# Patient Record
Sex: FEMALE | Race: BLACK OR AFRICAN AMERICAN | Employment: OTHER | URBAN - METROPOLITAN AREA
[De-identification: names, ages, dates, MRNs, and addresses within clinical notes are randomized per-mention and may not be internally consistent; named-entity substitution may affect disease eponyms.]

---

## 2017-01-13 ENCOUNTER — HOSPITAL ENCOUNTER (OUTPATIENT)
Dept: MAMMOGRAPHY | Age: 72
Discharge: HOME | End: 2017-01-13
Attending: NURSE PRACTITIONER
Payer: MEDICARE

## 2017-01-13 DIAGNOSIS — Z13.39 SCREENING FOR ALCOHOLISM: ICD-10-CM

## 2017-01-13 DIAGNOSIS — I10 ESSENTIAL HYPERTENSION WITH GOAL BLOOD PRESSURE LESS THAN 130/80: ICD-10-CM

## 2017-01-13 DIAGNOSIS — Z12.39 SCREENING FOR MALIGNANT NEOPLASM OF BREAST: ICD-10-CM

## 2017-01-13 DIAGNOSIS — M89.9 DISORDER OF BONE: ICD-10-CM

## 2017-01-13 DIAGNOSIS — R10.9 LEFT SIDED ABDOMINAL PAIN: ICD-10-CM

## 2017-01-13 DIAGNOSIS — Z71.3 DIETARY COUNSELING AND SURVEILLANCE: ICD-10-CM

## 2017-01-13 DIAGNOSIS — L73.2 HIDRADENITIS SUPPURATIVA: ICD-10-CM

## 2017-01-13 DIAGNOSIS — Z71.89 ADVANCE CARE PLANNING: ICD-10-CM

## 2017-01-13 DIAGNOSIS — E66.9 OBESITY, CLASS I, BMI 30.0-34.9 (SEE ACTUAL BMI): ICD-10-CM

## 2017-01-13 DIAGNOSIS — Z12.31 VISIT FOR SCREENING MAMMOGRAM: ICD-10-CM

## 2017-01-13 DIAGNOSIS — E55.9 VITAMIN D DEFICIENCY: ICD-10-CM

## 2017-01-13 DIAGNOSIS — Z71.82 EXERCISE COUNSELING: ICD-10-CM

## 2017-01-13 DIAGNOSIS — E78.5 HYPERLIPIDEMIA, UNSPECIFIED HYPERLIPIDEMIA TYPE: ICD-10-CM

## 2017-01-13 DIAGNOSIS — Z13.820 SCREENING FOR OSTEOPOROSIS: ICD-10-CM

## 2017-01-13 DIAGNOSIS — N20.0 RENAL CALCULI: ICD-10-CM

## 2017-01-13 DIAGNOSIS — Z00.00 ROUTINE GENERAL MEDICAL EXAMINATION AT A HEALTH CARE FACILITY: ICD-10-CM

## 2017-01-13 PROCEDURE — 77080 DXA BONE DENSITY AXIAL: CPT

## 2017-01-13 PROCEDURE — 77067 SCR MAMMO BI INCL CAD: CPT

## 2017-01-23 ENCOUNTER — OFFICE VISIT (OUTPATIENT)
Dept: FAMILY MEDICINE CLINIC | Age: 72
End: 2017-01-23

## 2017-01-23 VITALS
HEIGHT: 60 IN | SYSTOLIC BLOOD PRESSURE: 112 MMHG | TEMPERATURE: 98.9 F | DIASTOLIC BLOOD PRESSURE: 73 MMHG | OXYGEN SATURATION: 97 % | RESPIRATION RATE: 20 BRPM | WEIGHT: 170.8 LBS | BODY MASS INDEX: 33.53 KG/M2 | HEART RATE: 102 BPM

## 2017-01-23 DIAGNOSIS — E55.9 VITAMIN D DEFICIENCY: ICD-10-CM

## 2017-01-23 DIAGNOSIS — Z71.82 EXERCISE COUNSELING: ICD-10-CM

## 2017-01-23 DIAGNOSIS — E66.9 OBESITY, CLASS I, BMI 30.0-34.9 (SEE ACTUAL BMI): ICD-10-CM

## 2017-01-23 DIAGNOSIS — I10 ESSENTIAL HYPERTENSION WITH GOAL BLOOD PRESSURE LESS THAN 130/80: Primary | ICD-10-CM

## 2017-01-23 DIAGNOSIS — L73.2 HIDRADENITIS SUPPURATIVA: ICD-10-CM

## 2017-01-23 DIAGNOSIS — Z83.3 FAMILY HISTORY OF DIABETES MELLITUS: ICD-10-CM

## 2017-01-23 DIAGNOSIS — Z71.3 DIETARY COUNSELING AND SURVEILLANCE: ICD-10-CM

## 2017-01-23 DIAGNOSIS — E78.5 HYPERLIPIDEMIA, UNSPECIFIED HYPERLIPIDEMIA TYPE: ICD-10-CM

## 2017-01-23 NOTE — PROGRESS NOTES
Progress Note    Patient: Angela Larson MRN: [id number]  SSN: [id number]    YOB: 1945  Age: 70 y.o. Sex: female        Chief Complaint   Patient presents with    Hypertension    Diabetes    Labs         Subjective:   Cardiovascular Review:  The patient has diabetes, hypertension and hyperlipidemia. Diet and Lifestyle: generally follows a low fat low cholesterol diet, exercises regularly, nonsmoker, caffeine intake mild, alcohol intake none  Home BP Monitoring: is not measured at home. Pertinent ROS: taking medications as instructed, no medication side effects noted, no TIA's, no chest pain on exertion, no dyspnea on exertion, no swelling of ankles, no orthostatic dizziness or lightheadedness, no orthopnea or paroxysmal nocturnal dyspnea, no palpitations. Patient states that she is taking her medications as prescribed. No issues or concerns. Denies any problems with side-effects.      Diabetes Mellitus:  She has diabetes mellitus, and diabetes, hypertension and hyperlipidemia. Diabetic ROS - medication compliance: compliant all of the time, diabetic diet compliance: compliant all of the time, home glucose monitoring: is performed regularly, minimum reading is 120, maximum reading is 150, and average reading is 140. .   Lab review: orders written for new lab studies as appropriate; see orders, Patient states that she is taking her medications as prescribed. No problems with low blood sugar. Encounter Diagnoses   Name Primary?     Essential hypertension with goal blood pressure less than 130/80 Yes    Hyperlipidemia, unspecified hyperlipidemia type     Vitamin D deficiency     Hidradenitis suppurativa     Exercise counseling     Dietary counseling and surveillance     Obesity, Class I, BMI 30.0-34.9 (see actual BMI)      Current and past medical information:    Current Medications after this visit[de-identified]   Current Outpatient Prescriptions   Medication Sig    HYDROcodone-acetaminophen ([place]) 7.5-325 mg per tablet     tamsulosin (FLOMAX) 0.4 mg capsule     furosemide (LASIX) 40 mg tablet Take 1 Tab by mouth daily for 90 days. Indications: EDEMA    metFORMIN (GLUCOPHAGE) 500 mg tablet Take 1 Tab by mouth two (2) times daily (with meals).  apixaban (ELIQUIS) 5 mg tablet Take 1 Tab by mouth every twelve (12) hours.  oxybutynin (DITROPAN) 5 mg tablet Take 1 Tab by mouth three (3) times daily.  loratadine (CLARITIN) 10 mg tablet Take 1 Tab by mouth daily for 360 days.  ezetimibe (ZETIA) 10 mg tablet Take 1 Tab by mouth daily.  atorvastatin (LIPITOR) 80 mg tablet Take 1 Tab by mouth daily. No current facility-administered medications for this visit. Patient Active Problem List    Diagnosis Date Noted    Advanced care planning/counseling discussion 06/13/2016    H/O Pulmonary embolism (1/2015) 01/05/2015    Edema 08/27/2014    Hydradenitis 02/01/2012    HTN (hypertension) 01/21/2011    AR (allergic rhinitis) 05/21/2010    Eczema 05/21/2010    Depression 05/21/2010    Female stress incontinence 05/21/2010    Hyperlipidemia 05/21/2010       Past Medical History   Diagnosis Date    Arthritis     Diabetes ([facility] [address]      states she is \"pre-diabetic\", diet controlled    frequency     H/O blood clots      right side of body    Heart attack ([facility] [address] 1/2014    Hydradenitis 2/1/2012    Hypercholesterolemia     Hypertension     Ill-defined condition      edema of legs       Allergies   Allergen Reactions    Pcn [Penicillins] Rash       History reviewed. No pertinent past surgical history.     Social History     Social History    Marital status: LEGALLY      Spouse name: N/A    Number of children: N/A    Years of education: N/A     Social History Main Topics    Smoking status: Never Smoker    Smokeless tobacco: Never Used    Alcohol use No    Drug use: No    Sexual activity: No     Other Topics Concern    None     Social History Narrative       Review of Systems Constitutional: Negative. Negative for chills, diaphoresis, fever, malaise/fatigue and weight loss. HENT: Negative. Negative for congestion and sore throat. Eyes: Negative. Negative for blurred vision, double vision, photophobia and pain. Respiratory: Negative. Negative for cough, hemoptysis, sputum production, shortness of breath and wheezing. Cardiovascular: Negative. Negative for chest pain, palpitations, orthopnea, claudication, leg swelling and PND. Gastrointestinal: Negative. Negative for abdominal pain, blood in stool, constipation, diarrhea, heartburn, melena, nausea and vomiting. Genitourinary: Negative. Negative for dysuria, flank pain, frequency, hematuria and urgency. Musculoskeletal: Negative. Negative for back pain, falls, joint pain, myalgias and neck pain. Skin: Negative for itching and rash. Abscesses under both axillary areas   Neurological: Negative for dizziness, tingling, tremors, sensory change, speech change, focal weakness, seizures, loss of consciousness, weakness and headaches. Endo/Heme/Allergies: Negative. Negative for environmental allergies and polydipsia. Does not bruise/bleed easily. Psychiatric/Behavioral: Negative. Negative for depression, hallucinations, memory loss, substance abuse and suicidal ideas. The patient is not nervous/anxious and does not have insomnia. Objective:     Vitals:    01/23/17 1339   BP: 112/73   Pulse: (!) 102   Resp: 20   Temp: 98.9 °F (37.2 °C)   TempSrc: Oral   SpO2: 97%   Weight: 170 lb 12.8 oz (77.5 kg)   Height: 5' (1.524 m)      Body mass index is 33.36 kg/(m^2). Physical Exam   Constitutional: She is oriented to person, place, and time and well-developed, well-nourished, and in no distress. No distress. HENT:   Head: Normocephalic and atraumatic.    Right Ear: External ear normal.   Left Ear: External ear normal.   Nose: Nose normal.   Mouth/Throat: Oropharynx is clear and moist. No oropharyngeal exudate. Eyes: Conjunctivae and EOM are normal. Pupils are equal, round, and reactive to light. Right eye exhibits no discharge. Left eye exhibits no discharge. No scleral icterus. Neck: Normal range of motion. Neck supple. No JVD present. No tracheal deviation present. No thyromegaly present. Cardiovascular: Normal rate, regular rhythm, normal heart sounds and intact distal pulses. Exam reveals no gallop and no friction rub. No murmur heard. Pulmonary/Chest: Effort normal and breath sounds normal. No stridor. No respiratory distress. She has no wheezes. She has no rales. She exhibits no tenderness. Abdominal: Soft. Normal appearance, normal aorta and bowel sounds are normal. She exhibits no shifting dullness, no distension, no pulsatile liver, no fluid wave, no abdominal bruit, no ascites, no pulsatile midline mass and no mass. There is no hepatosplenomegaly. There is no tenderness. There is no rebound, no guarding and no CVA tenderness. Musculoskeletal: Normal range of motion. She exhibits edema. She exhibits no tenderness or deformity. 2-3 + edema is noted in the bilateral lower extremities. Lymphadenopathy:     She has no cervical adenopathy. Neurological: She is alert and oriented to person, place, and time. She displays normal reflexes. No cranial nerve deficit. She exhibits normal muscle tone. Gait normal. Coordination normal. GCS score is 15. Skin: Skin is warm and dry. No rash noted. She is not diaphoretic. There is erythema. No pallor. Patient has multiple abscesses under the bilateral axillary area. Some has some open areas with yellow/white drainage noted. Painful on palpation. Psychiatric: Mood, memory, affect and judgment normal.   Nursing note and vitals reviewed.         Health Maintenance Due   Topic Date Due    BREAST CANCER SCRN MAMMOGRAM  03/05/1995    FOBT Q 1 YEAR AGE 50-75  03/05/1995    ZOSTER VACCINE AGE 60>  03/05/2005    GLAUCOMA SCREENING Q2Y  03/05/2010  Pneumococcal 65+ Low/Medium Risk (1 of 2 - PCV13) 03/05/2010       Assessment and orders:       ICD-10-CM ICD-9-CM    1. Essential hypertension with goal blood pressure less than 130/80 I10 401.9 Self management goals of living with hypertension include:   A. Taking medicine as prescribed,  B. Monitoring blood pressure response to therapy  C. Adopting lifestyle recommendations--increasing exercise, decreasing salt intake, and increasing fruits and vegetable consumption. Our goal is to normalize the blood pressure to decrease the risks of strokes and heart attacks. The patient is in agreement with the plan. Information printed and given to the patient for review. 2. Hyperlipidemia, unspecified hyperlipidemia type E78.5 272.4 The patient is aware of our goal to reduce or eliminate the long term problems (such as strokes and heart attacks) related to poorly controlled hyperlipidemia. Discussion about strict diet modification along with as much exercise as possible. Information printed and given to the patient for review. 3. Vitamin D deficiency E55.9 268.9 Stable at this visit. Patient to continue with her current treatment. 4. Hidradenitis suppurativa L73.2 705.83 Patient to keep her appointment with derm in February. Patient continues to have pain in the bilateral under arms. 5. Exercise counseling Z71.89 V65.41 Physical activity information given to patient and printed for review. 6. Dietary counseling and surveillance Z71.3 V65.3 Dietary information discussed with patient and printed for review. 7. Obesity, Class I, BMI 30.0-34.9 (see actual BMI) E66.9 278.00 I have reviewed/discussed the above normal BMI with the patient. I have recommended the following interventions: dietary management education, guidance, and counseling, dietary needs education, encourage exercise, feeding regime, lifestyle education regarding diet, monitor weight, nutrition therapy and nutrition/feeding management . The plan is as follows: I have counseled this patient on diet and exercise regimens. .             Plan of care:  Discussed diagnoses in detail with patient. Medication risks/benefits/side effects discussed with patient. All of the patient's questions were addressed. The patient understands and agrees with our plan of care. The patient knows to call back if they are unsure of or forget any changes we discussed today or if the symptoms change. The patient received an After-Visit Summary which contains VS, orders, medication list and allergy list. This can be used as a \"mini-medical record\" should they have to seek medical care while out of town.     Patient Care Team:  Boogie Leahy NP as PCP - General (Nurse Practitioner)  Deana Fuentes MD (General Surgery)  Evangelina Young MD (Cardiology)  Kimo Morales DPM (Podiatry)  Deana Fuentes MD (General Surgery)  Myrna Carias RN as Nurse Navigator  Marissa Munoz NP (Nurse Practitioner)    Follow-up Disposition: Not on File    Future Appointments  Date Time Provider [facility]   3/20/2017 2:20 PM Evangelina Young MD [address] [place] [address]       Signed By: Boogie Leahy NP     January 23, 2017

## 2017-01-23 NOTE — PATIENT INSTRUCTIONS
Body Mass Index: Care Instructions  Your Care Instructions    Body mass index (BMI) can help you see if your weight is raising your risk for health problems. It uses a formula to compare how much you weigh with how tall you are. A BMI between 18.5 and 24.9 is considered healthy. A BMI between 25 and 29.9 is considered overweight. A BMI of 30 or higher is considered obese. If your BMI is in the normal range, it means that you have a lower risk for weight-related health problems. If your BMI is in the overweight or obese range, you may be at increased risk for weight-related health problems, such as high blood pressure, heart disease, stroke, arthritis or joint pain, and diabetes. BMI is just one measure of your risk for weight-related health problems. You may be at higher risk for health problems if you are not active, you eat an unhealthy diet, or you drink too much alcohol or use tobacco products. Follow-up care is a key part of your treatment and safety. Be sure to make and go to all appointments, and call your doctor if you are having problems. It's also a good idea to know your test results and keep a list of the medicines you take. How can you care for yourself at home? · Practice healthy eating habits. This includes eating plenty of fruits, vegetables, whole grains, lean protein, and low-fat dairy. · Get at least 30 minutes of exercise 5 days a week or more. Brisk walking is a good choice. You also may want to do other activities, such as running, swimming, cycling, or playing tennis or team sports. · Do not smoke. Smoking can increase your risk for health problems. If you need help quitting, talk to your doctor about stop-smoking programs and medicines. These can increase your chances of quitting for good. · Limit alcohol to 2 drinks a day for men and 1 drink a day for women. Too much alcohol can cause health problems.   If you have a BMI higher than 25  · Your doctor may do other tests to check your risk for weight-related health problems. This may include measuring the distance around your waist. A waist measurement of more than 40 inches in men or 35 inches in women can increase the risk of weight-related health problems. · Talk with your doctor about steps you can take to stay healthy or improve your health. You may need to make lifestyle changes to lose weight and stay healthy, such as changing your diet and getting regular exercise. Where can you learn more? Go to http://milagros-vitaliy.info/. Enter S176 in the search box to learn more about \"Body Mass Index: Care Instructions. \"  Current as of: February 16, 2016  Content Version: 11.1  © 7302-8469 [employer]. Care instructions adapted under license by [employer] (which disclaims liability or warranty for this information). If you have questions about a medical condition or this instruction, always ask your healthcare professional. [address] any warranty or liability for your use of this information. Diabetic Renal Diet: Care Instructions  Your Care Instructions  You may already be spreading carbohydrate throughout your daily meals. When you also have kidney disease, you need to avoid foods that make your kidneys worse. Keep your blood sugar and blood pressure as near normal as you can to reduce your chance of kidney failure. Your doctor and dietitian will help you make an eating plan. It will be based on your body weight, size, and medical condition. You may need to limit salt, fluids, and protein. You also may need to limit minerals such as potassium and phosphorus. It takes planning, but there are plenty of tasty, healthy foods you can eat. Always talk with your doctor or dietitian before you make changes in your diet. Follow-up care is a key part of your treatment and safety. Be sure to make and go to all appointments, and call your doctor if you are having problems. It's also a good idea to know your test results and keep a list of the medicines you take. How can you care for yourself at home? · Work with your doctor or dietitian to create a food plan that guides your daily food choices. · Eat regular meals. Do not skip meals or go for many hours without eating. To help control your blood sugar, try to eat several small meals during the day, rather than three large ones. · You can use margarine, mayonnaise, and oil to add calories to your diet for energy. The healthiest oils are olive, canola, and safflower oils. · Talk to your dietitian about eating sweets, including honey and sugar. · Be safe with medicines. Take your medicines exactly as prescribed. Call your doctor if you think you are having a problem with your medicine. You will get more details on the specific medicines your doctor prescribes. · Do not take any other medicine without talking to your doctor first. This includes over-the-counter medicines, vitamins, and herbal products. · Limit alcohol to no more than 1 drink per day. Count it as part of your fluid allowance. To get the right amount of protein  · Ask your doctor or dietitian how much protein you can have each day. You need some protein to stay healthy. · Include all sources of protein in your daily protein count. Besides meat, poultry, and fish, protein is found in milk and milk products, breads, cereals, and most vegetables. To limit salt  · Do not add salt to your food. Avoid foods that list salt, sodium, or MSG as an ingredient. And look for \"reduced salt\" or \"low sodium\" on labels. · Do not use a salt substitute or lite salt unless your doctor says it is okay. (These products are high in potassium.)  · Avoid or use very small amounts of condiments and marinades. These include soy sauce, fish sauce, and barbecue sauce. They are high in sodium. · Avoid salted pretzels, chips, and other salted snacks.   · Check food labels to become more aware of the sodium content of foods. Foods that are high in sodium include soups; many canned foods; cured, smoked, or dried meats; and many packaged foods. To control carbohydrate  · Spread carbohydrate throughout the day. This helps to prevent high blood sugar after meals. Ask your dietitian how much carbohydrate you can have. Carbohydrate foods include:  ¨ Whole-grain and refined breads and cereals, and some vegetables such as peas and beans. ¨ Fruits, milk, and milk products (except cheese). ¨ Candy, table sugar, and regular carbonated drinks. To limit fluids  · Know what your fluid allowance is. Fill a pitcher with that amount of water every day. If you drink another fluid (such as coffee) that day, pour an equal amount out of the pitcher. · Foods that are liquid at room temperature count as fluids. These include ice cream and gelatin desserts such as Jell-O. To limit potassium  · Fruits that are low in potassium include blueberries and raspberries. · Vegetables that are low in potassium include cucumber and radishes. To limit phosphorus  · Follow your doctor's or dietitian's plan for your limit on milk and milk products in your diet. · Avoid nuts, peanut butter, seeds, lentils, beans, organ meats, and sardines. · Avoid cola drinks. · Avoid bran breads or bran cereals. They are high in phosphorus. Where can you learn more? Go to http://milagros-vitaliy.info/. Enter G799 in the search box to learn more about \"Diabetic Renal Diet: Care Instructions. \"  Current as of: May 23, 2016  Content Version: 11.1  © 3635-3861 [employer]. Care instructions adapted under license by [employer] (which disclaims liability or warranty for this information). If you have questions about a medical condition or this instruction, always ask your healthcare professional. [place] [address] any warranty or liability for your use of this information.        Eating Healthy Foods: Care Instructions  Your Care Instructions  Eating healthy foods can help lower your risk for disease. Healthy food gives you energy and keeps your heart strong, your brain active, your muscles working, and your bones strong. A healthy diet includes a variety of foods from the basic food groups: grains, vegetables, fruits, milk and milk products, and meat and beans. Some people may eat more of their favorite foods from only one food group and, as a result, miss getting the nutrients they need. So, it is important to pay attention not only to what you eat but also to what you are missing from your diet. You can eat a healthy, balanced diet by making a few small changes. Follow-up care is a key part of your treatment and safety. Be sure to make and go to all appointments, and call your doctor if you are having problems. Its also a good idea to know your test results and keep a list of the medicines you take. How can you care for yourself at home? Look at what you eat  · Keep a food diary for a week or two and record everything you eat or drink. Track the number of servings you eat from each food group. · For a balanced diet every day, eat a variety of:  ¨ 6 or more ounce-equivalents of grains, such as cereals, breads, crackers, rice, or pasta, every day. An ounce-equivalent is 1 slice of bread, 1 cup of ready-to-eat cereal, or ½ cup of cooked rice, cooked pasta, or cooked cereal.  ¨ 2½ cups of vegetables, especially:  § Dark-green vegetables such as broccoli and spinach. § Orange vegetables such as carrots and sweet potatoes. § Dry beans (such as barcenas and kidney beans) and peas (such as lentils). ¨ 2 cups of fresh, frozen, or canned fruit. A small apple or 1 banana or orange equals 1 cup. ¨ 3 cups of nonfat or low-fat milk, yogurt, or other milk products. ¨ 5½ ounces of meat and beans, such as chicken, fish, lean meat, beans, nuts, and seeds.  One egg, 1 tablespoon of peanut butter, ½ ounce nuts or seeds, or ¼ cup of cooked beans equals 1 ounce of meat. · Learn how to read food labels for serving sizes and ingredients. Fast-food and convenience-food meals often contain few or no fruits or vegetables. Make sure you eat some fruits and vegetables to make the meal more nutritious. · Look at your food diary. For each food group, add up what you have eaten and then divide the total by the number of days. This will give you an idea of how much you are eating from each food group. See if you can find some ways to change your diet to make it more healthy. Start small  · Do not try to make dramatic changes to your diet all at once. You might feel that you are missing out on your favorite foods and then be more likely to fail. · Start slowly, and gradually change your habits. Try some of the following:  ¨ Use whole wheat bread instead of white bread. ¨ Use nonfat or low-fat milk instead of whole milk. ¨ Eat brown rice instead of white rice, and eat whole wheat pasta instead of white-flour pasta. ¨ Try low-fat cheeses and low-fat yogurt. ¨ Add more fruits and vegetables to meals and have them for snacks. ¨ Add lettuce, tomato, cucumber, and onion to sandwiches. ¨ Add fruit to yogurt and cereal.  Enjoy food  · You can still eat your favorite foods. You just may need to eat less of them. If your favorite foods are high in fat, salt, and sugar, limit how often you eat them, but do not cut them out entirely. · Eat a wide variety of foods. Make healthy choices when eating out  · The type of restaurant you choose can help you make healthy choices. Even fast-food chains are now offering more low-fat or healthier choices on the menu. · Choose smaller portions, or take half of your meal home. · When eating out, try:  ¨ A veggie pizza with a whole wheat crust or grilled chicken (instead of sausage or pepperoni). ¨ Pasta with roasted vegetables, grilled chicken, or marinara sauce instead of cream sauce.   ¨ A vegetable wrap or grilled chicken wrap. ¨ Broiled or poached food instead of fried or breaded items. Make healthy choices easy  · Buy packaged, prewashed, ready-to-eat fresh vegetables and fruits, such as baby carrots, salad mixes, and chopped or shredded broccoli and cauliflower. · Buy packaged, presliced fruits, such as melon or pineapple. · Choose 100% fruit or vegetable juice instead of soda. Limit juice intake to 4 to 6 oz (½ to ¾ cup) a day. · Blend low-fat yogurt, fruit juice, and canned or frozen fruit to make a smoothie for breakfast or a snack. Where can you learn more? Go to http://milagros-vitaliy.info/. Enter T756 in the search box to learn more about \"Eating Healthy Foods: Care Instructions. \"  Current as of: November 20, 2015  Content Version: 11.1  © 5980-1867 [employer]. Care instructions adapted under license by [employer] (which disclaims liability or warranty for this information). If you have questions about a medical condition or this instruction, always ask your healthcare professional. [place] [address] any warranty or liability for your use of this information. Learning About Healthy Weight  What is a healthy weight? A healthy weight is the weight at which you feel good about yourself and have energy for work and play. It's also one that lowers your risk for health problems. What can you do to stay at a healthy weight? It can be hard to stay at a healthy weight, especially when fast food, vending-machine snacks, and processed foods are so easy to find. And with your busy lifestyle, activity may be low on your list of things to do. But staying at a healthy weight may be easier than you think. Here are some dos and don'ts for staying at a healthy weight:  Do eat healthy foods  The kinds of foods you eat have a big impact on both your weight and your health.  Reaching and staying at a healthy weight is not about going on a diet. It's about making healthier food choices every day and changing your diet for good. Healthy eating means eating a variety of foods so that you get all the nutrients you need. Your body needs protein, carbohydrate, and fats for energy. They keep your heart beating, your brain active, and your muscles working. On most days, try to eat from each food group. This means eating a variety of:  · Whole grains, such as whole wheat breads and pastas. · Fruits and vegetables. · Dairy products, such as low-fat milk, yogurt, and cheese. · Lean proteins, such as all types of fish, chicken without the skin, and beans. Don't have too much or too little of one thing. All foods, if eaten in moderation, can be part of healthy eating. Even sweets can be okay. If your favorite foods are high in fat, salt, sugar, or calories, limit how often you eat them. Eat smaller servings, or look for healthy substitutes. Do watch what you eat  Many people eat more than their bodies need. Part of staying at a healthy weight means learning how much food you really need from day to day and not eating more than that. Even with healthy foods, eating too much can make you gain weight. Having a well-balanced diet means that you eat enough, but not too much, and that your food gives you the nutrients you need to stay healthy. So listen to your body. Eat when you're hungry. Stop when you feel satisfied. It's a good idea to have healthy snacks ready for when you get hungry. Keep healthy snacks with you at work, in your car, and at home. If you have a healthy snack easily available, you'll be less likely to pick a candy bar or bag of chips from a vending machine instead. Some healthy snacks you might want to keep on hand are fruit, low-fat yogurt, string cheese, low-fat microwave popcorn, raisins and other dried fruit, nuts, whole wheat crackers, pretzels, carrots, celery sticks, and broccoli.   Do some physical activity  A big part of reaching and staying at a healthy weight is being active. When you're active, you burn calories. This makes it easier to reach and stay at a healthy weight. When you're active on a regular basis, your body burns more calories, even when you're at rest. Being active helps you lose fat and build lean muscle. Try to be active for at least 1 hour every day. This may sound like a lot, but it's okay to be active in smaller blocks of time that add up to 1 hour a day. Any activity that makes your heart beat faster and keeps it there for a while counts. A brisk walk, run, or swim will get your heart beating faster. So will climbing stairs, shooting baskets, or cycling. Even some household chores like vacuuming and mowing the lawn will get your heart rate up. Pick activities that you enjoy--ones that make your heart beat faster, your muscles stronger, and your muscles and joints more flexible. If you find more than one thing you like doing, do them all. You don't have to do the same thing every day. Don't diet  Diets don't work. Diets are temporary. Because you give up so much when you diet, you may be hungry and think about food all the time. And after you stop dieting, you also may overeat to make up for what you missed. Most people who diet end up gaining back the pounds they lost--and more. Remember that healthy bodies come in lots of shapes and sizes. Everyone can get healthier by eating better and being more active. Where can you learn more? Go to http://milagros-vitaliy.info/. Enter 809 0177 in the search box to learn more about \"Learning About Healthy Weight. \"  Current as of: February 16, 2016  Content Version: 11.1  © 0398-2515 [employer]. Care instructions adapted under license by [employer] (which disclaims liability or warranty for this information).  If you have questions about a medical condition or this instruction, always ask your healthcare professional. [employer] disclaims any warranty or liability for your use of this information.

## 2017-01-23 NOTE — MR AVS SNAPSHOT
Visit Information Date & Time Provider Department Dept. Phone Encounter #  
 1/23/2017  1:25 PM Hyman ArmondLAINA [facility] [phone] [id number] Your Appointments 3/20/2017  2:20 PM  
ESTABLISHED PATIENT with Hemant Gomez MD  
[facility] ([facility] SCHEDULING) Appt Note: 1 yr fu appt  sll; ANNUAL  
 [address] [facility] 600 1007 [address] Upcoming Health Maintenance Date Due  
 BREAST CANCER SCRN MAMMOGRAM 3/5/1995 FOBT Q 1 YEAR AGE 50-75 3/5/1995 ZOSTER VACCINE AGE 60> 3/5/2005 GLAUCOMA SCREENING Q2Y 3/5/2010 Pneumococcal 65+ Low/Medium Risk (1 of 2 - PCV13) 3/5/2010 MEDICARE YEARLY EXAM 11/18/2017 DTaP/Tdap/Td series (2 - Td) 9/13/2022 Allergies as of 1/23/2017  Review Complete On: 1/23/2017 By: Veronica Forde Severity Noted Reaction Type Reactions Pcn [Penicillins]  01/03/2011    Rash Current Immunizations  Reviewed on 9/13/2012 Name Date TDAP Vaccine 9/13/2012 Not reviewed this visit You Were Diagnosed With   
  
 Codes Comments Essential hypertension with goal blood pressure less than 130/80    -  Primary ICD-10-CM: I10 
ICD-9-CM: 401.9 Hyperlipidemia, unspecified hyperlipidemia type     ICD-10-CM: E78.5 ICD-9-CM: 272.4 Vitamin D deficiency     ICD-10-CM: E55.9 ICD-9-CM: 268.9 Hidradenitis suppurativa     ICD-10-CM: L73.2 ICD-9-CM: 705.83 Exercise counseling     ICD-10-CM: Z71.89 ICD-9-CM: V65.41 Dietary counseling and surveillance     ICD-10-CM: Z71.3 ICD-9-CM: V65.3 Obesity, Class I, BMI 30.0-34.9 (see actual BMI)     ICD-10-CM: E66.9 ICD-9-CM: 278.00 Family history of diabetes mellitus     ICD-10-CM: Z83.3 ICD-9-CM: V18.0 Vitals BP Pulse Temp Resp Height(growth percentile) Weight(growth percentile) 112/73 (!) 102 98.9 °F (37.2 °C) (Oral) 20 5' (1.524 m) 170 lb 12.8 oz (77.5 kg) SpO2 BMI OB Status Smoking Status 97% 33.36 kg/m2 Postmenopausal Never Smoker Vitals History BMI and BSA Data Body Mass Index Body Surface Area  
 33.36 kg/m 2 1.81 m 2 Preferred Pharmacy Pharmacy Name Phone [facility] [address] [place] [address] [address] [phone] Your Updated Medication List  
  
   
This list is accurate as of: 1/23/17  2:05 PM.  Always use your most recent med list.  
  
  
  
  
 apixaban 5 mg tablet Commonly known as:  Thao Falling Take 1 Tab by mouth every twelve (12) hours. atorvastatin 80 mg tablet Commonly known as:  LIPITOR Take 1 Tab by mouth daily. ezetimibe 10 mg tablet Commonly known as:  Paco Lamprey Take 1 Tab by mouth daily. furosemide 40 mg tablet Commonly known as:  LASIX Take 1 Tab by mouth daily for 90 days. Indications: EDEMA HYDROcodone-acetaminophen 7.5-325 mg per tablet Commonly known as:  NORCO  
  
 loratadine 10 mg tablet Commonly known as:  Shahnaz Shaylee Take 1 Tab by mouth daily for 360 days. metFORMIN 500 mg tablet Commonly known as:  GLUCOPHAGE Take 1 Tab by mouth two (2) times daily (with meals). oxybutynin 5 mg tablet Commonly known as:  XFPOCJCN Take 1 Tab by mouth three (3) times daily. We Performed the Following CBC WITH AUTOMATED DIFF [01524 CPT(R)] HEMOGLOBIN A1C WITH EAG [95243 CPT(R)] LIPID PANEL [25455 CPT(R)] MAGNESIUM [id number] CPT(R)] METABOLIC PANEL, COMPREHENSIVE [75348 CPT(R)] MICROALBUMIN, UR, RAND W/ MICROALBUMIN/CREA RATIO [id number] CPT(R)] PHOSPHORUS [48581 CPT(R)] PTH INTACT [79782 CPT(R)] T4, FREE [id number] CPT(R)] TSH 3RD GENERATION [31190 CPT(R)] URINALYSIS W/MICROSCOPIC [72042 CPT(R)] VITAMIN D, 25 HYDROXY [id number] CPT(R)] Patient Instructions Body Mass Index: Care Instructions Your Care Instructions Body mass index (BMI) can help you see if your weight is raising your risk for health problems. It uses a formula to compare how much you weigh with how tall you are. A BMI between 18.5 and 24.9 is considered healthy. A BMI between 25 and 29.9 is considered overweight. A BMI of 30 or higher is considered obese. If your BMI is in the normal range, it means that you have a lower risk for weight-related health problems. If your BMI is in the overweight or obese range, you may be at increased risk for weight-related health problems, such as high blood pressure, heart disease, stroke, arthritis or joint pain, and diabetes. BMI is just one measure of your risk for weight-related health problems. You may be at higher risk for health problems if you are not active, you eat an unhealthy diet, or you drink too much alcohol or use tobacco products. Follow-up care is a key part of your treatment and safety. Be sure to make and go to all appointments, and call your doctor if you are having problems. It's also a good idea to know your test results and keep a list of the medicines you take. How can you care for yourself at home? · Practice healthy eating habits. This includes eating plenty of fruits, vegetables, whole grains, lean protein, and low-fat dairy. · Get at least 30 minutes of exercise 5 days a week or more. Brisk walking is a good choice. You also may want to do other activities, such as running, swimming, cycling, or playing tennis or team sports. · Do not smoke. Smoking can increase your risk for health problems. If you need help quitting, talk to your doctor about stop-smoking programs and medicines. These can increase your chances of quitting for good. · Limit alcohol to 2 drinks a day for men and 1 drink a day for women. Too much alcohol can cause health problems. If you have a BMI higher than 25 · Your doctor may do other tests to check your risk for weight-related health problems. This may include measuring the distance around your waist. A waist measurement of more than 40 inches in men or 35 inches in women can increase the risk of weight-related health problems. · Talk with your doctor about steps you can take to stay healthy or improve your health. You may need to make lifestyle changes to lose weight and stay healthy, such as changing your diet and getting regular exercise. Where can you learn more? Go to http://milagros-vitaliy.info/. Enter S176 in the search box to learn more about \"Body Mass Index: Care Instructions. \" Current as of: February 16, 2016 Content Version: 11.1 © 3804-5201 [employer]. Care instructions adapted under license by [employer] (which disclaims liability or warranty for this information). If you have questions about a medical condition or this instruction, always ask your healthcare professional. [address] any warranty or liability for your use of this information. Diabetic Renal Diet: Care Instructions Your Care Instructions You may already be spreading carbohydrate throughout your daily meals. When you also have kidney disease, you need to avoid foods that make your kidneys worse. Keep your blood sugar and blood pressure as near normal as you can to reduce your chance of kidney failure. Your doctor and dietitian will help you make an eating plan. It will be based on your body weight, size, and medical condition. You may need to limit salt, fluids, and protein. You also may need to limit minerals such as potassium and phosphorus. It takes planning, but there are plenty of tasty, healthy foods you can eat. Always talk with your doctor or dietitian before you make changes in your diet. Follow-up care is a key part of your treatment and safety.  Be sure to make and go to all appointments, and call your doctor if you are having problems. It's also a good idea to know your test results and keep a list of the medicines you take. How can you care for yourself at home? · Work with your doctor or dietitian to create a food plan that guides your daily food choices. · Eat regular meals. Do not skip meals or go for many hours without eating. To help control your blood sugar, try to eat several small meals during the day, rather than three large ones. · You can use margarine, mayonnaise, and oil to add calories to your diet for energy. The healthiest oils are olive, canola, and safflower oils. · Talk to your dietitian about eating sweets, including honey and sugar. · Be safe with medicines. Take your medicines exactly as prescribed. Call your doctor if you think you are having a problem with your medicine. You will get more details on the specific medicines your doctor prescribes. · Do not take any other medicine without talking to your doctor first. This includes over-the-counter medicines, vitamins, and herbal products. · Limit alcohol to no more than 1 drink per day. Count it as part of your fluid allowance. To get the right amount of protein · Ask your doctor or dietitian how much protein you can have each day. You need some protein to stay healthy. · Include all sources of protein in your daily protein count. Besides meat, poultry, and fish, protein is found in milk and milk products, breads, cereals, and most vegetables. To limit salt · Do not add salt to your food. Avoid foods that list salt, sodium, or MSG as an ingredient. And look for \"reduced salt\" or \"low sodium\" on labels. · Do not use a salt substitute or lite salt unless your doctor says it is okay. (These products are high in potassium.) · Avoid or use very small amounts of condiments and marinades. These include soy sauce, fish sauce, and barbecue sauce. They are high in sodium. · Avoid salted pretzels, chips, and other salted snacks. · Check food labels to become more aware of the sodium content of foods. Foods that are high in sodium include soups; many canned foods; cured, smoked, or dried meats; and many packaged foods. To control carbohydrate · Spread carbohydrate throughout the day. This helps to prevent high blood sugar after meals. Ask your dietitian how much carbohydrate you can have. Carbohydrate foods include: ¨ Whole-grain and refined breads and cereals, and some vegetables such as peas and beans. ¨ Fruits, milk, and milk products (except cheese). ¨ Candy, table sugar, and regular carbonated drinks. To limit fluids · Know what your fluid allowance is. Fill a pitcher with that amount of water every day. If you drink another fluid (such as coffee) that day, pour an equal amount out of the pitcher. · Foods that are liquid at room temperature count as fluids. These include ice cream and gelatin desserts such as Jell-O. To limit potassium · Fruits that are low in potassium include blueberries and raspberries. · Vegetables that are low in potassium include cucumber and radishes. To limit phosphorus · Follow your doctor's or dietitian's plan for your limit on milk and milk products in your diet. · Avoid nuts, peanut butter, seeds, lentils, beans, organ meats, and sardines. · Avoid cola drinks. · Avoid bran breads or bran cereals. They are high in phosphorus. Where can you learn more? Go to http://milagros-vitaliy.info/. Enter N544 in the search box to learn more about \"Diabetic Renal Diet: Care Instructions. \" Current as of: May 23, 2016 Content Version: 11.1 © 3445-5983 [employer] Care instructions adapted under license by surespot (which disclaims liability or warranty for this information).  If you have questions about a medical condition or this instruction, always ask your healthcare professional. Gwendolyn Anglin[employer]orporated disclaims any warranty or liability for your use of this information. Eating Healthy Foods: Care Instructions Your Care Instructions Eating healthy foods can help lower your risk for disease. Healthy food gives you energy and keeps your heart strong, your brain active, your muscles working, and your bones strong. A healthy diet includes a variety of foods from the basic food groups: grains, vegetables, fruits, milk and milk products, and meat and beans. Some people may eat more of their favorite foods from only one food group and, as a result, miss getting the nutrients they need. So, it is important to pay attention not only to what you eat but also to what you are missing from your diet. You can eat a healthy, balanced diet by making a few small changes. Follow-up care is a key part of your treatment and safety. Be sure to make and go to all appointments, and call your doctor if you are having problems. Its also a good idea to know your test results and keep a list of the medicines you take. How can you care for yourself at home? Look at what you eat · Keep a food diary for a week or two and record everything you eat or drink. Track the number of servings you eat from each food group. · For a balanced diet every day, eat a variety of: ¨ 6 or more ounce-equivalents of grains, such as cereals, breads, crackers, rice, or pasta, every day. An ounce-equivalent is 1 slice of bread, 1 cup of ready-to-eat cereal, or ½ cup of cooked rice, cooked pasta, or cooked cereal. 
¨ 2½ cups of vegetables, especially: § Dark-green vegetables such as broccoli and spinach. § Orange vegetables such as carrots and sweet potatoes. § Dry beans (such as barcenas and kidney beans) and peas (such as lentils). ¨ 2 cups of fresh, frozen, or canned fruit. A small apple or 1 banana or orange equals 1 cup. ¨ 3 cups of nonfat or low-fat milk, yogurt, or other milk products. ¨ 5½ ounces of meat and beans, such as chicken, fish, lean meat, beans, nuts, and seeds. One egg, 1 tablespoon of peanut butter, ½ ounce nuts or seeds, or ¼ cup of cooked beans equals 1 ounce of meat. · Learn how to read food labels for serving sizes and ingredients. Fast-food and convenience-food meals often contain few or no fruits or vegetables. Make sure you eat some fruits and vegetables to make the meal more nutritious. · Look at your food diary. For each food group, add up what you have eaten and then divide the total by the number of days. This will give you an idea of how much you are eating from each food group. See if you can find some ways to change your diet to make it more healthy. Start small · Do not try to make dramatic changes to your diet all at once. You might feel that you are missing out on your favorite foods and then be more likely to fail. · Start slowly, and gradually change your habits. Try some of the following: ¨ Use whole wheat bread instead of white bread. ¨ Use nonfat or low-fat milk instead of whole milk. ¨ Eat brown rice instead of white rice, and eat whole wheat pasta instead of white-flour pasta. ¨ Try low-fat cheeses and low-fat yogurt. ¨ Add more fruits and vegetables to meals and have them for snacks. ¨ Add lettuce, tomato, cucumber, and onion to sandwiches. ¨ Add fruit to yogurt and cereal. 
Enjoy food · You can still eat your favorite foods. You just may need to eat less of them. If your favorite foods are high in fat, salt, and sugar, limit how often you eat them, but do not cut them out entirely. · Eat a wide variety of foods. Make healthy choices when eating out · The type of restaurant you choose can help you make healthy choices. Even fast-food chains are now offering more low-fat or healthier choices on the menu. · Choose smaller portions, or take half of your meal home. · When eating out, try: ¨ A veggie pizza with a whole wheat crust or grilled chicken (instead of sausage or pepperoni). ¨ Pasta with roasted vegetables, grilled chicken, or marinara sauce instead of cream sauce. ¨ A vegetable wrap or grilled chicken wrap. ¨ Broiled or poached food instead of fried or breaded items. Make healthy choices easy · Buy packaged, prewashed, ready-to-eat fresh vegetables and fruits, such as baby carrots, salad mixes, and chopped or shredded broccoli and cauliflower. · Buy packaged, presliced fruits, such as melon or pineapple. · Choose 100% fruit or vegetable juice instead of soda. Limit juice intake to 4 to 6 oz (½ to ¾ cup) a day. · Blend low-fat yogurt, fruit juice, and canned or frozen fruit to make a smoothie for breakfast or a snack. Where can you learn more? Go to http://milagros-vitaliy.info/. Enter T756 in the search box to learn more about \"Eating Healthy Foods: Care Instructions. \" Current as of: November 20, 2015 Content Version: 11.1 © 5663-8495 Mediv[employer]. Care instructions adapted under license by Planet Daily (which disclaims liability or warranty for this information). If you have questions about a medical condition or this instruction, always ask your healthcare professional. [place] [address] any warranty or liability for your use of this information. Learning About Healthy Weight What is a healthy weight? A healthy weight is the weight at which you feel good about yourself and have energy for work and play. It's also one that lowers your risk for health problems. What can you do to stay at a healthy weight? It can be hard to stay at a healthy weight, especially when fast food, vending-machine snacks, and processed foods are so easy to find. And with your busy lifestyle, activity may be low on your list of things to do. But staying at a healthy weight may be easier than you think. Here are some dos and don'ts for staying at a healthy weight: 
Do eat healthy foods The kinds of foods you eat have a big impact on both your weight and your health. Reaching and staying at a healthy weight is not about going on a diet. It's about making healthier food choices every day and changing your diet for good. Healthy eating means eating a variety of foods so that you get all the nutrients you need. Your body needs protein, carbohydrate, and fats for energy. They keep your heart beating, your brain active, and your muscles working. On most days, try to eat from each food group. This means eating a variety of: · Whole grains, such as whole wheat breads and pastas. · Fruits and vegetables. · Dairy products, such as low-fat milk, yogurt, and cheese. · Lean proteins, such as all types of fish, chicken without the skin, and beans. Don't have too much or too little of one thing. All foods, if eaten in moderation, can be part of healthy eating. Even sweets can be okay. If your favorite foods are high in fat, salt, sugar, or calories, limit how often you eat them. Eat smaller servings, or look for healthy substitutes. Do watch what you eat Many people eat more than their bodies need. Part of staying at a healthy weight means learning how much food you really need from day to day and not eating more than that. Even with healthy foods, eating too much can make you gain weight. Having a well-balanced diet means that you eat enough, but not too much, and that your food gives you the nutrients you need to stay healthy. So listen to your body. Eat when you're hungry. Stop when you feel satisfied. It's a good idea to have healthy snacks ready for when you get hungry. Keep healthy snacks with you at work, in your car, and at home. If you have a healthy snack easily available, you'll be less likely to pick a candy bar or bag of chips from a vending machine instead. Some healthy snacks you might want to keep on hand are fruit, low-fat yogurt, string cheese, low-fat microwave popcorn, raisins and other dried fruit, nuts, whole wheat crackers, pretzels, carrots, celery sticks, and broccoli. Do some physical activity A big part of reaching and staying at a healthy weight is being active. When you're active, you burn calories. This makes it easier to reach and stay at a healthy weight. When you're active on a regular basis, your body burns more calories, even when you're at rest. Being active helps you lose fat and build lean muscle. Try to be active for at least 1 hour every day. This may sound like a lot, but it's okay to be active in smaller blocks of time that add up to 1 hour a day. Any activity that makes your heart beat faster and keeps it there for a while counts. A brisk walk, run, or swim will get your heart beating faster. So will climbing stairs, shooting baskets, or cycling. Even some household chores like vacuuming and mowing the lawn will get your heart rate up. Pick activities that you enjoyones that make your heart beat faster, your muscles stronger, and your muscles and joints more flexible. If you find more than one thing you like doing, do them all. You don't have to do the same thing every day. Don't diet Diets don't work. Diets are temporary. Because you give up so much when you diet, you may be hungry and think about food all the time. And after you stop dieting, you also may overeat to make up for what you missed. Most people who diet end up gaining back the pounds they lostand more. Remember that healthy bodies come in lots of shapes and sizes. Everyone can get healthier by eating better and being more active. Where can you learn more? Go to http://milagros-vitaliy.info/. Enter 929 3653 in the search box to learn more about \"Learning About Healthy Weight. \" Current as of: February 16, 2016 Content Version: 11.1 © 6088-3230 Healthwise, Incorporated. Care instructions adapted under license by Limtel (which disclaims liability or warranty for this information). If you have questions about a medical condition or this instruction, always ask your healthcare professional. [address] any warranty or liability for your use of this information. Introducing [facility]! Gwendolyn Johnson introduces AimWith patient portal. Now you can access parts of your medical record, email your doctor's office, and request medication refills online. 1. In your internet browser, go to [email or web address] "[email or web address]."/[email or web address]. 2. Click on the First Time User? Click Here link in the Sign In box. You will see the New Member Sign Up page. 3. Enter your AimWith Access Code exactly as it appears below. You will not need to use this code after youve completed the sign-up process. If you do not sign up before the expiration date, you must request a new code. · AimWith Access Code: [email or web address] Expires: 3/18/2017  5:21 PM 
 
4. Enter the last four digits of your Social Security Number (xxxx) and Date of Birth (mm/dd/yyyy) as indicated and click Submit. You will be taken to the next sign-up page. 5. Create a AimWith ID. This will be your AimWith login ID and cannot be changed, so think of one that is secure and easy to remember. 6. Create a AimWith password. You can change your password at any time. 7. Enter your Password Reset Question and Answer. This can be used at a later time if you forget your password. 8. Enter your e-mail address. You will receive e-mail notification when new information is available in [address] 9. Click Sign Up. You can now view and download portions of your medical record. 10. Click the Download Summary menu link to download a portable copy of your medical information.  
 
If you have questions, please visit the Frequently Asked Questions section of the PATHSENSORS. Remember, [employer] Solutions[employer] is NOT to be used for urgent needs. For medical emergencies, dial 911. Now available from your iPhone and Android! Please provide this summary of care documentation to your next provider. Your primary care clinician is listed as [address] [place] [address]. If you have any questions after today's visit, please call [phone].

## 2017-01-23 NOTE — PROGRESS NOTES
Health Maintenance Due   Topic Date Due    BREAST CANCER SCRN MAMMOGRAM  03/05/1995    FOBT Q 1 YEAR AGE 50-75  03/05/1995    ZOSTER VACCINE AGE 60>  03/05/2005    GLAUCOMA SCREENING Q2Y  03/05/2010    Pneumococcal 65+ Low/Medium Risk (1 of 2 - PCV13) 03/05/2010       Diabetic Bundle:  LDL-122  A1C-6.8  BP-  Smoking?no  Anticoagulation medication?  yes  Eye exam dilated?good  Foot exam?good

## 2017-01-24 ENCOUNTER — TELEPHONE (OUTPATIENT)
Dept: FAMILY MEDICINE CLINIC | Age: 72
End: 2017-01-24

## 2017-01-24 LAB
25(OH)D3+25(OH)D2 SERPL-MCNC: 12.3 NG/ML (ref 30–100)
ALBUMIN SERPL-MCNC: 3.3 G/DL (ref 3.5–4.8)
ALBUMIN/CREAT UR: 10.2 MG/G CREAT (ref 0–30)
ALBUMIN/GLOB SERPL: 0.8 {RATIO} (ref 1.1–2.5)
ALP SERPL-CCNC: 136 IU/L (ref 39–117)
ALT SERPL-CCNC: 15 IU/L (ref 0–32)
APPEARANCE UR: ABNORMAL
AST SERPL-CCNC: 20 IU/L (ref 0–40)
BACTERIA #/AREA URNS HPF: ABNORMAL /[HPF]
BASOPHILS # BLD AUTO: 0 X10E3/UL (ref 0–0.2)
BASOPHILS NFR BLD AUTO: 0 %
BILIRUB SERPL-MCNC: 0.3 MG/DL (ref 0–1.2)
BILIRUB UR QL STRIP: NEGATIVE
BUN SERPL-MCNC: 11 MG/DL (ref 8–27)
BUN/CREAT SERPL: 14 (ref 11–26)
CALCIUM SERPL-MCNC: 9.1 MG/DL (ref 8.7–10.3)
CASTS URNS QL MICRO: ABNORMAL /LPF
CHLORIDE SERPL-SCNC: 98 MMOL/L (ref 96–106)
CHOLEST SERPL-MCNC: 192 MG/DL (ref 100–199)
CO2 SERPL-SCNC: 25 MMOL/L (ref 18–29)
COLOR UR: YELLOW
CREAT SERPL-MCNC: 0.8 MG/DL (ref 0.57–1)
CREAT UR-MCNC: 159.6 MG/DL
CRYSTALS URNS MICRO: ABNORMAL
EOSINOPHIL # BLD AUTO: 0.4 X10E3/UL (ref 0–0.4)
EOSINOPHIL NFR BLD AUTO: 3 %
EPI CELLS #/AREA URNS HPF: ABNORMAL /HPF
ERYTHROCYTE [DISTWIDTH] IN BLOOD BY AUTOMATED COUNT: 15.1 % (ref 12.3–15.4)
EST. AVERAGE GLUCOSE BLD GHB EST-MCNC: 148 MG/DL
GLOBULIN SER CALC-MCNC: 4.4 G/DL (ref 1.5–4.5)
GLUCOSE SERPL-MCNC: 116 MG/DL (ref 65–99)
GLUCOSE UR QL: NEGATIVE
HBA1C MFR BLD: 6.8 % (ref 4.8–5.6)
HCT VFR BLD AUTO: 33.5 % (ref 34–46.6)
HDLC SERPL-MCNC: 48 MG/DL
HGB BLD-MCNC: 11.1 G/DL (ref 11.1–15.9)
HGB UR QL STRIP: ABNORMAL
IMM GRANULOCYTES # BLD: 0 X10E3/UL (ref 0–0.1)
IMM GRANULOCYTES NFR BLD: 0 %
KETONES UR QL STRIP: NEGATIVE
LDLC SERPL CALC-MCNC: 121 MG/DL (ref 0–99)
LEUKOCYTE ESTERASE UR QL STRIP: ABNORMAL
LYMPHOCYTES # BLD AUTO: 2.2 X10E3/UL (ref 0.7–3.1)
LYMPHOCYTES NFR BLD AUTO: 19 %
MAGNESIUM SERPL-MCNC: 1.8 MG/DL (ref 1.6–2.3)
MCH RBC QN AUTO: 28.1 PG (ref 26.6–33)
MCHC RBC AUTO-ENTMCNC: 33.1 G/DL (ref 31.5–35.7)
MCV RBC AUTO: 85 FL (ref 79–97)
MICRO URNS: ABNORMAL
MICROALBUMIN UR-MCNC: 16.2 UG/ML
MONOCYTES # BLD AUTO: 0.6 X10E3/UL (ref 0.1–0.9)
MONOCYTES NFR BLD AUTO: 5 %
MUCOUS THREADS URNS QL MICRO: PRESENT
NEUTROPHILS # BLD AUTO: 8.2 X10E3/UL (ref 1.4–7)
NEUTROPHILS NFR BLD AUTO: 73 %
NITRITE UR QL STRIP: NEGATIVE
PH UR STRIP: 6 [PH] (ref 5–7.5)
PHOSPHATE SERPL-MCNC: 3 MG/DL (ref 2.5–4.5)
PLATELET # BLD AUTO: 402 X10E3/UL (ref 150–379)
POTASSIUM SERPL-SCNC: 4 MMOL/L (ref 3.5–5.2)
PROT SERPL-MCNC: 7.7 G/DL (ref 6–8.5)
PROT UR QL STRIP: ABNORMAL
PTH-INTACT SERPL-MCNC: 15 PG/ML (ref 15–65)
RBC # BLD AUTO: 3.95 X10E6/UL (ref 3.77–5.28)
RBC #/AREA URNS HPF: ABNORMAL /HPF
SODIUM SERPL-SCNC: 139 MMOL/L (ref 134–144)
SP GR UR: 1.02 (ref 1–1.03)
T4 FREE SERPL-MCNC: 0.97 NG/DL (ref 0.82–1.77)
TRIGL SERPL-MCNC: 113 MG/DL (ref 0–149)
TSH SERPL DL<=0.005 MIU/L-ACNC: 2.84 UIU/ML (ref 0.45–4.5)
UNIDENT CRYS URNS QL MICRO: PRESENT
UROBILINOGEN UR STRIP-MCNC: 0.2 MG/DL (ref 0.2–1)
VLDLC SERPL CALC-MCNC: 23 MG/DL (ref 5–40)
WBC # BLD AUTO: 11.4 X10E3/UL (ref 3.4–10.8)
WBC #/AREA URNS HPF: >30 /HPF

## 2017-01-24 RX ORDER — CIPROFLOXACIN 500 MG/1
500 TABLET ORAL 2 TIMES DAILY
Qty: 20 TAB | Refills: 0 | Status: SHIPPED | OUTPATIENT
Start: 2017-01-24 | End: 2017-02-03

## 2017-01-25 NOTE — PROGRESS NOTES
Unable to reach patient by phone. Letter mailed to patient to schedule appointment to follow up as soon as possible.

## 2017-02-07 ENCOUNTER — OFFICE VISIT (OUTPATIENT)
Dept: FAMILY MEDICINE CLINIC | Age: 72
End: 2017-02-07

## 2017-02-07 VITALS
HEIGHT: 60 IN | TEMPERATURE: 99.5 F | HEART RATE: 91 BPM | RESPIRATION RATE: 12 BRPM | SYSTOLIC BLOOD PRESSURE: 129 MMHG | DIASTOLIC BLOOD PRESSURE: 72 MMHG | WEIGHT: 160 LBS | BODY MASS INDEX: 31.41 KG/M2

## 2017-02-07 DIAGNOSIS — H92.01 OTALGIA, RIGHT: Primary | ICD-10-CM

## 2017-02-07 DIAGNOSIS — H61.21 IMPACTED CERUMEN OF RIGHT EAR: ICD-10-CM

## 2017-02-07 NOTE — PROGRESS NOTES
Reviewed record in preparation for visit and have necessary documentation      Body mass index is 31.25 kg/(m^2).     Health Maintenance Due   Topic Date Due    FOBT Q 1 YEAR AGE 50-75  03/05/1995    ZOSTER VACCINE AGE 60>  03/05/2005    GLAUCOMA SCREENING Q2Y  03/05/2010    Pneumococcal 65+ Low/Medium Risk (1 of 2 - PCV13) 03/05/2010

## 2017-02-07 NOTE — MR AVS SNAPSHOT
Visit Information Date & Time Provider Department Dept. Phone Encounter #  
 2/7/2017  1:20 PM Shayne Conde  [facility] [phone] [id number] Your Appointments 2/20/2017  1:05 PM  
ROUTINE CARE with Lorrie Anaya  [facility] ([address]) Appt Note: 1 mo f/u-Per [place] [address] [facility] [address] [facility]   
[phone]  
  
   
 [facility] [id number]  
  
    
 3/20/2017  2:20 PM  
ESTABLISHED PATIENT with Milla Nobles MD  
[facility] ([facility] SCHEDULING) Appt Note: 1 yr fu appt  sll; ANNUAL  
 [address] [facility] [address] [facility]  
54 [facility] [address] [address] Upcoming Health Maintenance Date Due FOBT Q 1 YEAR AGE 50-75 3/5/1995 ZOSTER VACCINE AGE 60> 3/5/2005 GLAUCOMA SCREENING Q2Y 3/5/2010 Pneumococcal 65+ Low/Medium Risk (1 of 2 - PCV13) 3/5/2010 MEDICARE YEARLY EXAM 11/18/2017 BREAST CANCER SCRN MAMMOGRAM 1/13/2019 DTaP/Tdap/Td series (2 - Td) 9/13/2022 Allergies as of 2/7/2017  Review Complete On: 2/7/2017 By: Shayne Conde MD  
  
 Severity Noted Reaction Type Reactions Pcn [Penicillins]  01/03/2011    Rash Current Immunizations  Reviewed on 9/13/2012 Name Date TDAP Vaccine 9/13/2012 Not reviewed this visit You Were Diagnosed With   
  
 Codes Comments Otalgia, right    -  Primary ICD-10-CM: H92.01 
ICD-9-CM: 388.70 Impacted cerumen of right ear     ICD-10-CM: H61.21 ICD-9-CM: 380.4 Vitals BP Pulse Temp Resp Height(growth percentile) Weight(growth percentile) 129/72 91 99.5 °F (37.5 °C) (Oral) 12 5' (1.524 m) 160 lb (72.6 kg) BMI OB Status Smoking Status 31.25 kg/m2 Postmenopausal Never Smoker BMI and BSA Data Body Mass Index Body Surface Area 31.25 kg/m 2 1.75 m 2 Preferred Pharmacy Pharmacy Name Phone [facility] [address] [facility] [address] [phone] Your Updated Medication List  
  
   
This list is accurate as of: 2/7/17  1:56 PM.  Always use your most recent med list.  
  
  
  
  
 apixaban 5 mg tablet Commonly known as:  King Maze Take 1 Tab by mouth every twelve (12) hours. atorvastatin 80 mg tablet Commonly known as:  LIPITOR Take 1 Tab by mouth daily. ezetimibe 10 mg tablet Commonly known as:  Scott Och Take 1 Tab by mouth daily. furosemide 40 mg tablet Commonly known as:  LASIX Take 1 Tab by mouth daily for 90 days. Indications: EDEMA HYDROcodone-acetaminophen 7.5-325 mg per tablet Commonly known as:  NORCO  
  
 loratadine 10 mg tablet Commonly known as:  Herrera Seal Take 1 Tab by mouth daily for 360 days. metFORMIN 500 mg tablet Commonly known as:  GLUCOPHAGE Take 1 Tab by mouth two (2) times daily (with meals). oxybutynin 5 mg tablet Commonly known as:  VENMMWQI Take 1 Tab by mouth three (3) times daily. Patient Instructions Earwax Blockage: Care Instructions Your Care Instructions Earwax is a natural substance that protects the ear canal. Normally, earwax drains from the ears and does not cause problems. Sometimes earwax builds up and hardens. Earwax blockage (also called cerumen impaction) can cause some loss of hearing and pain. When wax is tightly packed, you will need to have your doctor remove it. Follow-up care is a key part of your treatment and safety. Be sure to make and go to all appointments, and call your doctor if you are having problems. Its also a good idea to know your test results and keep a list of the medicines you take. How can you care for yourself at home?  
· Do not try to remove earwax with cotton swabs, fingers, or other objects. This can make the blockage worse and damage the eardrum. · If your doctor recommends that you try to remove earwax at home: ¨ Soften and loosen the earwax with warm mineral oil. You also can try hydrogen peroxide mixed with an equal amount of room temperature water. Place 2 drops of the fluid, warmed to body temperature, in the ear two times a day for up to 5 days. ¨ Once the wax is loose and soft, all that is usually needed to remove it from the ear canal is a gentle, warm shower. Direct the water into the ear, then tip your head to let the earwax drain out. Dry your ear thoroughly with a hair dryer set on low. Hold the dryer several inches from your ear. ¨ If the warm mineral oil and shower do not work, use an over-the-counter wax softener followed by gentle flushing with an ear syringe each night for a week or two. Make sure the flushing solution is body temperature. Cool or hot fluids in the ear can cause dizziness. When should you call for help? Call your doctor now or seek immediate medical care if: · Pus or blood drains from your ear. · Your ears are ringing or feel full. · You have a loss of hearing. Watch closely for changes in your health, and be sure to contact your doctor if: 
· You have pain or reduced hearing after 1 week of home treatment. · You have any new symptoms, such as nausea or balance problems. Where can you learn more? Go to http://milagros-vitaliy.info/. Enter J503 in the search box to learn more about \"Earwax Blockage: Care Instructions. \" Current as of: May 27, 2016 Content Version: 11.1 © 3406-8684 [employer]. Care instructions adapted under license by [employer] (which disclaims liability or warranty for this information).  If you have questions about a medical condition or this instruction, always ask your healthcare professional. [address] any warranty or liability for your use of this information. Introducing [facility]! Lora Ibarra introduces yourdelivery patient portal. Now you can access parts of your medical record, email your doctor's office, and request medication refills online. 1. In your internet browser, go to [email or web address] 2. Click on the First Time User? Click Here link in the Sign In box. You will see the New Member Sign Up page. 3. Enter your yourdelivery Access Code exactly as it appears below. You will not need to use this code after youve completed the sign-up process. If you do not sign up before the expiration date, you must request a new code. · yourdelivery Access Code: [email or web address] Expires: 3/18/2017  5:21 PM 
 
4. Enter the last four digits of your Social Security Number (xxxx) and Date of Birth (mm/dd/yyyy) as indicated and click Submit. You will be taken to the next sign-up page. 5. Create a yourdelivery ID. This will be your yourdelivery login ID and cannot be changed, so think of one that is secure and easy to remember. 6. Create a yourdelivery password. You can change your password at any time. 7. Enter your Password Reset Question and Answer. This can be used at a later time if you forget your password. 8. Enter your e-mail address. You will receive e-mail notification when new information is available in [address] 9. Click Sign Up. You can now view and download portions of your medical record. 10. Click the Download Summary menu link to download a portable copy of your medical information. If you have questions, please visit the Frequently Asked Questions section of the yourdelivery website. Remember, yourdelivery is NOT to be used for urgent needs. For medical emergencies, dial 911. Now available from your iPhone and Android! Please provide this summary of care documentation to your next provider. Your primary care clinician is listed as [address].  If you have any questions after today's visit, please call [phone].

## 2017-02-07 NOTE — PATIENT INSTRUCTIONS
Earwax Blockage: Care Instructions  Your Care Instructions    Earwax is a natural substance that protects the ear canal. Normally, earwax drains from the ears and does not cause problems. Sometimes earwax builds up and hardens. Earwax blockage (also called cerumen impaction) can cause some loss of hearing and pain. When wax is tightly packed, you will need to have your doctor remove it. Follow-up care is a key part of your treatment and safety. Be sure to make and go to all appointments, and call your doctor if you are having problems. Its also a good idea to know your test results and keep a list of the medicines you take. How can you care for yourself at home? · Do not try to remove earwax with cotton swabs, fingers, or other objects. This can make the blockage worse and damage the eardrum. · If your doctor recommends that you try to remove earwax at home:  ¨ Soften and loosen the earwax with warm mineral oil. You also can try hydrogen peroxide mixed with an equal amount of room temperature water. Place 2 drops of the fluid, warmed to body temperature, in the ear two times a day for up to 5 days. ¨ Once the wax is loose and soft, all that is usually needed to remove it from the ear canal is a gentle, warm shower. Direct the water into the ear, then tip your head to let the earwax drain out. Dry your ear thoroughly with a hair dryer set on low. Hold the dryer several inches from your ear. ¨ If the warm mineral oil and shower do not work, use an over-the-counter wax softener followed by gentle flushing with an ear syringe each night for a week or two. Make sure the flushing solution is body temperature. Cool or hot fluids in the ear can cause dizziness. When should you call for help? Call your doctor now or seek immediate medical care if:  · Pus or blood drains from your ear. · Your ears are ringing or feel full. · You have a loss of hearing.   Watch closely for changes in your health, and be sure to contact your doctor if:  · You have pain or reduced hearing after 1 week of home treatment. · You have any new symptoms, such as nausea or balance problems. Where can you learn more? Go to http://milagros-vitaliy.info/. Enter J842 in the search box to learn more about \"Earwax Blockage: Care Instructions. \"  Current as of: May 27, 2016  Content Version: 11.1  © 8394-6348 [employer].. Care instructions adapted under license by [employer] (which disclaims liability or warranty for this information). If you have questions about a medical condition or this instruction, always ask your healthcare professional. [address] any warranty or liability for your use of this information.

## 2017-02-13 NOTE — PROGRESS NOTES
Patient: Marty Fontana MRN: [id number]  SSN: [id number]    YOB: 1945  Age: 70 y.o. Sex: female      Chief Complaint   Patient presents with    Ear Pain     Marty Fontana is a 70 y.o. female presents with complaints of right ear pain, hearing loss for several days. There has been no nausea and no vomiting . she has not had  congestion, headache and fever. Symptoms are mild. Patient is drinking plenty of fluids. Patient feeling good sans other complaints or concerns at this time. Medications:     Current Outpatient Prescriptions   Medication Sig    HYDROcodone-acetaminophen (NORCO) 7.5-325 mg per tablet     furosemide (LASIX) 40 mg tablet Take 1 Tab by mouth daily for 90 days. Indications: EDEMA    metFORMIN (GLUCOPHAGE) 500 mg tablet Take 1 Tab by mouth two (2) times daily (with meals).  apixaban (ELIQUIS) 5 mg tablet Take 1 Tab by mouth every twelve (12) hours.  oxybutynin (DITROPAN) 5 mg tablet Take 1 Tab by mouth three (3) times daily.  loratadine (CLARITIN) 10 mg tablet Take 1 Tab by mouth daily for 360 days.  ezetimibe (ZETIA) 10 mg tablet Take 1 Tab by mouth daily.  atorvastatin (LIPITOR) 80 mg tablet Take 1 Tab by mouth daily. No current facility-administered medications for this visit.         Problem List:     Patient Active Problem List    Diagnosis Date Noted    Advanced care planning/counseling discussion 06/13/2016    H/O Pulmonary embolism (1/2015) 01/05/2015    Edema 08/27/2014    Hydradenitis 02/01/2012    HTN (hypertension) 01/21/2011    AR (allergic rhinitis) 05/21/2010    Eczema 05/21/2010    Depression 05/21/2010    Female stress incontinence 05/21/2010    Hyperlipidemia 05/21/2010       Medical History:     Past Medical History   Diagnosis Date    Arthritis     Diabetes ([facility] [address]      states she is \"pre-diabetic\", diet controlled    frequency     H/O blood clots      right side of body    Heart attack ([facility] [address] 1/2014    Hydradenitis 2/1/2012    Hypercholesterolemia     Hypertension     Ill-defined condition      edema of legs       Allergies: Allergies   Allergen Reactions    Pcn [Penicillins] Rash       Surgical History:   History reviewed. No pertinent past surgical history.     Social History:     Social History     Social History    Marital status: LEGALLY      Spouse name: N/A    Number of children: N/A    Years of education: N/A     Social History Main Topics    Smoking status: Never Smoker    Smokeless tobacco: Never Used    Alcohol use No    Drug use: No    Sexual activity: No     Other Topics Concern    None     Social History Narrative       Review of Symptoms:  Constitutional: Negative for fever or chills  Skin: Negative for rash or lesion  Head: Negative for facial swelling or tenderness  Eyes: Negative for redness or discharge  Ears: Positive for otalgia and decreased hearing  Nose: Negative for nasal congestion, sinus pressure  Neck: Negative for sore throat, denies lymphadenopathy   Cardiovascular: Negative for chest pain or palpitations  Respiratory: Negative for cough, wheezing or SOB  Gastrointestinal: Negative for nausea or abdominal pain  Neurologic: Negative for headache or dizziness      Visit Vitals    /72    Pulse 91    Temp 99.5 °F (37.5 °C) (Oral)    Resp 12    Ht 5' (1.524 m)    Wt 160 lb (72.6 kg)    BMI 31.25 kg/m2       Physical Examination:  General: Well developed, well nourished, in no acute distress  Skin: Warm and dry sans rash or lesion  Head: Normocephalic, atraumatic  Eyes: Sclera clear, EOMI, PERRL  Ears: right cerumen impaction, tympanic membranes normal in appearance  Nose: mucosal edema with rhinorrhea  Oropharynx: posterior erythema, no exudate   Neck: Normal range of motion, no lymphadenopathy  Cardiovascular: normal S1, S2, regular rate and rhythm  Respiratory: Clear to auscultation bilaterally with symmetrical, unlabored effort  Extremities: Full range of motion  Neurologic: Active, alert and oriented      Vivian Abdalla was seen today for ear pain. Diagnoses and all orders for this visit:    Otalgia, right    Impacted cerumen of right ear  -     REMOVAL IMPACTED CERUMEN IRRIGATION/LVG UNILAT        I have discussed the diagnosis with the patient and the intended plan as seen in the above orders. The patient expresses understanding and agreement with our plan of care. All of the patient's questions were answered to apparent satisfaction. The patient has received an after-visit summary. The patient knows to call our office if there are any questions or concerns regarding diagnosis and treatment plans. I have discussed medication side effects and warnings with the patient as well. Follow-up Disposition:  Return if symptoms worsen or fail to improve.

## 2017-02-16 DIAGNOSIS — M85.80 OSTEOPENIA: Primary | ICD-10-CM

## 2017-02-16 RX ORDER — ALENDRONATE SODIUM 35 MG/1
35 TABLET ORAL
Qty: 4 TAB | Refills: 2 | Status: SHIPPED | OUTPATIENT
Start: 2017-02-16 | End: 2017-03-20 | Stop reason: ALTCHOICE

## 2017-02-20 ENCOUNTER — OFFICE VISIT (OUTPATIENT)
Dept: FAMILY MEDICINE CLINIC | Age: 72
End: 2017-02-20

## 2017-02-20 VITALS
HEART RATE: 83 BPM | RESPIRATION RATE: 14 BRPM | OXYGEN SATURATION: 99 % | SYSTOLIC BLOOD PRESSURE: 102 MMHG | TEMPERATURE: 97.8 F | BODY MASS INDEX: 32.39 KG/M2 | DIASTOLIC BLOOD PRESSURE: 67 MMHG | HEIGHT: 60 IN | WEIGHT: 165 LBS

## 2017-02-20 DIAGNOSIS — I10 ESSENTIAL HYPERTENSION WITH GOAL BLOOD PRESSURE LESS THAN 130/80: Primary | ICD-10-CM

## 2017-02-20 DIAGNOSIS — L73.2 HIDRADENITIS SUPPURATIVA: ICD-10-CM

## 2017-02-20 DIAGNOSIS — Z12.11 SCREENING FOR COLON CANCER: ICD-10-CM

## 2017-02-20 DIAGNOSIS — E78.5 HYPERLIPIDEMIA, UNSPECIFIED HYPERLIPIDEMIA TYPE: ICD-10-CM

## 2017-02-20 DIAGNOSIS — E66.9 OBESITY, CLASS I, BMI 30.0-34.9 (SEE ACTUAL BMI): ICD-10-CM

## 2017-02-20 DIAGNOSIS — Z23 NEED FOR STREPTOCOCCUS PNEUMONIAE VACCINATION: ICD-10-CM

## 2017-02-20 DIAGNOSIS — E66.9 OBESITY (BMI 30-39.9): ICD-10-CM

## 2017-02-20 DIAGNOSIS — Z71.3 DIETARY COUNSELING AND SURVEILLANCE: ICD-10-CM

## 2017-02-20 DIAGNOSIS — Z71.82 EXERCISE COUNSELING: ICD-10-CM

## 2017-02-20 DIAGNOSIS — E55.9 VITAMIN D DEFICIENCY: ICD-10-CM

## 2017-02-20 DIAGNOSIS — Z23 NEED FOR SHINGLES VACCINE: ICD-10-CM

## 2017-02-20 RX ORDER — ACETAMINOPHEN 500 MG
2000 TABLET ORAL 2 TIMES DAILY
Qty: 180 CAP | Refills: 0 | Status: SHIPPED | OUTPATIENT
Start: 2017-02-20 | End: 2017-03-20 | Stop reason: ALTCHOICE

## 2017-02-20 RX ORDER — DOXYCYCLINE 100 MG/1
CAPSULE ORAL
COMMUNITY
Start: 2017-02-04 | End: 2017-11-29

## 2017-02-20 RX ORDER — CLINDAMYCIN PHOSPHATE 10 UG/ML
LOTION TOPICAL
COMMUNITY
Start: 2017-02-02 | End: 2017-10-05 | Stop reason: SDUPTHER

## 2017-02-20 NOTE — PROGRESS NOTES
Reviewed record in preparation for visit and have necessary documentation      Body mass index is 32.22 kg/(m^2).     Health Maintenance Due   Topic Date Due    FOBT Q 1 YEAR AGE 50-75  03/05/1995    ZOSTER VACCINE AGE 60>  03/05/2005    GLAUCOMA SCREENING Q2Y  03/05/2010    Pneumococcal 65+ Low/Medium Risk (1 of 2 - PCV13) 03/05/2010

## 2017-02-20 NOTE — MR AVS SNAPSHOT
Visit Information Date & Time Provider Department Dept. Phone Encounter #  
 2/20/2017  1:05 PM Lorrie Storey January,  [facility] [phone] [id number] Follow-up Instructions Return in about 3 months (around 5/20/2017), or if symptoms worsen or fail to improve. Your Appointments 3/20/2017  2:20 PM  
ESTABLISHED PATIENT with Xuan Wynn MD  
[facility] Appt Note: 1 yr fu appt  sll; ANNUAL  
 [address] [place] [address] [facility]  
[address] [facility] [place] [address] [facility] Upcoming Health Maintenance Date Due FOBT Q 1 YEAR AGE 50-75 3/5/1995 ZOSTER VACCINE AGE 60> 3/5/2005 GLAUCOMA SCREENING Q2Y 3/5/2010 Pneumococcal 65+ Low/Medium Risk (1 of 2 - PCV13) 3/5/2010 MEDICARE YEARLY EXAM 11/18/2017 BREAST CANCER SCRN MAMMOGRAM 1/13/2019 DTaP/Tdap/Td series (2 - Td) 9/13/2022 Allergies as of 2/20/2017  Review Complete On: 2/20/2017 By: Sandra Kelly NP Severity Noted Reaction Type Reactions Pcn [Penicillins]  01/03/2011    Rash Current Immunizations  Reviewed on 9/13/2012 Name Date TDAP Vaccine 9/13/2012 Not reviewed this visit You Were Diagnosed With   
  
 Codes Comments Essential hypertension with goal blood pressure less than 130/80    -  Primary ICD-10-CM: I10 
ICD-9-CM: 401.9 Hyperlipidemia, unspecified hyperlipidemia type     ICD-10-CM: E78.5 ICD-9-CM: 272.4 Vitamin D deficiency     ICD-10-CM: E55.9 ICD-9-CM: 268.9 Hidradenitis suppurativa     ICD-10-CM: L73.2 ICD-9-CM: 705.83 Screening for colon cancer     ICD-10-CM: Z12.11 ICD-9-CM: V76.51 Exercise counseling     ICD-10-CM: Z71.89 ICD-9-CM: V65.41 Dietary counseling and surveillance     ICD-10-CM: Z71.3 ICD-9-CM: V65.3 Obesity, Class I, BMI 30.0-34.9 (see actual BMI)     ICD-10-CM: E66.9 ICD-9-CM: 278.00 Need for Streptococcus pneumoniae vaccination     ICD-10-CM: N49 ICD-9-CM: V03.82 Need for shingles vaccine     ICD-10-CM: H76 ICD-9-CM: V04.89 Obesity (BMI 30-39. 9)     ICD-10-CM: E66.9 ICD-9-CM: 278.00 Vitals BP Pulse Temp Resp Height(growth percentile) Weight(growth percentile) 102/67 83 97.8 °F (36.6 °C) (Oral) 14 5' (1.524 m) 165 lb (74.8 kg) SpO2 BMI OB Status Smoking Status 99% 32.22 kg/m2 Postmenopausal Never Smoker BMI and BSA Data Body Mass Index Body Surface Area  
 32.22 kg/m 2 1.78 m 2 Preferred Pharmacy Pharmacy Name [facility] [address] [place] [address] [phone] Your Updated Medication List  
  
   
This list is accurate as of: 2/20/17  1:54 PM.  Always use your most recent med list.  
  
  
  
  
 alendronate 35 mg tablet Commonly known as:  FOSAMAX Take 1 Tab by mouth every seven (7) days for 90 days. Indications: POST-MENOPAUSAL OSTEOPOROSIS PREVENTION  
  
 apixaban 5 mg tablet Commonly known as:  [place] Take 1 Tab by mouth every twelve (12) hours. atorvastatin 80 mg tablet Commonly known as:  LIPITOR Take 1 Tab by mouth daily. Cholecalciferol (Vitamin D3) 2,000 unit Cap capsule Commonly known as:  VITAMIN D3 Take 2,000 Units by mouth two (2) times a day for 90 days. Indications: VITAMIN D DEFICIENCY  
  
 clindamycin 1 % lotion Commonly known as:  CLEOCIN T  
  
 doxycycline 100 mg capsule Commonly known as:  VIBRAMYCIN  
  
 ezetimibe 10 mg tablet Commonly known as:  Jarome Dys Take 1 Tab by mouth daily. HYDROcodone-acetaminophen 7.5-325 mg per tablet Commonly known as:  NORCO  
  
 loratadine 10 mg tablet Commonly known as:  Sylvie Dustman Take 1 Tab by mouth daily for 360 days. metFORMIN 500 mg tablet Commonly known as:  GLUCOPHAGE  
 Take 1 Tab by mouth two (2) times daily (with meals). oxybutynin 5 mg tablet Commonly known as:  VSPWEPNR Take 1 Tab by mouth three (3) times daily. pneumococcal 13 carolyn conj dip 0.5 mL Syrg injection Commonly known as:  PREVNAR-13  
0.5 mL by IntraMUSCular route once for 1 dose. varicella zoster vacine live 19,400 unit/0.65 mL Susr injection Commonly known as:  varicella-zoster vacine live 1 Vial by SubCUTAneous route once for 1 dose. Prescriptions Sent to Pharmacy Refills  
 pneumococcal 13 carolyn conj dip (PREVNAR-13) 0.5 mL syrg injection 0 Si.5 mL by IntraMUSCular route once for 1 dose. Class: Normal  
 Pharmacy: [place] [id number] Ph #: [phone] Route: IntraMUSCular  
 varicella zoster vacine live (VARICELLA-ZOSTER VACINE LIVE) 19,400 unit/0.65 mL susr injection 0 Si Vial by SubCUTAneous route once for 1 dose. Class: Normal  
 Pharmacy: [place] [address] Ph #: [phone] Route: SubCUTAneous Cholecalciferol, Vitamin D3, (VITAMIN D3) 2,000 unit cap capsule 0 Sig: Take 2,000 Units by mouth two (2) times a day for 90 days. Indications: VITAMIN D DEFICIENCY Class: Normal  
 Pharmacy: [place] [id number] Ph #: [phone] Route: Oral  
  
We Performed the Following OCCULT BLOOD, IMMUNOASSAY (FIT) [id number] CPT(R)] Follow-up Instructions Return in about 3 months (around 2017), or if symptoms worsen or fail to improve. Patient Instructions Body Mass Index: Care Instructions Your Care Instructions Body mass index (BMI) can help you see if your weight is raising your risk for health problems. It uses a formula to compare how much you weigh with how tall you are. A BMI between 18.5 and 24.9 is considered healthy.  A BMI between 25 and 29.9 is considered overweight. A BMI of 30 or higher is considered obese. If your BMI is in the normal range, it means that you have a lower risk for weight-related health problems. If your BMI is in the overweight or obese range, you may be at increased risk for weight-related health problems, such as high blood pressure, heart disease, stroke, arthritis or joint pain, and diabetes. BMI is just one measure of your risk for weight-related health problems. You may be at higher risk for health problems if you are not active, you eat an unhealthy diet, or you drink too much alcohol or use tobacco products. Follow-up care is a key part of your treatment and safety. Be sure to make and go to all appointments, and call your doctor if you are having problems. It's also a good idea to know your test results and keep a list of the medicines you take. How can you care for yourself at home? · Practice healthy eating habits. This includes eating plenty of fruits, vegetables, whole grains, lean protein, and low-fat dairy. · Get at least 30 minutes of exercise 5 days a week or more. Brisk walking is a good choice. You also may want to do other activities, such as running, swimming, cycling, or playing tennis or team sports. · Do not smoke. Smoking can increase your risk for health problems. If you need help quitting, talk to your doctor about stop-smoking programs and medicines. These can increase your chances of quitting for good. · Limit alcohol to 2 drinks a day for men and 1 drink a day for women. Too much alcohol can cause health problems. If you have a BMI higher than 25 · Your doctor may do other tests to check your risk for weight-related health problems. This may include measuring the distance around your waist. A waist measurement of more than 40 inches in men or 35 inches in women can increase the risk of weight-related health problems. · Talk with your doctor about steps you can take to stay healthy or improve your health. You may need to make lifestyle changes to lose weight and stay healthy, such as changing your diet and getting regular exercise. Where can you learn more? Go to http://milagros-vitaliy.info/. Enter S176 in the search box to learn more about \"Body Mass Index: Care Instructions. \" Current as of: February 16, 2016 Content Version: 11.1 © 9018-4040 GOOD. Care instructions adapted under license by [employer] (which disclaims liability or warranty for this information). If you have questions about a medical condition or this instruction, always ask your healthcare professional. [address] any warranty or liability for your use of this information. Folliculitis: Care Instructions Your Care Instructions Folliculitis (say \"yni-GSA-bvk-LY-tus\") is an infection of the pouches (follicles) in the skin where hair grows. It can occur on any part of the body, but it is most common on the scalp, face, armpits, and groin. Bacteria, such as those found in a hot tub, can cause folliculitis. Folliculitis begins as a red, tender area near a strand of hair. The skin can itch or burn and may drain pus or blood. Sometimes folliculitis can lead to more serious skin infections. Your doctor usually can treat mild folliculitis with an antibiotic cream or ointment. If you have folliculitis on your scalp, you may use a shampoo that kills bacteria. Antibiotics you take as pills can treat infections deeper in the skin. For stubborn cases of folliculitis, laser treatment may be an option. Laser treatment uses strong beams of light to destroy the hair follicle. But hair will no longer grow in the treated area. Follow-up care is a key part of your treatment and safety.  Be sure to make and go to all appointments, and call your doctor if you are having problems. It's also a good idea to know your test results and keep a list of the medicines you take. How can you care for yourself at home? · Take your medicine exactly as prescribed. If your doctor prescribed antibiotics, take them as directed. Do not stop taking them just because you feel better. You need to take the full course of antibiotics. · Use a soap that kills bacteria to wash the infected area. If your scalp or beard is infected, use a shampoo with selenium or propylene glycol. Be careful. Do not scrub too long or too hard. · Mix 1 1/3 cup warm water and 1 tablespoon vinegar. Soak a cloth in the mixture, and place it over the infected skin until it cools off (usually 5 to 10 minutes). You can do this 3 to 6 times a day. · Do not share your razor, towel, or washcloth. That can spread folliculitis. · Use a new blade in your razor each time you shave to keep from re-infecting your skin. · If you tend to get folliculitis, avoid using hot tubs. They can contain bacteria that cause folliculitis. When should you call for help? Call your doctor now or seek immediate medical care if: 
· You have a fever not caused by the flu or some other known illness. · You have signs of infection such as: 
¨ Pain, warmth, or swelling in the skin. ¨ Red streaks near a hair follicle. ¨ Pus coming from a hair follicle. ¨ A fever. Watch closely for changes in your health, and be sure to contact your doctor if: 
· Your home treatment does not help. Where can you learn more? Go to http://milagros-vitaliy.info/. Enter M257 in the search box to learn more about \"Folliculitis: Care Instructions. \" Current as of: February 5, 2016 Content Version: 11.1 © 7621-6833 Talenta. Care instructions adapted under license by AllTrails (which disclaims liability or warranty for this information).  If you have questions about a medical condition or this instruction, always ask your healthcare professional. [place] [address] any warranty or liability for your use of this information. Eating Healthy Foods: Care Instructions Your Care Instructions Eating healthy foods can help lower your risk for disease. Healthy food gives you energy and keeps your heart strong, your brain active, your muscles working, and your bones strong. A healthy diet includes a variety of foods from the basic food groups: grains, vegetables, fruits, milk and milk products, and meat and beans. Some people may eat more of their favorite foods from only one food group and, as a result, miss getting the nutrients they need. So, it is important to pay attention not only to what you eat but also to what you are missing from your diet. You can eat a healthy, balanced diet by making a few small changes. Follow-up care is a key part of your treatment and safety. Be sure to make and go to all appointments, and call your doctor if you are having problems. Its also a good idea to know your test results and keep a list of the medicines you take. How can you care for yourself at home? Look at what you eat · Keep a food diary for a week or two and record everything you eat or drink. Track the number of servings you eat from each food group. · For a balanced diet every day, eat a variety of: ¨ 6 or more ounce-equivalents of grains, such as cereals, breads, crackers, rice, or pasta, every day. An ounce-equivalent is 1 slice of bread, 1 cup of ready-to-eat cereal, or ½ cup of cooked rice, cooked pasta, or cooked cereal. 
¨ 2½ cups of vegetables, especially: § Dark-green vegetables such as broccoli and spinach. § Orange vegetables such as carrots and sweet potatoes. § Dry beans (such as barcenas and kidney beans) and peas (such as lentils). ¨ 2 cups of fresh, frozen, or canned fruit. A small apple or 1 banana or orange equals 1 cup. ¨ 3 cups of nonfat or low-fat milk, yogurt, or other milk products. ¨ 5½ ounces of meat and beans, such as chicken, fish, lean meat, beans, nuts, and seeds. One egg, 1 tablespoon of peanut butter, ½ ounce nuts or seeds, or ¼ cup of cooked beans equals 1 ounce of meat. · Learn how to read food labels for serving sizes and ingredients. Fast-food and convenience-food meals often contain few or no fruits or vegetables. Make sure you eat some fruits and vegetables to make the meal more nutritious. · Look at your food diary. For each food group, add up what you have eaten and then divide the total by the number of days. This will give you an idea of how much you are eating from each food group. See if you can find some ways to change your diet to make it more healthy. Start small · Do not try to make dramatic changes to your diet all at once. You might feel that you are missing out on your favorite foods and then be more likely to fail. · Start slowly, and gradually change your habits. Try some of the following: ¨ Use whole wheat bread instead of white bread. ¨ Use nonfat or low-fat milk instead of whole milk. ¨ Eat brown rice instead of white rice, and eat whole wheat pasta instead of white-flour pasta. ¨ Try low-fat cheeses and low-fat yogurt. ¨ Add more fruits and vegetables to meals and have them for snacks. ¨ Add lettuce, tomato, cucumber, and onion to sandwiches. ¨ Add fruit to yogurt and cereal. 
Enjoy food · You can still eat your favorite foods. You just may need to eat less of them. If your favorite foods are high in fat, salt, and sugar, limit how often you eat them, but do not cut them out entirely. · Eat a wide variety of foods. Make healthy choices when eating out · The type of restaurant you choose can help you make healthy choices. Even fast-food chains are now offering more low-fat or healthier choices on the menu. · Choose smaller portions, or take half of your meal home. · When eating out, try: ¨ A veggie pizza with a whole wheat crust or grilled chicken (instead of sausage or pepperoni). ¨ Pasta with roasted vegetables, grilled chicken, or marinara sauce instead of cream sauce. ¨ A vegetable wrap or grilled chicken wrap. ¨ Broiled or poached food instead of fried or breaded items. Make healthy choices easy · Buy packaged, prewashed, ready-to-eat fresh vegetables and fruits, such as baby carrots, salad mixes, and chopped or shredded broccoli and cauliflower. · Buy packaged, presliced fruits, such as melon or pineapple. · Choose 100% fruit or vegetable juice instead of soda. Limit juice intake to 4 to 6 oz (½ to ¾ cup) a day. · Blend low-fat yogurt, fruit juice, and canned or frozen fruit to make a smoothie for breakfast or a snack. Where can you learn more? Go to http://milagros-vitaliy.info/. Enter T756 in the search box to learn more about \"Eating Healthy Foods: Care Instructions. \" Current as of: November 20, 2015 Content Version: 11.1 © 1066-5307 C2cube. Care instructions adapted under license by iCouch (which disclaims liability or warranty for this information). If you have questions about a medical condition or this instruction, always ask your healthcare professional. [place] [address] any warranty or liability for your use of this information. Learning About Healthy Weight What is a healthy weight? A healthy weight is the weight at which you feel good about yourself and have energy for work and play. It's also one that lowers your risk for health problems. What can you do to stay at a healthy weight? It can be hard to stay at a healthy weight, especially when fast food, vending-machine snacks, and processed foods are so easy to find. And with your busy lifestyle, activity may be low on your list of things to do. But staying at a healthy weight may be easier than you think. Here are some dos and don'ts for staying at a healthy weight: 
Do eat healthy foods The kinds of foods you eat have a big impact on both your weight and your health. Reaching and staying at a healthy weight is not about going on a diet. It's about making healthier food choices every day and changing your diet for good. Healthy eating means eating a variety of foods so that you get all the nutrients you need. Your body needs protein, carbohydrate, and fats for energy. They keep your heart beating, your brain active, and your muscles working. On most days, try to eat from each food group. This means eating a variety of: · Whole grains, such as whole wheat breads and pastas. · Fruits and vegetables. · Dairy products, such as low-fat milk, yogurt, and cheese. · Lean proteins, such as all types of fish, chicken without the skin, and beans. Don't have too much or too little of one thing. All foods, if eaten in moderation, can be part of healthy eating. Even sweets can be okay. If your favorite foods are high in fat, salt, sugar, or calories, limit how often you eat them. Eat smaller servings, or look for healthy substitutes. Do watch what you eat Many people eat more than their bodies need. Part of staying at a healthy weight means learning how much food you really need from day to day and not eating more than that. Even with healthy foods, eating too much can make you gain weight. Having a well-balanced diet means that you eat enough, but not too much, and that your food gives you the nutrients you need to stay healthy. So listen to your body. Eat when you're hungry. Stop when you feel satisfied. It's a good idea to have healthy snacks ready for when you get hungry. Keep healthy snacks with you at work, in your car, and at home. If you have a healthy snack easily available, you'll be less likely to pick a candy bar or bag of chips from a vending machine instead. Some healthy snacks you might want to keep on hand are fruit, low-fat yogurt, string cheese, low-fat microwave popcorn, raisins and other dried fruit, nuts, whole wheat crackers, pretzels, carrots, celery sticks, and broccoli. Do some physical activity A big part of reaching and staying at a healthy weight is being active. When you're active, you burn calories. This makes it easier to reach and stay at a healthy weight. When you're active on a regular basis, your body burns more calories, even when you're at rest. Being active helps you lose fat and build lean muscle. Try to be active for at least 1 hour every day. This may sound like a lot, but it's okay to be active in smaller blocks of time that add up to 1 hour a day. Any activity that makes your heart beat faster and keeps it there for a while counts. A brisk walk, run, or swim will get your heart beating faster. So will climbing stairs, shooting baskets, or cycling. Even some household chores like vacuuming and mowing the lawn will get your heart rate up. Pick activities that you enjoyones that make your heart beat faster, your muscles stronger, and your muscles and joints more flexible. If you find more than one thing you like doing, do them all. You don't have to do the same thing every day. Don't diet Diets don't work. Diets are temporary. Because you give up so much when you diet, you may be hungry and think about food all the time. And after you stop dieting, you also may overeat to make up for what you missed. Most people who diet end up gaining back the pounds they lostand more. Remember that healthy bodies come in lots of shapes and sizes. Everyone can get healthier by eating better and being more active. Where can you learn more? Go to http://milagros-vitaliy.info/. Enter 202 9866 in the search box to learn more about \"Learning About Healthy Weight. \" Current as of: February 16, 2016 Content Version: 11.1 © 2998-5533 Healthwise, Raffstar. Care instructions adapted under license by [employer] (which disclaims liability or warranty for this information). If you have questions about a medical condition or this instruction, always ask your healthcare professional. [address] any warranty or liability for your use of this information. A Healthy Lifestyle: Care Instructions Your Care Instructions A healthy lifestyle can help you feel good, stay at a healthy weight, and have plenty of energy for both work and play. A healthy lifestyle is something you can share with your whole family. A healthy lifestyle also can lower your risk for serious health problems, such as high blood pressure, heart disease, and diabetes. You can follow a few steps listed below to improve your health and the health of your family. Follow-up care is a key part of your treatment and safety. Be sure to make and go to all appointments, and call your doctor if you are having problems. Its also a good idea to know your test results and keep a list of the medicines you take. How can you care for yourself at home? · Do not eat too much sugar, fat, or fast foods. You can still have dessert and treats now and then. The goal is moderation. · Start small to improve your eating habits. Pay attention to portion sizes, drink less juice and soda pop, and eat more fruits and vegetables. ¨ Eat a healthy amount of food. A 3-ounce serving of meat, for example, is about the size of a deck of cards. Fill the rest of your plate with vegetables and whole grains. ¨ Limit the amount of soda and sports drinks you have every day. Drink more water when you are thirsty. ¨ Eat at least 5 servings of fruits and vegetables every day. It may seem like a lot, but it is not hard to reach this goal. A serving or helping is 1 piece of fruit, 1 cup of vegetables, or 2 cups of leafy, raw vegetables. Have an apple or some carrot sticks as an afternoon snack instead of a candy bar. Try to have fruits and/or vegetables at every meal. 
· Make exercise part of your daily routine. You may want to start with simple activities, such as walking, bicycling, or slow swimming. Try to be active 30 to 60 minutes every day. You do not need to do all 30 to 60 minutes all at once. For example, you can exercise 3 times a day for 10 or 20 minutes. Moderate exercise is safe for most people, but it is always a good idea to talk to your doctor before starting an exercise program. 
· Keep moving. Peggy Baires the lawn, work in the garden, or [employer]. Take the stairs instead of the elevator at work. · If you smoke, quit. People who smoke have an increased risk for heart attack, stroke, cancer, and other lung illnesses. Quitting is hard, but there are ways to boost your chance of quitting tobacco for good. ¨ Use nicotine gum, patches, or lozenges. ¨ Ask your doctor about stop-smoking programs and medicines. ¨ Keep trying. In addition to reducing your risk of diseases in the future, you will notice some benefits soon after you stop using tobacco. If you have shortness of breath or asthma symptoms, they will likely get better within a few weeks after you quit. · Limit how much alcohol you drink. Moderate amounts of alcohol (up to 2 drinks a day for men, 1 drink a day for women) are okay. But drinking too much can lead to liver problems, high blood pressure, and other health problems. Family health If you have a family, there are many things you can do together to improve your health. · Eat meals together as a family as often as possible. · Eat healthy foods. This includes fruits, vegetables, lean meats and dairy, and whole grains. · Include your family in your fitness plan.  Most people think of activities such as jogging or tennis as the way to fitness, but there are many ways you and your family can be more active. Anything that makes you breathe hard and gets your heart pumping is exercise. Here are some tips: 
¨ Walk to do errands or to take your child to school or the bus. ¨ Go for a family bike ride after dinner instead of watching TV. Where can you learn more? Go to http://milagros-vitaliy.info/. Enter E055 in the search box to learn more about \"A Healthy Lifestyle: Care Instructions. \" Current as of: July 26, 2016 Content Version: 11.1 © 4582-2639 [employer]. Care instructions adapted under license by [employer] (which disclaims liability or warranty for this information). If you have questions about a medical condition or this instruction, always ask your healthcare professional. [address] any warranty or liability for your use of this information. Introducing [facility]! [employer] introduces [facility] patient portal. Now you can access parts of your medical record, email your doctor's office, and request medication refills online. 1. In your internet browser, go to [email or web address] 2. Click on the First Time User? Click Here link in the Sign In box. You will see the New Member Sign Up page. 3. Enter your [facility] Access Code exactly as it appears below. You will not need to use this code after youve completed the sign-up process. If you do not sign up before the expiration date, you must request a new code. · [facility] Access Code: [email or web address] Expires: 3/18/2017  5:21 PM 
 
4. Enter the last four digits of your Social Security Number (xxxx) and Date of Birth (mm/dd/yyyy) as indicated and click Submit. You will be taken to the next sign-up page. 5. Create a [facility] ID. This will be your [facility] login ID and cannot be changed, so think of one that is secure and easy to remember. 6. Create a takokatt password. You can change your password at any time. 7. Enter your Password Reset Question and Answer. This can be used at a later time if you forget your password. 8. Enter your e-mail address. You will receive e-mail notification when new information is available in [address] 9. Click Sign Up. You can now view and download portions of your medical record. 10. Click the Download Summary menu link to download a portable copy of your medical information. If you have questions, please visit the Frequently Asked Questions section of the "[employer]" website. Remember, "[employer]" is NOT to be used for urgent needs. For medical emergencies, dial 911. Now available from your iPhone and Android! Please provide this summary of care documentation to your next provider. Your primary care clinician is listed as [address] [place] [address]. If you have any questions after today's visit, please call [phone].

## 2017-02-20 NOTE — PROGRESS NOTES
Progress Note    Patient: Valentina Iraheta MRN: [id number]  SSN: [id number]    YOB: 1945  Age: 70 y.o. Sex: female        No chief complaint on file. Subjective:    Cardiovascular Review:  The patient has diabetes, hypertension and hyperlipidemia. Diet and Lifestyle: generally follows a low fat low cholesterol diet, exercises regularly, nonsmoker, caffeine intake mild, alcohol intake none  Home BP Monitoring: is not measured at home. Pertinent ROS: taking medications as instructed, no medication side effects noted, no TIA's, no chest pain on exertion, no dyspnea on exertion, no swelling of ankles, no orthostatic dizziness or lightheadedness, no orthopnea or paroxysmal nocturnal dyspnea, no palpitations. Patient states that she is taking her medications as prescribed. No issues or concerns. Denies any problems with side-effects.       Diabetes Mellitus:  She has diabetes mellitus, and diabetes, hypertension and hyperlipidemia. Diabetic ROS - medication compliance: compliant all of the time, diabetic diet compliance: compliant all of the time, home glucose monitoring: is performed regularly, minimum reading is 120, maximum reading is 150, and average reading is 140. .   Lab review: orders written for new lab studies as appropriate; see orders, Patient states that she is taking her medications as prescribed. No problems with low blood sugar.      Encounter Diagnoses   Name Primary?     Essential hypertension with goal blood pressure less than 130/80 Yes    Hyperlipidemia, unspecified hyperlipidemia type     Vitamin D deficiency     Hidradenitis suppurativa     Exercise counseling     Dietary counseling and surveillance     Obesity, Class I, BMI 30.0-34.9 (see actual BMI)      Current and past medical information:    Current Medications after this visit[de-identified]   Current Outpatient Prescriptions   Medication Sig    alendronate (FOSAMAX) 35 mg tablet Take 1 Tab by mouth every seven (7) days for 90 days. Indications: POST-MENOPAUSAL OSTEOPOROSIS PREVENTION    HYDROcodone-acetaminophen (NORCO) 7.5-325 mg per tablet     metFORMIN (GLUCOPHAGE) 500 mg tablet Take 1 Tab by mouth two (2) times daily (with meals).  apixaban (ELIQUIS) 5 mg tablet Take 1 Tab by mouth every twelve (12) hours.  oxybutynin (DITROPAN) 5 mg tablet Take 1 Tab by mouth three (3) times daily.  loratadine (CLARITIN) 10 mg tablet Take 1 Tab by mouth daily for 360 days.  ezetimibe (ZETIA) 10 mg tablet Take 1 Tab by mouth daily.  atorvastatin (LIPITOR) 80 mg tablet Take 1 Tab by mouth daily. No current facility-administered medications for this visit. Patient Active Problem List    Diagnosis Date Noted    Advanced care planning/counseling discussion 06/13/2016    H/O Pulmonary embolism (1/2015) 01/05/2015    Edema 08/27/2014    Hydradenitis 02/01/2012    HTN (hypertension) 01/21/2011    AR (allergic rhinitis) 05/21/2010    Eczema 05/21/2010    Depression 05/21/2010    Female stress incontinence 05/21/2010    Hyperlipidemia 05/21/2010       Past Medical History   Diagnosis Date    Arthritis     Diabetes ([facility] [address]      states she is \"pre-diabetic\", diet controlled    frequency     H/O blood clots      right side of body    Heart attack ([facility] [address] 1/2014    Hydradenitis 2/1/2012    Hypercholesterolemia     Hypertension     Ill-defined condition      edema of legs       Allergies   Allergen Reactions    Pcn [Penicillins] Rash       History reviewed. No pertinent past surgical history. Social History     Social History    Marital status: LEGALLY      Spouse name: N/A    Number of children: N/A    Years of education: N/A     Social History Main Topics    Smoking status: Never Smoker    Smokeless tobacco: Never Used    Alcohol use No    Drug use: No    Sexual activity: No     Other Topics Concern    None     Social History Narrative       Review of Systems   Constitutional: Negative.   Negative for chills, diaphoresis, fever, malaise/fatigue and weight loss. HENT: Negative. Negative for congestion and sore throat. Eyes: Negative. Negative for blurred vision, double vision, photophobia and pain. Respiratory: Negative. Negative for cough, hemoptysis, sputum production, shortness of breath and wheezing. Cardiovascular: Negative. Negative for chest pain, palpitations, orthopnea, claudication, leg swelling and PND. Gastrointestinal: Negative. Negative for abdominal pain, blood in stool, constipation, diarrhea, heartburn, melena, nausea and vomiting. Genitourinary: Negative. Negative for dysuria, flank pain, frequency, hematuria and urgency. Musculoskeletal: Negative. Negative for back pain, falls, joint pain, myalgias and neck pain. Skin: Negative for itching and rash. Abscesses under both axillary areas   Neurological: Negative for dizziness, tingling, tremors, sensory change, speech change, focal weakness, seizures, loss of consciousness, weakness and headaches. Endo/Heme/Allergies: Negative. Negative for environmental allergies and polydipsia. Does not bruise/bleed easily. Psychiatric/Behavioral: Negative. Negative for depression, hallucinations, memory loss, substance abuse and suicidal ideas. The patient is not nervous/anxious and does not have insomnia. Objective:     Vitals:    02/20/17 1327   BP: 102/67   Pulse: 83   Resp: 14   Temp: 97.8 °F (36.6 °C)   TempSrc: Oral   SpO2: 99%   Weight: 165 lb (74.8 kg)   Height: 5' (1.524 m)      Body mass index is 32.22 kg/(m^2). Physical Exam   Constitutional: She is oriented to person, place, and time and well-developed, well-nourished, and in no distress. No distress. HENT:   Head: Normocephalic and atraumatic. Right Ear: External ear normal.   Left Ear: External ear normal.   Nose: Nose normal.   Mouth/Throat: Oropharynx is clear and moist. No oropharyngeal exudate.    Eyes: Conjunctivae and EOM are normal. Pupils are equal, round, and reactive to light. Right eye exhibits no discharge. Left eye exhibits no discharge. No scleral icterus. Neck: Normal range of motion. Neck supple. No JVD present. No tracheal deviation present. No thyromegaly present. Cardiovascular: Normal rate, regular rhythm, normal heart sounds and intact distal pulses. Exam reveals no gallop and no friction rub. No murmur heard. Pulmonary/Chest: Effort normal and breath sounds normal. No stridor. No respiratory distress. She has no wheezes. She has no rales. She exhibits no tenderness. Abdominal: Soft. Normal appearance, normal aorta and bowel sounds are normal. She exhibits no shifting dullness, no distension, no pulsatile liver, no fluid wave, no abdominal bruit, no ascites, no pulsatile midline mass and no mass. There is no hepatosplenomegaly. There is no tenderness. There is no rebound, no guarding and no CVA tenderness. Musculoskeletal: Normal range of motion. She exhibits edema. She exhibits no tenderness or deformity. 2-3 + edema is noted in the bilateral lower extremities. Lymphadenopathy:     She has no cervical adenopathy. Neurological: She is alert and oriented to person, place, and time. She displays normal reflexes. No cranial nerve deficit. She exhibits normal muscle tone. Gait normal. Coordination normal. GCS score is 15. Skin: Skin is warm and dry. No rash noted. She is not diaphoretic. There is erythema. No pallor. Patient has multiple abscesses under the bilateral axillary area. Some has some open areas with yellow/white drainage noted. Painful on palpation. Psychiatric: Mood, memory, affect and judgment normal.   Nursing note and vitals reviewed.     Lab Results   Component Value Date/Time    WBC 11.4 01/23/2017 02:18 PM    HGB 11.1 01/23/2017 02:18 PM    PLATELET 897 27/49/2416 02:18 PM      Lab Results   Component Value Date/Time    Sodium 139 01/23/2017 02:18 PM    Potassium 4.0 01/23/2017 02:18 PM Chloride 98 01/23/2017 02:18 PM    CO2 25 01/23/2017 02:18 PM    Anion gap 5 01/08/2015 02:17 AM    Glucose 116 01/23/2017 02:18 PM    BUN 11 01/23/2017 02:18 PM    Creatinine 0.80 01/23/2017 02:18 PM    BUN/Creatinine ratio 14 01/23/2017 02:18 PM    GFR est AA 86 01/23/2017 02:18 PM    GFR est non-AA 74 01/23/2017 02:18 PM    Calcium 9.1 01/23/2017 02:18 PM    Bilirubin, total 0.3 01/23/2017 02:18 PM    ALT (SGPT) 15 01/23/2017 02:18 PM    AST (SGOT) 20 01/23/2017 02:18 PM    Alk. phosphatase 136 01/23/2017 02:18 PM    Protein, total 7.7 01/23/2017 02:18 PM    Albumin 3.3 01/23/2017 02:18 PM    Globulin 4.3 06/30/2014 04:09 PM    A-G Ratio 0.8 01/23/2017 02:18 PM      Last Lipid:    Lab Results   Component Value Date/Time    Cholesterol, total 192 01/23/2017 02:18 PM    HDL Cholesterol 48 01/23/2017 02:18 PM    LDL, calculated 121 01/23/2017 02:18 PM    Triglyceride 113 01/23/2017 02:18 PM    CHOL/HDL Ratio 4.7 09/30/2009 03:47 PM     Health Maintenance Due   Topic Date Due    FOBT Q 1 YEAR AGE 50-75  03/05/1995    ZOSTER VACCINE AGE 60>  03/05/2005    GLAUCOMA SCREENING Q2Y  03/05/2010    Pneumococcal 65+ Low/Medium Risk (1 of 2 - PCV13) 03/05/2010       Assessment and orders:       ICD-10-CM ICD-9-CM    1. Essential hypertension with goal blood pressure less than 130/80 I10 401.9 Self management goals of living with hypertension include:   A. Taking medicine as prescribed,  B. Monitoring blood pressure response to therapy  C. Adopting lifestyle recommendations--increasing exercise, decreasing salt intake, and increasing fruits and vegetable consumption. Our goal is to normalize the blood pressure to decrease the risks of strokes and heart attacks. The patient is in agreement with the plan. Information printed and given to the patient for review.     2. Hyperlipidemia, unspecified hyperlipidemia type E78.5 272.4 The patient is aware of our goal to reduce or eliminate the long term problems (such as strokes and heart attacks) related to poorly controlled hyperlipidemia. Discussion about strict diet modification along with as much exercise as possible. Information printed and given to the patient for review. 3. Vitamin D deficiency E55.9 268.9 Stable at this visit. Patient to continue with her current treatment. 4. Hidradenitis suppurativa L73.2 705.83 Patient to continue with her appointment with derm for further evaluation. 5. Exercise counseling Z71.89 V65.41 Physical activity information given to patient and printed for review. 6. Dietary counseling and surveillance Z71.3 V65.3 Dietary information discussed with patient and printed for review. 7. Obesity, Class I, BMI 30.0-34.9 (see actual BMI) E66.9 278.00 Obesity information discussed with patient with emphasis on the effects of obesity on total health         Plan of care:  Discussed diagnoses in detail with patient. Medication risks/benefits/side effects discussed with patient. All of the patient's questions were addressed. The patient understands and agrees with our plan of care. The patient knows to call back if they are unsure of or forget any changes we discussed today or if the symptoms change. The patient received an After-Visit Summary which contains VS, orders, medication list and allergy list. This can be used as a \"mini-medical record\" should they have to seek medical care while out of town.     Patient Care Team:  Gabino Dorantes NP as PCP - General (Nurse Practitioner)  Shashank Almonte MD (General Surgery)  Edis Santamaria MD (Cardiology)  Nazario Boswell DPM (Podiatry)  Shashank Almonte MD (General Surgery)  Rc Rivera RN as Nurse Navigator  Papi Carolina NP (Nurse Practitioner)    Follow-up Disposition: Not on File    Future Appointments  Date Time Provider Buddy Elvi   3/20/2017 2:20 PM Edis Santamaria MD [address] [place] [address]       Signed By: Gabino Dorantes NP     February 20, 2017 Discussed the patient's BMI with her. The BMI follow up plan is as follows:     I have reviewed/discussed the above normal BMI with the patient. I have recommended the following interventions: dietary management education, guidance, and counseling . The plan is as follows: I have counseled this patient on diet and exercise regimens.

## 2017-02-20 NOTE — LETTER
2/20/2017 1:38 PM 
 
Ms. Iraida Andres [address] [facility] Dear Iraida Andres: 
 
Please find your most recent results below. Resulted Orders CBC WITH AUTOMATED DIFF Result Value Ref Range WBC 11.4 (H) 3.4 - 10.8 x10E3/uL  
 RBC 3.95 3.77 - 5.28 x10E6/uL HGB 11.1 11.1 - 15.9 g/dL HCT 33.5 (L) 34.0 - 46.6 % MCV 85 79 - 97 fL  
 MCH 28.1 26.6 - 33.0 pg  
 MCHC 33.1 31.5 - 35.7 g/dL  
 RDW 15.1 12.3 - 15.4 % PLATELET 670 (H) 965 - 379 x10E3/uL NEUTROPHILS 73 % Lymphocytes 19 % MONOCYTES 5 % EOSINOPHILS 3 % BASOPHILS 0 %  
 ABS. NEUTROPHILS 8.2 (H) 1.4 - 7.0 x10E3/uL Abs Lymphocytes 2.2 0.7 - 3.1 x10E3/uL  
 ABS. MONOCYTES 0.6 0.1 - 0.9 x10E3/uL  
 ABS. EOSINOPHILS 0.4 0.0 - 0.4 x10E3/uL  
 ABS. BASOPHILS 0.0 0.0 - 0.2 x10E3/uL IMMATURE GRANULOCYTES 0 %  
 ABS. IMM. GRANS. 0.0 0.0 - 0.1 x10E3/uL Narrative Performed at:  [address]  [id number] : Mony Ugarte MD, Phone:  [phone] HEMOGLOBIN A1C WITH EAG Result Value Ref Range Hemoglobin A1c 6.8 (H) 4.8 - 5.6 % Comment:  
            Pre-diabetes: 5.7 - 6.4 Diabetes: >6.4 Glycemic control for adults with diabetes: <7.0 Estimated average glucose 148 mg/dL Narrative Performed at:  [address]  [id number] : Mony Ugarte MD, Phone:  [phone] METABOLIC PANEL, COMPREHENSIVE Result Value Ref Range Glucose 116 (H) 65 - 99 mg/dL BUN 11 8 - 27 mg/dL Creatinine 0.80 0.57 - 1.00 mg/dL GFR est non-AA 74 >59 mL/min/1.73 GFR est AA 86 >59 mL/min/1.73  
 BUN/Creatinine ratio 14 11 - 26 Sodium 139 134 - 144 mmol/L Potassium 4.0 3.5 - 5.2 mmol/L Chloride 98 96 - 106 mmol/L  
 CO2 25 18 - 29 mmol/L Calcium 9.1 8.7 - 10.3 mg/dL Protein, total 7.7 6.0 - 8.5 g/dL Albumin 3.3 (L) 3.5 - 4.8 g/dL GLOBULIN, TOTAL 4.4 1.5 - 4.5 g/dL A-G Ratio 0.8 (L) 1.1 - 2.5 Bilirubin, total 0.3 0.0 - 1.2 mg/dL Alk. phosphatase 136 (H) 39 - 117 IU/L  
 AST (SGOT) 20 0 - 40 IU/L  
 ALT (SGPT) 15 0 - 32 IU/L Narrative Performed at:  [address]  [id number] : Shira Santamaria MD, Phone:  [phone] VITAMIN D, 25 HYDROXY Result Value Ref Range VITAMIN D, 25-HYDROXY 12.3 (L) 30.0 - 100.0 ng/mL Comment:  
   Vitamin D deficiency has been defined by the [address] [place] [address] practice guideline as a 
level of serum 25-OH vitamin D less than 20 ng/mL (1,2). The Endocrine Society went on to further define vitamin D 
insufficiency as a level between 21 and 29 ng/mL (2). 1. IOM ([place] of [employer]). 2010. Dietary reference 
   intakes for calcium and D. [address] [place] [address]: The 
   [employer]. 2. Sara MF, Benigno NC, Bren RAMOS, et al. 
   Evaluation, treatment, and prevention of vitamin D 
   deficiency: an Endocrine Society clinical practice 
   guideline. JCEM. 2011 Jul; 96(7):1911-30. Narrative Performed at:  [address]  [id number] : Shira Santamaria MD, Phone:  [phone] URINALYSIS W/MICROSCOPIC Result Value Ref Range Specific Gravity 1.025 1.005 - 1.030  
 pH (UA) 6.0 5.0 - 7.5 Color Yellow Yellow Appearance Cloudy (A) Clear Leukocyte Esterase 3+ (A) Negative Protein Trace Negative/Trace Glucose Negative Negative Ketone Negative Negative Blood 1+ (A) Negative Bilirubin Negative Negative Urobilinogen 0.2 0.2 - 1.0 mg/dL Nitrites Negative Negative Microscopic Examination See additional order Comment:  
   Microscopic was indicated and was performed. Narrative Performed at:  [address]  [id number] : Bipin Robbins MD, Phone:  [phone] TSH 3RD GENERATION Result Value Ref Range TSH 2.840 0.450 - 4.500 uIU/mL Narrative Performed at:  [address]  [id number] : Bipin Robbins MD, Phone:  [phone] T4, FREE Result Value Ref Range T4, Free 0.97 0.82 - 1.77 ng/dL Narrative Performed at:  [address]  [id number] : Bipin Robbins MD, Phone:  [phone] MICROALBUMIN, UR, RAND W/ MICROALBUMIN/CREA RATIO Result Value Ref Range Creatinine, urine 159.6 Not Estab. mg/dL Microalbumin, urine 16.2 Not Estab. ug/mL Microalb/Creat ratio (ug/mg creat.) 10.2 0.0 - 30.0 mg/g creat Narrative Performed at:  [address]  [id number] : Bipin Robbins MD, Phone:  [phone] LIPID PANEL Result Value Ref Range Cholesterol, total 192 100 - 199 mg/dL Triglyceride 113 0 - 149 mg/dL HDL Cholesterol 48 >39 mg/dL VLDL, calculated 23 5 - 40 mg/dL LDL, calculated 121 (H) 0 - 99 mg/dL Narrative Performed at:  [address]  [id number] : Bipin Robbins MD, Phone:  [phone] PHOSPHORUS Result Value Ref Range Phosphorus 3.0 2.5 - 4.5 mg/dL Narrative Performed at:  [address]  [id number] : Bipin Robbins MD, Phone:  [phone] MAGNESIUM Result Value Ref Range Magnesium 1.8 1.6 - 2.3 mg/dL Narrative Performed at:  [address]  [id number] : Bipin Robbins MD, Phone:  [phone] PTH INTACT Result Value Ref Range PTH, Intact 15 15 - 65 pg/mL Narrative Performed at:  [address]  [id number] : Wesley Patterson MD, Phone:  [phone] MICROSCOPIC EXAMINATION Result Value Ref Range WBC >30 (A) 0 - 5 /hpf  
 RBC 11-30 (A) 0 - 2 /hpf Epithelial cells 0-10 0 - 10 /hpf Casts None seen None seen /lpf Crystals Present (A) N/A Crystal type Calcium Oxalate N/A Mucus Present Not Estab. Bacteria Few None seen/Few Narrative Performed at:  [address]  [id number] : Wesley Patterson MD, Phone:  [phone] RECOMMENDATIONS: 
As discussed during office visit. Please call me if you have any questions: [phone] Sincerely, Lorrie Kapadia NP

## 2017-02-20 NOTE — PATIENT INSTRUCTIONS
Body Mass Index: Care Instructions  Your Care Instructions    Body mass index (BMI) can help you see if your weight is raising your risk for health problems. It uses a formula to compare how much you weigh with how tall you are. A BMI between 18.5 and 24.9 is considered healthy. A BMI between 25 and 29.9 is considered overweight. A BMI of 30 or higher is considered obese. If your BMI is in the normal range, it means that you have a lower risk for weight-related health problems. If your BMI is in the overweight or obese range, you may be at increased risk for weight-related health problems, such as high blood pressure, heart disease, stroke, arthritis or joint pain, and diabetes. BMI is just one measure of your risk for weight-related health problems. You may be at higher risk for health problems if you are not active, you eat an unhealthy diet, or you drink too much alcohol or use tobacco products. Follow-up care is a key part of your treatment and safety. Be sure to make and go to all appointments, and call your doctor if you are having problems. It's also a good idea to know your test results and keep a list of the medicines you take. How can you care for yourself at home? · Practice healthy eating habits. This includes eating plenty of fruits, vegetables, whole grains, lean protein, and low-fat dairy. · Get at least 30 minutes of exercise 5 days a week or more. Brisk walking is a good choice. You also may want to do other activities, such as running, swimming, cycling, or playing tennis or team sports. · Do not smoke. Smoking can increase your risk for health problems. If you need help quitting, talk to your doctor about stop-smoking programs and medicines. These can increase your chances of quitting for good. · Limit alcohol to 2 drinks a day for men and 1 drink a day for women. Too much alcohol can cause health problems.   If you have a BMI higher than 25  · Your doctor may do other tests to check your risk for weight-related health problems. This may include measuring the distance around your waist. A waist measurement of more than 40 inches in men or 35 inches in women can increase the risk of weight-related health problems. · Talk with your doctor about steps you can take to stay healthy or improve your health. You may need to make lifestyle changes to lose weight and stay healthy, such as changing your diet and getting regular exercise. Where can you learn more? Go to http://milagros-vitaliy.info/. Enter S176 in the search box to learn more about \"Body Mass Index: Care Instructions. \"  Current as of: February 16, 2016  Content Version: 11.1  © 4916-3283 [email or web address]. Care instructions adapted under license by [employer] (which disclaims liability or warranty for this information). If you have questions about a medical condition or this instruction, always ask your healthcare professional. [place] [address] any warranty or liability for your use of this information. Folliculitis: Care Instructions  Your Care Instructions    Folliculitis (say \"psa-NQT-kgr-LY-tus\") is an infection of the pouches (follicles) in the skin where hair grows. It can occur on any part of the body, but it is most common on the scalp, face, armpits, and groin. Bacteria, such as those found in a hot tub, can cause folliculitis. Folliculitis begins as a red, tender area near a strand of hair. The skin can itch or burn and may drain pus or blood. Sometimes folliculitis can lead to more serious skin infections. Your doctor usually can treat mild folliculitis with an antibiotic cream or ointment. If you have folliculitis on your scalp, you may use a shampoo that kills bacteria. Antibiotics you take as pills can treat infections deeper in the skin. For stubborn cases of folliculitis, laser treatment may be an option.  Laser treatment uses strong beams of light to destroy the hair follicle. But hair will no longer grow in the treated area. Follow-up care is a key part of your treatment and safety. Be sure to make and go to all appointments, and call your doctor if you are having problems. It's also a good idea to know your test results and keep a list of the medicines you take. How can you care for yourself at home? · Take your medicine exactly as prescribed. If your doctor prescribed antibiotics, take them as directed. Do not stop taking them just because you feel better. You need to take the full course of antibiotics. · Use a soap that kills bacteria to wash the infected area. If your scalp or beard is infected, use a shampoo with selenium or propylene glycol. Be careful. Do not scrub too long or too hard. · Mix 1 1/3 cup warm water and 1 tablespoon vinegar. Soak a cloth in the mixture, and place it over the infected skin until it cools off (usually 5 to 10 minutes). You can do this 3 to 6 times a day. · Do not share your razor, towel, or washcloth. That can spread folliculitis. · Use a new blade in your razor each time you shave to keep from re-infecting your skin. · If you tend to get folliculitis, avoid using hot tubs. They can contain bacteria that cause folliculitis. When should you call for help? Call your doctor now or seek immediate medical care if:  · You have a fever not caused by the flu or some other known illness. · You have signs of infection such as:  ¨ Pain, warmth, or swelling in the skin. ¨ Red streaks near a hair follicle. ¨ Pus coming from a hair follicle. ¨ A fever. Watch closely for changes in your health, and be sure to contact your doctor if:  · Your home treatment does not help. Where can you learn more? Go to http://milagros-vitaliy.info/. Enter M257 in the search box to learn more about \"Folliculitis: Care Instructions. \"  Current as of: February 5, 2016  Content Version: 11.1  © 6537-4739 [employer], [facility].  Care instructions adapted under license by [employer] (which disclaims liability or warranty for this information). If you have questions about a medical condition or this instruction, always ask your healthcare professional. [address] any warranty or liability for your use of this information. Eating Healthy Foods: Care Instructions  Your Care Instructions  Eating healthy foods can help lower your risk for disease. Healthy food gives you energy and keeps your heart strong, your brain active, your muscles working, and your bones strong. A healthy diet includes a variety of foods from the basic food groups: grains, vegetables, fruits, milk and milk products, and meat and beans. Some people may eat more of their favorite foods from only one food group and, as a result, miss getting the nutrients they need. So, it is important to pay attention not only to what you eat but also to what you are missing from your diet. You can eat a healthy, balanced diet by making a few small changes. Follow-up care is a key part of your treatment and safety. Be sure to make and go to all appointments, and call your doctor if you are having problems. Its also a good idea to know your test results and keep a list of the medicines you take. How can you care for yourself at home? Look at what you eat  · Keep a food diary for a week or two and record everything you eat or drink. Track the number of servings you eat from each food group. · For a balanced diet every day, eat a variety of:  ¨ 6 or more ounce-equivalents of grains, such as cereals, breads, crackers, rice, or pasta, every day. An ounce-equivalent is 1 slice of bread, 1 cup of ready-to-eat cereal, or ½ cup of cooked rice, cooked pasta, or cooked cereal.  ¨ 2½ cups of vegetables, especially:  § Dark-green vegetables such as broccoli and spinach. § Orange vegetables such as carrots and sweet potatoes.   § Dry beans (such as barcenas and kidney beans) and peas (such as lentils). ¨ 2 cups of fresh, frozen, or canned fruit. A small apple or 1 banana or orange equals 1 cup. ¨ 3 cups of nonfat or low-fat milk, yogurt, or other milk products. ¨ 5½ ounces of meat and beans, such as chicken, fish, lean meat, beans, nuts, and seeds. One egg, 1 tablespoon of peanut butter, ½ ounce nuts or seeds, or ¼ cup of cooked beans equals 1 ounce of meat. · Learn how to read food labels for serving sizes and ingredients. Fast-food and convenience-food meals often contain few or no fruits or vegetables. Make sure you eat some fruits and vegetables to make the meal more nutritious. · Look at your food diary. For each food group, add up what you have eaten and then divide the total by the number of days. This will give you an idea of how much you are eating from each food group. See if you can find some ways to change your diet to make it more healthy. Start small  · Do not try to make dramatic changes to your diet all at once. You might feel that you are missing out on your favorite foods and then be more likely to fail. · Start slowly, and gradually change your habits. Try some of the following:  ¨ Use whole wheat bread instead of white bread. ¨ Use nonfat or low-fat milk instead of whole milk. ¨ Eat brown rice instead of white rice, and eat whole wheat pasta instead of white-flour pasta. ¨ Try low-fat cheeses and low-fat yogurt. ¨ Add more fruits and vegetables to meals and have them for snacks. ¨ Add lettuce, tomato, cucumber, and onion to sandwiches. ¨ Add fruit to yogurt and cereal.  Enjoy food  · You can still eat your favorite foods. You just may need to eat less of them. If your favorite foods are high in fat, salt, and sugar, limit how often you eat them, but do not cut them out entirely. · Eat a wide variety of foods. Make healthy choices when eating out  · The type of restaurant you choose can help you make healthy choices.  Even fast-food chains are now offering more low-fat or healthier choices on the menu. · Choose smaller portions, or take half of your meal home. · When eating out, try:  ¨ A veggie pizza with a whole wheat crust or grilled chicken (instead of sausage or pepperoni). ¨ Pasta with roasted vegetables, grilled chicken, or marinara sauce instead of cream sauce. ¨ A vegetable wrap or grilled chicken wrap. ¨ Broiled or poached food instead of fried or breaded items. Make healthy choices easy  · Buy packaged, prewashed, ready-to-eat fresh vegetables and fruits, such as baby carrots, salad mixes, and chopped or shredded broccoli and cauliflower. · Buy packaged, presliced fruits, such as melon or pineapple. · Choose 100% fruit or vegetable juice instead of soda. Limit juice intake to 4 to 6 oz (½ to ¾ cup) a day. · Blend low-fat yogurt, fruit juice, and canned or frozen fruit to make a smoothie for breakfast or a snack. Where can you learn more? Go to http://milagrosNalari Healthvitaliy.info/. Enter T756 in the search box to learn more about \"Eating Healthy Foods: Care Instructions. \"  Current as of: November 20, 2015  Content Version: 11.1  © 3170-2908 [employer]. Care instructions adapted under license by [employer] (which disclaims liability or warranty for this information). If you have questions about a medical condition or this instruction, always ask your healthcare professional. [place] [address] any warranty or liability for your use of this information. Learning About Healthy Weight  What is a healthy weight? A healthy weight is the weight at which you feel good about yourself and have energy for work and play. It's also one that lowers your risk for health problems. What can you do to stay at a healthy weight? It can be hard to stay at a healthy weight, especially when fast food, vending-machine snacks, and processed foods are so easy to find.  And with your busy lifestyle, activity may be low on your list of things to do. But staying at a healthy weight may be easier than you think. Here are some dos and don'ts for staying at a healthy weight:  Do eat healthy foods  The kinds of foods you eat have a big impact on both your weight and your health. Reaching and staying at a healthy weight is not about going on a diet. It's about making healthier food choices every day and changing your diet for good. Healthy eating means eating a variety of foods so that you get all the nutrients you need. Your body needs protein, carbohydrate, and fats for energy. They keep your heart beating, your brain active, and your muscles working. On most days, try to eat from each food group. This means eating a variety of:  · Whole grains, such as whole wheat breads and pastas. · Fruits and vegetables. · Dairy products, such as low-fat milk, yogurt, and cheese. · Lean proteins, such as all types of fish, chicken without the skin, and beans. Don't have too much or too little of one thing. All foods, if eaten in moderation, can be part of healthy eating. Even sweets can be okay. If your favorite foods are high in fat, salt, sugar, or calories, limit how often you eat them. Eat smaller servings, or look for healthy substitutes. Do watch what you eat  Many people eat more than their bodies need. Part of staying at a healthy weight means learning how much food you really need from day to day and not eating more than that. Even with healthy foods, eating too much can make you gain weight. Having a well-balanced diet means that you eat enough, but not too much, and that your food gives you the nutrients you need to stay healthy. So listen to your body. Eat when you're hungry. Stop when you feel satisfied. It's a good idea to have healthy snacks ready for when you get hungry. Keep healthy snacks with you at work, in your car, and at home.  If you have a healthy snack easily available, you'll be less likely to pick a candy bar or bag of chips from a vending machine instead. Some healthy snacks you might want to keep on hand are fruit, low-fat yogurt, string cheese, low-fat microwave popcorn, raisins and other dried fruit, nuts, whole wheat crackers, pretzels, carrots, celery sticks, and broccoli. Do some physical activity  A big part of reaching and staying at a healthy weight is being active. When you're active, you burn calories. This makes it easier to reach and stay at a healthy weight. When you're active on a regular basis, your body burns more calories, even when you're at rest. Being active helps you lose fat and build lean muscle. Try to be active for at least 1 hour every day. This may sound like a lot, but it's okay to be active in smaller blocks of time that add up to 1 hour a day. Any activity that makes your heart beat faster and keeps it there for a while counts. A brisk walk, run, or swim will get your heart beating faster. So will climbing stairs, shooting baskets, or cycling. Even some household chores like vacuuming and mowing the lawn will get your heart rate up. Pick activities that you enjoy--ones that make your heart beat faster, your muscles stronger, and your muscles and joints more flexible. If you find more than one thing you like doing, do them all. You don't have to do the same thing every day. Don't diet  Diets don't work. Diets are temporary. Because you give up so much when you diet, you may be hungry and think about food all the time. And after you stop dieting, you also may overeat to make up for what you missed. Most people who diet end up gaining back the pounds they lost--and more. Remember that healthy bodies come in lots of shapes and sizes. Everyone can get healthier by eating better and being more active. Where can you learn more? Go to http://milagros-vitaliy.info/.   Enter 652 6212 in the search box to learn more about \"Learning About Healthy Weight. \"  Current as of: February 16, 2016  Content Version: 11.1  © 6570-2636 [employer]. Care instructions adapted under license by [employer] (which disclaims liability or warranty for this information). If you have questions about a medical condition or this instruction, always ask your healthcare professional. [address] any warranty or liability for your use of this information. A Healthy Lifestyle: Care Instructions  Your Care Instructions  A healthy lifestyle can help you feel good, stay at a healthy weight, and have plenty of energy for both work and play. A healthy lifestyle is something you can share with your whole family. A healthy lifestyle also can lower your risk for serious health problems, such as high blood pressure, heart disease, and diabetes. You can follow a few steps listed below to improve your health and the health of your family. Follow-up care is a key part of your treatment and safety. Be sure to make and go to all appointments, and call your doctor if you are having problems. Its also a good idea to know your test results and keep a list of the medicines you take. How can you care for yourself at home? · Do not eat too much sugar, fat, or fast foods. You can still have dessert and treats now and then. The goal is moderation. · Start small to improve your eating habits. Pay attention to portion sizes, drink less juice and soda pop, and eat more fruits and vegetables. ¨ Eat a healthy amount of food. A 3-ounce serving of meat, for example, is about the size of a deck of cards. Fill the rest of your plate with vegetables and whole grains. ¨ Limit the amount of soda and sports drinks you have every day. Drink more water when you are thirsty. ¨ Eat at least 5 servings of fruits and vegetables every day.  It may seem like a lot, but it is not hard to reach this goal. A serving or helping is 1 piece of fruit, 1 cup of vegetables, or 2 cups of leafy, raw vegetables. Have an apple or some carrot sticks as an afternoon snack instead of a candy bar. Try to have fruits and/or vegetables at every meal.  · Make exercise part of your daily routine. You may want to start with simple activities, such as walking, bicycling, or slow swimming. Try to be active 30 to 60 minutes every day. You do not need to do all 30 to 60 minutes all at once. For example, you can exercise 3 times a day for 10 or 20 minutes. Moderate exercise is safe for most people, but it is always a good idea to talk to your doctor before starting an exercise program.  · Keep moving. [place] the lawn, work in the garden, or [employer]. Take the stairs instead of the elevator at work. · If you smoke, quit. People who smoke have an increased risk for heart attack, stroke, cancer, and other lung illnesses. Quitting is hard, but there are ways to boost your chance of quitting tobacco for good. ¨ Use nicotine gum, patches, or lozenges. ¨ Ask your doctor about stop-smoking programs and medicines. ¨ Keep trying. In addition to reducing your risk of diseases in the future, you will notice some benefits soon after you stop using tobacco. If you have shortness of breath or asthma symptoms, they will likely get better within a few weeks after you quit. · Limit how much alcohol you drink. Moderate amounts of alcohol (up to 2 drinks a day for men, 1 drink a day for women) are okay. But drinking too much can lead to liver problems, high blood pressure, and other health problems. Family health  If you have a family, there are many things you can do together to improve your health. · Eat meals together as a family as often as possible. · Eat healthy foods. This includes fruits, vegetables, lean meats and dairy, and whole grains. · Include your family in your fitness plan.  Most people think of activities such as jogging or tennis as the way to fitness, but there are many ways you and your family can be more active. Anything that makes you breathe hard and gets your heart pumping is exercise. Here are some tips:  ¨ Walk to do errands or to take your child to school or the bus. ¨ Go for a family bike ride after dinner instead of watching TV. Where can you learn more? Go to http://milagros-vitaliy.info/. Enter E058 in the search box to learn more about \"A Healthy Lifestyle: Care Instructions. \"  Current as of: July 26, 2016  Content Version: 11.1  © 0808-1831 [employer]. Care instructions adapted under license by [employer] (which disclaims liability or warranty for this information). If you have questions about a medical condition or this instruction, always ask your healthcare professional. [address] any warranty or liability for your use of this information.

## 2017-03-20 ENCOUNTER — OFFICE VISIT (OUTPATIENT)
Dept: CARDIOLOGY CLINIC | Age: 72
End: 2017-03-20

## 2017-03-20 VITALS
DIASTOLIC BLOOD PRESSURE: 60 MMHG | SYSTOLIC BLOOD PRESSURE: 120 MMHG | BODY MASS INDEX: 33.57 KG/M2 | HEART RATE: 78 BPM | WEIGHT: 171 LBS | OXYGEN SATURATION: 99 % | HEIGHT: 60 IN | RESPIRATION RATE: 22 BRPM

## 2017-03-20 DIAGNOSIS — I10 ESSENTIAL HYPERTENSION: Primary | ICD-10-CM

## 2017-03-20 DIAGNOSIS — E78.5 DYSLIPIDEMIA: ICD-10-CM

## 2017-03-20 DIAGNOSIS — I26.99 PULMONARY EMBOLISM, OTHER: ICD-10-CM

## 2017-03-20 RX ORDER — FUROSEMIDE 40 MG/1
TABLET ORAL DAILY
COMMUNITY
End: 2017-09-22 | Stop reason: SDUPTHER

## 2017-03-20 RX ORDER — GLUCOSAMINE SULFATE 1500 MG
2000 POWDER IN PACKET (EA) ORAL DAILY
COMMUNITY
End: 2018-03-08 | Stop reason: DRUGHIGH

## 2017-03-20 NOTE — PROGRESS NOTES
Subjective:     Problem List  Date Reviewed: 3/20/2017          Codes Class Noted    Advanced care planning/counseling discussion ICD-10-CM: Z71.89  ICD-9-CM: V65.49  6/13/2016    Overview Signed 6/13/2016  3:44 PM by Francis Kc NP     Discussion with patient. Information and paperwork given to patient for review. H/O Pulmonary embolism (1/2015) ICD-10-CM: I26.99  ICD-9-CM: 415.19  1/5/2015        Edema ICD-10-CM: R60.9  ICD-9-CM: 782.3  8/27/2014        Hydradenitis ICD-10-CM: L73.2  ICD-9-CM: 705.83  2/1/2012        HTN (hypertension) ICD-10-CM: I10  ICD-9-CM: 401.9  1/21/2011    Overview Addendum 3/8/2016  4:29 PM by MD NICOLE Garcia. Echo (8/27/14):  EF 65%, DD.  B.  Echo (1/4/15): Normal LVF. Mild TR. C.  Echo (3/8/16):  EF 55%, DD. Mild AVSC. AR (allergic rhinitis) ICD-10-CM: J30.9  ICD-9-CM: 477.9  5/21/2010        Eczema ICD-10-CM: L30.9  ICD-9-CM: 692.9  5/21/2010        Depression ICD-10-CM: F32.9  ICD-9-CM: 407  5/21/2010        Female stress incontinence ICD-10-CM: N39.3  ICD-9-CM: 625.6  5/21/2010        Dyslipidemia ICD-10-CM: E78.5  ICD-9-CM: 272.4  5/21/2010    Overview Signed 3/20/2017  4:00 PM by MD NICOLE Garcia. FLP (1/23/17): Tot 192, , HDL 48,  (Zetia 10, Lipitor 80). Ms. Rubina Brown is a 67 y.o. woman with the above past medical history, who presents for follow up of hypertension. She is doing well from a cardiac standpoint. She has chronic lymphedema of the lower extremities. Otherwise she denies any chest pain, chest discomfort, shortness of breath, orthopnea, paroxysmal nocturnal dyspnea, palpitations or syncope. History   Smoking Status    Never Smoker   Smokeless Tobacco    Never Used       Current Outpatient Prescriptions   Medication Sig Dispense Refill    furosemide (LASIX) 40 mg tablet Take  by mouth daily.       cholecalciferol (VITAMIN D3) 1,000 unit cap Take 2,000 Units by mouth daily.      clindamycin (CLEOCIN T) 1 % lotion       doxycycline (VIBRAMYCIN) 100 mg capsule       HYDROcodone-acetaminophen (NORCO) 7.5-325 mg per tablet       metFORMIN (GLUCOPHAGE) 500 mg tablet Take 1 Tab by mouth two (2) times daily (with meals). 60 Tab 3    apixaban (ELIQUIS) 5 mg tablet Take 1 Tab by mouth every twelve (12) hours. 60 Tab 3    oxybutynin (DITROPAN) 5 mg tablet Take 1 Tab by mouth three (3) times daily. 270 Tab 1    loratadine (CLARITIN) 10 mg tablet Take 1 Tab by mouth daily for 360 days. 30 Tab 11    ezetimibe (ZETIA) 10 mg tablet Take 1 Tab by mouth daily. 90 Tab 1    atorvastatin (LIPITOR) 80 mg tablet Take 1 Tab by mouth daily. 30 Tab 3       Objective:     Visit Vitals    /60 (BP 1 Location: Left arm, BP Patient Position: Sitting)    Pulse 78    Resp 22    Ht 5' (1.524 m)    Wt 171 lb (77.6 kg)    SpO2 99%    BMI 33.4 kg/m2       HEENT Exam:     Normocephalic, atraumatic. EOMI. Oropharynx negative. Neck supple. No lymphadenopathy. Lung Exam:     The patient is not dyspneic. There is no cough. The lungs are clear to percussion. Breath sounds are heard equally in all lung fields. There are no wheezes, rales, rhonchi, or rubs heard on auscultation. Heart Exam:     The rhythm is regular. The PMI is in the 5th intercostal space of the MCL. Apical impulse is normal. S1 is regular. S2 is physiologic. There is no S3, S4 gallop, murmur, click, or rub. Abdomen Exam:     Bowel sounds are normoactive. Abdomen benign. Extremities Exam:     The extremities are atraumatic appearing. There is no clubbing, cyanosis, edema, ulcers, varicose veins, rash, swelling, erythemia noted in the extremities. The neurovascular status is grossly intact with normal distal sensation and pulses. Vascular Exam:     The radial, brachial, dorsalis pedis, posterior tibial, are equal and strong bilaterally The carotids are equal bilaterally without bruits. EKG: NSR @ 69, no isch. Assessment/Plan:     Ms. Glenna Herrera appears stable from a cardiac standpoint. We are going to stay the course with her current medication regimen, and she is going to follow up with me as needed. We had a lengthy discussion with regard to diet and exercise including sodium restricted diet, and trying to limit her intake of sugars. Plan:  1. Continue outpatient medication regimen. 2. Follow up with me as needed. 3. Diet and exercise. 4. Call my office, call her primary care physician, or return to the hospital should any concerning symptomatology arise. Ms. Glenna Herrera indicated that she understood this plan and wished to proceed ahead.              Patient Care Team:  Librado Duverney, NP as PCP - General (Nurse Practitioner)  Alvina Dimas MD (General Surgery)  Yahir Beasley MD (Cardiology)  Kendal Jovel DPM (Podiatry)  Alvina Dimas MD (General Surgery)  Santos Fitch RN as Nurse Navigator  Toby Avila NP (Nurse Practitioner)

## 2017-09-07 RX ORDER — FUROSEMIDE 40 MG/1
40 TABLET ORAL DAILY
Qty: 30 TAB | Status: CANCELLED | OUTPATIENT
Start: 2017-09-07

## 2017-09-08 RX ORDER — FUROSEMIDE 40 MG/1
40 TABLET ORAL DAILY
Qty: 30 TAB | Refills: 0 | OUTPATIENT
Start: 2017-09-08

## 2017-09-11 NOTE — TELEPHONE ENCOUNTER
Attempted to call patient at all numbers listed however, all numbers are no longer in service. Patient will need an appointment prior to refills.

## 2017-09-22 ENCOUNTER — OFFICE VISIT (OUTPATIENT)
Dept: FAMILY MEDICINE CLINIC | Age: 72
End: 2017-09-22

## 2017-09-22 VITALS
HEIGHT: 60 IN | TEMPERATURE: 98.7 F | HEART RATE: 88 BPM | OXYGEN SATURATION: 98 % | WEIGHT: 160 LBS | RESPIRATION RATE: 20 BRPM | SYSTOLIC BLOOD PRESSURE: 108 MMHG | DIASTOLIC BLOOD PRESSURE: 62 MMHG | BODY MASS INDEX: 31.41 KG/M2

## 2017-09-22 DIAGNOSIS — R73.9 HYPERGLYCEMIA: ICD-10-CM

## 2017-09-22 DIAGNOSIS — R05.9 COUGH: ICD-10-CM

## 2017-09-22 DIAGNOSIS — R60.1 GENERALIZED EDEMA: Primary | ICD-10-CM

## 2017-09-22 DIAGNOSIS — I27.82 OTHER CHRONIC PULMONARY EMBOLISM WITHOUT ACUTE COR PULMONALE (HCC): ICD-10-CM

## 2017-09-22 DIAGNOSIS — I10 ESSENTIAL HYPERTENSION: ICD-10-CM

## 2017-09-22 RX ORDER — FUROSEMIDE 40 MG/1
40 TABLET ORAL DAILY
Qty: 90 TAB | Refills: 0 | Status: SHIPPED | OUTPATIENT
Start: 2017-09-22 | End: 2017-10-05 | Stop reason: SDUPTHER

## 2017-09-22 RX ORDER — BENZONATATE 100 MG/1
100 CAPSULE ORAL
Qty: 30 CAP | Refills: 0 | Status: SHIPPED | OUTPATIENT
Start: 2017-09-22 | End: 2017-09-29

## 2017-09-22 RX ORDER — METFORMIN HYDROCHLORIDE 500 MG/1
500 TABLET ORAL 2 TIMES DAILY WITH MEALS
Qty: 60 TAB | Refills: 3 | Status: SHIPPED | OUTPATIENT
Start: 2017-09-22 | End: 2018-03-02 | Stop reason: SDUPTHER

## 2017-09-22 NOTE — PROGRESS NOTES
Progress Note    Patient: Nuria Huetra MRN: [id number]  SSN: [id number]    YOB: 1945  Age: 67 y.o. Sex: female        Chief Complaint   Patient presents with    Cough    Sore Throat         Subjective:     Encounter Diagnoses   Name Primary?  Generalized edema Yes    Other chronic pulmonary embolism without acute cor pulmonale (HCC)     Essential hypertension     Hyperglycemia     Cough        Cough  Patient accompanied by her daughter complaining of cough x 1 week. Associated with chest pain during cough. Patient concerned that she has PNA as well as fluid in her chest. Patient has been out of lasix x 1 week. Hypertension: Controlled   BP Readings from Last 3 Encounters:   09/22/17 108/62   03/20/17 120/60   02/20/17 102/67     The patient reports:  taking medications as instructed, no medication side effects noted, no TIA's, no chest pain on exertion, no dyspnea on exertion, noting swelling of ankles. Lab Results   Component Value Date/Time    Sodium 141 09/22/2017 04:16 PM    Potassium 3.9 09/22/2017 04:16 PM    Chloride 102 09/22/2017 04:16 PM    CO2 26 09/22/2017 04:16 PM    Anion gap 5 01/08/2015 02:17 AM    Glucose 101 09/22/2017 04:16 PM    BUN 9 09/22/2017 04:16 PM    Creatinine 0.79 09/22/2017 04:16 PM    BUN/Creatinine ratio 11 09/22/2017 04:16 PM    GFR est AA 86 09/22/2017 04:16 PM    GFR est non-AA 75 09/22/2017 04:16 PM    Calcium 9.0 09/22/2017 04:16 PM    Bilirubin, total 0.3 01/23/2017 02:18 PM    AST (SGOT) 20 01/23/2017 02:18 PM    Alk. phosphatase 136 01/23/2017 02:18 PM    Protein, total 7.7 01/23/2017 02:18 PM    Albumin 3.3 01/23/2017 02:18 PM    Globulin 4.3 06/30/2014 04:09 PM    A-G Ratio 0.8 01/23/2017 02:18 PM    ALT (SGPT) 15 01/23/2017 02:18 PM     Our goal is to normalize the blood pressure to decrease the risks of strokes and heart attacks. The patient is in agreement with the plan.       Health Maintenance  Health Maintenance Due   Topic Date Due    FOBT Q 1 YEAR AGE 50-75  03/05/1995    ZOSTER VACCINE AGE 60>  01/05/2005    GLAUCOMA SCREENING Q2Y  03/05/2010    Pneumococcal 65+ Low/Medium Risk (1 of 2 - PCV13) 03/05/2010    INFLUENZA AGE 9 TO ADULT  08/01/2017       Current and past medical information:  Patient Active Problem List    Diagnosis Date Noted    Advanced care planning/counseling discussion 06/13/2016    H/O Pulmonary embolism (1/2015) 01/05/2015    Edema 08/27/2014    Hydradenitis 02/01/2012    HTN (hypertension) 01/21/2011    AR (allergic rhinitis) 05/21/2010    Eczema 05/21/2010    Depression 05/21/2010    Female stress incontinence 05/21/2010    Dyslipidemia 05/21/2010       Past Medical History:   Diagnosis Date    Arthritis     Diabetes ([facility] [address]     states she is \"pre-diabetic\", diet controlled    frequency     H/O blood clots     right side of body    Heart attack ([facility] [address] 1/2014    Hydradenitis 2/1/2012    Hypercholesterolemia     Hypertension     Ill-defined condition     edema of legs       Allergies   Allergen Reactions    Pcn [Penicillins] Rash       History reviewed. No pertinent surgical history. Social History     Social History    Marital status: LEGALLY      Spouse name: N/A    Number of children: N/A    Years of education: N/A     Social History Main Topics    Smoking status: Never Smoker    Smokeless tobacco: Never Used    Alcohol use No    Drug use: No    Sexual activity: No     Other Topics Concern    None     Social History Narrative       {Review of Systems   Constitutional: Negative for chills and fever. HENT: Positive for congestion and sore throat. Respiratory: Positive for cough. Negative for hemoptysis, sputum production, shortness of breath and wheezing. Cardiovascular: Positive for leg swelling. Negative for chest pain and palpitations. Skin: Negative for itching. Neurological: Negative for dizziness, focal weakness, loss of consciousness and headaches. Objective:     Vitals:    09/22/17 1526   BP: 108/62   Pulse: 88   Resp: 20   Temp: 98.7 °F (37.1 °C)   TempSrc: Oral   SpO2: 98%   Weight: 160 lb (72.6 kg)   Height: 5' (1.524 m)      Body mass index is 31.25 kg/(m^2). Physical Exam   Constitutional: She appears well-developed. Pt is disheveled and wearing dirty clothes. HENT:   Head: Normocephalic and atraumatic. Right Ear: External ear normal.   Left Ear: External ear normal.   Mouth/Throat: Oropharynx is clear and moist. No oropharyngeal exudate. Eyes: EOM are normal. Pupils are equal, round, and reactive to light. Neck: Normal range of motion. Neck supple. Cardiovascular: Normal rate and regular rhythm. Exam reveals no friction rub. No murmur heard. Pulmonary/Chest: Effort normal and breath sounds normal. No respiratory distress. She has no wheezes. She has no rales. Musculoskeletal: Normal range of motion. She exhibits edema (3+ ankles and trace edema to mid clves bilaterally. ). Assessment and orders:     Encounter Diagnoses     ICD-10-CM ICD-9-CM   1. Generalized edema R60.1 782.3   2. Other chronic pulmonary embolism without acute cor pulmonale (HCC) I27.82 416.2   3. Essential hypertension I10 401.9   4. Hyperglycemia R73.9 790.29   5. Cough R05 786.2       1. Generalized edema  Resume lasix and see patient back in office for close f/u. Discussed with patient and duaghter regarding monitoring weight to to call if weight increases more then 3 lbs in a day. 2. Other chronic pulmonary embolism without acute cor pulmonale (HCC)  - apixaban (ELIQUIS) 5 mg tablet; Take 1 Tab by mouth every twelve (12) hours. Dispense: 60 Tab; Refill: 3    3. Essential hypertension  - furosemide (LASIX) 40 mg tablet; Take 1 Tab by mouth daily. Dispense: 90 Tab; Refill: 0  - METABOLIC PANEL, BASIC  - LIPID PANEL    4. Hyperglycemia  - metFORMIN (GLUCOPHAGE) 500 mg tablet; Take 1 Tab by mouth two (2) times daily (with meals).   Dispense: 60 Tab; Refill: 3  - HEMOGLOBIN A1C WITH EAG    5. Cough  Likely viral illness. - benzonatate (TESSALON) 100 mg capsule; Take 1 Cap by mouth three (3) times daily as needed for Cough for up to 7 days. Dispense: 30 Cap; Refill: 0    Current medication list after this visit:  Current Outpatient Prescriptions   Medication Sig    furosemide (LASIX) 40 mg tablet Take 1 Tab by mouth daily.  apixaban (ELIQUIS) 5 mg tablet Take 1 Tab by mouth every twelve (12) hours.  metFORMIN (GLUCOPHAGE) 500 mg tablet Take 1 Tab by mouth two (2) times daily (with meals).  benzonatate (TESSALON) 100 mg capsule Take 1 Cap by mouth three (3) times daily as needed for Cough for up to 7 days.  cholecalciferol (VITAMIN D3) 1,000 unit cap Take 2,000 Units by mouth daily.  clindamycin (CLEOCIN T) 1 % lotion     doxycycline (VIBRAMYCIN) 100 mg capsule     HYDROcodone-acetaminophen (NORCO) 7.5-325 mg per tablet     oxybutynin (DITROPAN) 5 mg tablet Take 1 Tab by mouth three (3) times daily.  ezetimibe (ZETIA) 10 mg tablet Take 1 Tab by mouth daily.  atorvastatin (LIPITOR) 80 mg tablet Take 1 Tab by mouth daily. No current facility-administered medications for this visit. Medications Discontinued During This Encounter   Medication Reason    furosemide (LASIX) 40 mg tablet Reorder    apixaban (ELIQUIS) 5 mg tablet Reorder    metFORMIN (GLUCOPHAGE) 500 mg tablet Reorder       Plan of care:  Discussed diagnoses in detail with patient. Medication risks/benefits/side effects discussed with patient. All of the patient's questions were addressed. The patient understands and agrees with our plan of care. The patient knows to call back if they are unsure of or forget any changes we discussed today or if the symptoms change.      The patient received an After-Visit Summary which contains VS, orders, medication list and allergy list. This can be used as a \"mini-medical record\" should they have to seek medical care while out of town. Follow-up Disposition:  Return in about 1 week (around 9/29/2017) for fluid.     Future Appointments  Date Time Provider Buddy Elvi   9/29/2017 2:10 PM Daysi Hu MD [facility] DANNY SCHED   11/15/2017 11:30 AM Clarissa Pierre [facility] DANNY SCHED       Signed By: Daysi Hu MD     September 25, 2017

## 2017-09-22 NOTE — PROGRESS NOTES
Reviewed record in preparation for visit and have necessary documentation  Pt did not bring medication to office visit for review    Goals that were addressed and/or need to be completed during or after this appointment include   Health Maintenance Due   Topic Date Due    FOBT Q 1 YEAR AGE 50-75  03/05/1995    ZOSTER VACCINE AGE 60>  01/05/2005    GLAUCOMA SCREENING Q2Y  03/05/2010    Pneumococcal 65+ Low/Medium Risk (1 of 2 - PCV13) 03/05/2010    INFLUENZA AGE 9 TO ADULT  08/01/2017

## 2017-09-22 NOTE — MR AVS SNAPSHOT
Visit Information Date & Time Provider Department Dept. Phone Encounter #  
 9/22/2017  3:00 PM Buddy Brian MD  Amanda Anaya [id number] Follow-up Instructions Return in about 1 week (around 9/29/2017) for fluid. Your Appointments 11/15/2017 11:30 AM  
Medicare Physical with Jael Chambers[address] [place] [address] ([address]) Appt Note:  & Chronic Condition Education-letter mailed [address] [address]  
[place] [address] [address] Upcoming Health Maintenance Date Due FOBT Q 1 YEAR AGE 50-75 3/5/1995 ZOSTER VACCINE AGE 60> 1/5/2005 GLAUCOMA SCREENING Q2Y 3/5/2010 Pneumococcal 65+ Low/Medium Risk (1 of 2 - PCV13) 3/5/2010 INFLUENZA AGE 9 TO ADULT 8/1/2017 MEDICARE YEARLY EXAM 11/18/2017 BREAST CANCER SCRN MAMMOGRAM 1/13/2019 DTaP/Tdap/Td series (2 - Td) 9/13/2022 Allergies as of 9/22/2017  Review Complete On: 9/22/2017 By: Buddy Brian MD  
  
 Severity Noted Reaction Type Reactions Pcn [Penicillins]  01/03/2011    Rash Current Immunizations  Reviewed on 9/13/2012 Name Date TDAP Vaccine 9/13/2012 Not reviewed this visit You Were Diagnosed With   
  
 Codes Comments Generalized edema    -  Primary ICD-10-CM: R60.1 ICD-9-CM: 782.3 Other chronic pulmonary embolism without acute cor pulmonale (HCC)     ICD-10-CM: I27.82 
ICD-9-CM: 416.2 Essential hypertension     ICD-10-CM: I10 
ICD-9-CM: 401.9 Hyperglycemia     ICD-10-CM: R73.9 ICD-9-CM: 790.29 Vitals BP Pulse Temp Resp Height(growth percentile) Weight(growth percentile) 108/62 (BP 1 Location: Left arm, BP Patient Position: Sitting) 88 98.7 °F (37.1 °C) (Oral) 20 5' (1.524 m) 160 lb (72.6 kg) SpO2 BMI OB Status Smoking Status 98% 31.25 kg/m2 Postmenopausal Never Smoker Vitals History BMI and BSA Data Body Mass Index Body Surface Area  
 31.25 kg/m 2 1.75 m 2 Preferred Pharmacy Pharmacy Name Phone [facility] [address] [place] [address] [phone] Your Updated Medication List  
  
   
This list is accurate as of: 9/22/17  3:55 PM.  Always use your most recent med list.  
  
  
  
  
 apixaban 5 mg tablet Commonly known as:  Donnis Staples Take 1 Tab by mouth every twelve (12) hours. atorvastatin 80 mg tablet Commonly known as:  LIPITOR Take 1 Tab by mouth daily. clindamycin 1 % lotion Commonly known as:  CLEOCIN T  
  
 doxycycline 100 mg capsule Commonly known as:  VIBRAMYCIN  
  
 ezetimibe 10 mg tablet Commonly known as:  Denette Chaudhary Take 1 Tab by mouth daily. furosemide 40 mg tablet Commonly known as:  LASIX Take 1 Tab by mouth daily. HYDROcodone-acetaminophen 7.5-325 mg per tablet Commonly known as:  NORCO  
  
 metFORMIN 500 mg tablet Commonly known as:  GLUCOPHAGE Take 1 Tab by mouth two (2) times daily (with meals). oxybutynin 5 mg tablet Commonly known as:  BUEMNADI Take 1 Tab by mouth three (3) times daily. VITAMIN D3 1,000 unit Cap Generic drug:  cholecalciferol Take 2,000 Units by mouth daily. Prescriptions Sent to Pharmacy Refills  
 furosemide (LASIX) 40 mg tablet 0 Sig: Take 1 Tab by mouth daily. Class: Normal  
 Pharmacy: [place] [address] Ph #: [phone] Route: Oral  
 apixaban (ELIQUIS) 5 mg tablet 3 Sig: Take 1 Tab by mouth every twelve (12) hours. Class: Normal  
 Pharmacy: [place] [address] Ph #: [phone] Route: Oral  
 metFORMIN (GLUCOPHAGE) 500 mg tablet 3 Sig: Take 1 Tab by mouth two (2) times daily (with meals).   
 Class: Normal  
 Pharmacy: [address], [address] [place] [address] [facility] #: [phone] Route: Oral  
  
We Performed the Following HEMOGLOBIN A1C WITH EAG [91572 CPT(R)] LIPID PANEL [54806 CPT(R)] METABOLIC PANEL, BASIC [39904 CPT(R)] Follow-up Instructions Return in about 1 week (around 9/29/2017) for fluid. Patient Instructions Low Sodium Diet (2,000 Milligram): Care Instructions Your Care Instructions Too much sodium causes your body to hold on to extra water. This can raise your blood pressure and force your heart and kidneys to work harder. In very serious cases, this could cause you to be put in the hospital. It might even be life-threatening. By limiting sodium, you will feel better and lower your risk of serious problems. The most common source of sodium is salt. People get most of the salt in their diet from canned, prepared, and packaged foods. Fast food and restaurant meals also are very high in sodium. Your doctor will probably limit your sodium to less than 2,000 milligrams (mg) a day. This limit counts all the sodium in prepared and packaged foods and any salt you add to your food. Follow-up care is a key part of your treatment and safety. Be sure to make and go to all appointments, and call your doctor if you are having problems. It's also a good idea to know your test results and keep a list of the medicines you take. How can you care for yourself at home? Read food labels · Read labels on cans and food packages. The labels tell you how much sodium is in each serving. Make sure that you look at the serving size. If you eat more than the serving size, you have eaten more sodium. · Food labels also tell you the Percent Daily Value for sodium. Choose products with low Percent Daily Values for sodium. · Be aware that sodium can come in forms other than salt, including monosodium glutamate (MSG), sodium citrate, and sodium bicarbonate (baking soda). MSG is often added to Asian food.  When you eat out, you can sometimes ask for food without MSG or added salt. Buy low-sodium foods · Buy foods that are labeled \"unsalted\" (no salt added), \"sodium-free\" (less than 5 mg of sodium per serving), or \"low-sodium\" (less than 140 mg of sodium per serving). Foods labeled \"reduced-sodium\" and \"light sodium\" may still have too much sodium. Be sure to read the label to see how much sodium you are getting. · Buy fresh vegetables, or frozen vegetables without added sauces. Buy low-sodium versions of canned vegetables, soups, and other canned goods. Prepare low-sodium meals · Cut back on the amount of salt you use in cooking. This will help you adjust to the taste. Do not add salt after cooking. One teaspoon of salt has about 2,300 mg of sodium. · Take the salt shaker off the table. · Flavor your food with garlic, lemon juice, onion, vinegar, herbs, and spices. Do not use soy sauce, lite soy sauce, steak sauce, onion salt, garlic salt, celery salt, mustard, or ketchup on your food. · Use low-sodium salad dressings, sauces, and ketchup. Or make your own salad dressings and sauces without adding salt. · Use less salt (or none) when recipes call for it. You can often use half the salt a recipe calls for without losing flavor. Other foods such as rice, pasta, and grains do not need added salt. · Rinse canned vegetables, and cook them in fresh water. This removes somebut not allof the salt. · Avoid water that is naturally high in sodium or that has been treated with water softeners, which add sodium. Call your local water company to find out the sodium content of your water supply. If you buy bottled water, read the label and choose a sodium-free brand. Avoid high-sodium foods · Avoid eating: ¨ Smoked, cured, salted, and canned meat, fish, and poultry. ¨ Ham, sinha, hot dogs, and luncheon meats. ¨ Regular, hard, and processed cheese and regular peanut butter.  
¨ Crackers with salted tops, and other salted snack foods such as pretzels, chips, and salted popcorn. ¨ Frozen prepared meals, unless labeled low-sodium. ¨ Canned and dried soups, broths, and bouillon, unless labeled sodium-free or low-sodium. ¨ Canned vegetables, unless labeled sodium-free or low-sodium. ¨ Western Deepthi fries, pizza, tacos, and other fast foods. ¨ Pickles, olives, ketchup, and other condiments, especially soy sauce, unless labeled sodium-free or low-sodium. Where can you learn more? Go to http://milagrosRenovis Surgical Technologiesvitaliy.info/. Enter V286 in the search box to learn more about \"Low Sodium Diet (2,000 Milligram): Care Instructions. \" Current as of: July 26, 2016 Content Version: 11.3 © 8871-5977 [employer]. Care instructions adapted under license by [employer] (which disclaims liability or warranty for this information). If you have questions about a medical condition or this instruction, always ask your healthcare professional. [place] [address] any warranty or liability for your use of this information. Introducing [address]! Marlene Cuevas introduces [employer] patient portal. Now you can access parts of your medical record, email your doctor's office, and request medication refills online. 1. In your internet browser, go to [email or web address] 2. Click on the First Time User? Click Here link in the Sign In box. You will see the New Member Sign Up page. 3. Enter your [employer] Access Code exactly as it appears below. You will not need to use this code after youve completed the sign-up process. If you do not sign up before the expiration date, you must request a new code. · [employer] Access Code: [email or web address] Expires: 12/21/2017  3:21 PM 
 
4. Enter the last four digits of your Social Security Number (xxxx) and Date of Birth (mm/dd/yyyy) as indicated and click Submit. You will be taken to the next sign-up page. 5. Create a [employer] ID. This will be your [employer] login ID and cannot be changed, so think of one that is secure and easy to remember. 6. Create a [employer] password. You can change your password at any time. 7. Enter your Password Reset Question and Answer. This can be used at a later time if you forget your password. 8. Enter your e-mail address. You will receive e-mail notification when new information is available in [address] 9. Click Sign Up. You can now view and download portions of your medical record. 10. Click the Download Summary menu link to download a portable copy of your medical information. If you have questions, please visit the Frequently Asked Questions section of the [employer] website. Remember, [employer] is NOT to be used for urgent needs. For medical emergencies, dial 911. Now available from your iPhone and Android! Please provide this summary of care documentation to your next provider. Your primary care clinician is listed as [address] [place] [address]. If you have any questions after today's visit, please call [phone].

## 2017-09-22 NOTE — PATIENT INSTRUCTIONS

## 2017-09-23 LAB
BUN SERPL-MCNC: 9 MG/DL (ref 8–27)
BUN/CREAT SERPL: 11 (ref 12–28)
CALCIUM SERPL-MCNC: 9 MG/DL (ref 8.7–10.3)
CHLORIDE SERPL-SCNC: 102 MMOL/L (ref 96–106)
CHOLEST SERPL-MCNC: 171 MG/DL (ref 100–199)
CO2 SERPL-SCNC: 26 MMOL/L (ref 18–29)
CREAT SERPL-MCNC: 0.79 MG/DL (ref 0.57–1)
EST. AVERAGE GLUCOSE BLD GHB EST-MCNC: 137 MG/DL
GLUCOSE SERPL-MCNC: 101 MG/DL (ref 65–99)
HBA1C MFR BLD: 6.4 % (ref 4.8–5.6)
HDLC SERPL-MCNC: 39 MG/DL
LDLC SERPL CALC-MCNC: 110 MG/DL (ref 0–99)
POTASSIUM SERPL-SCNC: 3.9 MMOL/L (ref 3.5–5.2)
SODIUM SERPL-SCNC: 141 MMOL/L (ref 134–144)
TRIGL SERPL-MCNC: 111 MG/DL (ref 0–149)
VLDLC SERPL CALC-MCNC: 22 MG/DL (ref 5–40)

## 2017-10-05 ENCOUNTER — OFFICE VISIT (OUTPATIENT)
Dept: FAMILY MEDICINE CLINIC | Age: 72
End: 2017-10-05

## 2017-10-05 VITALS
HEART RATE: 87 BPM | BODY MASS INDEX: 31.29 KG/M2 | SYSTOLIC BLOOD PRESSURE: 120 MMHG | TEMPERATURE: 98.5 F | OXYGEN SATURATION: 97 % | WEIGHT: 159.4 LBS | DIASTOLIC BLOOD PRESSURE: 74 MMHG | HEIGHT: 60 IN | RESPIRATION RATE: 20 BRPM

## 2017-10-05 DIAGNOSIS — Z00.00 MEDICARE ANNUAL WELLNESS VISIT, SUBSEQUENT: ICD-10-CM

## 2017-10-05 DIAGNOSIS — L73.2 HYDRADENITIS: ICD-10-CM

## 2017-10-05 DIAGNOSIS — J30.89 NON-SEASONAL ALLERGIC RHINITIS, UNSPECIFIED CHRONICITY, UNSPECIFIED TRIGGER: Primary | ICD-10-CM

## 2017-10-05 DIAGNOSIS — Z13.31 DEPRESSION SCREENING: ICD-10-CM

## 2017-10-05 DIAGNOSIS — R60.1 GENERALIZED EDEMA: ICD-10-CM

## 2017-10-05 DIAGNOSIS — I10 ESSENTIAL HYPERTENSION: ICD-10-CM

## 2017-10-05 DIAGNOSIS — Z13.39 ALCOHOL SCREENING: ICD-10-CM

## 2017-10-05 RX ORDER — FLUTICASONE PROPIONATE 50 MCG
2 SPRAY, SUSPENSION (ML) NASAL DAILY
Qty: 1 BOTTLE | Refills: 2 | Status: SHIPPED | OUTPATIENT
Start: 2017-10-05 | End: 2018-06-29 | Stop reason: SDUPTHER

## 2017-10-05 RX ORDER — LORATADINE 10 MG/1
10 TABLET ORAL DAILY
Qty: 30 TAB | Refills: 2 | Status: SHIPPED | OUTPATIENT
Start: 2017-10-05 | End: 2018-06-29 | Stop reason: SDUPTHER

## 2017-10-05 RX ORDER — CLINDAMYCIN PHOSPHATE 10 UG/ML
LOTION TOPICAL
Qty: 1 BOTTLE | Refills: 2 | Status: SHIPPED | OUTPATIENT
Start: 2017-10-05 | End: 2017-11-29 | Stop reason: SDUPTHER

## 2017-10-05 RX ORDER — FUROSEMIDE 40 MG/1
TABLET ORAL
Qty: 90 TAB | Refills: 0
Start: 2017-10-05 | End: 2017-11-29 | Stop reason: SDUPTHER

## 2017-10-05 NOTE — PATIENT INSTRUCTIONS
Medicare Part B Preventive Services Guidelines/Limitations Date last completed and Frequency Due Date   Bone Mass Measurement  (age 72 & older, biennial) Requires diagnosis related to osteoporosis or estrogen deficiency. Biennial benefit unless patient has history of long-term glucocorticoid tx or baseline is needed because initial test was by other method Completed     1/13/17    Recommended every 2 years As recommended by your PCP or Specialist     Cardiovascular Screening Blood Tests (every 5 years)  Total cholesterol, HDL, Triglycerides Order as a panel if possible Completed     9/22/17    As recommended by your PCP As recommended by your PCP or Specialist   Colorectal Cancer Screening  -Fecal occult blood test (annual)  -Flexible sigmoidoscopy (5y)  -Screening colonoscopy (10y)  -Barium Enema Age 49-80; After age [de-identified] if history of abnormal results      Recommended every 5 to 10 years  As recommended by your PCP or Specialist     Counseling to Prevent Tobacco Use (up to 8 sessions per year)  - Counseling greater than 3 and up to 10 minutes  - Counseling greater than 10 minutes Patients must be asymptomatic of tobacco-related conditions to receive as preventive service N/A N/A   Diabetes Screening Tests (at least every 3 years, Medicare covers annually or at 6-month intervals for prediabetic patients)    Fasting blood sugar (FBS) or glucose tolerance test (GTT) Patient must be diagnosed with one of the following:  -Hypertension, Dyslipidemia, obesity, previous impaired FBS or GTT  Or any two of the following: overweight, FH of diabetes, age ? 72, history of gestational diabetes, birth of baby weighing more than 9 pounds Completed       9/23/17    Recommended every 3 years for non-diabetics    Recommended every 3-6 months for Pre-Diabetics and Diabetics As recommended by your PCP or Specialist     Glaucoma Screening (no USPSTF recommendation) Diabetes mellitus, family history, , age 48 or over,  American, age 72 or over     Recommended annually As recommended by your PCP or Specialist    Referral to Dr. Jasmyne Jara [place], [place]   Seasonal Influenza Vaccination (annually)    Recommended Annually Due Fall 2017   TDAP Vaccination  Completed     9/13/12    Recommended every 10 years As recommended by your PCP or Specialist   Zoster (Shingles) Vaccination Covered by Medicare Part D through the pharmacy- PCP provides prescription     Recommended once over age 48     Pneumococcal Vaccination (once after 72)  Pneumo 21-   Recommended once over the age of 72    Prevnar 15-  Recommended once over the age of 72          Screening Mammography (biennial age 54-69) Annually (age 36 or over) Completed     1/13/17 As recommended by your PCP or Specialist     Screening Pap Tests and Pelvic Examination (up to age 79 and after 79 if unknown history or abnormal study last 8 years) Every 25 months except high risk As recommended by your PCP or Specialist   As recommended by your PCP or Specialist     Ultrasound Screening for Abdominal Aortic Aneurysm (AAA) (once) Patient must be referred through IPPE and not have had a screening for abdominal aortic aneurysm before under Medicare. Limited to patients who meet one of the following criteria:  - Men who are 73-68 years old and have smoked more than 100 cigarettes in their lifetime.  -Anyone with a FH of AAA  -Anyone recommended for screening by USPSTF Not indicated unless recommended by PCP   Not indicated unless recommended by PCP     Family Practice Management 2011    Medicare Wellness Visit, Female    The best way to live healthy is to have a healthy lifestyle by eating a well-balanced diet, exercising regularly, limiting alcohol and stopping smoking. Regular physical exams and screening tests are another way to keep healthy. Preventive exams provided by your health care provider can find health problems before they become diseases or illnesses.  Preventive services including immunizations, screening tests, monitoring and exams can help you take care of your own health. All people over age 72 should have a pneumovax  and and a prevnar shot to prevent pneumonia. These are once in a lifetime unless you and your provider decide differently. All people over 65 should have a yearly flu shot and a tetanus vaccine every 10 years. A bone mass density to screen for osteoporosis or thinning of the bones should be done every 2 years after 65. Screening for diabetes mellitus with a blood sugar test should be done every year. Glaucoma is a disease of the eye due to increased ocular pressure that can lead to blindness and it should be done every year by an eye professional.    Cardiovascular screening tests that check for elevated lipids (fatty part of blood) which can lead to heart disease and strokes should be done every 5 years. Colorectal screening that evaluates for blood or polyps in your colon should be done yearly as a stool test or every five years as a flexible sigmoidoscope or every 10 years as a colonoscopy up to age 76. Breast cancer screening with a mammogram is recommended biennially  for women age 54-69. Screening for cervical cancer with a pap smear and pelvic exam is recommended for women after age 72 years every 2 years up to age 79 or when the provider and patient decide to stop. If there is a history of cervical abnormalities or other increased risk for cancer then the test is recommended yearly. Hepatitis C screening is also recommended for anyone born between 80 through [address]. A shingles vaccine is also recommended once in a lifetime after age 61. Your Medicare Wellness Exam is recommended annually.     Here is a list of your current Health Maintenance items with a due date:  Health Maintenance Due   Topic Date Due    Stool testing for trace blood  03/05/1995    Glaucoma Screening   03/05/2010

## 2017-10-05 NOTE — PROGRESS NOTES
Chief Complaint   Patient presents with    Cough     Chest Congestion    Sore Throat    Skin Problem     Under Arms     Body mass index is 31.13 kg/(m^2).     Reviewed record in preparation for visit and have necessary documentation  Pt did not bring medication to office visit for review  Information was given to pt on Advanced Directives, Living Will  Information was given on Shingles Vaccine  Opportunity was given for questions  Goals that were addressed and/or need to be completed after this appointment include:     Health Maintenance Due   Topic Date Due    FOBT Q 1 YEAR AGE 50-75  03/05/1995    ZOSTER VACCINE AGE 60>  01/05/2005    GLAUCOMA SCREENING Q2Y  03/05/2010    Pneumococcal 65+ Low/Medium Risk (1 of 2 - PCV13) 03/05/2010    INFLUENZA AGE 9 TO ADULT  08/01/2017

## 2017-10-05 NOTE — MR AVS SNAPSHOT
Visit Information Date & Time Provider Department Dept. Phone Encounter #  
 10/5/2017  1:00 PM René Larsen MD [address] [place] [address] [id number] Follow-up Instructions Return in about 1 week (around 10/12/2017) for Weight check. Your Appointments 10/12/2017  1:00 PM  
ROUTINE CARE with René Larsen MD  
[facility] ([facility]) Appt Note: Weight check [address] [address]  
[place] [address] [address] Upcoming Health Maintenance Date Due FOBT Q 1 YEAR AGE 50-75 3/5/1995 ZOSTER VACCINE AGE 60> 1/5/2005 GLAUCOMA SCREENING Q2Y 3/5/2010 Pneumococcal 65+ Low/Medium Risk (1 of 2 - PCV13) 3/5/2010 INFLUENZA AGE 9 TO ADULT 8/1/2017 MEDICARE YEARLY EXAM 11/18/2017 BREAST CANCER SCRN MAMMOGRAM 1/13/2019 DTaP/Tdap/Td series (2 - Td) 9/13/2022 Allergies as of 10/5/2017  Review Complete On: 10/5/2017 By: René Larsen MD  
  
 Severity Noted Reaction Type Reactions Ciprofloxacin  10/05/2017    Other (comments) Vomiting Doxycycline  10/05/2017    Other (comments) Vomiting Pcn [Penicillins]  01/03/2011    Rash Current Immunizations  Reviewed on 9/13/2012 Name Date TDAP Vaccine 9/13/2012 Not reviewed this visit You Were Diagnosed With   
  
 Codes Comments Non-seasonal allergic rhinitis, unspecified chronicity, unspecified trigger    -  Primary ICD-10-CM: J30.89 ICD-9-CM: 477.8 Generalized edema     ICD-10-CM: R60.1 ICD-9-CM: 782.3 Essential hypertension     ICD-10-CM: I10 
ICD-9-CM: 401.9 Hydradenitis     ICD-10-CM: L73.2 ICD-9-CM: 705.83 Vitals BP Pulse Temp Resp Height(growth percentile) Weight(growth percentile)  120/74 (BP 1 Location: Right arm, BP Patient Position: Sitting) 87 98.5 °F (36.9 °C) (Oral) 20 5' (1.524 m) 159 lb 6.4 oz (72.3 kg) SpO2 BMI OB Status Smoking Status 97% 31.13 kg/m2 Postmenopausal Never Smoker Vitals History BMI and BSA Data Body Mass Index Body Surface Area  
 31.13 kg/m 2 1.75 m 2 Preferred Pharmacy Pharmacy Name Phone [address] [phone] Your Updated Medication List  
  
   
This list is accurate as of: 10/5/17  1:41 PM.  Always use your most recent med list.  
  
  
  
  
 apixaban 5 mg tablet Commonly known as:  Mariela Shield Take 1 Tab by mouth every twelve (12) hours. atorvastatin 80 mg tablet Commonly known as:  LIPITOR Take 1 Tab by mouth daily. clindamycin 1 % lotion Commonly known as:  CLEOCIN T Apply to effected area twixe a day  
  
 doxycycline 100 mg capsule Commonly known as:  VIBRAMYCIN  
  
 ezetimibe 10 mg tablet Commonly known as:  Suellyn Leon Take 1 Tab by mouth daily. fluticasone 50 mcg/actuation nasal spray Commonly known as:  Caffie Euler 2 Sprays by Both Nostrils route daily. furosemide 40 mg tablet Commonly known as:  LASIX Take 1 tab every morning and another tab 6 hours later. HYDROcodone-acetaminophen 7.5-325 mg per tablet Commonly known as:  NORCO  
  
 loratadine 10 mg tablet Commonly known as:  Rene Shipley Take 1 Tab by mouth daily. metFORMIN 500 mg tablet Commonly known as:  GLUCOPHAGE Take 1 Tab by mouth two (2) times daily (with meals). oxybutynin 5 mg tablet Commonly known as:  HEXMBOHN Take 1 Tab by mouth three (3) times daily. VITAMIN D3 1,000 unit Cap Generic drug:  cholecalciferol Take 2,000 Units by mouth daily. Prescriptions Sent to Pharmacy Refills  
 clindamycin (CLEOCIN T) 1 % lotion 2 Sig: Apply to effected area twixe a day  Class: Normal  
 Pharmacy: [id number], [address] [place] [address] [place] Ph #: [phone]  
 loratadine (CLARITIN) 10 mg tablet 2 Sig: Take 1 Tab by mouth daily. Class: Normal  
 Pharmacy: [place] [id number] Ph #: [phone] Route: Oral  
 fluticasone (FLONASE) 50 mcg/actuation nasal spray 2 Si Sprays by Both Nostrils route daily. Class: Normal  
 Pharmacy: [place] [address] Ph #: [phone] Route: Both Nostrils Follow-up Instructions Return in about 1 week (around 10/12/2017) for Weight check. Patient Instructions Medicare Part B Preventive Services Guidelines/Limitations Date last completed and Frequency Due Date Bone Mass Measurement 
(age 72 & older, biennial) Requires diagnosis related to osteoporosis or estrogen deficiency. Biennial benefit unless patient has history of long-term glucocorticoid tx or baseline is needed because initial test was by other method Completed 17 Recommended every 2 years As recommended by your PCP or Specialist 
  
Cardiovascular Screening Blood Tests (every 5 years) Total cholesterol, HDL, Triglycerides Order as a panel if possible Completed 17 As recommended by your PCP As recommended by your PCP or Specialist  
Colorectal Cancer Screening 
-Fecal occult blood test (annual) -Flexible sigmoidoscopy (5y) 
-Screening colonoscopy (10y) -Barium Enema Age 49-80; After age [de-identified] if history of abnormal results Recommended every 5 to 10 years  As recommended by your PCP or Specialist 
  
Counseling to Prevent Tobacco Use (up to 8 sessions per year) - Counseling greater than 3 and up to 10 minutes - Counseling greater than 10 minutes Patients must be asymptomatic of tobacco-related conditions to receive as preventive service N/A N/A Diabetes Screening Tests (at least every 3 years, Medicare covers annually or at 6-month intervals for prediabetic patients) Fasting blood sugar (FBS) or glucose tolerance test (GTT) Patient must be diagnosed with one of the following: 
-Hypertension, Dyslipidemia, obesity, previous impaired FBS or GTT 
Or any two of the following: overweight, FH of diabetes, age ? 72, history of gestational diabetes, birth of baby weighing more than 9 pounds Completed 9/23/17 Recommended every 3 years for non-diabetics Recommended every 3-6 months for Pre-Diabetics and Diabetics As recommended by your PCP or Specialist 
  
Glaucoma Screening (no USPSTF recommendation) Diabetes mellitus, family history, , age 48 or over,  American, age 72 or over Recommended annually As recommended by your PCP or Specialist 
 
Referral to Dr. Wisdom [place], [place] Seasonal Influenza Vaccination (annually) Recommended Annually Due Fall 2017 TDAP Vaccination  Completed 9/13/12 Recommended every 10 years As recommended by your PCP or Specialist  
Zoster (Shingles) Vaccination Covered by Medicare Part D through the pharmacy- PCP provides prescription Recommended once over age 48 Pneumococcal Vaccination (once after 65)  Pneumo 23- Recommended once over the age of 72 Prevnar 13-  Recommended once over the age of 72 Screening Mammography (biennial age 54-69) Annually (age 36 or over) Completed 1/13/17 As recommended by your PCP or Specialist 
  
Screening Pap Tests and Pelvic Examination (up to age 79 and after 79 if unknown history or abnormal study last 10 years) Every 24 months except high risk As recommended by your PCP or Specialist 
 As recommended by your PCP or Specialist 
  
Ultrasound Screening for Abdominal Aortic Aneurysm (AAA) (once) Patient must be referred through IPPE and not have had a screening for abdominal aortic aneurysm before under Medicare.   Limited to patients who meet one of the following criteria: 
- Men who are 73-68 years old and have smoked more than 100 cigarettes in their lifetime. 
-Anyone with a FH of AAA 
-Anyone recommended for screening by USPSTF Not indicated unless recommended by PCP Not indicated unless recommended by PCP Family Practice Management 2011 Introducing [facility] & [facility] SERVICES! [employer] introduces [employer] patient portal. Now you can access parts of your medical record, email your doctor's office, and request medication refills online. 1. In your internet browser, go to [email or web address] 2. Click on the First Time User? Click Here link in the Sign In box. You will see the New Member Sign Up page. 3. Enter your [employer] Access Code exactly as it appears below. You will not need to use this code after youve completed the sign-up process. If you do not sign up before the expiration date, you must request a new code. · [employer] Access Code: [email or web address] Expires: 12/21/2017  3:21 PM 
 
4. Enter the last four digits of your Social Security Number (xxxx) and Date of Birth (mm/dd/yyyy) as indicated and click Submit. You will be taken to the next sign-up page. 5. Create a [employer] ID. This will be your [employer] login ID and cannot be changed, so think of one that is secure and easy to remember. 6. Create a [employer] password. You can change your password at any time. 7. Enter your Password Reset Question and Answer. This can be used at a later time if you forget your password. 8. Enter your e-mail address. You will receive e-mail notification when new information is available in [address] 9. Click Sign Up. You can now view and download portions of your medical record. 10. Click the Download Summary menu link to download a portable copy of your medical information. If you have questions, please visit the Frequently Asked Questions section of the [employer] website. Remember, [employer] is NOT to be used for urgent needs. For medical emergencies, dial 911. Now available from your iPhone and Android! Please provide this summary of care documentation to your next provider. Your primary care clinician is listed as [address] [place] [address]. If you have any questions after today's visit, please call [phone].

## 2017-10-05 NOTE — PROGRESS NOTES
Navi Wilkinson  67 y.o. female  1945  [address] [place]   [id number]     [facility] Practice: Progress Note       Encounter Date: 10/5/2017    Chief Complaint   Patient presents with    Cough     Chest Congestion    Sore Throat    Skin Problem     Under Arms     History of Present Illness   Navi Wilkinson is a 67 y.o. female who presents to clinic today for      Cough  Patient is complaining of cough x several days. Associated with itching eyes. Cough is non-productive. Denies fever, chills, SOB. Denies sick contacts. Daughter reports that she used to take allergy medication but it has been several months since she took it. Edema  Patient is still LE edema. No improvement since last visit. Patient reports that she had been non-compliant with therapy including medication and elevating extremities and diet. Skin  Patient has a hx of hydradenitis and uses clindamycin ointment for therapy      Health Maintenance  Discussed with MLP during medicare wellness. Health Maintenance Due   Topic Date Due    FOBT Q 1 YEAR AGE 50-75  03/05/1995    ZOSTER VACCINE AGE 60>  01/05/2005    GLAUCOMA SCREENING Q2Y  03/05/2010    Pneumococcal 65+ Low/Medium Risk (1 of 2 - PCV13) 03/05/2010    INFLUENZA AGE 9 TO ADULT  08/01/2017     Review of Systems   Review of Systems   Constitutional: Negative for chills and fever. HENT: Positive for congestion and sore throat. Negative for ear discharge, ear pain, nosebleeds and sinus pain. Eyes: Positive for redness. Negative for pain and discharge. Respiratory: Negative for stridor. Cardiovascular: Positive for leg swelling. Negative for chest pain and palpitations. Gastrointestinal: Negative for abdominal pain, blood in stool, constipation, diarrhea, nausea and vomiting. Musculoskeletal: Negative for myalgias and neck pain. Skin: Negative for rash. Neurological: Negative for dizziness and headaches.        Vitals/Objective:     Vitals: 10/05/17 1300   BP: 120/74   Pulse: 87   Resp: 20   Temp: 98.5 °F (36.9 °C)   TempSrc: Oral   SpO2: 97%   Weight: 159 lb 6.4 oz (72.3 kg)   Height: 5' (1.524 m)     Body mass index is 31.13 kg/(m^2). Physical Exam   Constitutional: She is oriented to person, place, and time. She appears well-developed and well-nourished. HENT:   Head: Normocephalic and atraumatic. Right Ear: External ear normal.   Left Ear: External ear normal.   Mouth/Throat: No oropharyngeal exudate. Eyes: Conjunctivae and EOM are normal. Pupils are equal, round, and reactive to light. Right eye exhibits no discharge. Left eye exhibits no discharge. Neck: Normal range of motion. Neck supple. Cardiovascular: Normal rate, regular rhythm and normal heart sounds. Pulmonary/Chest: Effort normal and breath sounds normal. No respiratory distress. She has no wheezes. Musculoskeletal: Normal range of motion. Edema: 3 pitting edema bilaterla feet and LE. Neurological: She is alert and oriented to person, place, and time. No results found for this or any previous visit (from the past 24 hour(s)). Assessment and Plan:     Encounter Diagnoses     ICD-10-CM ICD-9-CM   1. Non-seasonal allergic rhinitis, unspecified chronicity, unspecified trigger J30.89 477.8   2. Generalized edema R60.1 782.3   3. Essential hypertension I10 401.9   4. Hydradenitis L73.2 705.83       1. Non-seasonal allergic rhinitis, unspecified chronicity, unspecified trigger  Start allergy treated. - loratadine (CLARITIN) 10 mg tablet; Take 1 Tab by mouth daily. Dispense: 30 Tab; Refill: 2  - fluticasone (FLONASE) 50 mcg/actuation nasal spray; 2 Sprays by Both Nostrils route daily. Dispense: 1 Bottle; Refill: 2    2. Generalized edema  3. Essential hypertension  Increase Lasix to BID for the next week. Will see back for close f/u  - furosemide (LASIX) 40 mg tablet; Take 1 tab every morning and another tab 6 hours later. Dispense: 90 Tab; Refill: 0    4. Hydradenitis  - clindamycin (CLEOCIN T) 1 % lotion; Apply to effected area twixe a day  Dispense: 1 Bottle; Refill: 2    I have discussed the diagnosis with the patient and the intended plan as seen in the above orders. she has expressed understanding. The patient has received an after-visit summary and questions were answered concerning future plans. I have discussed medication side effects and warnings with the patient as well. Electronically Signed: Joselito Capps MD     History/Allergies   Patients past medical, surgical and family histories were reviewed and updated. Past Medical History:   Diagnosis Date    Arthritis     Diabetes ([facility] [address]     states she is \"pre-diabetic\", diet controlled    frequency     H/O blood clots     right side of body    Heart attack 1/2014    Hydradenitis 2/1/2012    Hypercholesterolemia     Hypertension     Ill-defined condition     edema of legs    History reviewed. No pertinent surgical history. Family History   Problem Relation Age of Onset    Heart Disease Mother     Cancer Mother     Asthma Father     Alcohol abuse Brother      Social History     Social History    Marital status: LEGALLY      Spouse name: N/A    Number of children: N/A    Years of education: N/A     Occupational History    Not on file. Social History Main Topics    Smoking status: Never Smoker    Smokeless tobacco: Never Used    Alcohol use No    Drug use: No    Sexual activity: No     Other Topics Concern    Not on file     Social History Narrative         Allergies   Allergen Reactions    Ciprofloxacin Other (comments)     Vomiting    Doxycycline Other (comments)     Vomiting    Pcn [Penicillins] Rash       Disposition     Follow-up Disposition:  Return in about 1 week (around 10/12/2017) for Weight check.     Future Appointments  Date Time Provider Buddy Boles   10/12/2017 1:00 PM Joselito Capps MD [facility] SCHED            Current Medications after this visit     Current Outpatient Prescriptions   Medication Sig    clindamycin (CLEOCIN T) 1 % lotion Apply to effected area twixe a day    loratadine (CLARITIN) 10 mg tablet Take 1 Tab by mouth daily.  fluticasone (FLONASE) 50 mcg/actuation nasal spray 2 Sprays by Both Nostrils route daily.  furosemide (LASIX) 40 mg tablet Take 1 tab every morning and another tab 6 hours later.  apixaban (ELIQUIS) 5 mg tablet Take 1 Tab by mouth every twelve (12) hours.  metFORMIN (GLUCOPHAGE) 500 mg tablet Take 1 Tab by mouth two (2) times daily (with meals).  cholecalciferol (VITAMIN D3) 1,000 unit cap Take 2,000 Units by mouth daily.  HYDROcodone-acetaminophen (NORCO) 7.5-325 mg per tablet     oxybutynin (DITROPAN) 5 mg tablet Take 1 Tab by mouth three (3) times daily.  ezetimibe (ZETIA) 10 mg tablet Take 1 Tab by mouth daily.  atorvastatin (LIPITOR) 80 mg tablet Take 1 Tab by mouth daily.  doxycycline (VIBRAMYCIN) 100 mg capsule      No current facility-administered medications for this visit.       Medications Discontinued During This Encounter   Medication Reason    clindamycin (CLEOCIN T) 1 % lotion Reorder    furosemide (LASIX) 40 mg tablet Reorder

## 2017-10-05 NOTE — ACP (ADVANCE CARE PLANNING)
NN discussed with patient about an Advanced Medical Directive. Provided patient blank Advanced Medical Directive. RN reviewed Advanced Medical Directive paperwork with patient with a brief description of how to complete the form. Requested that if document completed, to please provide a copy of completed Advanced Medical Directive to PCP office. Patient verbalized understanding.

## 2017-10-05 NOTE — PROGRESS NOTES
This is a Subsequent Medicare Annual Wellness Exam (AWV) (Performed 12 months after IPPE or effective date of Medicare Part B enrollment, Once in a lifetime)    I have reviewed the patient's medical history in detail and updated the computerized patient record. History     Past Medical History:   Diagnosis Date    Arthritis     Diabetes ([address]     states she is \"pre-diabetic\", diet controlled    frequency     H/O blood clots     right side of body    Heart attack 1/2014    Hydradenitis 2/1/2012    Hypercholesterolemia     Hypertension     Ill-defined condition     edema of legs      History reviewed. No pertinent surgical history. Current Outpatient Prescriptions   Medication Sig Dispense Refill    clindamycin (CLEOCIN T) 1 % lotion Apply to effected area twixe a day 1 Bottle 2    loratadine (CLARITIN) 10 mg tablet Take 1 Tab by mouth daily. 30 Tab 2    fluticasone (FLONASE) 50 mcg/actuation nasal spray 2 Sprays by Both Nostrils route daily. 1 Bottle 2    furosemide (LASIX) 40 mg tablet Take 1 tab every morning and another tab 6 hours later. 90 Tab 0    apixaban (ELIQUIS) 5 mg tablet Take 1 Tab by mouth every twelve (12) hours. 60 Tab 3    metFORMIN (GLUCOPHAGE) 500 mg tablet Take 1 Tab by mouth two (2) times daily (with meals). 60 Tab 3    cholecalciferol (VITAMIN D3) 1,000 unit cap Take 2,000 Units by mouth daily.  HYDROcodone-acetaminophen (NORCO) 7.5-325 mg per tablet       oxybutynin (DITROPAN) 5 mg tablet Take 1 Tab by mouth three (3) times daily. 270 Tab 1    ezetimibe (ZETIA) 10 mg tablet Take 1 Tab by mouth daily. 90 Tab 1    atorvastatin (LIPITOR) 80 mg tablet Take 1 Tab by mouth daily. 30 Tab 3    doxycycline (VIBRAMYCIN) 100 mg capsule          Medication reconciliation completed by MA/ LPN and reviewed by PCP.       Allergies   Allergen Reactions    Ciprofloxacin Other (comments)     Vomiting    Doxycycline Other (comments)     Vomiting    Pcn [Penicillins] Rash Family History   Problem Relation Age of Onset    Heart Disease Mother     Cancer Mother     Asthma Father     Alcohol abuse Brother      Social History   Substance Use Topics    Smoking status: Never Smoker    Smokeless tobacco: Never Used    Alcohol use No     Patient states she does not consume or abuse tobacco products. She states she does consume a glass of wine with dinner \"every now and then\". RN reviewed medication profile and discussed medication and alcohol mixtures. Patient verbalized understanding. Patient Active Problem List   Diagnosis Code    AR (allergic rhinitis) J30.9    Eczema L30.9    Depression F32.9    Female stress incontinence N39.3    Dyslipidemia E78.5    HTN (hypertension) I10    Hydradenitis L73.2    Edema R60.9    H/O Pulmonary embolism (1/2015) I26.99    Advanced care planning/counseling discussion Z71.89       Depression Risk Factor Screening:     PHQ over the last two weeks 2/20/2017   Little interest or pleasure in doing things Not at all   Feeling down, depressed or hopeless Not at all   Total Score PHQ 2 0       Patient denies thoughts of harm to self or others. Patient states there is a strong support system within friends & family. Alcohol Risk Factor Screening: You do not drink alcohol or very rarely. Functional Ability and Level of Safety:   Hearing Loss  Hearing is good. Activities of Daily Living  The home contains: no safety equipment. Patient does total self care    Patient states she does not drive a motor vehicle. She resides in her home with her children. She uses the bus for transportation locally and her daughter drives. Fall RiskFall Risk Assessment, last 12 mths 10/5/2017   Able to walk? Yes   Fall in past 12 months? No     Patient denies falls within the past year & verbalizes awareness of fall prevention strategies.        Abuse Screen  Patient is not abused    Cognitive Screening   Evaluation of Cognitive Function:  Has your family/caregiver stated any concerns about your memory: no  Normal    Patient Care Team   Patient Care Team:  Gregorio Loya, LAINA as PCP - General (Nurse Practitioner)  Wagner Kaur MD (General Surgery)  Will Alvarez MD (Cardiology)  Sujata Hoover DPM (Podiatry)  aWgner Kaur MD (General Surgery)  David Lott, RN as Nurse Navigator  Hailee Hernandes NP (Nurse Practitioner)    Assessment/Plan   Education and counseling provided:  Are appropriate based on today's review and evaluation  Screening for glaucoma    Diagnoses and all orders for this visit:    1. Non-seasonal allergic rhinitis, unspecified chronicity, unspecified trigger  -     loratadine (CLARITIN) 10 mg tablet; Take 1 Tab by mouth daily. -     fluticasone (FLONASE) 50 mcg/actuation nasal spray; 2 Sprays by Both Nostrils route daily. 2. Generalized edema  -     furosemide (LASIX) 40 mg tablet; Take 1 tab every morning and another tab 6 hours later. 3. Essential hypertension  -     furosemide (LASIX) 40 mg tablet; Take 1 tab every morning and another tab 6 hours later. 4. Hydradenitis  -     clindamycin (CLEOCIN T) 1 % lotion; Apply to effected area twixe a day    5. Medicare annual wellness visit, subsequent    6. Alcohol screening    7. Depression screening        Health Maintenance Due   Topic Date Due    FOBT Q 1 YEAR AGE 50-75  03/05/1995    GLAUCOMA SCREENING Q2Y  03/05/2010     NN discussed with patient about an Advanced Medical Directive. Provided patient blank Advanced Medical Directive. RN reviewed Advanced Medical Directive paperwork with patient with a brief description of how to complete the form. Requested that if document completed, to please provide a copy of completed Advanced Medical Directive to PCP office. Patient verbalized understanding. Patient is up to date on the following immunizations: Seasonal Influenza, Zoster and Pneumococcal (patient declined at this time).   Tdap (completed 9/13/12). Patient states that she follows the PCP's recommendations for the following screenings: Mammography (completed 1/13/17) & DEXA scan (completed 1/13/17). Due to the patient's age, screening colonoscopies are recommended by PCP or a specialist.    Referral for Glaucoma screening-patient request a doctor in [place], [place]. Patient verbalized understanding of all information discussed. Patient was given the opportunity to ask questions.

## 2017-10-18 ENCOUNTER — OFFICE VISIT (OUTPATIENT)
Dept: FAMILY MEDICINE CLINIC | Age: 72
End: 2017-10-18

## 2017-10-18 VITALS
TEMPERATURE: 98.7 F | OXYGEN SATURATION: 98 % | HEIGHT: 60 IN | RESPIRATION RATE: 20 BRPM | WEIGHT: 156 LBS | BODY MASS INDEX: 30.63 KG/M2 | HEART RATE: 95 BPM | SYSTOLIC BLOOD PRESSURE: 98 MMHG | DIASTOLIC BLOOD PRESSURE: 57 MMHG

## 2017-10-18 DIAGNOSIS — E11.40 NEUROPATHY DUE TO TYPE 2 DIABETES MELLITUS (HCC): ICD-10-CM

## 2017-10-18 DIAGNOSIS — E78.5 HYPERLIPIDEMIA, UNSPECIFIED HYPERLIPIDEMIA TYPE: ICD-10-CM

## 2017-10-18 DIAGNOSIS — R60.0 LOWER EXTREMITY EDEMA: Primary | ICD-10-CM

## 2017-10-18 DIAGNOSIS — Z12.11 SCREENING FOR MALIGNANT NEOPLASM OF COLON: ICD-10-CM

## 2017-10-18 RX ORDER — ATORVASTATIN CALCIUM 80 MG/1
80 TABLET, FILM COATED ORAL DAILY
Qty: 30 TAB | Refills: 3 | Status: SHIPPED | OUTPATIENT
Start: 2017-10-18 | End: 2018-03-02 | Stop reason: SDUPTHER

## 2017-10-18 RX ORDER — GABAPENTIN 100 MG/1
100 CAPSULE ORAL
Qty: 30 CAP | Refills: 0 | Status: SHIPPED | OUTPATIENT
Start: 2017-10-18 | End: 2018-03-02 | Stop reason: SDUPTHER

## 2017-10-18 NOTE — PATIENT INSTRUCTIONS
An appointment has been made for you to see Dr. Naz Hanson, optometrist, on October 19, 2017 at 2:15. The office is located at [address] [facility], [address] and the number to the office is [phone]. Fecal Immunochemical Test (FIT): About This Test  What is it? A fecal immunochemical test, or FIT, checks for hidden blood in the stool. Your doctor gives you a kit that contains everything you need. At home, you follow simple steps to collect a small amount of stool. You return the kit to the doctor or to a lab. Why is this test done? This test is done to check for colorectal cancer and other types of gastrointestinal problems, such as hemorrhoids, anal fissures, and colon polyps, that can cause blood in the stools. How can you prepare for the test?  · Don't do the test during your menstrual period or if you are having bleeding from hemorrhoids. What happens during the test?  There are different types of home tests available. It is important to follow the instructions provided with any test.  Here are some general instructions:  · Check the expiration date on the package. Don't use a test kit after its expiration date. · Follow the instructions exactly. Do all the steps, in order, without skipping any of them. · After you finish your test, follow the instructions that you were given for returning the test.  There is a FIT test that shows the results right away. If your test shows that blood was found in your stool sample, call your doctor as soon as possible. Follow-up care is a key part of your treatment and safety. Be sure to make and go to all appointments, and call your doctor if you are having problems. It's also a good idea to keep a list of the medicines you take. Ask your doctor when you can expect to have your test results. Where can you learn more? Go to http://milagros-vitaliy.info/.   Enter B025 in the search box to learn more about \"Fecal Immunochemical Test (FIT): About This Test.\"  Current as of: July 28, 2016  Content Version: 11.3  © 3077-0072 [employer]. Care instructions adapted under license by [employer] (which disclaims liability or warranty for this information). If you have questions about a medical condition or this instruction, always ask your healthcare professional. [address] any warranty or liability for your use of this information. Gabapentin (By mouth)   Gabapentin (zonia-a-PEN-tin)  Treats seizures and pain caused by shingles. Brand Name(s): ACTIVE-PAC with Gabapentin, Convenience Gaudencio, Cyclo/Taty 10/300 Pack, FusePaq Fanatrex, Taty-V, Gralise, [address] Pack, Neurontin, SmartRx Taty Kit   There may be other brand names for this medicine. When This Medicine Should Not Be Used: This medicine is not right for everyone. Do not use it if you had an allergic reaction to gabapentin. How to Use This Medicine:   Capsule, Liquid, Tablet  · Take your medicine as directed. Your dose may need to be changed several times to find what works best for you. If you have epilepsy, do not allow more than 12 hours to pass between doses. · Capsule: Swallow the capsule whole with plenty of water. Do not open, crush, or chew it. · Gralise® tablet: Swallow the tablet whole . Do not crush, break, or chew it. · Neurontin® tablet: If you break a tablet into 2 pieces, use the second half as your next dose. If you don't use it within 28 days, throw it away. · Measure the oral liquid medicine with a marked measuring spoon, oral syringe, or medicine cup. · This medicine should come with a Medication Guide. Ask your pharmacist for a copy if you do not have one. · Missed dose: Take a dose as soon as you remember. If it is almost time for your next dose, wait until then and take a regular dose. Do not take extra medicine to make up for a missed dose.   · Store the medicine in a closed container at room temperature, away from heat, moisture, and direct light. Store the Neurontin® oral liquid in the refrigerator. Do not freeze. Drugs and Foods to Avoid:   Ask your doctor or pharmacist before using any other medicine, including over-the-counter medicines, vitamins, and herbal products. · Some medicines can affect how gabapentin works. Tell your doctor if you also use any of the following:   ¨ Hydrocodone  ¨ Morphine  · If you take an antacid, wait at least 2 hours before you take gabapentin. · Tell your doctor if you use anything else that makes you sleepy. Some examples are allergy medicine, narcotic pain medicine, and alcohol. Warnings While Using This Medicine:   · Tell your doctor if you are pregnant or breastfeeding, or if you have kidney problems or are receiving dialysis. Tell your doctor if you have a history of depression or mental health problems. · This medicine may increase depression or thoughts of suicide. Tell your doctor right away if you start to feel more depressed or think about hurting yourself. · This medicine may cause a serious allergic reaction called multiorgan hypersensitivity, which can damage organs and be life-threatening. · Do not stop using this medicine suddenly. Your doctor will need to slowly decrease your dose before you stop it completely. If you take this medicine to prevent seizures, your seizures may return or occur more often if you stop this medicine suddenly. · This medicine may make you dizzy or drowsy. Do not drive or do anything else that could be dangerous until you know how this medicine affects you. · Tell any doctor or dentist who treats you that you are using this medicine. This medicine may affect certain medical test results. · Your doctor will check your progress and the effects of this medicine at regular visits. Keep all appointments. · Keep all medicine out of the reach of children. Never share your medicine with anyone.   Possible Side Effects While Using This Medicine:   Call your doctor right away if you notice any of these side effects:  · Allergic reaction: Itching or hives, swelling in your face or hands, swelling or tingling in your mouth or throat, chest tightness, trouble breathing  · Behavior problems, aggression, restlessness, trouble concentrating, moodiness (especially in children)  · Blistering, peeling, red skin rash  · Change in how much or how often you urinate, bloody or cloudy urine,  · Chest pain, fast heartbeat, trouble breathing  · Dark urine or pale stools, nausea, vomiting, loss of appetite, stomach pain, yellow skin or eyes  · Fever, rash, swollen or tender glands in the neck, armpit, or groin  · Problems with coordination, shakiness, unsteadiness  · Rapid weight gain, swelling in your hands, ankles, or feet  · Unusual moods or behaviors, thoughts of hurting yourself, feeling depressed  If you notice these less serious side effects, talk with your doctor:   · Dizziness, drowsiness, sleepiness, tiredness  If you notice other side effects that you think are caused by this medicine, tell your doctor. Call your doctor for medical advice about side effects. You may report side effects to FDA at 8-787-FDA-8282  © 2017 [facility] Information is for End User's use only and may not be sold, redistributed or otherwise used for commercial purposes. The above information is an  only. It is not intended as medical advice for individual conditions or treatments. Talk to your doctor, nurse or pharmacist before following any medical regimen to see if it is safe and effective for you.

## 2017-10-18 NOTE — Clinical Note
You were the PCP but I had not seen any visits with you recently so I reassigned to myself. Let me know if you object.

## 2017-10-18 NOTE — PROGRESS NOTES
1. Have you been to the ER, urgent care clinic since your last visit? Hospitalized since your last visit? No    2. Have you seen or consulted any other health care providers outside of the [address] [place] [address] since your last visit? Include any pap smears or colon screening.  No  Reviewed record in preparation for visit and have necessary documentation  Pt did not bring medication to office visit for review    Goals that were addressed and/or need to be completed during or after this appointment include   Health Maintenance Due   Topic Date Due    FOBT Q 1 YEAR AGE 50-75  03/05/1995    GLAUCOMA SCREENING Q2Y  03/05/2010

## 2017-10-18 NOTE — MR AVS SNAPSHOT
Visit Information Date & Time Provider Department Dept. Phone Encounter #  
 10/18/2017  1:00 PM Luis Amaya MD [address] [place], [place] [address] [id number] Follow-up Instructions Return in about 3 months (around 1/18/2018) for Routine. Claudine Bill Upcoming Health Maintenance Date Due FOBT Q 1 YEAR AGE 50-75 3/5/1995 GLAUCOMA SCREENING Q2Y 3/5/2010 Pneumococcal 65+ Low/Medium Risk (2 of 2 - PPSV23) 10/5/2018 MEDICARE YEARLY EXAM 10/6/2018 BREAST CANCER SCRN MAMMOGRAM 1/13/2019 DTaP/Tdap/Td series (2 - Td) 9/13/2022 Allergies as of 10/18/2017  Review Complete On: 10/18/2017 By: Luis Amaya MD  
  
 Severity Noted Reaction Type Reactions Ciprofloxacin  10/05/2017    Other (comments) Vomiting Doxycycline  10/05/2017    Other (comments) Vomiting Pcn [Penicillins]  01/03/2011    Rash Current Immunizations  Reviewed on 9/13/2012 Name Date TDAP Vaccine 9/13/2012 Not reviewed this visit You Were Diagnosed With   
  
 Codes Comments Lower extremity edema    -  Primary ICD-10-CM: R60.0 ICD-9-CM: 782.3 Neuropathy due to type 2 diabetes mellitus (HCC)     ICD-10-CM: E11.40 ICD-9-CM: 250.60, 357.2 Hyperlipidemia, unspecified hyperlipidemia type     ICD-10-CM: E78.5 ICD-9-CM: 272.4 Screening for malignant neoplasm of colon     ICD-10-CM: Z12.11 ICD-9-CM: V76.51 Vitals BP Pulse Temp Resp Height(growth percentile) Weight(growth percentile) 98/57 (BP 1 Location: Right arm, BP Patient Position: Sitting) 95 98.7 °F (37.1 °C) (Oral) 20 5' (1.524 m) 156 lb (70.8 kg) SpO2 BMI OB Status Smoking Status 98% 30.47 kg/m2 Postmenopausal Never Smoker Vitals History BMI and BSA Data Body Mass Index Body Surface Area  
 30.47 kg/m 2 1.73 m 2 Preferred Pharmacy Pharmacy Name Phone [facility] [address] [place] [address] [phone] Your Updated Medication List  
  
   
This list is accurate as of: 10/18/17  1:25 PM.  Always use your most recent med list.  
  
  
  
  
 apixaban 5 mg tablet Commonly known as:  Irma Spatz Take 1 Tab by mouth every twelve (12) hours. atorvastatin 80 mg tablet Commonly known as:  LIPITOR Take 1 Tab by mouth daily. clindamycin 1 % lotion Commonly known as:  CLEOCIN T Apply to effected area twixe a day  
  
 doxycycline 100 mg capsule Commonly known as:  VIBRAMYCIN  
  
 fluticasone 50 mcg/actuation nasal spray Commonly known as:  Danette Jumper 2 Sprays by Both Nostrils route daily. furosemide 40 mg tablet Commonly known as:  LASIX Take 1 tab every morning and another tab 6 hours later. gabapentin 100 mg capsule Commonly known as:  NEURONTIN Take 1 Cap by mouth three (3) times daily as needed. loratadine 10 mg tablet Commonly known as:  Zee Gracia Take 1 Tab by mouth daily. metFORMIN 500 mg tablet Commonly known as:  GLUCOPHAGE Take 1 Tab by mouth two (2) times daily (with meals). oxybutynin 5 mg tablet Commonly known as:  DLFJAAVS Take 1 Tab by mouth three (3) times daily. VITAMIN D3 1,000 unit Cap Generic drug:  cholecalciferol Take 2,000 Units by mouth daily. Prescriptions Sent to Pharmacy Refills  
 atorvastatin (LIPITOR) 80 mg tablet 3 Sig: Take 1 Tab by mouth daily. Class: Normal  
 Pharmacy: [id number] [place] [address] Ph #: [phone] Route: Oral  
 gabapentin (NEURONTIN) 100 mg capsule 0 Sig: Take 1 Cap by mouth three (3) times daily as needed. Class: Normal  
 Pharmacy: [id number] [place] [address] Ph #: [phone] Route: Oral  
  
We Performed the Following OCCULT BLOOD, IMMUNOASSAY (FIT) [id number] CPT(R)] Follow-up Instructions Return in about 3 months (around 1/18/2018) for Routine. Nestor Cochran Patient Instructions An appointment has been made for you to see Dr. Diane Otero, optometrist, on October 19, 2017 at 2:15. The office is located at [address] [place] [address], [address] and the number to the office is [phone]. Fecal Immunochemical Test (FIT): About This Test 
What is it? A fecal immunochemical test, or FIT, checks for hidden blood in the stool. Your doctor gives you a kit that contains everything you need. At home, you follow simple steps to collect a small amount of stool. You return the kit to the doctor or to a lab. Why is this test done? This test is done to check for colorectal cancer and other types of gastrointestinal problems, such as hemorrhoids, anal fissures, and colon polyps, that can cause blood in the stools. How can you prepare for the test? 
· Don't do the test during your menstrual period or if you are having bleeding from hemorrhoids. What happens during the test? 
There are different types of home tests available. It is important to follow the instructions provided with any test. 
Here are some general instructions: 
· Check the expiration date on the package. Don't use a test kit after its expiration date. · Follow the instructions exactly. Do all the steps, in order, without skipping any of them. · After you finish your test, follow the instructions that you were given for returning the test. 
There is a FIT test that shows the results right away. If your test shows that blood was found in your stool sample, call your doctor as soon as possible. Follow-up care is a key part of your treatment and safety. Be sure to make and go to all appointments, and call your doctor if you are having problems. It's also a good idea to keep a list of the medicines you take. Ask your doctor when you can expect to have your test results. Where can you learn more? Go to http://milagros-vitaliy.info/. Enter A092 in the search box to learn more about \"Fecal Immunochemical Test (FIT): About This Test.\" Current as of: July 28, 2016 Content Version: 11.3 © 1050-9807 [employer]. Care instructions adapted under license by [employer] (which disclaims liability or warranty for this information). If you have questions about a medical condition or this instruction, always ask your healthcare professional. [place] [address] any warranty or liability for your use of this information. Gabapentin (By mouth) Gabapentin (zonia-a-PEN-tin) Treats seizures and pain caused by shingles. Brand Name(s): ACTIVE-PAC with Gabapentin, Convenience Gaudencio, Cyclo/Taty 10/300 Pack, [employer]V, Taty-V, Gralise, Gralise Starter Pack, Neurontin, [employer] Kit There may be other brand names for this medicine. When This Medicine Should Not Be Used: This medicine is not right for everyone. Do not use it if you had an allergic reaction to gabapentin. How to Use This Medicine:  
Capsule, Liquid, Tablet · Take your medicine as directed. Your dose may need to be changed several times to find what works best for you. If you have epilepsy, do not allow more than 12 hours to pass between doses. · Capsule: Swallow the capsule whole with plenty of water. Do not open, crush, or chew it. · Gralise® tablet: Swallow the tablet whole . Do not crush, break, or chew it. · Neurontin® tablet: If you break a tablet into 2 pieces, use the second half as your next dose. If you don't use it within 28 days, throw it away. · Measure the oral liquid medicine with a marked measuring spoon, oral syringe, or medicine cup. · This medicine should come with a Medication Guide. Ask your pharmacist for a copy if you do not have one. · Missed dose: Take a dose as soon as you remember. If it is almost time for your next dose, wait until then and take a regular dose.  Do not take extra medicine to make up for a missed dose. · Store the medicine in a closed container at room temperature, away from heat, moisture, and direct light. Store the Neurontin® oral liquid in the refrigerator. Do not freeze. Drugs and Foods to Avoid: Ask your doctor or pharmacist before using any other medicine, including over-the-counter medicines, vitamins, and herbal products. · Some medicines can affect how gabapentin works. Tell your doctor if you also use any of the following: ¨ Hydrocodone ¨ Morphine · If you take an antacid, wait at least 2 hours before you take gabapentin. · Tell your doctor if you use anything else that makes you sleepy. Some examples are allergy medicine, narcotic pain medicine, and alcohol. Warnings While Using This Medicine: · Tell your doctor if you are pregnant or breastfeeding, or if you have kidney problems or are receiving dialysis. Tell your doctor if you have a history of depression or mental health problems. · This medicine may increase depression or thoughts of suicide. Tell your doctor right away if you start to feel more depressed or think about hurting yourself. · This medicine may cause a serious allergic reaction called multiorgan hypersensitivity, which can damage organs and be life-threatening. · Do not stop using this medicine suddenly. Your doctor will need to slowly decrease your dose before you stop it completely. If you take this medicine to prevent seizures, your seizures may return or occur more often if you stop this medicine suddenly. · This medicine may make you dizzy or drowsy. Do not drive or do anything else that could be dangerous until you know how this medicine affects you. · Tell any doctor or dentist who treats you that you are using this medicine. This medicine may affect certain medical test results. · Your doctor will check your progress and the effects of this medicine at regular visits. Keep all appointments. · Keep all medicine out of the reach of children. Never share your medicine with anyone. Possible Side Effects While Using This Medicine:  
Call your doctor right away if you notice any of these side effects: · Allergic reaction: Itching or hives, swelling in your face or hands, swelling or tingling in your mouth or throat, chest tightness, trouble breathing · Behavior problems, aggression, restlessness, trouble concentrating, moodiness (especially in children) · Blistering, peeling, red skin rash · Change in how much or how often you urinate, bloody or cloudy urine, 
· Chest pain, fast heartbeat, trouble breathing · Dark urine or pale stools, nausea, vomiting, loss of appetite, stomach pain, yellow skin or eyes · Fever, rash, swollen or tender glands in the neck, armpit, or groin · Problems with coordination, shakiness, unsteadiness · Rapid weight gain, swelling in your hands, ankles, or feet · Unusual moods or behaviors, thoughts of hurting yourself, feeling depressed If you notice these less serious side effects, talk with your doctor: · Dizziness, drowsiness, sleepiness, tiredness If you notice other side effects that you think are caused by this medicine, tell your doctor. Call your doctor for medical advice about side effects. You may report side effects to FDA at 5-717-FDA-6503 © 2017 [address] Information is for End User's use only and may not be sold, redistributed or otherwise used for commercial purposes. The above information is an  only. It is not intended as medical advice for individual conditions or treatments. Talk to your doctor, nurse or pharmacist before following any medical regimen to see if it is safe and effective for you. Please provide this summary of care documentation to your next provider. Your primary care clinician is listed as [address]  If you have any questions after today's visit, please call [phone].

## 2017-10-18 NOTE — PROGRESS NOTES
Angela Larson  67 y.o. female  1945  [address] [address] [address]   [id number]     [facility]: Progress Note       Encounter Date: 10/18/2017    No chief complaint on file. History of Present Illness   Angela Larson is a 67 y.o. female who presents to clinic today for:    LE edema  Patient is present with edema. Patient reports that she has been not allowing elevating her legs as she is supposed to not is she eating a sodium restricted diet. Neuropathy  Patient reports bilateral foot pain. Pain is described as pins and needles. She is requesting medication for the pain as she has not taking anything for the pain as of yet. Had hydro-something in the past.     Hyperlipidemia:  Uncontrolled  Cardiovascular risks for her are: hypertension  hyperlipidemia  smoker. Currently she takes Lipitor (atorvastatin) unsure the last time she took it. Lab Results   Component Value Date/Time    Cholesterol, total 171 09/22/2017 04:16 PM    HDL Cholesterol 39 09/22/2017 04:16 PM    LDL, calculated 110 09/22/2017 04:16 PM    Triglyceride 111 09/22/2017 04:16 PM    CHOL/HDL Ratio 4.7 09/30/2009 03:47 PM     Lab Results   Component Value Date/Time    ALT (SGPT) 15 01/23/2017 02:18 PM    AST (SGOT) 20 01/23/2017 02:18 PM    Alk. phosphatase 136 01/23/2017 02:18 PM    Bilirubin, total 0.3 01/23/2017 02:18 PM     Myalgias: No  Fatigue: No  Wt Readings from Last 3 Encounters:   10/18/17 156 lb (70.8 kg)   10/05/17 159 lb 6.4 oz (72.3 kg)   09/22/17 160 lb (72.6 kg)         Health Maintenance  Stool test today. Health Maintenance Due   Topic Date Due    FOBT Q 1 YEAR AGE 50-75  03/05/1995    GLAUCOMA SCREENING Q2Y  03/05/2010     Review of Systems   Review of Systems   Constitutional: Negative for chills and fever. Cardiovascular: Positive for leg swelling. Negative for chest pain and palpitations. Gastrointestinal: Negative for abdominal pain, constipation, diarrhea, nausea and vomiting. Genitourinary: Negative for dysuria, frequency and urgency. Skin: Negative for itching and rash. Neurological: Positive for tingling. Negative for dizziness and headaches. Vitals/Objective:     Vitals:    10/18/17 1301   BP: 98/57   Pulse: 95   Resp: 20   Temp: 98.7 °F (37.1 °C)   TempSrc: Oral   SpO2: 98%   Weight: 156 lb (70.8 kg)   Height: 5' (1.524 m)     Body mass index is 30.47 kg/(m^2). Physical Exam   Constitutional: She is oriented to person, place, and time. She appears well-developed and well-nourished. HENT:   Head: Normocephalic and atraumatic. Right Ear: External ear normal.   Left Ear: External ear normal.   Mouth/Throat: Oropharynx is clear and moist.   Neck: Normal range of motion. Neck supple. Cardiovascular: Normal rate and regular rhythm. Pulmonary/Chest: Effort normal and breath sounds normal.   Musculoskeletal: Normal range of motion. She exhibits edema (_3 edema to mid calf). Neurological: She is alert and oriented to person, place, and time. No results found for this or any previous visit (from the past 24 hour(s)). Assessment and Plan:     Encounter Diagnoses     ICD-10-CM ICD-9-CM   1. Lower extremity edema R60.0 782.3   2. Neuropathy due to type 2 diabetes mellitus (HCC) E11.40 250.60     357.2   3. Hyperlipidemia, unspecified hyperlipidemia type E78.5 272.4   4. Screening for malignant neoplasm of colon Z12.11 V76.51       1. Lower extremity edema  Patient will continue with lasix and attempting to watch her diet and her weight. 2. Neuropathy due to type 2 diabetes mellitus ([facility] [address]  Do do trial of gabapentin for nerve pain. - gabapentin (NEURONTIN) 100 mg capsule; Take 1 Cap by mouth three (3) times daily as needed. Dispense: 30 Cap; Refill: 0    3. Hyperlipidemia, unspecified hyperlipidemia type  - atorvastatin (LIPITOR) 80 mg tablet; Take 1 Tab by mouth daily. Dispense: 30 Tab; Refill: 3    4.  Screening for malignant neoplasm of colon  - OCCULT BLOOD, IMMUNOASSAY (FIT)    I have discussed the diagnosis with the patient and the intended plan as seen in the above orders. she has expressed understanding. The patient has received an after-visit summary and questions were answered concerning future plans. I have discussed medication side effects and warnings with the patient as well. Electronically Signed: Pia Cárdenas MD     History/Allergies   Patients past medical, surgical and family histories were reviewed and updated. Past Medical History:   Diagnosis Date    Arthritis     Diabetes ([facility] [address]     states she is \"pre-diabetic\", diet controlled    frequency     H/O blood clots     right side of body    Heart attack 1/2014    Hydradenitis 2/1/2012    Hypercholesterolemia     Hypertension     Ill-defined condition     edema of legs    History reviewed. No pertinent surgical history. Family History   Problem Relation Age of Onset    Heart Disease Mother     Cancer Mother     Asthma Father     Alcohol abuse Brother      Social History     Social History    Marital status: LEGALLY      Spouse name: N/A    Number of children: N/A    Years of education: N/A     Occupational History    Not on file. Social History Main Topics    Smoking status: Never Smoker    Smokeless tobacco: Never Used    Alcohol use No    Drug use: No    Sexual activity: No     Other Topics Concern    Not on file     Social History Narrative         Allergies   Allergen Reactions    Ciprofloxacin Other (comments)     Vomiting    Doxycycline Other (comments)     Vomiting    Pcn [Penicillins] Rash       Disposition     Follow-up Disposition:  Return in about 3 months (around 1/18/2018) for Routine. .    No future appointments. Current Medications after this visit     Current Outpatient Prescriptions   Medication Sig    atorvastatin (LIPITOR) 80 mg tablet Take 1 Tab by mouth daily.     gabapentin (NEURONTIN) 100 mg capsule Take 1 Cap by mouth three (3) times daily as needed.  clindamycin (CLEOCIN T) 1 % lotion Apply to effected area twixe a day    loratadine (CLARITIN) 10 mg tablet Take 1 Tab by mouth daily.  fluticasone (FLONASE) 50 mcg/actuation nasal spray 2 Sprays by Both Nostrils route daily.  furosemide (LASIX) 40 mg tablet Take 1 tab every morning and another tab 6 hours later.  apixaban (ELIQUIS) 5 mg tablet Take 1 Tab by mouth every twelve (12) hours.  metFORMIN (GLUCOPHAGE) 500 mg tablet Take 1 Tab by mouth two (2) times daily (with meals).  cholecalciferol (VITAMIN D3) 1,000 unit cap Take 2,000 Units by mouth daily.  doxycycline (VIBRAMYCIN) 100 mg capsule     oxybutynin (DITROPAN) 5 mg tablet Take 1 Tab by mouth three (3) times daily. No current facility-administered medications for this visit.       Medications Discontinued During This Encounter   Medication Reason    ezetimibe (ZETIA) 10 mg tablet Not A Current Medication    HYDROcodone-acetaminophen (NORCO) 7.5-325 mg per tablet Not A Current Medication    atorvastatin (LIPITOR) 80 mg tablet Reorder

## 2017-11-06 ENCOUNTER — TELEPHONE (OUTPATIENT)
Dept: FAMILY MEDICINE CLINIC | Age: 72
End: 2017-11-06

## 2017-11-06 NOTE — TELEPHONE ENCOUNTER
----- Message from Shiela Mccracken sent at 11/4/2017 10:29 AM EDT -----  Regarding: DrAshely Pierre, daughter, inquired about the status of documents sent from Dr. Dorota Valdez D[facility]at[facility] office regarding weekly \"Humira\" injections. Best contact [phone].

## 2017-11-06 NOTE — TELEPHONE ENCOUNTER
Attempted to call x2. No answer. No voicemail set up yet. We have not received anything from MD regarding Humira injections.

## 2017-11-29 ENCOUNTER — OFFICE VISIT (OUTPATIENT)
Dept: FAMILY MEDICINE CLINIC | Age: 72
End: 2017-11-29

## 2017-11-29 VITALS
SYSTOLIC BLOOD PRESSURE: 105 MMHG | RESPIRATION RATE: 16 BRPM | TEMPERATURE: 99.2 F | DIASTOLIC BLOOD PRESSURE: 68 MMHG | WEIGHT: 157 LBS | OXYGEN SATURATION: 95 % | HEART RATE: 84 BPM | HEIGHT: 60 IN | BODY MASS INDEX: 30.82 KG/M2

## 2017-11-29 DIAGNOSIS — R30.0 DYSURIA: ICD-10-CM

## 2017-11-29 DIAGNOSIS — R60.0 PEDAL EDEMA: Primary | ICD-10-CM

## 2017-11-29 DIAGNOSIS — N30.01 ACUTE CYSTITIS WITH HEMATURIA: ICD-10-CM

## 2017-11-29 DIAGNOSIS — I10 ESSENTIAL HYPERTENSION: ICD-10-CM

## 2017-11-29 DIAGNOSIS — Z12.11 SCREEN FOR COLON CANCER: ICD-10-CM

## 2017-11-29 DIAGNOSIS — L73.2 HYDRADENITIS: ICD-10-CM

## 2017-11-29 LAB
BILIRUB UR QL STRIP: NEGATIVE
GLUCOSE UR-MCNC: NEGATIVE MG/DL
KETONES P FAST UR STRIP-MCNC: NEGATIVE MG/DL
PH UR STRIP: 6 [PH] (ref 4.6–8)
PROT UR QL STRIP: NORMAL
SP GR UR STRIP: 1.02 (ref 1–1.03)
UA UROBILINOGEN AMB POC: NORMAL (ref 0.2–1)
URINALYSIS CLARITY POC: CLEAR
URINALYSIS COLOR POC: YELLOW
URINE BLOOD POC: NORMAL
URINE LEUKOCYTES POC: NORMAL
URINE NITRITES POC: POSITIVE

## 2017-11-29 RX ORDER — FUROSEMIDE 40 MG/1
TABLET ORAL
Qty: 180 TAB | Refills: 0 | Status: SHIPPED | OUTPATIENT
Start: 2017-11-29 | End: 2018-03-02 | Stop reason: SDUPTHER

## 2017-11-29 RX ORDER — CLINDAMYCIN PHOSPHATE 10 UG/ML
LOTION TOPICAL
Qty: 1 BOTTLE | Refills: 2 | Status: SHIPPED | OUTPATIENT
Start: 2017-11-29 | End: 2018-07-30 | Stop reason: SDUPTHER

## 2017-11-29 RX ORDER — SULFAMETHOXAZOLE AND TRIMETHOPRIM 800; 160 MG/1; MG/1
1 TABLET ORAL 2 TIMES DAILY
Qty: 20 TAB | Refills: 0 | Status: SHIPPED | OUTPATIENT
Start: 2017-11-29 | End: 2017-12-04 | Stop reason: ALTCHOICE

## 2017-11-29 NOTE — PROGRESS NOTES
Patient: Wesley Osborne MRN: [id number]  SSN: [id number]    YOB: 1945  Age: 67 y.o. Sex: female        Subjective:     Chief Complaint   Patient presents with    Foot Swelling       HPI: she is a 67y.o. year old female who presents with multiple complaints. Has pedal edema and has decreased home lasix regimen due to staying at daughters house. Has hx of hydradenitis with draining lesions. She complains of blood on tissue when she wipes and dysuria. Patient denies HA, dizziness, SOB, CP, abdominal pain, acute myalgias or arthralgias. Encounter Diagnoses   Name Primary?  Pedal edema Yes    Hydradenitis     Essential hypertension     Dysuria     Acute cystitis with hematuria     Screen for colon cancer        BP Readings from Last 3 Encounters:   11/29/17 105/68   10/18/17 98/57   10/05/17 120/74       Wt Readings from Last 3 Encounters:   11/29/17 157 lb (71.2 kg)   10/18/17 156 lb (70.8 kg)   10/05/17 159 lb 6.4 oz (72.3 kg)     Body mass index is 30.66 kg/(m^2). Current and past medical information:    Current Medications after this visit[de-identified]     Current Outpatient Prescriptions   Medication Sig    clindamycin (CLEOCIN T) 1 % lotion Apply to effected area twixe a day    furosemide (LASIX) 40 mg tablet Take 1 tab every morning and another tab 6 hours later.  trimethoprim-sulfamethoxazole (BACTRIM DS, SEPTRA DS) 160-800 mg per tablet Take 1 Tab by mouth two (2) times a day for 10 days.  atorvastatin (LIPITOR) 80 mg tablet Take 1 Tab by mouth daily.  gabapentin (NEURONTIN) 100 mg capsule Take 1 Cap by mouth three (3) times daily as needed.  loratadine (CLARITIN) 10 mg tablet Take 1 Tab by mouth daily.  fluticasone (FLONASE) 50 mcg/actuation nasal spray 2 Sprays by Both Nostrils route daily.  apixaban (ELIQUIS) 5 mg tablet Take 1 Tab by mouth every twelve (12) hours.  metFORMIN (GLUCOPHAGE) 500 mg tablet Take 1 Tab by mouth two (2) times daily (with meals).     cholecalciferol (VITAMIN D3) 1,000 unit cap Take 2,000 Units by mouth daily.  oxybutynin (DITROPAN) 5 mg tablet Take 1 Tab by mouth three (3) times daily. No current facility-administered medications for this visit. Patient Active Problem List    Diagnosis Date Noted    Advanced care planning/counseling discussion 06/13/2016    H/O Pulmonary embolism (1/2015) 01/05/2015    Edema 08/27/2014    Hydradenitis 02/01/2012    HTN (hypertension) 01/21/2011    AR (allergic rhinitis) 05/21/2010    Eczema 05/21/2010    Depression 05/21/2010    Female stress incontinence 05/21/2010    Dyslipidemia 05/21/2010       Past Medical History:   Diagnosis Date    Arthritis     Diabetes ([facility] [address]     states she is \"pre-diabetic\", diet controlled    frequency     H/O blood clots     right side of body    Heart attack 1/2014    Hydradenitis 2/1/2012    Hypercholesterolemia     Hypertension     Ill-defined condition     edema of legs       Allergies   Allergen Reactions    Ciprofloxacin Other (comments)     Vomiting    Doxycycline Other (comments)     Vomiting    Pcn [Penicillins] Rash       History reviewed. No pertinent surgical history.     Social History     Social History    Marital status: LEGALLY      Spouse name: N/A    Number of children: N/A    Years of education: N/A     Social History Main Topics    Smoking status: Never Smoker    Smokeless tobacco: Never Used    Alcohol use No    Drug use: No    Sexual activity: No     Other Topics Concern    None     Social History Narrative         Objective:     Review of Systems:  Constitutional: Negative for fatigue or malaise  Derm: see HPI  HEENT: Negative for acute hearing or vision changes  Cardiovascular: Negative for dizziness, chest pain or palpitations  Respiratory: Negative for cough, wheezing or SOB  Gastreintestinal: Positive for blood on tissue, Negative for nausea or abdominal pain  Genital/urinary: Positive for dysuria Muscoloskeletal: Negative for acute myalgias or arthralgias   Neurological: Negative for headache, weakness or paresthesia  Psychological: Negative for depression or anxiety      Vitals:    11/29/17 1445   BP: 105/68   Pulse: 84   Resp: 16   Temp: 99.2 °F (37.3 °C)   TempSrc: Oral   SpO2: 95%   Weight: 157 lb (71.2 kg)   Height: 5' (1.524 m)      Body mass index is 30.66 kg/(m^2). Physical Exam:  Constitutional: well developed, well nourished, in no acute distress  Skin: multiple axillarydraining  nodules   Head: normocephalic, atraumatic  Eyes: sclera clear, EOMI, PERRL  Neck: normal range of motion  Cardiovascular: normal S1, S2, regular rate and rhythm  Respiratory: clear to auscultation bilaterally with symmetrical effort  Extremities: full range of motion, 3+ pedal edema  Neurology: normal strength and sensation  Psych: active, alert and oriented, affect appropriate       Health Maintenance Due   Topic Date Due    FOBT Q 1 YEAR AGE 50-75  03/05/1995    GLAUCOMA SCREENING Q2Y  03/05/2010       Risk, benefits and potential costs of recommended health maintenance discussed. Patient expressed understanding and declined at this time. Assessment and orders:       ICD-10-CM ICD-9-CM    1. Pedal edema R60.0 782.3 furosemide (LASIX) 40 mg tablet   2. Hydradenitis L73.2 705.83 clindamycin (CLEOCIN T) 1 % lotion      trimethoprim-sulfamethoxazole (BACTRIM DS, SEPTRA DS) 160-800 mg per tablet      AEROBIC BACTERIAL CULTURE   3. Essential hypertension I10 401.9 furosemide (LASIX) 40 mg tablet   4. Dysuria R30.0 788.1 AMB POC URINALYSIS DIP STICK AUTO W/O MICRO   5. Acute cystitis with hematuria N30.01 595.0 trimethoprim-sulfamethoxazole (BACTRIM DS, SEPTRA DS) 160-800 mg per tablet   6. Screen for colon cancer Z12.11 V76.51 OCCULT BLOOD, IMMUNOASSAY (FIT)         Plan of care:  Diagnoses were discussed in detail with patient. Medication risks/benefits/side effects discussed with patient.    All of the patient's questions were addressed and answered to apparent satisfaction. The patient understands and agrees with our plan of care. The patient knows to call back if they have questions about the plan of care or if symptoms change. The patient received an After-Visit Summary which contains VS, diagnoses, orders, allergy and medication lists. Patient Care Team:  Bhumi Minaya MD as PCP - General (Family Practice)  Cristina Burciaga MD (General Surgery)  Josiah Morgan MD (Cardiology)  Marian Garcia DPM (Podiatry)  Cristina Burciaga MD (General Surgery)  Rafael Fuentes RN as Nurse Navigator  Jarred Holland NP (Nurse Practitioner)  Mary Gambino OD (Optometry)    Follow-up Disposition:  Return in about 2 weeks (around 12/13/2017), or if symptoms worsen or fail to improve.     Future Appointments  Date Time Provider Buddy Boles   12/13/2017 3:00 PM Norah Plata MD [facility]       Signed By: Norah Plata MD     November 29, 2017

## 2017-11-29 NOTE — MR AVS SNAPSHOT
Visit Information Date & Time Provider Department Dept. Phone Encounter #  
 11/29/2017  2:40 PM Greta Wilson MD [facility] [id number] Follow-up Instructions Return in about 2 weeks (around 12/13/2017), or if symptoms worsen or fail to improve. Upcoming Health Maintenance Date Due FOBT Q 1 YEAR AGE 50-75 3/5/1995 GLAUCOMA SCREENING Q2Y 3/5/2010 Pneumococcal 65+ Low/Medium Risk (2 of 2 - PPSV23) 10/5/2018 MEDICARE YEARLY EXAM 10/6/2018 BREAST CANCER SCRN MAMMOGRAM 1/13/2019 DTaP/Tdap/Td series (2 - Td) 9/13/2022 Allergies as of 11/29/2017  Review Complete On: 11/29/2017 By: Greta Wilson MD  
  
 Severity Noted Reaction Type Reactions Ciprofloxacin  10/05/2017    Other (comments) Vomiting Doxycycline  10/05/2017    Other (comments) Vomiting Pcn [Penicillins]  01/03/2011    Rash Current Immunizations  Reviewed on 9/13/2012 Name Date TDAP Vaccine 9/13/2012 Not reviewed this visit You Were Diagnosed With   
  
 Codes Comments Pedal edema    -  Primary ICD-10-CM: R60.0 ICD-9-CM: 654. 3 Hydradenitis     ICD-10-CM: L73.2 ICD-9-CM: 705.83 Essential hypertension     ICD-10-CM: I10 
ICD-9-CM: 401.9 Dysuria     ICD-10-CM: R30.0 ICD-9-CM: 788.1 Screen for colon cancer     ICD-10-CM: Z12.11 ICD-9-CM: V76.51 Vitals BP Pulse Temp Resp Height(growth percentile) Weight(growth percentile) 105/68 (BP 1 Location: Right arm, BP Patient Position: Sitting) 84 99.2 °F (37.3 °C) (Oral) 16 5' (1.524 m) 157 lb (71.2 kg) SpO2 BMI OB Status Smoking Status 95% 30.66 kg/m2 Postmenopausal Never Smoker Vitals History BMI and BSA Data Body Mass Index Body Surface Area  
 30.66 kg/m 2 1.74 m 2 Preferred Pharmacy Pharmacy Name Phone [facility] [address] [place] [address] [phone] Your Updated Medication List  
  
   
This list is accurate as of: 11/29/17  3:41 PM.  Always use your most recent med list.  
  
  
  
  
 apixaban 5 mg tablet Commonly known as:  Pau Minus Take 1 Tab by mouth every twelve (12) hours. atorvastatin 80 mg tablet Commonly known as:  LIPITOR Take 1 Tab by mouth daily. clindamycin 1 % lotion Commonly known as:  CLEOCIN T Apply to effected area twixe a day  
  
 fluticasone 50 mcg/actuation nasal spray Commonly known as:  Euna Jayce 2 Sprays by Both Nostrils route daily. furosemide 40 mg tablet Commonly known as:  LASIX Take 1 tab every morning and another tab 6 hours later. gabapentin 100 mg capsule Commonly known as:  NEURONTIN Take 1 Cap by mouth three (3) times daily as needed. loratadine 10 mg tablet Commonly known as:  [place] Take 1 Tab by mouth daily. metFORMIN 500 mg tablet Commonly known as:  GLUCOPHAGE Take 1 Tab by mouth two (2) times daily (with meals). oxybutynin 5 mg tablet Commonly known as:  GPXCOWRT Take 1 Tab by mouth three (3) times daily. trimethoprim-sulfamethoxazole 160-800 mg per tablet Commonly known as:  BACTRIM DS, SEPTRA DS Take 1 Tab by mouth two (2) times a day for 10 days. VITAMIN D3 1,000 unit Cap Generic drug:  cholecalciferol Take 2,000 Units by mouth daily. Prescriptions Sent to Pharmacy Refills  
 clindamycin (CLEOCIN T) 1 % lotion 2 Sig: Apply to effected area twixe a day Class: Normal  
 Pharmacy: [id number] [place] [address] Ph #: [phone]  
 furosemide (LASIX) 40 mg tablet 0 Sig: Take 1 tab every morning and another tab 6 hours later.   
 Class: Normal  
 Pharmacy: [id number] [place] [address] Ph #: [phone]  
 trimethoprim-sulfamethoxazole (BACTRIM DS, SEPTRA DS) 160-800 mg per tablet 0  
 Sig: Take 1 Tab by mouth two (2) times a day for 10 days. Class: Normal  
 Pharmacy: [id number] #: [phone] Route: Oral  
  
We Performed the Following AEROBIC BACTERIAL CULTURE [id number] CPT(R)] AMB POC URINALYSIS DIP STICK AUTO W/O MICRO [79542 CPT(R)] OCCULT BLOOD, IMMUNOASSAY (FIT) [id number] CPT(R)] Follow-up Instructions Return in about 2 weeks (around 12/13/2017), or if symptoms worsen or fail to improve. Patient Instructions Your doctor has recommended that you have an eye exam done. You can contact one of the below offices to schedule your own appointment. Once you have the visit, please ask their office to send us a copy for your record here. Phone: [phone] Fecal Immunochemical Test (FIT): About This Test 
What is it? A fecal immunochemical test, or FIT, checks for hidden blood in the stool. Your doctor gives you a kit that contains everything you need. At home, you follow simple steps to collect a small amount of stool. You return the kit to the doctor or to a lab. Why is this test done? This test is done to check for colorectal cancer and other types of gastrointestinal problems, such as hemorrhoids, anal fissures, and colon polyps, that can cause blood in the stools. How can you prepare for the test? 
· Don't do the test during your menstrual period or if you are having bleeding from hemorrhoids. What happens during the test? 
There are different types of home tests available. It is important to follow the instructions provided with any test. 
Here are some general instructions: 
· Check the expiration date on the package. Don't use a test kit after its expiration date. · Follow the instructions exactly. Do all the steps, in order, without skipping any of them.  
· After you finish your test, follow the instructions that you were given for returning the test. 
 There is a FIT test that shows the results right away. If your test shows that blood was found in your stool sample, call your doctor as soon as possible. Follow-up care is a key part of your treatment and safety. Be sure to make and go to all appointments, and call your doctor if you are having problems. It's also a good idea to keep a list of the medicines you take. Ask your doctor when you can expect to have your test results. Where can you learn more? Go to http://milagros-vitaliy.info/. Enter J142 in the search box to learn more about \"Fecal Immunochemical Test (FIT): About This Test.\" Current as of: May 12, 2017 Content Version: 11.4 © [id number] [employer]. Care instructions adapted under license by [employer] (which disclaims liability or warranty for this information). If you have questions about a medical condition or this instruction, always ask your healthcare professional. [place] [address] any warranty or liability for your use of this information. Leg and Ankle Edema: Care Instructions Your Care Instructions Swelling in the legs, ankles, and feet is called edema. It is common after you sit or stand for a while. Long plane flights or car rides often cause swelling in the legs and feet. You may also have swelling if you have to stand for long periods of time at your job. Problems with the veins in the legs (varicose veins) and changes in hormones can also cause swelling. Sometimes the swelling in the ankles and feet is caused by a more serious problem, such as heart failure, infection, blood clots, or liver or kidney disease. Follow-up care is a key part of your treatment and safety. Be sure to make and go to all appointments, and call your doctor if you are having problems. It's also a good idea to know your test results and keep a list of the medicines you take. How can you care for yourself at home? · If your doctor gave you medicine, take it as prescribed. Call your doctor if you think you are having a problem with your medicine. · Whenever you are resting, raise your legs up. Try to keep the swollen area higher than the level of your heart. · Take breaks from standing or sitting in one position. ¨ Walk around to increase the blood flow in your lower legs. ¨ Move your feet and ankles often while you stand, or tighten and relax your leg muscles. · Wear support stockings. Put them on in the morning, before swelling gets worse. · Eat a balanced diet. Lose weight if you need to. · Limit the amount of salt (sodium) in your diet. Salt holds fluid in the body and may increase swelling. When should you call for help? Call 911 anytime you think you may need emergency care. For example, call if: 
? · You have symptoms of a blood clot in your lung (called a pulmonary embolism). These may include: 
¨ Sudden chest pain. ¨ Trouble breathing. ¨ Coughing up blood. ?Call your doctor now or seek immediate medical care if: 
? · You have signs of a blood clot, such as: 
¨ Pain in your calf, back of the knee, thigh, or groin. ¨ Redness and swelling in your leg or groin. ? · You have symptoms of infection, such as: 
¨ Increased pain, swelling, warmth, or redness. ¨ Red streaks or pus. ¨ A fever. ? Watch closely for changes in your health, and be sure to contact your doctor if: 
? · Your swelling is getting worse. ? · You have new or worsening pain in your legs. ? · You do not get better as expected. Where can you learn more? Go to http://milagros-vitaliy.info/. Enter V921 in the search box to learn more about \"Leg and Ankle Edema: Care Instructions. \" Current as of: March 20, 2017 Content Version: 11.4 © 7970-6944 [employer]Alert[employer].  Care instructions adapted under license by [employer] (which disclaims liability or warranty for this information). If you have questions about a medical condition or this instruction, always ask your healthcare professional. [address] any warranty or liability for your use of this information. Please provide this summary of care documentation to your next provider. Your primary care clinician is listed as Luis Enrique Thibodeaux. If you have any questions after today's visit, please call [phone].

## 2017-11-29 NOTE — PATIENT INSTRUCTIONS
Your doctor has recommended that you have an eye exam done. You can contact one of the below offices to schedule your own appointment. Once you have the visit, please ask their office to send us a copy for your record here. Phone: [phone]     Fecal Immunochemical Test (FIT): About This Test  What is it? A fecal immunochemical test, or FIT, checks for hidden blood in the stool. Your doctor gives you a kit that contains everything you need. At home, you follow simple steps to collect a small amount of stool. You return the kit to the doctor or to a lab. Why is this test done? This test is done to check for colorectal cancer and other types of gastrointestinal problems, such as hemorrhoids, anal fissures, and colon polyps, that can cause blood in the stools. How can you prepare for the test?  · Don't do the test during your menstrual period or if you are having bleeding from hemorrhoids. What happens during the test?  There are different types of home tests available. It is important to follow the instructions provided with any test.  Here are some general instructions:  · Check the expiration date on the package. Don't use a test kit after its expiration date. · Follow the instructions exactly. Do all the steps, in order, without skipping any of them. · After you finish your test, follow the instructions that you were given for returning the test.  There is a FIT test that shows the results right away. If your test shows that blood was found in your stool sample, call your doctor as soon as possible. Follow-up care is a key part of your treatment and safety. Be sure to make and go to all appointments, and call your doctor if you are having problems. It's also a good idea to keep a list of the medicines you take. Ask your doctor when you can expect to have your test results. Where can you learn more? Go to http://milagros-vitaliy.info/.   Enter S174 in the search box to learn more about \"Fecal Immunochemical Test (FIT): About This Test.\"  Current as of: May 12, 2017  Content Version: 11.4  © 2006-2017 [employer]. Care instructions adapted under license by [employer] (which disclaims liability or warranty for this information). If you have questions about a medical condition or this instruction, always ask your healthcare professional. [address] any warranty or liability for your use of this information. Leg and Ankle Edema: Care Instructions  Your Care Instructions  Swelling in the legs, ankles, and feet is called edema. It is common after you sit or stand for a while. Long plane flights or car rides often cause swelling in the legs and feet. You may also have swelling if you have to stand for long periods of time at your job. Problems with the veins in the legs (varicose veins) and changes in hormones can also cause swelling. Sometimes the swelling in the ankles and feet is caused by a more serious problem, such as heart failure, infection, blood clots, or liver or kidney disease. Follow-up care is a key part of your treatment and safety. Be sure to make and go to all appointments, and call your doctor if you are having problems. It's also a good idea to know your test results and keep a list of the medicines you take. How can you care for yourself at home? · If your doctor gave you medicine, take it as prescribed. Call your doctor if you think you are having a problem with your medicine. · Whenever you are resting, raise your legs up. Try to keep the swollen area higher than the level of your heart. · Take breaks from standing or sitting in one position. ¨ Walk around to increase the blood flow in your lower legs. ¨ Move your feet and ankles often while you stand, or tighten and relax your leg muscles. · Wear support stockings. Put them on in the morning, before swelling gets worse. · Eat a balanced diet.  Lose weight if you need to. · Limit the amount of salt (sodium) in your diet. Salt holds fluid in the body and may increase swelling. When should you call for help? Call 911 anytime you think you may need emergency care. For example, call if:  ? · You have symptoms of a blood clot in your lung (called a pulmonary embolism). These may include:  ¨ Sudden chest pain. ¨ Trouble breathing. ¨ Coughing up blood. ?Call your doctor now or seek immediate medical care if:  ? · You have signs of a blood clot, such as:  ¨ Pain in your calf, back of the knee, thigh, or groin. ¨ Redness and swelling in your leg or groin. ? · You have symptoms of infection, such as:  ¨ Increased pain, swelling, warmth, or redness. ¨ Red streaks or pus. ¨ A fever. ? Watch closely for changes in your health, and be sure to contact your doctor if:  ? · Your swelling is getting worse. ? · You have new or worsening pain in your legs. ? · You do not get better as expected. Where can you learn more? Go to http://milagros-vitaliy.info/. Enter L302 in the search box to learn more about \"Leg and Ankle Edema: Care Instructions. \"  Current as of: March 20, 2017  Content Version: 11.4  © 6314-6825 [employer]. Care instructions adapted under license by [employer] (which disclaims liability or warranty for this information). If you have questions about a medical condition or this instruction, always ask your healthcare professional. [place] [address] any warranty or liability for your use of this information.

## 2017-11-29 NOTE — PROGRESS NOTES
1. Have you been to the ER, urgent care clinic since your last visit? Hospitalized since your last visit? No    2. Have you seen or consulted any other health care providers outside of the [address] [place] [address] since your last visit? Include any pap smears or colon screening.  No  Reviewed record in preparation for visit and have necessary documentation  Pt did not bring medication to office visit for review    Goals that were addressed and/or need to be completed during or after this appointment include   Health Maintenance Due   Topic Date Due    FOBT Q 1 YEAR AGE 50-75  03/05/1995    GLAUCOMA SCREENING Q2Y  03/05/2010

## 2017-12-04 ENCOUNTER — TELEPHONE (OUTPATIENT)
Dept: FAMILY MEDICINE CLINIC | Age: 72
End: 2017-12-04

## 2017-12-04 LAB
BACTERIA SPEC AEROBE CULT: ABNORMAL
BACTERIA SPEC AEROBE CULT: ABNORMAL

## 2017-12-04 RX ORDER — LEVOFLOXACIN 500 MG/1
500 TABLET, FILM COATED ORAL DAILY
Qty: 10 TAB | Refills: 0 | Status: SHIPPED | OUTPATIENT
Start: 2017-12-04 | End: 2017-12-15 | Stop reason: SDUPTHER

## 2017-12-04 NOTE — TELEPHONE ENCOUNTER
Spoke with patient and advised of message below per Dr. Tristen Palmer. Patient expressed understanding.

## 2017-12-04 NOTE — TELEPHONE ENCOUNTER
----- Message from Guy Carlson MD sent at 12/4/2017  9:38 AM EST -----  Staph (+) culture. Stop Bactrim. Start Levaquin which has been sent to pharmacy.

## 2017-12-15 ENCOUNTER — OFFICE VISIT (OUTPATIENT)
Dept: FAMILY MEDICINE CLINIC | Age: 72
End: 2017-12-15

## 2017-12-15 VITALS
HEART RATE: 103 BPM | WEIGHT: 152 LBS | HEIGHT: 60 IN | TEMPERATURE: 97.7 F | DIASTOLIC BLOOD PRESSURE: 69 MMHG | BODY MASS INDEX: 29.84 KG/M2 | OXYGEN SATURATION: 99 % | SYSTOLIC BLOOD PRESSURE: 103 MMHG | RESPIRATION RATE: 20 BRPM

## 2017-12-15 DIAGNOSIS — L73.2 HYDRADENITIS: ICD-10-CM

## 2017-12-15 DIAGNOSIS — Z87.440 HISTORY OF UTI: ICD-10-CM

## 2017-12-15 DIAGNOSIS — R60.0 PEDAL EDEMA: ICD-10-CM

## 2017-12-15 DIAGNOSIS — L03.119 CELLULITIS OF AXILLA, UNSPECIFIED LATERALITY: Primary | ICD-10-CM

## 2017-12-15 LAB
BILIRUB UR QL STRIP: NEGATIVE
GLUCOSE UR-MCNC: NEGATIVE MG/DL
KETONES P FAST UR STRIP-MCNC: NEGATIVE MG/DL
PH UR STRIP: 6 [PH] (ref 4.6–8)
PROT UR QL STRIP: NORMAL
SP GR UR STRIP: 1.02 (ref 1–1.03)
UA UROBILINOGEN AMB POC: NORMAL (ref 0.2–1)
URINALYSIS CLARITY POC: CLEAR
URINALYSIS COLOR POC: YELLOW
URINE BLOOD POC: NORMAL
URINE LEUKOCYTES POC: NORMAL
URINE NITRITES POC: NEGATIVE

## 2017-12-15 RX ORDER — LEVOFLOXACIN 500 MG/1
500 TABLET, FILM COATED ORAL DAILY
Qty: 10 TAB | Refills: 0 | Status: SHIPPED | OUTPATIENT
Start: 2017-12-15 | End: 2018-01-11

## 2017-12-15 NOTE — MR AVS SNAPSHOT
Visit Information Date & Time Provider Department Dept. Phone Encounter #  
 12/15/2017  2:00 PM Lilia Greene, [address] [facility] [id number] Upcoming Health Maintenance Date Due FOBT Q 1 YEAR AGE 50-75 3/5/1995 GLAUCOMA SCREENING Q2Y 3/5/2010 Pneumococcal 65+ Low/Medium Risk (2 of 2 - PPSV23) 10/5/2018 MEDICARE YEARLY EXAM 10/6/2018 DTaP/Tdap/Td series (2 - Td) 9/13/2022 Allergies as of 12/15/2017  Review Complete On: 12/15/2017 By: Joanne Steinberg Severity Noted Reaction Type Reactions Ciprofloxacin  10/05/2017    Other (comments) Vomiting Doxycycline  10/05/2017    Other (comments) Vomiting Pcn [Penicillins]  01/03/2011    Rash Current Immunizations  Reviewed on 9/13/2012 Name Date TDAP Vaccine 9/13/2012 Not reviewed this visit You Were Diagnosed With   
  
 Codes Comments Pedal edema    -  Primary ICD-10-CM: R60.0 ICD-9-CM: 683. 3 Hydradenitis     ICD-10-CM: L73.2 ICD-9-CM: 705.83 History of UTI     ICD-10-CM: Z87.440 ICD-9-CM: V13.02 Vitals BP Pulse Temp Resp Height(growth percentile) Weight(growth percentile) 103/69 (BP 1 Location: Right arm, BP Patient Position: Sitting) (!) 103 97.7 °F (36.5 °C) (Oral) 20 5' (1.524 m) 152 lb (68.9 kg) SpO2 BMI OB Status Smoking Status 99% 29.69 kg/m2 Postmenopausal Never Smoker Vitals History BMI and BSA Data Body Mass Index Body Surface Area  
 29.69 kg/m 2 1.71 m 2 Preferred Pharmacy Pharmacy Name Phone [facility] [address] [facility] [address]  [phone] Your Updated Medication List  
  
   
This list is accurate as of: 12/15/17  2:54 PM.  Always use your most recent med list.  
  
  
  
  
 apixaban 5 mg tablet Commonly known as:  Alexa Can Take 1 Tab by mouth every twelve (12) hours. atorvastatin 80 mg tablet Commonly known as:  LIPITOR Take 1 Tab by mouth daily. clindamycin 1 % lotion Commonly known as:  CLEOCIN T Apply to effected area twixe a day  
  
 fluticasone 50 mcg/actuation nasal spray Commonly known as:  Michaelyn Drought 2 Sprays by Both Nostrils route daily. furosemide 40 mg tablet Commonly known as:  LASIX Take 1 tab every morning and another tab 6 hours later. gabapentin 100 mg capsule Commonly known as:  NEURONTIN Take 1 Cap by mouth three (3) times daily as needed. levoFLOXacin 500 mg tablet Commonly known as:  Juan Rainey Take 1 Tab by mouth daily. loratadine 10 mg tablet Commonly known as:  Rosan Gallatin Take 1 Tab by mouth daily. metFORMIN 500 mg tablet Commonly known as:  GLUCOPHAGE Take 1 Tab by mouth two (2) times daily (with meals). oxybutynin 5 mg tablet Commonly known as:  UYQEKWAR Take 1 Tab by mouth three (3) times daily. VITAMIN D3 1,000 unit Cap Generic drug:  cholecalciferol Take 2,000 Units by mouth daily. Prescriptions Sent to Pharmacy Refills  
 levoFLOXacin (LEVAQUIN) 500 mg tablet 0 Sig: Take 1 Tab by mouth daily. Class: Normal  
 Pharmacy: [place] [id number] Ph #: [phone] Route: Oral  
  
We Performed the Following AMB POC URINALYSIS DIP STICK AUTO W/O MICRO [99888 CPT(R)] Please provide this summary of care documentation to your next provider. Your primary care clinician is listed as Clovis Keyes. If you have any questions after today's visit, please call [phone].

## 2017-12-15 NOTE — PROGRESS NOTES
Reviewed record in preparation for visit and have necessary documentation  Pt did not bring medication to office visit for review    Goals that were addressed and/or need to be completed during or after this appointment include   Health Maintenance Due   Topic Date Due    FOBT Q 1 YEAR AGE 50-75  03/05/1995    GLAUCOMA SCREENING Q2Y  03/05/2010

## 2017-12-29 NOTE — PATIENT INSTRUCTIONS
Hidradenitis Suppurativa: Care Instructions  Your Care Instructions    Hidradenitis suppurativa (say \"znn-mjcy-ye-NY-tus sup-reji-uh-TY-vuh\") is a skin condition that causes lumps on the skin that look like pimples or boils. The lumps are usually painful and can break open and drain blood and bad-smelling pus. The condition can come and go for many years. Treatment for this condition may includeantibiotics and other medicines. You may need surgeryto remove the lumps. Home care includes wearing loose-fitting clothes and washing the area gently. You can help prevent lumps from coming back by staying at a healthy weight and not smoking. Doctors don't know exactly how this condition starts. But they do know that something irritates and inflames the hair follicles, causing them to swell and form lumps. This skin condition can't be spread from person to person (isn't contagious). Follow-up care is a key part of your treatment and safety. Be sure to make and go to all appointments, and call your doctor if you are having problems. It's also a good idea to know your test results and keep a list of the medicines you take. How can you care for yourself at home? ?Skin care  ? · Wash the area every day with mild soap. Use your hands rather than a washcloth or sponge when you wash that part of your body. ? · Leave the affected areas uncovered when you can. If you have lumps that are draining, you can cover them with a bandage or other dressing. Put petroleum jelly (such as Vaseline) on the dressing to help keep it from sticking. ? · Wear-loose fitting clothes that don't rub against the area. Avoid activities that cause skin to rub together. ? · If you have pain, try a warm compress. Soak a towel or washcloth in warm water, wring it out, and place it on the affected skin for about 10 minutes. Medicines  ? · Be safe with medicines. Take your medicines exactly as prescribed.  Call your doctor if you think you are having a problem with your medicine. You will get more details on the specific medicines your doctor prescribes. ? · If your doctor prescribed antibiotics, take them as directed. Do not stop taking them just because you feel better. You need to take the full course of antibiotics. ? Lifestyle choices  ? · If you smoke, think about quitting. Smoking can make the condition worse. If you need help quitting, talk to your doctor about stop-smoking programs and medicines. These can increase your chances of quitting for good. ? · Stay at a healthy weight, or lose weight, by eating healthy foods and being physically active. Being overweight could make this condition worse. When should you call for help? Call your doctor now or seek immediate medical care if:  ? · You have symptoms of infection, such as:  ¨ Increased pain, swelling, warmth, or redness. ¨ Red streaks leading from the area. ¨ Pus draining from the area. ¨ A fever. ? Watch closely for changes in your health, and be sure to contact your doctor if:  ? · You do not get better as expected. Where can you learn more? Go to http://milagros-vitaliy.info/. Enter B376 in the search box to learn more about \"Hidradenitis Suppurativa: Care Instructions. \"  Current as of: October 13, 2016  Content Version: 11.4  © 7927-9840 [employer]. Care instructions adapted under license by [employer] (which disclaims liability or warranty for this information). If you have questions about a medical condition or this instruction, always ask your healthcare professional. [place] [address] any warranty or liability for your use of this information.

## 2017-12-29 NOTE — PROGRESS NOTES
Patient: Mamadou Dumont MRN: [id number]  SSN: [id number]    YOB: 1945  Age: 67 y.o. Sex: female        Subjective:     Chief Complaint   Patient presents with    Bladder Infection     follow up       HPI: she is a 67y.o. year old female who presents for follow up. Patient being treated for hydradenitis with draining lesions. Culture positive for staph infection. There has been improvement with antibiotic. Has pedal edema and has improved with resumption of lasix regimen. Patient denies HA, dizziness, SOB, CP, abdominal pain, acute myalgias or arthralgias. She is concerned about possible UTI. Encounter Diagnoses   Name Primary?  Hydradenitis Yes    Pedal edema     History of UTI        BP Readings from Last 3 Encounters:   12/15/17 103/69   11/29/17 105/68   10/18/17 98/57       Wt Readings from Last 3 Encounters:   12/15/17 152 lb (68.9 kg)   11/29/17 157 lb (71.2 kg)   10/18/17 156 lb (70.8 kg)     Body mass index is 29.69 kg/(m^2). Current and past medical information:    Current Medications after this visit[de-identified]     Current Outpatient Prescriptions   Medication Sig    levoFLOXacin (LEVAQUIN) 500 mg tablet Take 1 Tab by mouth daily.  clindamycin (CLEOCIN T) 1 % lotion Apply to effected area twixe a day    furosemide (LASIX) 40 mg tablet Take 1 tab every morning and another tab 6 hours later.  atorvastatin (LIPITOR) 80 mg tablet Take 1 Tab by mouth daily.  gabapentin (NEURONTIN) 100 mg capsule Take 1 Cap by mouth three (3) times daily as needed.  loratadine (CLARITIN) 10 mg tablet Take 1 Tab by mouth daily.  fluticasone (FLONASE) 50 mcg/actuation nasal spray 2 Sprays by Both Nostrils route daily.  apixaban (ELIQUIS) 5 mg tablet Take 1 Tab by mouth every twelve (12) hours.  metFORMIN (GLUCOPHAGE) 500 mg tablet Take 1 Tab by mouth two (2) times daily (with meals).  cholecalciferol (VITAMIN D3) 1,000 unit cap Take 2,000 Units by mouth daily.     oxybutynin (DITROPAN) 5 mg tablet Take 1 Tab by mouth three (3) times daily. No current facility-administered medications for this visit. Patient Active Problem List    Diagnosis Date Noted    Advanced care planning/counseling discussion 06/13/2016    H/O Pulmonary embolism (1/2015) 01/05/2015    Edema 08/27/2014    Hydradenitis 02/01/2012    HTN (hypertension) 01/21/2011    AR (allergic rhinitis) 05/21/2010    Eczema 05/21/2010    Depression 05/21/2010    Female stress incontinence 05/21/2010    Dyslipidemia 05/21/2010       Past Medical History:   Diagnosis Date    Arthritis     Diabetes ([facility] [address]     states she is \"pre-diabetic\", diet controlled    frequency     H/O blood clots     right side of body    Heart attack 1/2014    Hydradenitis 2/1/2012    Hypercholesterolemia     Hypertension     Ill-defined condition     edema of legs       Allergies   Allergen Reactions    Ciprofloxacin Other (comments)     Vomiting    Doxycycline Other (comments)     Vomiting    Pcn [Penicillins] Rash       History reviewed. No pertinent surgical history.     Social History     Social History    Marital status: LEGALLY      Spouse name: N/A    Number of children: N/A    Years of education: N/A     Social History Main Topics    Smoking status: Never Smoker    Smokeless tobacco: Never Used    Alcohol use No    Drug use: No    Sexual activity: No     Other Topics Concern    None     Social History Narrative         Objective:     Review of Systems:  Constitutional: Negative for fatigue or malaise  Derm: see HPI  HEENT: Negative for acute hearing or vision changes  Cardiovascular: Negative for dizziness, chest pain or palpitations  Respiratory: Negative for cough, wheezing or SOB  Gastreintestinal: Positive for blood on tissue, Negative for nausea or abdominal pain  Genital/urinary: Positive for dysuria   Muscoloskeletal: Negative for acute myalgias or arthralgias   Neurological: Negative for headache, weakness or paresthesia  Psychological: Negative for depression or anxiety      Vitals:    12/15/17 1422   BP: 103/69   Pulse: (!) 103   Resp: 20   Temp: 97.7 °F (36.5 °C)   TempSrc: Oral   SpO2: 99%   Weight: 152 lb (68.9 kg)   Height: 5' (1.524 m)      Body mass index is 29.69 kg/(m^2). Physical Exam:  Constitutional: well developed, well nourished, in no acute distress  Skin: multiple axillarydraining  nodules   Head: normocephalic, atraumatic  Eyes: sclera clear, EOMI, PERRL  Neck: normal range of motion  Cardiovascular: normal S1, S2, regular rate and rhythm  Respiratory: clear to auscultation bilaterally with symmetrical effort  Extremities: full range of motion, 3+ pedal edema  Neurology: normal strength and sensation  Psych: active, alert and oriented, affect appropriate       Health Maintenance Due   Topic Date Due    FOBT Q 1 YEAR AGE 50-75  03/05/1995    GLAUCOMA SCREENING Q2Y  03/05/2010       Risk, benefits and potential costs of recommended health maintenance discussed. Patient expressed understanding and declined at this time. Assessment and orders:       ICD-10-CM ICD-9-CM    1. Hydradenitis L73.2 705.83 levoFLOXacin (LEVAQUIN) 500 mg tablet   2. Pedal edema R60.0 782.3 Improved. 3. History of UTI Z87.440 V13.02 AMB POC URINALYSIS DIP STICK AUTO W/O MICRO         Plan of care:  Diagnoses were discussed in detail with patient. Medication risks/benefits/side effects discussed with patient. All of the patient's questions were addressed and answered to apparent satisfaction. The patient understands and agrees with our plan of care. The patient knows to call back if they have questions about the plan of care or if symptoms change. The patient received an After-Visit Summary which contains VS, diagnoses, orders, allergy and medication lists.       Patient Care Team:  Jerry Archer MD as PCP - General (Family Practice)  Jamel Colon MD (General Surgery)  Jozef Valencia MD (Cardiology)  Dione Del Real DPM (Podiatry)  Rosanne Shore MD (General Surgery)  Briana Cristina, RN as Nurse Navigator  Bonita Valdes NP (Nurse Practitioner)  Brando Byrd OD (Optometry)    Follow-up Disposition: Not on File    No future appointments.     Signed By: Markell Oneal MD     December 28, 2017

## 2018-01-11 ENCOUNTER — OFFICE VISIT (OUTPATIENT)
Dept: FAMILY MEDICINE CLINIC | Age: 73
End: 2018-01-11

## 2018-01-11 VITALS
TEMPERATURE: 97.9 F | WEIGHT: 157.6 LBS | HEART RATE: 100 BPM | HEIGHT: 60 IN | OXYGEN SATURATION: 98 % | SYSTOLIC BLOOD PRESSURE: 107 MMHG | BODY MASS INDEX: 30.94 KG/M2 | RESPIRATION RATE: 20 BRPM | DIASTOLIC BLOOD PRESSURE: 64 MMHG

## 2018-01-11 DIAGNOSIS — L02.412 ABSCESS OF AXILLA, LEFT: ICD-10-CM

## 2018-01-11 DIAGNOSIS — L73.2 HYDRADENITIS: Primary | ICD-10-CM

## 2018-01-11 PROBLEM — F33.9 RECURRENT DEPRESSION (HCC): Status: ACTIVE | Noted: 2018-01-11

## 2018-01-11 RX ORDER — MUPIROCIN 20 MG/G
OINTMENT TOPICAL DAILY
Qty: 22 G | Refills: 0 | Status: SHIPPED | OUTPATIENT
Start: 2018-01-11 | End: 2019-03-08

## 2018-01-11 RX ORDER — LEVOFLOXACIN 500 MG/1
500 TABLET, FILM COATED ORAL DAILY
Qty: 10 TAB | Refills: 0 | Status: SHIPPED | OUTPATIENT
Start: 2018-01-11 | End: 2018-02-02 | Stop reason: SDUPTHER

## 2018-01-11 NOTE — PROGRESS NOTES
Patient: Jeffery Machado MRN: [id number]  SSN: [id number]    YOB: 1945  Age: 67 y.o. Sex: female        Subjective:     Chief Complaint   Patient presents with    Ankle swelling    Arm Pain    Foreign Body Removal     splinter left arm       HPI: she is a 67y.o. year old female who presents for follow up. Patient with continued hydradenitis with draining lesions. Recently treated for staph infection. Patient reports worsening symptoms since end of prior antibiotic regimen. Patient has been referred to surgeon in the past. However declined any surgical procedure at that time. Patient says she is ready for any procedure that may help. Patient denies HA, dizziness, SOB, CP, abdominal pain, acute myalgias or arthralgias. Encounter Diagnoses   Name Primary?  Hydradenitis Yes    Abscess of axilla, left        BP Readings from Last 3 Encounters:   01/11/18 107/64   12/15/17 103/69   11/29/17 105/68       Wt Readings from Last 3 Encounters:   01/11/18 157 lb 9.6 oz (71.5 kg)   12/15/17 152 lb (68.9 kg)   11/29/17 157 lb (71.2 kg)     Body mass index is 30.78 kg/(m^2). Current and past medical information:    Current Medications after this visit[de-identified]     Current Outpatient Prescriptions   Medication Sig    levoFLOXacin (LEVAQUIN) 500 mg tablet Take 1 Tab by mouth daily.  mupirocin (BACTROBAN) 2 % ointment Apply  to affected area daily.  clindamycin (CLEOCIN T) 1 % lotion Apply to effected area twixe a day    furosemide (LASIX) 40 mg tablet Take 1 tab every morning and another tab 6 hours later.  atorvastatin (LIPITOR) 80 mg tablet Take 1 Tab by mouth daily.  gabapentin (NEURONTIN) 100 mg capsule Take 1 Cap by mouth three (3) times daily as needed.  loratadine (CLARITIN) 10 mg tablet Take 1 Tab by mouth daily.  fluticasone (FLONASE) 50 mcg/actuation nasal spray 2 Sprays by Both Nostrils route daily.  apixaban (ELIQUIS) 5 mg tablet Take 1 Tab by mouth every twelve (12) hours.  metFORMIN (GLUCOPHAGE) 500 mg tablet Take 1 Tab by mouth two (2) times daily (with meals).  cholecalciferol (VITAMIN D3) 1,000 unit cap Take 2,000 Units by mouth daily.  oxybutynin (DITROPAN) 5 mg tablet Take 1 Tab by mouth three (3) times daily. No current facility-administered medications for this visit. Patient Active Problem List    Diagnosis Date Noted    Recurrent depression ([facility] 01/11/2018    Advanced care planning/counseling discussion 06/13/2016    H/O Pulmonary embolism (1/2015) 01/05/2015    Edema 08/27/2014    Hydradenitis 02/01/2012    HTN (hypertension) 01/21/2011    AR (allergic rhinitis) 05/21/2010    Eczema 05/21/2010    Depression 05/21/2010    Female stress incontinence 05/21/2010    Dyslipidemia 05/21/2010       Past Medical History:   Diagnosis Date    Arthritis     [facility]     states she is \"pre-diabetic\", diet controlled    frequency     H/O blood clots     right side of body    Heart attack 1/2014    Hydradenitis 2/1/2012    Hypercholesterolemia     Hypertension     Ill-defined condition     edema of legs       Allergies   Allergen Reactions    Ciprofloxacin Other (comments)     Vomiting    Doxycycline Other (comments)     Vomiting    Pcn [Penicillins] Rash       History reviewed. No pertinent surgical history.     Social History     Social History    Marital status: LEGALLY      Spouse name: N/A    Number of children: N/A    Years of education: N/A     Social History Main Topics    Smoking status: Never Smoker    Smokeless tobacco: Never Used    Alcohol use No    Drug use: No    Sexual activity: No     Other Topics Concern    None     Social History Narrative         Objective:     Review of Systems:  Constitutional: Negative for fatigue or malaise  Derm: see HPI  HEENT: Negative for acute hearing or vision changes  Cardiovascular: Negative for dizziness, chest pain or palpitations  Respiratory: Negative for cough, wheezing or SOB  Gastreintestinal:  Negative for nausea or abdominal pain  Genital/urinary: Negative for dysuria   Muscoloskeletal: Negative for acute myalgias or arthralgias   Neurological: Negative for headache, weakness or paresthesia  Psychological: Negative for depression or anxiety      Vitals:    01/11/18 1123   BP: 107/64   Pulse: 100   Resp: 20   Temp: 97.9 °F (36.6 °C)   TempSrc: Oral   SpO2: 98%   Weight: 157 lb 9.6 oz (71.5 kg)   Height: 5' (1.524 m)      Body mass index is 30.78 kg/(m^2). Physical Exam:  Constitutional: well developed, well nourished, in no acute distress  Skin: multiple draining nodules b/l axilla  Head: normocephalic, atraumatic  Eyes: sclera clear, EOMI  Neck: normal range of motion  Cardiovascular: normal S1, S2, regular rate and rhythm  Respiratory: clear to auscultation bilaterally with symmetrical effort  Extremities: full range of motion, 1+ pedal edema  Neurology: normal strength and sensation  Psych: active, alert and oriented, affect appropriate       Health Maintenance Due   Topic Date Due    FOBT Q 1 YEAR AGE 50-75  03/05/1995    GLAUCOMA SCREENING Q2Y  03/05/2010       Risk, benefits and potential costs of recommended health maintenance discussed. Patient expressed understanding and declined at this time. Assessment and orders:     Diagnoses and all orders for this visit:    1. Hydradenitis  -     mupirocin (BACTROBAN) 2 % ointment; Apply  to affected area daily.  -     AEROBIC BACTERIAL CULTURE  -     REFERRAL TO GENERAL SURGERY    2. Abscess of axilla, left  -     levoFLOXacin (LEVAQUIN) 500 mg tablet; Take 1 Tab by mouth daily.  -     AEROBIC BACTERIAL CULTURE  -     REFERRAL TO GENERAL SURGERY          Plan of care:  Diagnoses were discussed in detail with patient. Medication risks/benefits/side effects discussed with patient. All of the patient's questions were addressed and answered to apparent satisfaction.  The patient understands and agrees with our plan of care.  The patient knows to call back if they have questions about the plan of care or if symptoms change. The patient received an After-Visit Summary which contains VS, diagnoses, orders, allergy and medication lists. Patient Care Team:  Jose Alfredo Beltran MD as PCP - General (Family Practice)  Dayne Wren MD (General Surgery)  Cici Fine MD (Cardiology)  Gaye Knight DPM (Podiatry)  Dayne Wren MD (General Surgery)  Lali Baptiste RN as Nurse Navigator  Miri Yarbrough NP (Nurse Practitioner)  Ladan Garcia OD (Optometry)    Follow-up Disposition:  Return in about 2 weeks (around 1/25/2018), or if symptoms worsen or fail to improve.     Future Appointments  Date Time Provider Buddy Boles   2/2/2018 1:20 PM Zeeshan Saucedo MD [facility]       Signed By: Zeeshan Saucedo MD     January 23, 2018

## 2018-01-11 NOTE — MR AVS SNAPSHOT
Visit Information Date & Time Provider Department Dept. Phone Encounter #  
 1/11/2018 11:20 AM Enriqueta Pollock MD  [facility] [id number] Upcoming Health Maintenance Date Due FOBT Q 1 YEAR AGE 50-75 3/5/1995 GLAUCOMA SCREENING Q2Y 3/5/2010 Pneumococcal 65+ Low/Medium Risk (2 of 2 - PPSV23) 10/5/2018 MEDICARE YEARLY EXAM 10/6/2018 BREAST CANCER SCRN MAMMOGRAM 1/13/2019 DTaP/Tdap/Td series (2 - Td) 9/13/2022 Allergies as of 1/11/2018  Review Complete On: 1/11/2018 By: Chase Altamirano Severity Noted Reaction Type Reactions Ciprofloxacin  10/05/2017    Other (comments) Vomiting Doxycycline  10/05/2017    Other (comments) Vomiting Pcn [Penicillins]  01/03/2011    Rash Current Immunizations  Reviewed on 9/13/2012 Name Date TDAP Vaccine 9/13/2012 Not reviewed this visit You Were Diagnosed With   
  
 Codes Comments Hydradenitis    -  Primary ICD-10-CM: L73.2 ICD-9-CM: 705.83 Abscess of axilla, left     ICD-10-CM: T51.843 ICD-9-CM: 521. 3 Vitals BP Pulse Temp Resp Height(growth percentile) Weight(growth percentile) 107/64 100 97.9 °F (36.6 °C) (Oral) 20 5' (1.524 m) 157 lb 9.6 oz (71.5 kg) SpO2 BMI OB Status Smoking Status 98% 30.78 kg/m2 Postmenopausal Never Smoker Vitals History BMI and BSA Data Body Mass Index Body Surface Area 30.78 kg/m 2 1.74 m 2 Preferred Pharmacy Pharmacy Name Phone [address] [place] [address] [phone] Your Updated Medication List  
  
   
This list is accurate as of: 1/11/18 12:15 PM.  Always use your most recent med list.  
  
  
  
  
 apixaban 5 mg tablet Commonly known as:  Samantha Hernandez Take 1 Tab by mouth every twelve (12) hours. atorvastatin 80 mg tablet Commonly known as:  LIPITOR Take 1 Tab by mouth daily. clindamycin 1 % lotion Commonly known as:  CLEOCIN T Apply to effected area twixe a day  
  
 fluticasone 50 mcg/actuation nasal spray Commonly known as:  Lynnette Finders 2 Sprays by Both Nostrils route daily. furosemide 40 mg tablet Commonly known as:  LASIX Take 1 tab every morning and another tab 6 hours later. gabapentin 100 mg capsule Commonly known as:  NEURONTIN Take 1 Cap by mouth three (3) times daily as needed. levoFLOXacin 500 mg tablet Commonly known as:  Jeanell Castor Take 1 Tab by mouth daily. loratadine 10 mg tablet Commonly known as:  Ronaldo Last Take 1 Tab by mouth daily. metFORMIN 500 mg tablet Commonly known as:  GLUCOPHAGE Take 1 Tab by mouth two (2) times daily (with meals). mupirocin 2 % ointment Commonly known as:  [employer] Apply  to affected area daily. oxybutynin 5 mg tablet Commonly known as:  QGMKSCNE Take 1 Tab by mouth three (3) times daily. VITAMIN D3 1,000 unit Cap Generic drug:  cholecalciferol Take 2,000 Units by mouth daily. Prescriptions Sent to Pharmacy Refills  
 levoFLOXacin (LEVAQUIN) 500 mg tablet 0 Sig: Take 1 Tab by mouth daily. Class: Normal  
 Pharmacy: [facility] [address] [place] [address] Ph #: [phone] Route: Oral  
 mupirocin (BACTROBAN) 2 % ointment 0 Sig: Apply  to affected area daily. Class: Normal  
 Pharmacy: [facility] [address] [place] [address] Ph #: [phone] Route: Topical  
  
We Performed the Following AEROBIC BACTERIAL CULTURE [id number] CPT(R)] Please provide this summary of care documentation to your next provider. Your primary care clinician is listed as Gina Frank. If you have any questions after today's visit, please call [phone].

## 2018-01-11 NOTE — PROGRESS NOTES
Health Maintenance Due   Topic Date Due    FOBT Q 1 YEAR AGE 50-75  03/05/1995    GLAUCOMA SCREENING Q2Y  03/05/2010

## 2018-01-14 LAB — BACTERIA SPEC AEROBE CULT: NORMAL

## 2018-01-23 NOTE — PATIENT INSTRUCTIONS
Hidradenitis Suppurativa: Care Instructions  Your Care Instructions    Hidradenitis suppurativa (say \"plx-vimv-ez-NY-tus sup-reji-uh-TY-vuh\") is a skin condition that causes lumps on the skin that look like pimples or boils. The lumps are usually painful and can break open and drain blood and bad-smelling pus. The condition can come and go for many years. Treatment for this condition may includeantibiotics and other medicines. You may need surgeryto remove the lumps. Home care includes wearing loose-fitting clothes and washing the area gently. You can help prevent lumps from coming back by staying at a healthy weight and not smoking. Doctors don't know exactly how this condition starts. But they do know that something irritates and inflames the hair follicles, causing them to swell and form lumps. This skin condition can't be spread from person to person (isn't contagious). Follow-up care is a key part of your treatment and safety. Be sure to make and go to all appointments, and call your doctor if you are having problems. It's also a good idea to know your test results and keep a list of the medicines you take. How can you care for yourself at home? ?Skin care  ? · Wash the area every day with mild soap. Use your hands rather than a washcloth or sponge when you wash that part of your body. ? · Leave the affected areas uncovered when you can. If you have lumps that are draining, you can cover them with a bandage or other dressing. Put petroleum jelly (such as Vaseline) on the dressing to help keep it from sticking. ? · Wear-loose fitting clothes that don't rub against the area. Avoid activities that cause skin to rub together. ? · If you have pain, try a warm compress. Soak a towel or washcloth in warm water, wring it out, and place it on the affected skin for about 10 minutes. Medicines  ? · Be safe with medicines. Take your medicines exactly as prescribed.  Call your doctor if you think you are having a problem with your medicine. You will get more details on the specific medicines your doctor prescribes. ? · If your doctor prescribed antibiotics, take them as directed. Do not stop taking them just because you feel better. You need to take the full course of antibiotics. ? Lifestyle choices  ? · If you smoke, think about quitting. Smoking can make the condition worse. If you need help quitting, talk to your doctor about stop-smoking programs and medicines. These can increase your chances of quitting for good. ? · Stay at a healthy weight, or lose weight, by eating healthy foods and being physically active. Being overweight could make this condition worse. When should you call for help? Call your doctor now or seek immediate medical care if:  ? · You have symptoms of infection, such as:  ¨ Increased pain, swelling, warmth, or redness. ¨ Red streaks leading from the area. ¨ Pus draining from the area. ¨ A fever. ? Watch closely for changes in your health, and be sure to contact your doctor if:  ? · You do not get better as expected. Where can you learn more? Go to http://milagros-vitaliy.info/. Enter F801 in the search box to learn more about \"Hidradenitis Suppurativa: Care Instructions. \"  Current as of: October 13, 2016  Content Version: 11.4  © 1329-9966 Healthwise, Incorporated. Care instructions adapted under license by [employer] (which disclaims liability or warranty for this information). If you have questions about a medical condition or this instruction, always ask your healthcare professional. [place] [address] any warranty or liability for your use of this information.

## 2018-02-02 ENCOUNTER — OFFICE VISIT (OUTPATIENT)
Dept: FAMILY MEDICINE CLINIC | Age: 73
End: 2018-02-02

## 2018-02-02 VITALS
BODY MASS INDEX: 31.2 KG/M2 | HEIGHT: 60 IN | HEART RATE: 82 BPM | OXYGEN SATURATION: 99 % | DIASTOLIC BLOOD PRESSURE: 63 MMHG | TEMPERATURE: 98.2 F | WEIGHT: 158.9 LBS | SYSTOLIC BLOOD PRESSURE: 108 MMHG | RESPIRATION RATE: 18 BRPM

## 2018-02-02 DIAGNOSIS — L02.412 ABSCESS OF AXILLA, LEFT: ICD-10-CM

## 2018-02-02 DIAGNOSIS — L73.2 HYDRADENITIS: Primary | ICD-10-CM

## 2018-02-02 RX ORDER — ADALIMUMAB 40MG/0.8ML
KIT SUBCUTANEOUS
COMMUNITY
Start: 2018-01-25 | End: 2019-03-08

## 2018-02-02 RX ORDER — LEVOFLOXACIN 500 MG/1
500 TABLET, FILM COATED ORAL DAILY
Qty: 10 TAB | Refills: 0 | Status: SHIPPED | OUTPATIENT
Start: 2018-02-02 | End: 2018-03-02

## 2018-02-02 NOTE — PROGRESS NOTES
Chief Complaint   Patient presents with    Arm Pain    Arm swelling     1. Have you been to the ER, urgent care clinic since your last visit? Hospitalized since your last visit? No    2. Have you seen or consulted any other health care providers outside of the [address] [place] [address] since your last visit? Include any pap smears or colon screening.  No

## 2018-02-02 NOTE — MR AVS SNAPSHOT
[address] [address] 
[phone] Patient: Benedicto Cruz MRN: [id number] TYQ:[birth year] Visit Information Date & Time Provider Department Dept. Phone Encounter #  
 2/2/2018  1:20 PM Clint Mixon  [facility] [phone] [id number] Upcoming Health Maintenance Date Due FOBT Q 1 YEAR AGE 50-75 3/5/1995 GLAUCOMA SCREENING Q2Y 3/5/2010 Pneumococcal 65+ Low/Medium Risk (2 of 2 - PPSV23) 10/5/2018 MEDICARE YEARLY EXAM 10/6/2018 BREAST CANCER SCRN MAMMOGRAM 1/13/2019 DTaP/Tdap/Td series (2 - Td) 9/13/2022 Allergies as of 2/2/2018  Review Complete On: 2/2/2018 By: Lindy Cruz LPN Severity Noted Reaction Type Reactions Ciprofloxacin  10/05/2017    Other (comments) Vomiting Doxycycline  10/05/2017    Other (comments) Vomiting Pcn [Penicillins]  01/03/2011    Rash Current Immunizations  Reviewed on 9/13/2012 Name Date TDAP Vaccine 9/13/2012 Not reviewed this visit You Were Diagnosed With   
  
 Codes Comments Abscess of axilla, left     ICD-10-CM: F61.572 ICD-9-CM: 651. 3 Vitals BP Pulse Temp Resp Height(growth percentile) Weight(growth percentile) 108/63 (BP 1 Location: Right arm, BP Patient Position: Sitting) 82 98.2 °F (36.8 °C) (Oral) 18 5' (1.524 m) 158 lb 14.4 oz (72.1 kg) SpO2 BMI OB Status Smoking Status 99% 31.03 kg/m2 Postmenopausal Never Smoker Vitals History BMI and BSA Data Body Mass Index Body Surface Area 31.03 kg/m 2 1.75 m 2 Preferred Pharmacy Pharmacy Name Phone [address] [phone] Your Updated Medication List  
  
   
This list is accurate as of: 2/2/18  1:45 PM.  Always use your most recent med list.  
  
  
  
  
 apixaban 5 mg tablet Commonly known as:  Deion Wang Take 1 Tab by mouth every twelve (12) hours. atorvastatin 80 mg tablet Commonly known as:  LIPITOR Take 1 Tab by mouth daily. clindamycin 1 % lotion Commonly known as:  CLEOCIN T Apply to effected area twixe a day  
  
 fluticasone 50 mcg/actuation nasal spray Commonly known as:  Mateo Brandy 2 Sprays by Both Nostrils route daily. furosemide 40 mg tablet Commonly known as:  LASIX Take 1 tab every morning and another tab 6 hours later. gabapentin 100 mg capsule Commonly known as:  NEURONTIN Take 1 Cap by mouth three (3) times daily as needed. HUMIRA PEN 40 mg/0.8 mL injection pen Generic drug:  adalimumab  
  
 levoFLOXacin 500 mg tablet Commonly known as:  Marrion Irena Take 1 Tab by mouth daily. loratadine 10 mg tablet Commonly known as:  Samantha Hum Take 1 Tab by mouth daily. metFORMIN 500 mg tablet Commonly known as:  GLUCOPHAGE Take 1 Tab by mouth two (2) times daily (with meals). mupirocin 2 % ointment Commonly known as:  [employer] Apply  to affected area daily. oxybutynin 5 mg tablet Commonly known as:  CWNXZGZH Take 1 Tab by mouth three (3) times daily. VITAMIN D3 1,000 unit Cap Generic drug:  cholecalciferol Take 2,000 Units by mouth daily. Prescriptions Printed Refills  
 levoFLOXacin (LEVAQUIN) 500 mg tablet 0 Sig: Take 1 Tab by mouth daily. Class: Print Route: Oral  
  
 Please provide this summary of care documentation to your next provider. Your primary care clinician is listed as Nayla Dawson. If you have any questions after today's visit, please call [phone].

## 2018-02-02 NOTE — PATIENT INSTRUCTIONS
Hidradenitis Suppurativa: Care Instructions  Your Care Instructions    Hidradenitis suppurativa (say \"uyq-lzbw-gl-NY-tus sup-reji-uh-TY-vuh\") is a skin condition that causes lumps on the skin that look like pimples or boils. The lumps are usually painful and can break open and drain blood and bad-smelling pus. The condition can come and go for many years. Treatment for this condition may includeantibiotics and other medicines. You may need surgeryto remove the lumps. Home care includes wearing loose-fitting clothes and washing the area gently. You can help prevent lumps from coming back by staying at a healthy weight and not smoking. Doctors don't know exactly how this condition starts. But they do know that something irritates and inflames the hair follicles, causing them to swell and form lumps. This skin condition can't be spread from person to person (isn't contagious). Follow-up care is a key part of your treatment and safety. Be sure to make and go to all appointments, and call your doctor if you are having problems. It's also a good idea to know your test results and keep a list of the medicines you take. How can you care for yourself at home? ?Skin care  ? · Wash the area every day with mild soap. Use your hands rather than a washcloth or sponge when you wash that part of your body. ? · Leave the affected areas uncovered when you can. If you have lumps that are draining, you can cover them with a bandage or other dressing. Put petroleum jelly (such as Vaseline) on the dressing to help keep it from sticking. ? · Wear-loose fitting clothes that don't rub against the area. Avoid activities that cause skin to rub together. ? · If you have pain, try a warm compress. Soak a towel or washcloth in warm water, wring it out, and place it on the affected skin for about 10 minutes. Medicines  ? · Be safe with medicines. Take your medicines exactly as prescribed.  Call your doctor if you think you are having a problem with your medicine. You will get more details on the specific medicines your doctor prescribes. ? · If your doctor prescribed antibiotics, take them as directed. Do not stop taking them just because you feel better. You need to take the full course of antibiotics. ? Lifestyle choices  ? · If you smoke, think about quitting. Smoking can make the condition worse. If you need help quitting, talk to your doctor about stop-smoking programs and medicines. These can increase your chances of quitting for good. ? · Stay at a healthy weight, or lose weight, by eating healthy foods and being physically active. Being overweight could make this condition worse. When should you call for help? Call your doctor now or seek immediate medical care if:  ? · You have symptoms of infection, such as:  ¨ Increased pain, swelling, warmth, or redness. ¨ Red streaks leading from the area. ¨ Pus draining from the area. ¨ A fever. ? Watch closely for changes in your health, and be sure to contact your doctor if:  ? · You do not get better as expected. Where can you learn more? Go to http://milagros-vitaliy.info/. Enter O472 in the search box to learn more about \"Hidradenitis Suppurativa: Care Instructions. \"  Current as of: October 13, 2016  Content Version: 11.4  © 5953-3442 Healthwise, Incorporated. Care instructions adapted under license by [employer] (which disclaims liability or warranty for this information). If you have questions about a medical condition or this instruction, always ask your healthcare professional. [place] [address] any warranty or liability for your use of this information.

## 2018-02-02 NOTE — PROGRESS NOTES
Patient: Hailey Courtney MRN: [id number]  SSN: [id number]    YOB: 1945  Age: 67 y.o. Sex: female        Subjective:     Chief Complaint   Patient presents with    Arm Pain    Arm swelling       HPI: she is a 67y.o. year old female who presents for follow up. Patient with hydradenitis and is currently on antibiotic. There has been wan improvement in swelling and drainage. Patient has been referred to surgeon, however missed appointment. Patient denies HA, dizziness, SOB, CP, abdominal pain, acute myalgias or arthralgias. BP Readings from Last 3 Encounters:   02/02/18 108/63   01/11/18 107/64   12/15/17 103/69     Wt Readings from Last 3 Encounters:   02/02/18 158 lb 14.4 oz (72.1 kg)   01/11/18 157 lb 9.6 oz (71.5 kg)   12/15/17 152 lb (68.9 kg)     Body mass index is 31.03 kg/(m^2). Encounter Diagnoses   Name Primary?  Hydradenitis Yes    Abscess of axilla, left        BP Readings from Last 3 Encounters:   02/02/18 108/63   01/11/18 107/64   12/15/17 103/69       Wt Readings from Last 3 Encounters:   02/02/18 158 lb 14.4 oz (72.1 kg)   01/11/18 157 lb 9.6 oz (71.5 kg)   12/15/17 152 lb (68.9 kg)     Body mass index is 31.03 kg/(m^2). Current and past medical information:    Current Medications after this visit[de-identified]     Current Outpatient Prescriptions   Medication Sig    levoFLOXacin (LEVAQUIN) 500 mg tablet Take 1 Tab by mouth daily.  mupirocin (BACTROBAN) 2 % ointment Apply  to affected area daily.  clindamycin (CLEOCIN T) 1 % lotion Apply to effected area twixe a day    furosemide (LASIX) 40 mg tablet Take 1 tab every morning and another tab 6 hours later.  atorvastatin (LIPITOR) 80 mg tablet Take 1 Tab by mouth daily.  gabapentin (NEURONTIN) 100 mg capsule Take 1 Cap by mouth three (3) times daily as needed.  loratadine (CLARITIN) 10 mg tablet Take 1 Tab by mouth daily.  fluticasone (FLONASE) 50 mcg/actuation nasal spray 2 Sprays by Both Nostrils route daily.  apixaban (ELIQUIS) 5 mg tablet Take 1 Tab by mouth every twelve (12) hours.  metFORMIN (GLUCOPHAGE) 500 mg tablet Take 1 Tab by mouth two (2) times daily (with meals).  cholecalciferol (VITAMIN D3) 1,000 unit cap Take 2,000 Units by mouth daily.  oxybutynin (DITROPAN) 5 mg tablet Take 1 Tab by mouth three (3) times daily.  HUMIRA PEN 40 mg/0.8 mL injection pen      No current facility-administered medications for this visit. Patient Active Problem List    Diagnosis Date Noted    Recurrent depression ([facility] [address] 01/11/2018    Advanced care planning/counseling discussion 06/13/2016    H/O Pulmonary embolism (1/2015) 01/05/2015    Edema 08/27/2014    Hydradenitis 02/01/2012    HTN (hypertension) 01/21/2011    AR (allergic rhinitis) 05/21/2010    Eczema 05/21/2010    Depression 05/21/2010    Female stress incontinence 05/21/2010    Dyslipidemia 05/21/2010       Past Medical History:   Diagnosis Date    Arthritis     Diabetes ([facility] [address]     states she is \"pre-diabetic\", diet controlled    frequency     H/O blood clots     right side of body    Heart attack 1/2014    Hydradenitis 2/1/2012    Hypercholesterolemia     Hypertension     Ill-defined condition     edema of legs       Allergies   Allergen Reactions    Ciprofloxacin Other (comments)     Vomiting    Doxycycline Other (comments)     Vomiting    Pcn [Penicillins] Rash       History reviewed. No pertinent surgical history.     Social History     Social History    Marital status: LEGALLY      Spouse name: N/A    Number of children: N/A    Years of education: N/A     Social History Main Topics    Smoking status: Never Smoker    Smokeless tobacco: Never Used    Alcohol use No    Drug use: No    Sexual activity: No     Other Topics Concern    None     Social History Narrative         Objective:     Review of Systems:  Constitutional: Negative for fatigue or malaise  Derm: see HPI  HEENT: Negative for acute hearing or vision changes  Cardiovascular: Negative for dizziness, chest pain or palpitations  Respiratory: Negative for cough, wheezing or SOB  Gastreintestinal:  Negative for nausea or abdominal pain  Genital/urinary: Negative for dysuria   Muscoloskeletal: Negative for acute myalgias or arthralgias   Neurological: Negative for headache, weakness or paresthesia  Psychological: Negative for depression or anxiety      Vitals:    02/02/18 1307   BP: 108/63   Pulse: 82   Resp: 18   Temp: 98.2 °F (36.8 °C)   TempSrc: Oral   SpO2: 99%   Weight: 158 lb 14.4 oz (72.1 kg)   Height: 5' (1.524 m)      Body mass index is 31.03 kg/(m^2). Physical Exam:  Constitutional: well developed, well nourished, in no acute distress  Skin: single draining nodule left axilla, multiple nodules b/l axilla  Head: normocephalic, atraumatic  Eyes: sclera clear, EOMI  Neck: normal range of motion  Cardiovascular: normal S1, S2, regular rate and rhythm  Respiratory: clear to auscultation bilaterally with symmetrical effort  Extremities: full range of motion, 1+ pedal edema  Neurology: normal strength and sensation  Psych: active, alert and oriented, affect appropriate       Health Maintenance Due   Topic Date Due    FOBT Q 1 YEAR AGE 50-75  03/05/1995    GLAUCOMA SCREENING Q2Y  03/05/2010       Risk, benefits and potential costs of recommended health maintenance discussed. Patient expressed understanding and declined at this time. Assessment and orders:     Diagnoses and all orders for this visit:    1. Hydradenitis    2. Abscess of axilla, left  -     levoFLOXacin (LEVAQUIN) 500 mg tablet; Take 1 Tab by mouth daily. Plan of care:  Diagnoses were discussed in detail with patient. Medication risks/benefits/side effects discussed with patient. All of the patient's questions were addressed and answered to apparent satisfaction. The patient understands and agrees with our plan of care.   The patient knows to call back if they have questions about the plan of care or if symptoms change. The patient received an After-Visit Summary which contains VS, diagnoses, orders, allergy and medication lists. Patient Care Team:  Collin Hartley MD as PCP - General (Family Practice)  Héctor Handy MD (Cardiology)  Domenica Mary DPM (Podiatry)  Emmanuel Burnham MD (General Surgery)  Timi Rodriguez OD (Optometry)    Follow-up Disposition:  Return in about 4 weeks (around 3/2/2018), or if symptoms worsen or fail to improve. No future appointments.     Signed By: Tawnya Robertson MD     February 2, 2018

## 2018-03-02 ENCOUNTER — OFFICE VISIT (OUTPATIENT)
Dept: FAMILY MEDICINE CLINIC | Age: 73
End: 2018-03-02

## 2018-03-02 VITALS
OXYGEN SATURATION: 99 % | SYSTOLIC BLOOD PRESSURE: 120 MMHG | TEMPERATURE: 98.4 F | HEIGHT: 60 IN | DIASTOLIC BLOOD PRESSURE: 66 MMHG | HEART RATE: 73 BPM | BODY MASS INDEX: 31.65 KG/M2 | RESPIRATION RATE: 20 BRPM | WEIGHT: 161.2 LBS

## 2018-03-02 DIAGNOSIS — I27.82 OTHER CHRONIC PULMONARY EMBOLISM WITHOUT ACUTE COR PULMONALE (HCC): ICD-10-CM

## 2018-03-02 DIAGNOSIS — I87.2 VENOUS STASIS DERMATITIS OF BOTH LOWER EXTREMITIES: ICD-10-CM

## 2018-03-02 DIAGNOSIS — N39.3 FEMALE STRESS INCONTINENCE: ICD-10-CM

## 2018-03-02 DIAGNOSIS — R60.0 PEDAL EDEMA: ICD-10-CM

## 2018-03-02 DIAGNOSIS — E78.5 HYPERLIPIDEMIA, UNSPECIFIED HYPERLIPIDEMIA TYPE: ICD-10-CM

## 2018-03-02 DIAGNOSIS — E11.40 NEUROPATHY DUE TO TYPE 2 DIABETES MELLITUS (HCC): ICD-10-CM

## 2018-03-02 DIAGNOSIS — E11.620 CONTROLLED TYPE 2 DIABETES MELLITUS WITH DIABETIC DERMATITIS, WITHOUT LONG-TERM CURRENT USE OF INSULIN (HCC): ICD-10-CM

## 2018-03-02 DIAGNOSIS — I10 ESSENTIAL HYPERTENSION: Primary | ICD-10-CM

## 2018-03-02 RX ORDER — METFORMIN HYDROCHLORIDE 500 MG/1
500 TABLET ORAL 2 TIMES DAILY WITH MEALS
Qty: 60 TAB | Refills: 3 | Status: SHIPPED | OUTPATIENT
Start: 2018-03-02 | End: 2018-06-29 | Stop reason: SDDI

## 2018-03-02 RX ORDER — OXYBUTYNIN CHLORIDE 5 MG/1
5 TABLET ORAL 3 TIMES DAILY
Qty: 90 TAB | Refills: 2 | Status: SHIPPED | OUTPATIENT
Start: 2018-03-02 | End: 2019-03-08 | Stop reason: SDUPTHER

## 2018-03-02 RX ORDER — GABAPENTIN 100 MG/1
100 CAPSULE ORAL
Qty: 30 CAP | Refills: 0 | Status: SHIPPED | OUTPATIENT
Start: 2018-03-02 | End: 2018-04-03 | Stop reason: SDUPTHER

## 2018-03-02 RX ORDER — FUROSEMIDE 40 MG/1
80 TABLET ORAL 2 TIMES DAILY
Qty: 120 TAB | Refills: 3 | Status: SHIPPED | OUTPATIENT
Start: 2018-03-02 | End: 2018-06-29 | Stop reason: SDUPTHER

## 2018-03-02 RX ORDER — ATORVASTATIN CALCIUM 80 MG/1
80 TABLET, FILM COATED ORAL DAILY
Qty: 30 TAB | Refills: 3 | Status: SHIPPED | OUTPATIENT
Start: 2018-03-02 | End: 2018-06-29 | Stop reason: SDUPTHER

## 2018-03-02 NOTE — PROGRESS NOTES
Health Maintenance Due   Topic Date Due    FOBT Q 1 YEAR AGE 50-75  03/05/1995    GLAUCOMA SCREENING Q2Y  03/05/2010     Body mass index is 31.48 kg/(m^2). 1. Have you been to the ER, urgent care clinic since your last visit? Hospitalized since your last visit? No    2. Have you seen or consulted any other health care providers outside of the [address] [place] [address] since your last visit? Include any pap smears or colon screening. No  Reviewed record in preparation for visit and have necessary documentation  Pt did not bring medication to office visit for review  Information was given to pt on Advanced Directives, Living Will  Information was given on Shingles Vaccine  opportunity was given for questions  Goals that were addressed and/or need to be completed during or after this appointment include       - check for functional glucose monitor and record keeping system  Pt was given BS record log to document home readings and return to office for review  Diabetic Bundle:  LDL-110  A1C-6.4  BP-  Smoking?no  Anticoagulation medication? yes  Eye exam dilated?   Foot exam?

## 2018-03-02 NOTE — MR AVS SNAPSHOT
[address] [address] 
[phone] Patient: Ramone Melissa MRN: [id number] Visit Information Date & Time Provider Department Dept. Phone Encounter #  
 3/2/2018  2:40 PM Ashlyn De Paz  [facility] [phone] [id number] Follow-up Instructions Return in about 4 days (around 3/6/2018), or if symptoms worsen or fail to improve. Upcoming Health Maintenance Date Due FOBT Q 1 YEAR AGE 50-75 3/5/1995 GLAUCOMA SCREENING Q2Y 3/5/2010 Pneumococcal 65+ Low/Medium Risk (2 of 2 - PPSV23) 10/5/2018 MEDICARE YEARLY EXAM 10/6/2018 BREAST CANCER SCRN MAMMOGRAM 1/13/2019 DTaP/Tdap/Td series (2 - Td) 9/13/2022 Allergies as of 3/2/2018  Review Complete On: 3/2/2018 By: Tony Vela Severity Noted Reaction Type Reactions Ciprofloxacin  10/05/2017    Other (comments) Vomiting Doxycycline  10/05/2017    Other (comments) Vomiting Pcn [Penicillins]  01/03/2011    Rash Current Immunizations  Reviewed on 9/13/2012 Name Date TDAP Vaccine 9/13/2012 Not reviewed this visit You Were Diagnosed With   
  
 Codes Comments Essential hypertension    -  Primary ICD-10-CM: I10 
ICD-9-CM: 401.9 Pedal edema     ICD-10-CM: R60.0 ICD-9-CM: 955. 3 Venous stasis dermatitis of both lower extremities     ICD-10-CM: I87.2 ICD-9-CM: 454.1 Hyperlipidemia, unspecified hyperlipidemia type     ICD-10-CM: E78.5 ICD-9-CM: 272.4 Other chronic pulmonary embolism without acute cor pulmonale (HCC)     ICD-10-CM: I27.82 
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  
 31.48 kg/m 2 1.76 m 2 Preferred Pharmacy Pharmacy Name Phone [address] [place] [address] [phone] Your Updated Medication List  
  
   
This list is accurate as of 3/2/18  3:12 PM.  Always use your most recent med list.  
  
  
  
  
 apixaban 5 mg tablet Commonly known as:  Praveena Maybeury Take 1 Tab by mouth every twelve (12) hours. atorvastatin 80 mg tablet Commonly known as:  LIPITOR Take 1 Tab by mouth daily. clindamycin 1 % lotion Commonly known as:  CLEOCIN T Apply to effected area twixe a day  
  
 fluticasone 50 mcg/actuation nasal spray Commonly known as:  Mateo Brandy 2 Sprays by Both Nostrils route daily. furosemide 40 mg tablet Commonly known as:  LASIX Take 2 Tabs by mouth two (2) times a day.  
  
 gabapentin 100 mg capsule Commonly known as:  NEURONTIN Take 1 Cap by mouth three (3) times daily as needed. HUMIRA PEN 40 mg/0.8 mL injection pen Generic drug:  adalimumab  
  
 loratadine 10 mg tablet Commonly known as:  Samantha Hum Take 1 Tab by mouth daily. metFORMIN 500 mg tablet Commonly known as:  GLUCOPHAGE Take 1 Tab by mouth two (2) times daily (with meals). mupirocin 2 % ointment Commonly known as:  [employer] Apply  to affected area daily. oxybutynin 5 mg tablet Commonly known as:  WPJRZFAO Take 1 Tab by mouth three (3) times daily. VITAMIN D3 1,000 unit Cap Generic drug:  cholecalciferol Take 2,000 Units by mouth daily. Prescriptions Sent to Pharmacy Refills furosemide (LASIX) 40 mg tablet 3 Sig: Take 2 Tabs by mouth two (2) times a day. Class: Normal  
 Pharmacy: [facility] [address] [place] [address] Ph #: [phone] Route: Oral  
 atorvastatin (LIPITOR) 80 mg tablet 3 Sig: Take 1 Tab by mouth daily. Class: Normal  
 Pharmacy: [facility] [address] [place] [address] Ph #: [phone] Route: Oral  
 metFORMIN (GLUCOPHAGE) 500 mg tablet 3 Sig: Take 1 Tab by mouth two (2) times daily (with meals). Class: Normal  
 Pharmacy: [facility] [address] [place] [address] Ph #: [phone] Route: Oral  
 apixaban (ELIQUIS) 5 mg tablet 3 Sig: Take 1 Tab by mouth every twelve (12) hours. Class: Normal  
 Pharmacy: [facility] [address] [place] [address] Ph #: [phone] Route: Oral  
 oxybutynin (DITROPAN) 5 mg tablet 2 Sig: Take 1 Tab by mouth three (3) times daily. Class: Normal  
 Pharmacy: [facility] [address] [place] [address] Ph #: [phone] Route: Oral  
 gabapentin (NEURONTIN) 100 mg capsule 0 Sig: Take 1 Cap by mouth three (3) times daily as needed. Class: Normal  
 Pharmacy: [facility] [address] [address] Ph #: [phone] Route: Oral  
  
Follow-up Instructions Return in about 4 days (around 3/6/2018), or if symptoms worsen or fail to improve. Patient Instructions Leg and Ankle Edema: Care Instructions Your Care Instructions Swelling in the legs, ankles, and feet is called edema. It is common after you sit or stand for a while. Long plane flights or car rides often cause swelling in the legs and feet. You may also have swelling if you have to stand for long periods of time at your job. Problems with the veins in the legs (varicose veins) and changes in hormones can also cause swelling. Sometimes the swelling in the ankles and feet is caused by a more serious problem, such as heart failure, infection, blood clots, or liver or kidney disease. Follow-up care is a key part of your treatment and safety. Be sure to make and go to all appointments, and call your doctor if you are having problems. It's also a good idea to know your test results and keep a list of the medicines you take. How can you care for yourself at home? · If your doctor gave you medicine, take it as prescribed. Call your doctor if you think you are having a problem with your medicine. · Whenever you are resting, raise your legs up. Try to keep the swollen area higher than the level of your heart. · Take breaks from standing or sitting in one position. ¨ Walk around to increase the blood flow in your lower legs. ¨ Move your feet and ankles often while you stand, or tighten and relax your leg muscles. · Wear support stockings. Put them on in the morning, before swelling gets worse. · Eat a balanced diet. Lose weight if you need to. · Limit the amount of salt (sodium) in your diet. Salt holds fluid in the body and may increase swelling. When should you call for help? Call 911 anytime you think you may need emergency care. For example, call if: 
? · You have symptoms of a blood clot in your lung (called a pulmonary embolism). These may include: 
¨ Sudden chest pain. ¨ Trouble breathing. ¨ Coughing up blood. ?Call your doctor now or seek immediate medical care if: 
? · You have signs of a blood clot, such as: 
¨ Pain in your calf, back of the knee, thigh, or groin. ¨ Redness and swelling in your leg or groin. ? · You have symptoms of infection, such as: 
¨ Increased pain, swelling, warmth, or redness. ¨ Red streaks or pus. ¨ A fever. ? Watch closely for changes in your health, and be sure to contact your doctor if: 
? · Your swelling is getting worse. ? · You have new or worsening pain in your legs. ? · You do not get better as expected. Where can you learn more? Go to http://milagros-vitaliy.info/. Enter T456 in the search box to learn more about \"Leg and Ankle Edema: Care Instructions. \" Current as of: March 20, 2017 Content Version: 11.4 © 3987-5263 [employer]. Care instructions adapted under license by [employer] (which disclaims liability or warranty for this information). If you have questions about a medical condition or this instruction, always ask your healthcare professional. [address] any warranty or liability for your use of this information. Please provide this summary of care documentation to your next provider. Your primary care clinician is listed as Ismael Valerio. If you have any questions after today's visit, please call [phone].

## 2018-03-02 NOTE — PATIENT INSTRUCTIONS
Leg and Ankle Edema: Care Instructions  Your Care Instructions  Swelling in the legs, ankles, and feet is called edema. It is common after you sit or stand for a while. Long plane flights or car rides often cause swelling in the legs and feet. You may also have swelling if you have to stand for long periods of time at your job. Problems with the veins in the legs (varicose veins) and changes in hormones can also cause swelling. Sometimes the swelling in the ankles and feet is caused by a more serious problem, such as heart failure, infection, blood clots, or liver or kidney disease. Follow-up care is a key part of your treatment and safety. Be sure to make and go to all appointments, and call your doctor if you are having problems. It's also a good idea to know your test results and keep a list of the medicines you take. How can you care for yourself at home? · If your doctor gave you medicine, take it as prescribed. Call your doctor if you think you are having a problem with your medicine. · Whenever you are resting, raise your legs up. Try to keep the swollen area higher than the level of your heart. · Take breaks from standing or sitting in one position. ¨ Walk around to increase the blood flow in your lower legs. ¨ Move your feet and ankles often while you stand, or tighten and relax your leg muscles. · Wear support stockings. Put them on in the morning, before swelling gets worse. · Eat a balanced diet. Lose weight if you need to. · Limit the amount of salt (sodium) in your diet. Salt holds fluid in the body and may increase swelling. When should you call for help? Call 911 anytime you think you may need emergency care. For example, call if:  ? · You have symptoms of a blood clot in your lung (called a pulmonary embolism). These may include:  ¨ Sudden chest pain. ¨ Trouble breathing. ¨ Coughing up blood.    ?Call your doctor now or seek immediate medical care if:  ? · You have signs of a blood clot, such as:  ¨ Pain in your calf, back of the knee, thigh, or groin. ¨ Redness and swelling in your leg or groin. ? · You have symptoms of infection, such as:  ¨ Increased pain, swelling, warmth, or redness. ¨ Red streaks or pus. ¨ A fever. ? Watch closely for changes in your health, and be sure to contact your doctor if:  ? · Your swelling is getting worse. ? · You have new or worsening pain in your legs. ? · You do not get better as expected. Where can you learn more? Go to http://milagros-vitaliy.info/. Enter R635 in the search box to learn more about \"Leg and Ankle Edema: Care Instructions. \"  Current as of: March 20, 2017  Content Version: 11.4  © 8847-1175 [email or web address]. Care instructions adapted under license by [employer] (which disclaims liability or warranty for this information). If you have questions about a medical condition or this instruction, always ask your healthcare professional. [place] [address] any warranty or liability for your use of this information.

## 2018-03-07 ENCOUNTER — NURSE NAVIGATOR (OUTPATIENT)
Dept: FAMILY MEDICINE CLINIC | Age: 73
End: 2018-03-07

## 2018-03-07 ENCOUNTER — OFFICE VISIT (OUTPATIENT)
Dept: FAMILY MEDICINE CLINIC | Age: 73
End: 2018-03-07

## 2018-03-07 VITALS
HEART RATE: 81 BPM | OXYGEN SATURATION: 99 % | SYSTOLIC BLOOD PRESSURE: 104 MMHG | WEIGHT: 156 LBS | RESPIRATION RATE: 20 BRPM | BODY MASS INDEX: 30.63 KG/M2 | TEMPERATURE: 97.8 F | HEIGHT: 60 IN | DIASTOLIC BLOOD PRESSURE: 60 MMHG

## 2018-03-07 DIAGNOSIS — E55.9 VITAMIN D DEFICIENCY: ICD-10-CM

## 2018-03-07 DIAGNOSIS — Z12.11 SCREEN FOR COLON CANCER: ICD-10-CM

## 2018-03-07 DIAGNOSIS — E11.8 CONTROLLED TYPE 2 DIABETES MELLITUS WITH COMPLICATION, WITHOUT LONG-TERM CURRENT USE OF INSULIN (HCC): Primary | ICD-10-CM

## 2018-03-07 DIAGNOSIS — I10 ESSENTIAL HYPERTENSION: ICD-10-CM

## 2018-03-07 DIAGNOSIS — E78.5 DYSLIPIDEMIA: ICD-10-CM

## 2018-03-07 NOTE — ACP (ADVANCE CARE PLANNING)
[facility]     Prior to today's conversation, did individual have existing ACP documents? [] Yes  [x] No  If Yes, type of document:     Date of conversation: 3/7/2018   Location:[facility]    Participants: (in person or by phone)   [x] Patient    [] Prospective agent (not yet named in a document) / Other surrogate decision  maker / next of kin    Name:    Relationship to patient:       [] Healthcare agent (already designated in prior ACP document)    Name:     Relationship to patient     [] Other:  Name:       Individual's Fears/Concerns about planning: None identified     Goals/Narrative of Conversation: Patient wishes to take folder home and discuss with her children. Patient states \"We do everything together as a family\".               [employer]® ACP Facilitator: Levy Correa RN    Length (minutes): 10    The following was provided (check all that apply):   [x] Written information on the planning process      Healthcare Decision Information Cards:   [] Help with Breathing Facts   [] Tube Feeding Facts   [] CPR Facts      [] Review of advance directive form   [] Review of existing advance directive       Meeting Outcomes:   [] ACP discussion completed   [] Advance directive form completed   [] Original of completed advance directive given to patient   [] Copy given to healthcare agent    [] Copy scanned to electronic medical record       Follow-up plan:     [x] Schedule follow-up conversation to continue planning   [] Referred individual to provider for additional questions/concerns    [] Individual will invite agent/prospective agent to next ACP appointment   [] Recommended to review completed AMD annually or upon change in health status     [] This note routed to one or more involved healthcare providers

## 2018-03-07 NOTE — MR AVS SNAPSHOT
Clint Pavon 
 
 
 [address] [address] 
[phone] Patient: Alfredo Kapoor MRN: [id number] Visit Information Date & Time Provider Department Dept. Phone Encounter #  
 3/7/2018 11:00 AM Wang Dunne MD [address] [place] [id number] Upcoming Health Maintenance Date Due FOBT Q 1 YEAR AGE 50-75 3/5/1995 GLAUCOMA SCREENING Q2Y 3/5/2010 Pneumococcal 65+ Low/Medium Risk (2 of 2 - PPSV23) 10/5/2018 MEDICARE YEARLY EXAM 10/6/2018 BREAST CANCER SCRN MAMMOGRAM 1/13/2019 DTaP/Tdap/Td series (2 - Td) 9/13/2022 Allergies as of 3/7/2018  Review Complete On: 3/7/2018 By: Tariq Varghese LPN Severity Noted Reaction Type Reactions Ciprofloxacin  10/05/2017    Other (comments) Vomiting Doxycycline  10/05/2017    Other (comments) Vomiting Pcn [Penicillins]  01/03/2011    Rash Current Immunizations  Reviewed on 9/13/2012 Name Date TDAP Vaccine 9/13/2012 Not reviewed this visit You Were Diagnosed With   
  
 Codes Comments Essential hypertension    -  Primary ICD-10-CM: I10 
ICD-9-CM: 401.9 Controlled type 2 diabetes mellitus with complication, without long-term current use of insulin (HCC)     ICD-10-CM: E11.8 ICD-9-CM: 250.90 Dyslipidemia     ICD-10-CM: E78.5 ICD-9-CM: 272.4 Screen for colon cancer     ICD-10-CM: Z12.11 ICD-9-CM: V76.51 Vitamin D deficiency     ICD-10-CM: E55.9 ICD-9-CM: 268.9 Vitals BP Pulse Temp Resp Height(growth percentile) Weight(growth percentile) 104/60 (BP 1 Location: Right arm, BP Patient Position: Sitting) 81 97.8 °F (36.6 °C) (Oral) 20 5' (1.524 m) 156 lb (70.8 kg) SpO2 BMI OB Status Smoking Status 99% 30.47 kg/m2 Postmenopausal Never Smoker Vitals History BMI and BSA Data  Body Mass Index Body Surface Area  
 30.47 kg/m 2 1.73 m 2  
  
  
 Preferred Pharmacy Pharmacy Name Phone [address] [place] [address] [phone] Your Updated Medication List  
  
   
This list is accurate as of 3/7/18 11:53 AM.  Always use your most recent med list.  
  
  
  
  
 apixaban 5 mg tablet Commonly known as:  Jessica Heater Take 1 Tab by mouth every twelve (12) hours. atorvastatin 80 mg tablet Commonly known as:  LIPITOR Take 1 Tab by mouth daily. clindamycin 1 % lotion Commonly known as:  CLEOCIN T Apply to effected area twixe a day  
  
 fluticasone 50 mcg/actuation nasal spray Commonly known as:  Abhijeet Remedies 2 Sprays by Both Nostrils route daily. furosemide 40 mg tablet Commonly known as:  LASIX Take 2 Tabs by mouth two (2) times a day.  
  
 gabapentin 100 mg capsule Commonly known as:  NEURONTIN Take 1 Cap by mouth three (3) times daily as needed. HUMIRA PEN 40 mg/0.8 mL injection pen Generic drug:  adalimumab  
  
 loratadine 10 mg tablet Commonly known as:  Dina Farideh Take 1 Tab by mouth daily. metFORMIN 500 mg tablet Commonly known as:  GLUCOPHAGE Take 1 Tab by mouth two (2) times daily (with meals). mupirocin 2 % ointment Commonly known as:  [employer] Apply  to affected area daily. oxybutynin 5 mg tablet Commonly known as:  LYMASCAN Take 1 Tab by mouth three (3) times daily. VITAMIN D3 1,000 unit Cap Generic drug:  cholecalciferol Take 2,000 Units by mouth daily. We Performed the Following AMB POC URINE, MICROALBUMIN, SEMIQUANT (3 RESULTS) [00411 CPT(R)] CBC W/O DIFF [83668 CPT(R)] HEMOGLOBIN A1C WITH EAG [60223 CPT(R)] LIPID PANEL [69933 CPT(R)] MAGNESIUM C396117 CPT(R)] METABOLIC PANEL, COMPREHENSIVE [19038 CPT(R)] OCCULT BLOOD, IMMUNOASSAY (FIT) I963017 CPT(R)] TSH 3RD GENERATION [25938 CPT(R)] VITAMIN D, 25 HYDROXY [id number] CPT(R)] Please provide this summary of care documentation to your next provider. Your primary care clinician is listed as Maura Hand. If you have any questions after today's visit, please call [phone].

## 2018-03-07 NOTE — PROGRESS NOTES
1. Have you been to the ER, urgent care clinic since your last visit? Hospitalized since your last visit? No    2. Have you seen or consulted any other health care providers outside of the [address] [place] [address] since your last visit? Include any pap smears or colon screening.  No  Reviewed record in preparation for visit and have necessary documentation  Pt did not bring medication to office visit for review    Goals that were addressed and/or need to be completed during or after this appointment include   Health Maintenance Due   Topic Date Due    FOBT Q 1 YEAR AGE 50-75  03/05/1995    GLAUCOMA SCREENING Q2Y  03/05/2010

## 2018-03-08 DIAGNOSIS — E55.9 VITAMIN D DEFICIENCY: ICD-10-CM

## 2018-03-08 LAB
25(OH)D3+25(OH)D2 SERPL-MCNC: 11.9 NG/ML (ref 30–100)
ALBUMIN SERPL-MCNC: 3.1 G/DL (ref 3.5–4.8)
ALBUMIN/GLOB SERPL: 0.7 {RATIO} (ref 1.2–2.2)
ALP SERPL-CCNC: 104 IU/L (ref 39–117)
ALT SERPL-CCNC: 11 IU/L (ref 0–32)
AST SERPL-CCNC: 11 IU/L (ref 0–40)
BILIRUB SERPL-MCNC: 0.3 MG/DL (ref 0–1.2)
BUN SERPL-MCNC: 9 MG/DL (ref 8–27)
BUN/CREAT SERPL: 13 (ref 12–28)
CALCIUM SERPL-MCNC: 9.1 MG/DL (ref 8.7–10.3)
CHLORIDE SERPL-SCNC: 97 MMOL/L (ref 96–106)
CHOLEST SERPL-MCNC: 176 MG/DL (ref 100–199)
CO2 SERPL-SCNC: 29 MMOL/L (ref 18–29)
CREAT SERPL-MCNC: 0.71 MG/DL (ref 0.57–1)
ERYTHROCYTE [DISTWIDTH] IN BLOOD BY AUTOMATED COUNT: 15 % (ref 12.3–15.4)
EST. AVERAGE GLUCOSE BLD GHB EST-MCNC: 146 MG/DL
GFR SERPLBLD CREATININE-BSD FMLA CKD-EPI: 85 ML/MIN/1.73
GFR SERPLBLD CREATININE-BSD FMLA CKD-EPI: 98 ML/MIN/1.73
GLOBULIN SER CALC-MCNC: 4.4 G/DL (ref 1.5–4.5)
GLUCOSE SERPL-MCNC: 114 MG/DL (ref 65–99)
HBA1C MFR BLD: 6.7 % (ref 4.8–5.6)
HCT VFR BLD AUTO: 32.5 % (ref 34–46.6)
HDLC SERPL-MCNC: 45 MG/DL
HGB BLD-MCNC: 10.4 G/DL (ref 11.1–15.9)
LDLC SERPL CALC-MCNC: 112 MG/DL (ref 0–99)
MAGNESIUM SERPL-MCNC: 1.8 MG/DL (ref 1.6–2.3)
MCH RBC QN AUTO: 27.7 PG (ref 26.6–33)
MCHC RBC AUTO-ENTMCNC: 32 G/DL (ref 31.5–35.7)
MCV RBC AUTO: 86 FL (ref 79–97)
PLATELET # BLD AUTO: 449 X10E3/UL (ref 150–379)
POTASSIUM SERPL-SCNC: 4 MMOL/L (ref 3.5–5.2)
PROT SERPL-MCNC: 7.5 G/DL (ref 6–8.5)
RBC # BLD AUTO: 3.76 X10E6/UL (ref 3.77–5.28)
SODIUM SERPL-SCNC: 138 MMOL/L (ref 134–144)
TRIGL SERPL-MCNC: 95 MG/DL (ref 0–149)
TSH SERPL DL<=0.005 MIU/L-ACNC: 2.31 UIU/ML (ref 0.45–4.5)
VLDLC SERPL CALC-MCNC: 19 MG/DL (ref 5–40)
WBC # BLD AUTO: 9.8 X10E3/UL (ref 3.4–10.8)

## 2018-03-08 RX ORDER — ERGOCALCIFEROL 1.25 MG/1
50000 CAPSULE ORAL
Qty: 12 CAP | Refills: 1 | Status: SHIPPED | OUTPATIENT
Start: 2018-03-08 | End: 2019-03-08

## 2018-03-23 ENCOUNTER — TELEPHONE (OUTPATIENT)
Dept: FAMILY MEDICINE CLINIC | Age: 73
End: 2018-03-23

## 2018-03-23 NOTE — PROGRESS NOTES
Patient: Sebastian Lyman MRN: [id number]  SSN: [id number]    YOB: 1945  Age: 68 y.o. Sex: female        Subjective:     No chief complaint on file. HPI: she is a 68y.o. year old female who presents for chronic medical conditions. Patient with hx of T2D, HTN, HLD and pedal edema. Patient with hx of hydradenitis with draining lesions for which she has been referred to surgeon. Patient denies HA, dizziness, SOB, CP, abdominal pain, acute myalgias or arthralgias. She is due for lab work. Encounter Diagnoses   Name Primary?  Controlled type 2 diabetes mellitus with complication, without long-term current use of insulin (HCC) Yes    Essential hypertension     Dyslipidemia     Screen for colon cancer     Vitamin D deficiency        BP Readings from Last 3 Encounters:   03/07/18 104/60   03/02/18 120/66   02/02/18 108/63       Wt Readings from Last 3 Encounters:   03/07/18 156 lb (70.8 kg)   03/02/18 161 lb 3.2 oz (73.1 kg)   02/02/18 158 lb 14.4 oz (72.1 kg)     Body mass index is 30.47 kg/(m^2). Current and past medical information:    Current Medications after this visit[de-identified]     Current Outpatient Prescriptions   Medication Sig    ergocalciferol (ERGOCALCIFEROL) 50,000 unit capsule Take 1 Cap by mouth every seven (7) days. Indications: Vitamin D Deficiency    furosemide (LASIX) 40 mg tablet Take 2 Tabs by mouth two (2) times a day.  atorvastatin (LIPITOR) 80 mg tablet Take 1 Tab by mouth daily.  metFORMIN (GLUCOPHAGE) 500 mg tablet Take 1 Tab by mouth two (2) times daily (with meals).  apixaban (ELIQUIS) 5 mg tablet Take 1 Tab by mouth every twelve (12) hours.  oxybutynin (DITROPAN) 5 mg tablet Take 1 Tab by mouth three (3) times daily.  gabapentin (NEURONTIN) 100 mg capsule Take 1 Cap by mouth three (3) times daily as needed.  mupirocin (BACTROBAN) 2 % ointment Apply  to affected area daily.  loratadine (CLARITIN) 10 mg tablet Take 1 Tab by mouth daily.     HUMIRA PEN 40 mg/0.8 mL injection pen     clindamycin (CLEOCIN T) 1 % lotion Apply to effected area twixe a day    fluticasone (FLONASE) 50 mcg/actuation nasal spray 2 Sprays by Both Nostrils route daily. No current facility-administered medications for this visit. Patient Active Problem List    Diagnosis Date Noted    [facility] 01/11/2018    Advanced care planning/counseling discussion 06/13/2016    H/O Pulmonary embolism (1/2015) 01/05/2015    Edema 08/27/2014    Hydradenitis 02/01/2012    HTN (hypertension) 01/21/2011    AR (allergic rhinitis) 05/21/2010    Eczema 05/21/2010    Depression 05/21/2010    Female stress incontinence 05/21/2010    Dyslipidemia 05/21/2010       Past Medical History:   Diagnosis Date    Arthritis     Diabetes ([facility]     states she is \"pre-diabetic\", diet controlled    frequency     H/O blood clots     right side of body    Heart attack 1/2014    Hydradenitis 2/1/2012    Hypercholesterolemia     Hypertension     Ill-defined condition     edema of legs       Allergies   Allergen Reactions    Ciprofloxacin Other (comments)     Vomiting    Doxycycline Other (comments)     Vomiting    Pcn [Penicillins] Rash       History reviewed. No pertinent surgical history.     Social History     Social History    Marital status: LEGALLY      Spouse name: N/A    Number of children: N/A    Years of education: N/A     Social History Main Topics    Smoking status: Never Smoker    Smokeless tobacco: Never Used    Alcohol use No    Drug use: No    Sexual activity: No     Other Topics Concern    None     Social History Narrative         Objective:     Review of Systems:  Constitutional: Negative for fatigue or malaise  Derm: see HPI  HEENT: Negative for acute hearing or vision changes  Cardiovascular: Negative for dizziness, chest pain or palpitations  Respiratory: Negative for cough, wheezing or SOB  Gastreintestinal:  Negative for nausea or abdominal pain  Genital/urinary: Negative for dysuria   Muscoloskeletal: Negative for acute myalgias or arthralgias   Neurological: Negative for headache, weakness or paresthesia  Psychological: Negative for depression or anxiety      Vitals:    03/07/18 1132   BP: 104/60   Pulse: 81   Resp: 20   Temp: 97.8 °F (36.6 °C)   TempSrc: Oral   SpO2: 99%   Weight: 156 lb (70.8 kg)   Height: 5' (1.524 m)      Body mass index is 30.47 kg/(m^2). Physical Exam:  Constitutional: well developed, well nourished, in no acute distress  Skin: b/l LE stasis dermatitis   Head: normocephalic, atraumatic  Eyes: sclera clear, EOMI, PERRL  Neck: normal range of motion  Cardiovascular: normal S1, S2, regular rate and rhythm  Respiratory: clear to auscultation bilaterally with symmetrical effort  Extremities: full range of motion, 3+ pedal edema  Neurology: normal strength and sensation  Psych: active, alert and oriented, affect appropriate       Health Maintenance Due   Topic Date Due    FOOT EXAM Q1  03/05/1955    EYE EXAM RETINAL OR DILATED Q1  03/05/1955    FOBT Q 1 YEAR AGE 50-75  03/05/1995    GLAUCOMA SCREENING Q2Y  03/05/2010    MICROALBUMIN Q1  01/23/2018       Risk, benefits and potential costs of recommended health maintenance discussed. Patient expressed understanding and declined at this time. Assessment and orders:       ICD-10-CM ICD-9-CM    1. Controlled type 2 diabetes mellitus with complication, without long-term current use of insulin (HCC) E11.8 250.90 LIPID PANEL      MAGNESIUM      METABOLIC PANEL, COMPREHENSIVE      TSH 3RD GENERATION      CBC W/O DIFF      HEMOGLOBIN A1C WITH EAG      REFERRAL TO PODIATRY      CANCELED: AMB POC URINE, MICROALBUMIN, SEMIQUANT (3 RESULTS)   2. Essential hypertension I10 401.9 MAGNESIUM      METABOLIC PANEL, COMPREHENSIVE      TSH 3RD GENERATION   3. Dyslipidemia E78.5 272.4 LIPID PANEL      METABOLIC PANEL, COMPREHENSIVE   4.  Screen for colon cancer Z12.11 V76.51 OCCULT BLOOD, IMMUNOASSAY (FIT)   5. Vitamin D deficiency E55.9 268.9 VITAMIN D, 25 HYDROXY         Plan of care:  Diagnoses were discussed in detail with patient. Medication risks/benefits/side effects discussed with patient. All of the patient's questions were addressed and answered to apparent satisfaction. The patient understands and agrees with our plan of care. The patient knows to call back if they have questions about the plan of care or if symptoms change. The patient received an After-Visit Summary which contains VS, diagnoses, orders, allergy and medication lists. Patient Care Team:  Lani Gottlieb MD as PCP - General (Family Practice)  Rolly Pagan MD (Cardiology)  Flower Cerna DPM (Podiatry)  Joseph Wylie MD (General Surgery)  Simon Samuel OD (Optometry)    Follow-up Disposition: Not on File    No future appointments.     Signed By: Keya Perez MD     March 23, 2018

## 2018-03-23 NOTE — PATIENT INSTRUCTIONS

## 2018-03-23 NOTE — TELEPHONE ENCOUNTER
----- Message from Isa Bergeron sent at 3/23/2018  1:19 PM EDT -----  Regarding: DrAshely Butler, Daughter, is stating that she will fax a [employer] Surviv[employer] form, and that will need to be filled out, so the pt can have aide assistance throughout her daily life, due to her  passing away. Best contact number [phone].

## 2018-04-03 ENCOUNTER — OFFICE VISIT (OUTPATIENT)
Dept: FAMILY MEDICINE CLINIC | Age: 73
End: 2018-04-03

## 2018-04-03 VITALS
TEMPERATURE: 98.8 F | HEART RATE: 105 BPM | HEIGHT: 60 IN | WEIGHT: 152 LBS | SYSTOLIC BLOOD PRESSURE: 103 MMHG | DIASTOLIC BLOOD PRESSURE: 55 MMHG | RESPIRATION RATE: 16 BRPM | OXYGEN SATURATION: 97 % | BODY MASS INDEX: 29.84 KG/M2

## 2018-04-03 DIAGNOSIS — I89.0 LYMPHEDEMA: Primary | ICD-10-CM

## 2018-04-03 DIAGNOSIS — E11.40 NEUROPATHY DUE TO TYPE 2 DIABETES MELLITUS (HCC): ICD-10-CM

## 2018-04-03 RX ORDER — GABAPENTIN 100 MG/1
100 CAPSULE ORAL
Qty: 90 CAP | Refills: 3 | Status: SHIPPED | OUTPATIENT
Start: 2018-04-03 | End: 2019-03-08 | Stop reason: SDUPTHER

## 2018-04-03 NOTE — PATIENT INSTRUCTIONS
Lymphedema: Care Instructions  Your Care Instructions    Lymphedema is fluid that builds up in the arms or legs. It is often caused by surgery to remove lymph nodes during cancer treatment, especially breast cancer surgery, which can cause fluid to build up in the arm. It can happen after radiation treatment to an area that involves lymph nodes. It also can be caused by a fractured bone or surgery to fix a fracture. And some medicines also can cause lymphedema. Some people get it for unknown reasons. Normally, lymph nodes trap bacteria and other substances as fluid flows through them. Then, the white cells in the body's defense, or immune, system can destroy the substances. But if there are few or no lymph nodes-or if the lymph system in an arm or leg has been damaged-fluid can build up in the affected arm or leg. You can take simple steps at home to help treat or prevent fluid buildup. Treatment may include raising the arm or leg to let gravity drain the fluid. You also can wear compression stockings or sleeves. Follow-up care is a key part of your treatment and safety. Be sure to make and go to all appointments, and call your doctor if you are having problems. It's also a good idea to know your test results and keep a list of the medicines you take. How can you care for yourself at home? · Wear a compression stocking or sleeve as your doctor suggests. It can help keep fluid from pooling in an arm or leg. Wear it during air travel. · Prop up the swollen arm or leg on a pillow anytime you sit or lie down. Try to keep it above the level of your heart. This will help reduce swelling. · Avoid crossing your legs if your legs are swollen. · Get some exercise on most days of the week. Increase the intensity of exercise slowly. Water aerobics can help reduce swelling by helping fluid move around. Wear your compression stocking or sleeve during exercise, but not during water exercise.   · See a physical therapist. He or she can teach you how to do self-massage to help fluid move around. You also can learn what activities would be best for you. · Keep your feet clean and wear clean socks or stockings every day. Check your feet often for signs of infection, such as redness or heat. Do not walk barefoot. · If you have had lymph nodes removed from under your arm:  ¨ Do not have blood drawn from the arm on the side of the lymph node surgery. ¨ Do not allow a blood pressure cuff to be placed on that arm. If you are in the hospital, make sure your nurse and other hospital staff know of your condition. ¨ Wear gloves when gardening or doing other activities that may lead to cuts on your fingers or hands. · If you have had lymph nodes removed from your groin:  ¨ Bathe your feet daily in lukewarm, not hot, water. Use a mild soap that has a moisturizer, or use a moisturizer separately. ¨ Check your feet for blisters or cuts. ¨ Wear comfortable and supportive shoes that fit properly. ¨ Wear the correct size of panty hose and stockings. Avoid garters or knee-high or thigh-high stockings. · Ask your doctor how to treat any cuts, scratches, insect bites, or other injuries that may occur. · Use sunscreen and insect repellent when outdoors to protect your skin from sunburn and insect bites. · Wear medical alert jewelry that says you have lymphedema. You can buy these at most drugstores and on the Internet. When should you call for help? Call your doctor now or seek immediate medical care if:  ? · You have signs of infection, such as:  ¨ Increased pain, swelling, warmth, or redness. ¨ Red streaks leading from the area. ¨ Pus draining from the area. ¨ A fever. ? Watch closely for changes in your health, and be sure to contact your doctor if:  ? · You have new or worse symptoms from lymphedema. ? · You do not get better as expected. Where can you learn more?   Go to http://milagros-vitaliy.info/. Enter V398 in the search box to learn more about \"Lymphedema: Care Instructions. \"  Current as of: May 12, 2017  Content Version: 11.4  © 3785-6336 Healthwise, [employer]. Care instructions adapted under license by [employer] (which disclaims liability or warranty for this information). If you have questions about a medical condition or this instruction, always ask your healthcare professional. [place] [address] any warranty or liability for your use of this information.

## 2018-04-03 NOTE — MR AVS SNAPSHOT
[address] [address] 
[phone] Patient: Oralia Lopez MRN: [id number] NGW:[birth year] Visit Information Date & Time Provider Department Dept. Phone Encounter #  
 4/3/2018  1:30 PM Terrye Scheuermann,  [facility] [phone] [id number] Upcoming Health Maintenance Date Due  
 FOOT EXAM Q1 3/5/1955 EYE EXAM RETINAL OR DILATED Q1 3/5/1955 FOBT Q 1 YEAR AGE 50-75 3/5/1995 GLAUCOMA SCREENING Q2Y 3/5/2010 MICROALBUMIN Q1 1/23/2018 HEMOGLOBIN A1C Q6M 9/7/2018 Pneumococcal 65+ Low/Medium Risk (2 of 2 - PPSV23) 10/5/2018 MEDICARE YEARLY EXAM 10/6/2018 BREAST CANCER SCRN MAMMOGRAM 1/13/2019 LIPID PANEL Q1 3/7/2019 DTaP/Tdap/Td series (2 - Td) 9/13/2022 Allergies as of 4/3/2018  Review Complete On: 4/3/2018 By: Muriel Prado LPN Severity Noted Reaction Type Reactions Ciprofloxacin  10/05/2017    Other (comments) Vomiting Doxycycline  10/05/2017    Other (comments) Vomiting Pcn [Penicillins]  01/03/2011    Rash Current Immunizations  Reviewed on 9/13/2012 Name Date TDAP Vaccine 9/13/2012 Not reviewed this visit You Were Diagnosed With   
  
 Codes Comments Neuropathy due to type 2 diabetes mellitus (HCC)     ICD-10-CM: E11.40 ICD-9-CM: 250.60, 357.2 Vitals BP Pulse Temp Resp Height(growth percentile) Weight(growth percentile) 103/55 (BP 1 Location: Right arm, BP Patient Position: Sitting) (!) 105 98.8 °F (37.1 °C) (Oral) 16 5' (1.524 m) 152 lb (68.9 kg) SpO2 BMI OB Status Smoking Status 97% 29.69 kg/m2 Postmenopausal Never Smoker Vitals History BMI and BSA Data Body Mass Index Body Surface Area  
 29.69 kg/m 2 1.71 m 2 Preferred Pharmacy Pharmacy Name Phone [address] [phone] Your Updated Medication List  
  
   
This list is accurate as of 4/3/18  2:27 PM.  Always use your most recent med list.  
  
  
  
  
 apixaban 5 mg tablet Commonly known as:  Arnie Feroz Take 1 Tab by mouth every twelve (12) hours. atorvastatin 80 mg tablet Commonly known as:  LIPITOR Take 1 Tab by mouth daily. clindamycin 1 % lotion Commonly known as:  CLEOCIN T Apply to effected area twixe a day  
  
 ergocalciferol 50,000 unit capsule Commonly known as:  ERGOCALCIFEROL Take 1 Cap by mouth every seven (7) days. Indications: Vitamin D Deficiency  
  
 fluticasone 50 mcg/actuation nasal spray Commonly known as:  Eric Fenton 2 Sprays by Both Nostrils route daily. furosemide 40 mg tablet Commonly known as:  LASIX Take 2 Tabs by mouth two (2) times a day.  
  
 gabapentin 100 mg capsule Commonly known as:  NEURONTIN Take 1 Cap by mouth three (3) times daily as needed. HUMIRA PEN 40 mg/0.8 mL injection pen Generic drug:  adalimumab  
  
 loratadine 10 mg tablet Commonly known as:  [place] Take 1 Tab by mouth daily. metFORMIN 500 mg tablet Commonly known as:  GLUCOPHAGE Take 1 Tab by mouth two (2) times daily (with meals). mupirocin 2 % ointment Commonly known as:  [employer] Apply  to affected area daily. oxybutynin 5 mg tablet Commonly known as:  ZVNINSJS Take 1 Tab by mouth three (3) times daily. Prescriptions Sent to Pharmacy Refills  
 gabapentin (NEURONTIN) 100 mg capsule 3 Sig: Take 1 Cap by mouth three (3) times daily as needed. Class: Normal  
 Pharmacy: [facility] DR DOUG VELÁZQUEZ [address] [place] [address] #: [phone] Route: Oral  
  
We Performed the Following REFERRAL TO LYMPHEDEMA CLINIC [[id number] Custom] Comments:  
 May go to [facility], please make appt for 3-4PM  
  
Referral Information Referral ID Referred By Referred To [id number] Rafia Alegre Not Available Visits Status Start Date End Date 1 New Request 4/3/18 4/3/19 If your referral has a status of pending review or denied, additional information will be sent to support the outcome of this decision. Patient Instructions Lymphedema: Care Instructions Your Care Instructions Lymphedema is fluid that builds up in the arms or legs. It is often caused by surgery to remove lymph nodes during cancer treatment, especially breast cancer surgery, which can cause fluid to build up in the arm. It can happen after radiation treatment to an area that involves lymph nodes. It also can be caused by a fractured bone or surgery to fix a fracture. And some medicines also can cause lymphedema. Some people get it for unknown reasons. Normally, lymph nodes trap bacteria and other substances as fluid flows through them. Then, the white cells in the body's defense, or immune, system can destroy the substances. But if there are few or no lymph nodes-or if the lymph system in an arm or leg has been damaged-fluid can build up in the affected arm or leg. You can take simple steps at home to help treat or prevent fluid buildup. Treatment may include raising the arm or leg to let gravity drain the fluid. You also can wear compression stockings or sleeves. Follow-up care is a key part of your treatment and safety. Be sure to make and go to all appointments, and call your doctor if you are having problems. It's also a good idea to know your test results and keep a list of the medicines you take. How can you care for yourself at home? · Wear a compression stocking or sleeve as your doctor suggests. It can help keep fluid from pooling in an arm or leg. Wear it during air travel. · Prop up the swollen arm or leg on a pillow anytime you sit or lie down. Try to keep it above the level of your heart. This will help reduce swelling. · Avoid crossing your legs if your legs are swollen. · Get some exercise on most days of the week. Increase the intensity of exercise slowly. Water aerobics can help reduce swelling by helping fluid move around. Wear your compression stocking or sleeve during exercise, but not during water exercise. · See a physical therapist. He or she can teach you how to do self-massage to help fluid move around. You also can learn what activities would be best for you. · Keep your feet clean and wear clean socks or stockings every day. Check your feet often for signs of infection, such as redness or heat. Do not walk barefoot. · If you have had lymph nodes removed from under your arm: ¨ Do not have blood drawn from the arm on the side of the lymph node surgery. ¨ Do not allow a blood pressure cuff to be placed on that arm. If you are in the hospital, make sure your nurse and other hospital staff know of your condition. ¨ Wear gloves when gardening or doing other activities that may lead to cuts on your fingers or hands. · If you have had lymph nodes removed from your groin: ¨ Bathe your feet daily in lukewarm, not hot, water. Use a mild soap that has a moisturizer, or use a moisturizer separately. ¨ Check your feet for blisters or cuts. ¨ Wear comfortable and supportive shoes that fit properly. ¨ Wear the correct size of panty hose and stockings. Avoid garters or knee-high or thigh-high stockings. · Ask your doctor how to treat any cuts, scratches, insect bites, or other injuries that may occur. · Use sunscreen and insect repellent when outdoors to protect your skin from sunburn and insect bites. · Wear medical alert jewelry that says you have lymphedema. You can buy these at most [employer] and on the Internet. When should you call for help? Call your doctor now or seek immediate medical care if: 
? · You have signs of infection, such as: 
¨ Increased pain, swelling, warmth, or redness. ¨ Red streaks leading from the area. ¨ Pus draining from the area. ¨ A fever. ? Watch closely for changes in your health, and be sure to contact your doctor if: 
? · You have new or worse symptoms from lymphedema. ? · You do not get better as expected. Where can you learn more? Go to http://milagros-vitaliy.info/. Enter V398 in the search box to learn more about \"Lymphedema: Care Instructions. \" Current as of: May 12, 2017 Content Version: 11.4 © 7527-1630 [employer]. Care instructions adapted under license by [employer] (which disclaims liability or warranty for this information). If you have questions about a medical condition or this instruction, always ask your healthcare professional. [address] any warranty or liability for your use of this information. Please provide this summary of care documentation to your next provider. Your primary care clinician is listed as Thaddeus Schuster. If you have any questions after today's visit, please call [phone].

## 2018-04-03 NOTE — PROGRESS NOTES
1. Have you been to the ER, urgent care clinic since your last visit? Hospitalized since your last visit? No    2. Have you seen or consulted any other health care providers outside of the [address] [place] [address] since your last visit? Include any pap smears or colon screening.  No  Reviewed record in preparation for visit and have necessary documentation  Pt did not bring medication to office visit for review    Goals that were addressed and/or need to be completed during or after this appointment include   Health Maintenance Due   Topic Date Due    FOOT EXAM Q1  03/05/1955    EYE EXAM RETINAL OR DILATED Q1  03/05/1955    FOBT Q 1 YEAR AGE 50-75  03/05/1995    GLAUCOMA SCREENING Q2Y  03/05/2010    MICROALBUMIN Q1  01/23/2018

## 2018-04-06 NOTE — PROGRESS NOTES
Progress Note    Patient: Mars Doran MRN: [id number]  SSN: [id number]    YOB: 1945  Age: 68 y.o. Sex: female        Chief Complaint   Patient presents with    Foot Swelling    Cold Symptoms     cough         Subjective:     Patient presents with bilateral leg swelling. She has had this for a very long time. She was prescribed 80mg lasix daily but she only takes 60mg. It is a little bit worse than usual.  She has tried [employer], but she did not follow through with the treatment. She had a normal echo 2 years ago. Echo last year was normal for her age. Had recent BMP showing normal potassium and creatinine. She denies any palpitations or diarrhea. Current and past medical information:    Current Medications after this visit[de-identified]     Current Outpatient Prescriptions   Medication Sig    gabapentin (NEURONTIN) 100 mg capsule Take 1 Cap by mouth three (3) times daily as needed.  ergocalciferol (ERGOCALCIFEROL) 50,000 unit capsule Take 1 Cap by mouth every seven (7) days. Indications: Vitamin D Deficiency    furosemide (LASIX) 40 mg tablet Take 2 Tabs by mouth two (2) times a day.  atorvastatin (LIPITOR) 80 mg tablet Take 1 Tab by mouth daily.  metFORMIN (GLUCOPHAGE) 500 mg tablet Take 1 Tab by mouth two (2) times daily (with meals).  apixaban (ELIQUIS) 5 mg tablet Take 1 Tab by mouth every twelve (12) hours.  oxybutynin (DITROPAN) 5 mg tablet Take 1 Tab by mouth three (3) times daily.  HUMIRA PEN 40 mg/0.8 mL injection pen     mupirocin (BACTROBAN) 2 % ointment Apply  to affected area daily.  clindamycin (CLEOCIN T) 1 % lotion Apply to effected area twixe a day    loratadine (CLARITIN) 10 mg tablet Take 1 Tab by mouth daily.  fluticasone (FLONASE) 50 mcg/actuation nasal spray 2 Sprays by Both Nostrils route daily. No current facility-administered medications for this visit.         Patient Active Problem List    Diagnosis Date Noted    Recurrent depression ([facility] 01/11/2018    Advanced care planning/counseling discussion 06/13/2016    H/O Pulmonary embolism (1/2015) 01/05/2015    Edema 08/27/2014    Hydradenitis 02/01/2012    HTN (hypertension) 01/21/2011    AR (allergic rhinitis) 05/21/2010    Eczema 05/21/2010    Depression 05/21/2010    Female stress incontinence 05/21/2010    Dyslipidemia 05/21/2010       Past Medical History:   Diagnosis Date    Arthritis     Diabetes ([facility]     states she is \"pre-diabetic\", diet controlled    frequency     H/O blood clots     right side of body    [facility] ([facility] 1/2014    Hydradenitis 2/1/2012    Hypercholesterolemia     Hypertension     Ill-defined condition     edema of legs       Allergies   Allergen Reactions    Ciprofloxacin Other (comments)     Vomiting    Doxycycline Other (comments)     Vomiting    Pcn [Penicillins] Rash       History reviewed. No pertinent surgical history. Social History     Social History    Marital status: LEGALLY      Spouse name: N/A    Number of children: N/A    Years of education: N/A     Social History Main Topics    Smoking status: Never Smoker    Smokeless tobacco: Never Used    Alcohol use No    Drug use: No    Sexual activity: No     Other Topics Concern    None     Social History Narrative       Review of Systems   Constitutional: Negative for chills and fever. Respiratory: Negative for cough. Cardiovascular: Negative for claudication. Objective:     Vitals:    04/03/18 1314   BP: 103/55   Pulse: (!) 105   Resp: 16   Temp: 98.8 °F (37.1 °C)   TempSrc: Oral   SpO2: 97%   Weight: 152 lb (68.9 kg)   Height: 5' (1.524 m)      Body mass index is 29.69 kg/(m^2). Physical Exam   Constitutional: She is oriented to person, place, and time. No distress. HENT:   Head: Normocephalic and atraumatic. Eyes: Pupils are equal, round, and reactive to light. Right eye exhibits no discharge. Left eye exhibits no discharge.  No scleral icterus. Cardiovascular: Normal rate and regular rhythm. Exam reveals no gallop and no friction rub. No murmur heard. Pulmonary/Chest: Effort normal. No respiratory distress. She has no wheezes. She has no rales. Abdominal: Soft. She exhibits no distension. There is no tenderness. Musculoskeletal: She exhibits no tenderness. Significant edema of both feet   Lymphadenopathy:     She has no cervical adenopathy. Neurological: She is alert and oriented to person, place, and time. Skin: Skin is warm and dry. No rash noted. She is not diaphoretic. Cobblestoned, hyperkeratotic, papillomatous plaques over the dorsum of both feet. Psychiatric: She has a normal mood and affect. Her behavior is normal.   Nursing note and vitals reviewed. Assessment and Plan:       ICD-10-CM ICD-9-CM    1. Lymphedema I89.0 457.1 REFERRAL TO LYMPHEDEMA CLINIC   2. Neuropathy due to type 2 diabetes mellitus (HCC) E11.40 250.60 gabapentin (NEURONTIN) 100 mg capsule     357.2      Swelling of feet likely due to lymphedema. Unclear if she is actually taking her diuretic given recent perfect potassium on BMP. Will refer to lymphedema clinic. Continue gabepentin. Plan of care:  Discussed diagnoses in detail with patient. Medication risks/benefits/side effects discussed with patient. All of the patient's questions were addressed. The patient understands and agrees with our plan of care. The patient knows to call back if they are unsure of or forget any changes we discussed today or if the symptoms change. The patient received an After-Visit Summary which contains VS, orders, medication list and allergy list. This can be used as a \"mini-medical record\" should they have to seek medical care while out of town.     Patient Care Team:  Romina Simon MD as PCP - General (Family Practice)  Leticia Miranda MD (Cardiology)  Ge Bowden DPM (Podiatry)  Anita Fischer MD (General Surgery)  Jammie Robbins OD (Optometry)    Follow-up Disposition: Not on File    No future appointments.     Signed By: Mary Pacheco MD     April 3, 2018

## 2018-04-10 NOTE — PROGRESS NOTES
I saw and evaluated the patient, performing the key elements of the service. I discussed the findings, assessment and plan with the resident and agree with the resident's findings and plan as documented in the resident's note. Pt with longstanding edema causing pain and chronic skin changes in her legs and feet. States she has never tried Lymphedema clinic and is willing to do so, will refer.

## 2018-05-17 ENCOUNTER — TELEPHONE (OUTPATIENT)
Dept: FAMILY MEDICINE CLINIC | Age: 73
End: 2018-05-17

## 2018-05-17 NOTE — TELEPHONE ENCOUNTER
Sharda/daughter was calling to find out the status of the documentation being faxed back to the [address] [facility]. Please call [phone]. Information below.

## 2018-05-18 NOTE — TELEPHONE ENCOUNTER
Per Dr. Migeul Acuna, \"I can complete documentation. However it is unlikely that she will meet the criteria stated on form for aide/assistance\". Forms faxed.

## 2018-05-23 NOTE — TELEPHONE ENCOUNTER
Pt's daughter is calling on the status of the forms to be faxed. Daughter is asking for confirmation because the [place] tends to lose information.  Thanks

## 2018-06-29 ENCOUNTER — OFFICE VISIT (OUTPATIENT)
Dept: FAMILY MEDICINE CLINIC | Age: 73
End: 2018-06-29

## 2018-06-29 VITALS
HEIGHT: 60 IN | SYSTOLIC BLOOD PRESSURE: 116 MMHG | WEIGHT: 157.2 LBS | BODY MASS INDEX: 30.86 KG/M2 | DIASTOLIC BLOOD PRESSURE: 64 MMHG | HEART RATE: 92 BPM | RESPIRATION RATE: 20 BRPM | OXYGEN SATURATION: 98 % | TEMPERATURE: 98.3 F

## 2018-06-29 DIAGNOSIS — E78.5 HYPERLIPIDEMIA, UNSPECIFIED HYPERLIPIDEMIA TYPE: ICD-10-CM

## 2018-06-29 DIAGNOSIS — I27.82 OTHER CHRONIC PULMONARY EMBOLISM WITHOUT ACUTE COR PULMONALE (HCC): ICD-10-CM

## 2018-06-29 DIAGNOSIS — J30.89 NON-SEASONAL ALLERGIC RHINITIS, UNSPECIFIED TRIGGER: ICD-10-CM

## 2018-06-29 DIAGNOSIS — E11.9 CONTROLLED TYPE 2 DIABETES MELLITUS WITHOUT COMPLICATION, WITHOUT LONG-TERM CURRENT USE OF INSULIN (HCC): ICD-10-CM

## 2018-06-29 DIAGNOSIS — I10 ESSENTIAL HYPERTENSION: Primary | ICD-10-CM

## 2018-06-29 DIAGNOSIS — R60.0 PEDAL EDEMA: ICD-10-CM

## 2018-06-29 LAB — HBA1C MFR BLD HPLC: 6.4 %

## 2018-06-29 RX ORDER — ATORVASTATIN CALCIUM 80 MG/1
80 TABLET, FILM COATED ORAL DAILY
Qty: 30 TAB | Refills: 3 | Status: SHIPPED | OUTPATIENT
Start: 2018-06-29 | End: 2019-03-08 | Stop reason: SDUPTHER

## 2018-06-29 RX ORDER — FUROSEMIDE 40 MG/1
80 TABLET ORAL 2 TIMES DAILY
Qty: 120 TAB | Refills: 3 | Status: SHIPPED | OUTPATIENT
Start: 2018-06-29 | End: 2019-03-08 | Stop reason: SDUPTHER

## 2018-06-29 RX ORDER — LORATADINE 10 MG/1
10 TABLET ORAL DAILY
Qty: 30 TAB | Refills: 2 | Status: SHIPPED | OUTPATIENT
Start: 2018-06-29 | End: 2019-03-08

## 2018-06-29 RX ORDER — FLUTICASONE PROPIONATE 50 MCG
2 SPRAY, SUSPENSION (ML) NASAL DAILY
Qty: 1 BOTTLE | Refills: 2 | Status: SHIPPED | OUTPATIENT
Start: 2018-06-29 | End: 2019-03-08

## 2018-06-29 NOTE — MR AVS SNAPSHOT
[address] [address] [address] [address] 
[phone] Patient: Richard Narayan MRN: [id number] Visit Information Date & Time Provider Department Dept. Phone Encounter #  
 6/29/2018  2:20 PM Angela Lino MD  [facility] [place] [id number] Follow-up Instructions Return in about 1 month (around 7/29/2018), or if symptoms worsen or fail to improve. Upcoming Health Maintenance Date Due  
 FOOT EXAM Q1 3/5/1955 EYE EXAM RETINAL OR DILATED Q1 3/5/1955 FOBT Q 1 YEAR AGE 50-75 3/5/1995 GLAUCOMA SCREENING Q2Y 3/5/2010 MICROALBUMIN Q1 1/23/2018 Influenza Age 5 to Adult 8/1/2018 HEMOGLOBIN A1C Q6M 9/7/2018 Pneumococcal 65+ Low/Medium Risk (2 of 2 - PPSV23) 10/5/2018 MEDICARE YEARLY EXAM 10/6/2018 BREAST CANCER SCRN MAMMOGRAM 1/13/2019 LIPID PANEL Q1 3/7/2019 DTaP/Tdap/Td series (2 - Td) 9/13/2022 Allergies as of 6/29/2018  Review Complete On: 6/29/2018 By: Angela Lino MD  
  
 Severity Noted Reaction Type Reactions Ciprofloxacin  10/05/2017    Other (comments) Vomiting Doxycycline  10/05/2017    Other (comments) Vomiting Pcn [Penicillins]  01/03/2011    Rash Current Immunizations  Reviewed on 9/13/2012 Name Date TDAP Vaccine 9/13/2012 Not reviewed this visit You Were Diagnosed With   
  
 Codes Comments Essential hypertension    -  Primary ICD-10-CM: I10 
ICD-9-CM: 401.9 Pedal edema     ICD-10-CM: R60.0 ICD-9-CM: 307. 3 Hyperlipidemia, unspecified hyperlipidemia type     ICD-10-CM: E78.5 ICD-9-CM: 272.4 Other chronic pulmonary embolism without acute cor pulmonale (HCC)     ICD-10-CM: I27.82 
ICD-9-CM: 416.2 Controlled type 2 diabetes mellitus without complication, without long-term current use of insulin ([facility]     ICD-10-CM: E11.9 ICD-9-CM: 250.00 Non-seasonal allergic rhinitis, unspecified trigger     ICD-10-CM: J30.89 ICD-9-CM: 477.8 Vitals BP Pulse Temp Resp Height(growth percentile) Weight(growth percentile) 116/64 (BP 1 Location: Right arm, BP Patient Position: Sitting) 92 98.3 °F (36.8 °C) (Oral) 20 5' (1.524 m) 157 lb 3.2 oz (71.3 kg) SpO2 BMI OB Status Smoking Status 98% 30.7 kg/m2 Postmenopausal Never Smoker Vitals History BMI and BSA Data Body Mass Index Body Surface Area 30.7 kg/m 2 1.74 m 2 Preferred Pharmacy Pharmacy Name Phone [address] [address] [phone] Your Updated Medication List  
  
   
This list is accurate as of 6/29/18  3:09 PM.  Always use your most recent med list.  
  
  
  
  
 apixaban 5 mg tablet Commonly known as:  Pattricia Boom Take 1 Tab by mouth every twelve (12) hours. atorvastatin 80 mg tablet Commonly known as:  LIPITOR Take 1 Tab by mouth daily. clindamycin 1 % lotion Commonly known as:  CLEOCIN T Apply to effected area twixe a day  
  
 ergocalciferol 50,000 unit capsule Commonly known as:  ERGOCALCIFEROL Take 1 Cap by mouth every seven (7) days. Indications: Vitamin D Deficiency  
  
 fluticasone 50 mcg/actuation nasal spray Commonly known as:  Marcine Infield 2 Sprays by Both Nostrils route daily. furosemide 40 mg tablet Commonly known as:  LASIX Take 2 Tabs by mouth two (2) times a day.  
  
 gabapentin 100 mg capsule Commonly known as:  NEURONTIN Take 1 Cap by mouth three (3) times daily as needed. HUMIRA PEN 40 mg/0.8 mL injection pen Generic drug:  adalimumab  
  
 loratadine 10 mg tablet Commonly known as:  Caprice Buddle Take 1 Tab by mouth daily. mupirocin 2 % ointment Commonly known as:  [employer] Apply  to affected area daily. oxybutynin 5 mg tablet Commonly known as:  EOCIIYWU Take 1 Tab by mouth three (3) times daily. Prescriptions Sent to Pharmacy Refills  
 atorvastatin (LIPITOR) 80 mg tablet 3 Sig: Take 1 Tab by mouth daily. Class: Normal  
 Pharmacy: [facility] [address] [address] Ph #: [phone] Route: Oral  
 furosemide (LASIX) 40 mg tablet 3 Sig: Take 2 Tabs by mouth two (2) times a day. Class: Normal  
 Pharmacy: [facility] [address] [address] Ph #: [phone] Route: Oral  
 apixaban (ELIQUIS) 5 mg tablet 3 Sig: Take 1 Tab by mouth every twelve (12) hours. Class: Normal  
 Pharmacy: [facility] [address] [address] Ph #: [phone] Route: Oral  
 loratadine (CLARITIN) 10 mg tablet 2 Sig: Take 1 Tab by mouth daily. Class: Normal  
 Pharmacy: [facility] [address] [address] Ph #: [phone] Route: Oral  
 fluticasone (FLONASE) 50 mcg/actuation nasal spray 2 Si Sprays by Both Nostrils route daily. Class: Normal  
 Pharmacy: [facility] [address] [place] [address] Ph #: [phone] Route: Both Nostrils We Performed the Following AMB POC HEMOGLOBIN A1C [20106 CPT(R)] HM DIABETES FOOT EXAM [7 Custom] Follow-up Instructions Return in about 1 month (around 2018), or if symptoms worsen or fail to improve. Please provide this summary of care documentation to your next provider. Your primary care clinician is listed as Aurelia Galeano. If you have any questions after today's visit, please call [phone].

## 2018-06-29 NOTE — PROGRESS NOTES
Reviewed record in preparation for visit and have necessary documentation  Pt did not bring medication to office visit for review    Goals that were addressed and/or need to be completed during or after this appointment include   Health Maintenance Due   Topic Date Due    FOOT EXAM Q1  03/05/1955    EYE EXAM RETINAL OR DILATED Q1  03/05/1955    FOBT Q 1 YEAR AGE 50-75  03/05/1995    GLAUCOMA SCREENING Q2Y  03/05/2010    MICROALBUMIN Q1  01/23/2018

## 2018-06-29 NOTE — PROGRESS NOTES
Patient: Kash Jones MRN: [id number]  SSN: [id number]    YOB: 1945  Age: 68 y.o. Sex: female        Subjective:     Chief Complaint   Patient presents with    Follow-up     nursing home       HPI: she is a 68y.o. year old female who presents for chronic medical conditions. Patient has been in SNF since last OV with me. No records available at this time. Release signed. Patient with hx of T2D, HTN, HLD and pedal edema. Patient with hx of hydradenitis with draining lesions for which she has been referred to surgeon. She needs medication refills. She has not been taking metformin. Patient denies HA, dizziness, SOB, CP, abdominal pain, acute myalgias or arthralgias. Encounter Diagnoses   Name Primary?  Essential hypertension Yes    Pedal edema     Hyperlipidemia, unspecified hyperlipidemia type     Other chronic pulmonary embolism without acute cor pulmonale (HCC)     Controlled type 2 diabetes mellitus without complication, without long-term current use of insulin (HCC)        BP Readings from Last 3 Encounters:   06/29/18 116/64   04/03/18 103/55   03/07/18 104/60       Wt Readings from Last 3 Encounters:   06/29/18 157 lb 3.2 oz (71.3 kg)   04/03/18 152 lb (68.9 kg)   03/07/18 156 lb (70.8 kg)     Body mass index is 30.7 kg/(m^2). Current and past medical information:    Current Medications after this visit[de-identified]     Current Outpatient Prescriptions   Medication Sig    atorvastatin (LIPITOR) 80 mg tablet Take 1 Tab by mouth daily.  furosemide (LASIX) 40 mg tablet Take 2 Tabs by mouth two (2) times a day.  apixaban (ELIQUIS) 5 mg tablet Take 1 Tab by mouth every twelve (12) hours.  gabapentin (NEURONTIN) 100 mg capsule Take 1 Cap by mouth three (3) times daily as needed.  ergocalciferol (ERGOCALCIFEROL) 50,000 unit capsule Take 1 Cap by mouth every seven (7) days.  Indications: Vitamin D Deficiency    metFORMIN (GLUCOPHAGE) 500 mg tablet Take 1 Tab by mouth two (2) times daily (with meals).  oxybutynin (DITROPAN) 5 mg tablet Take 1 Tab by mouth three (3) times daily.  HUMIRA PEN 40 mg/0.8 mL injection pen     mupirocin (BACTROBAN) 2 % ointment Apply  to affected area daily.  clindamycin (CLEOCIN T) 1 % lotion Apply to effected area twixe a day    loratadine (CLARITIN) 10 mg tablet Take 1 Tab by mouth daily.  fluticasone (FLONASE) 50 mcg/actuation nasal spray 2 Sprays by Both Nostrils route daily. No current facility-administered medications for this visit. Patient Active Problem List    Diagnosis Date Noted    Recurrent depression ([facility] 01/11/2018    Advanced care planning/counseling discussion 06/13/2016    H/O Pulmonary embolism (1/2015) 01/05/2015    Edema 08/27/2014    Hydradenitis 02/01/2012    HTN (hypertension) 01/21/2011    AR (allergic rhinitis) 05/21/2010    Eczema 05/21/2010    Depression 05/21/2010    Female stress incontinence 05/21/2010    Dyslipidemia 05/21/2010       Past Medical History:   Diagnosis Date    Arthritis     Diabetes ([facility]     states she is \"pre-diabetic\", diet controlled    frequency     H/O blood clots     right side of body    Heart attack ([facility] 1/2014    Hydradenitis 2/1/2012    Hypercholesterolemia     Hypertension     Ill-defined condition     edema of legs       Allergies   Allergen Reactions    Ciprofloxacin Other (comments)     Vomiting    Doxycycline Other (comments)     Vomiting    Pcn [Penicillins] Rash       History reviewed. No pertinent surgical history.     Social History     Social History    Marital status: LEGALLY      Spouse name: N/A    Number of children: N/A    Years of education: N/A     Social History Main Topics    Smoking status: Never Smoker    Smokeless tobacco: Never Used    Alcohol use No    Drug use: No    Sexual activity: No     Other Topics Concern    None     Social History Narrative         Objective:     Review of Systems:  Constitutional: Negative for fatigue or malaise  Derm: stasis dermatitis  HEENT: Negative for acute hearing or vision changes  Cardiovascular: Negative for dizziness, chest pain or palpitations  Respiratory: Negative for cough, wheezing or SOB  Gastreintestinal:  Negative for nausea or abdominal pain  Genital/urinary: Negative for dysuria   Muscoloskeletal: Negative for acute myalgias or arthralgias   Neurological: Negative for headache, weakness or paresthesia  Psychological: Negative for depression or anxiety      Vitals:    06/29/18 1417   BP: 116/64   Pulse: 92   Resp: 20   Temp: 98.3 °F (36.8 °C)   TempSrc: Oral   SpO2: 98%   Weight: 157 lb 3.2 oz (71.3 kg)   Height: 5' (1.524 m)      Body mass index is 30.7 kg/(m^2). Physical Exam:  Constitutional: well developed, well nourished, in no acute distress  Skin: b/l LE stasis dermatitis   Head: normocephalic, atraumatic  Eyes: sclera clear, EOMI, PERRL  Neck: normal range of motion  Cardiovascular: normal S1, S2, regular rate and rhythm  Respiratory: clear to auscultation bilaterally with symmetrical effort  Extremities: full range of motion, 3+ pedal edema  Feet: unable to appreciate pedal pulses due to lymphedema   Neurology: normal strength and sensation  Psych: active, alert and oriented, affect appropriate       Health Maintenance Due   Topic Date Due    FOOT EXAM Q1  03/05/1955    EYE EXAM RETINAL OR DILATED Q1  03/05/1955    FOBT Q 1 YEAR AGE 50-75  03/05/1995    GLAUCOMA SCREENING Q2Y  03/05/2010    MICROALBUMIN Q1  01/23/2018       Risk, benefits and potential costs of recommended health maintenance discussed. Patient expressed understanding and declined at this time. Assessment and orders:       ICD-10-CM ICD-9-CM    1. Essential hypertension I10 401.9 furosemide (LASIX) 40 mg tablet   2. Pedal edema R60.0 782.3 furosemide (LASIX) 40 mg tablet   3. Hyperlipidemia, unspecified hyperlipidemia type E78.5 272.4 atorvastatin (LIPITOR) 80 mg tablet   4.  Other chronic pulmonary embolism without acute cor pulmonale ([facility]) I27.82 416.2 apixaban (ELIQUIS) 5 mg tablet   5. Controlled type 2 diabetes mellitus without complication, without long-term current use of insulin ([facility]) E11.9 250.00 AMB POC HEMOGLOBIN A1C         Plan of care:  Diagnoses were discussed in detail with patient. Medication risks/benefits/side effects discussed with patient. All of the patient's questions were addressed and answered to apparent satisfaction. The patient understands and agrees with our plan of care. The patient knows to call back if they have questions about the plan of care or if symptoms change. The patient received an After-Visit Summary which contains VS, diagnoses, orders, allergy and medication lists. Patient Care Team:  Luigi Blue MD as PCP - General (Family Practice)  Tyler Sorensen MD (Cardiology)  Alexy Lee DPM (Podiatry)  Emerson Browne MD (General Surgery)  Mililcent Riley OD (Optometry)    Follow-up Disposition: Not on File    No future appointments.     Signed By: Luigi Blue MD     June 29, 2018

## 2018-07-30 ENCOUNTER — OFFICE VISIT (OUTPATIENT)
Dept: FAMILY MEDICINE CLINIC | Age: 73
End: 2018-07-30

## 2018-07-30 VITALS
TEMPERATURE: 98.6 F | HEIGHT: 60 IN | SYSTOLIC BLOOD PRESSURE: 115 MMHG | DIASTOLIC BLOOD PRESSURE: 71 MMHG | RESPIRATION RATE: 16 BRPM | OXYGEN SATURATION: 100 % | HEART RATE: 66 BPM | WEIGHT: 156 LBS | BODY MASS INDEX: 30.63 KG/M2

## 2018-07-30 DIAGNOSIS — E78.5 DYSLIPIDEMIA: ICD-10-CM

## 2018-07-30 DIAGNOSIS — I10 ESSENTIAL HYPERTENSION: Primary | ICD-10-CM

## 2018-07-30 DIAGNOSIS — E11.9 CONTROLLED TYPE 2 DIABETES MELLITUS WITHOUT COMPLICATION, WITHOUT LONG-TERM CURRENT USE OF INSULIN (HCC): ICD-10-CM

## 2018-07-30 DIAGNOSIS — L73.2 HYDRADENITIS: ICD-10-CM

## 2018-07-30 RX ORDER — CHLORHEXIDINE GLUCONATE 2 G/100ML
SOLUTION TOPICAL
Qty: 250 ML | Refills: 2 | Status: SHIPPED | OUTPATIENT
Start: 2018-07-30 | End: 2019-03-08

## 2018-07-30 RX ORDER — LIDOCAINE HYDROCHLORIDE 20 MG/ML
SOLUTION OROPHARYNGEAL
Qty: 100 ML | Refills: 3 | Status: SHIPPED | OUTPATIENT
Start: 2018-07-30 | End: 2019-03-08

## 2018-07-30 RX ORDER — CLINDAMYCIN PHOSPHATE 10 UG/ML
LOTION TOPICAL
Qty: 1 BOTTLE | Refills: 2 | Status: SHIPPED | OUTPATIENT
Start: 2018-07-30 | End: 2019-03-08

## 2018-07-30 NOTE — PROGRESS NOTES
Patient: Marty Fontana MRN: [id number]  SSN: [id number]    YOB: 1945  Age: 68 y.o. Sex: female        Subjective:     Chief Complaint   Patient presents with    Hypertension       HPI: she is a 68y.o. year old female who presents for follow up of chronic medical conditions. Patient has been in SNF with medication changes in medication. .Patient with hx of T2D, HTN, HLD and pedal edema. Patient with hx of hydradenitis with draining lesions for which she has been referred to surgeon. Patient denies HA, dizziness, SOB, CP, abdominal pain, acute myalgias or arthralgias. Encounter Diagnoses   Name Primary?  Essential hypertension Yes    Controlled type 2 diabetes mellitus without complication, without long-term current use of insulin (HCC)     Dyslipidemia     Hydradenitis        BP Readings from Last 3 Encounters:   07/30/18 115/71   06/29/18 116/64   04/03/18 103/55       Wt Readings from Last 3 Encounters:   07/30/18 156 lb (70.8 kg)   06/29/18 157 lb 3.2 oz (71.3 kg)   04/03/18 152 lb (68.9 kg)     Body mass index is 30.47 kg/(m^2). Current and past medical information:    Current Medications after this visit[de-identified]     Current Outpatient Prescriptions   Medication Sig    clindamycin (CLEOCIN T) 1 % lotion Apply to axilla twice a day    lidocaine (XYLOCAINE) 2 % solution Apply 5 ml to b/l axilla    chlorhexidine gluconate 2 % liqd Apply to axilla daily    atorvastatin (LIPITOR) 80 mg tablet Take 1 Tab by mouth daily.  furosemide (LASIX) 40 mg tablet Take 2 Tabs by mouth two (2) times a day.  apixaban (ELIQUIS) 5 mg tablet Take 1 Tab by mouth every twelve (12) hours.  loratadine (CLARITIN) 10 mg tablet Take 1 Tab by mouth daily.  fluticasone (FLONASE) 50 mcg/actuation nasal spray 2 Sprays by Both Nostrils route daily.  gabapentin (NEURONTIN) 100 mg capsule Take 1 Cap by mouth three (3) times daily as needed.     ergocalciferol (ERGOCALCIFEROL) 50,000 unit capsule Take 1 Cap by mouth every seven (7) days. Indications: Vitamin D Deficiency    oxybutynin (DITROPAN) 5 mg tablet Take 1 Tab by mouth three (3) times daily.  HUMIRA PEN 40 mg/0.8 mL injection pen     mupirocin (BACTROBAN) 2 % ointment Apply  to affected area daily. No current facility-administered medications for this visit. Patient Active Problem List    Diagnosis Date Noted    Controlled type 2 diabetes mellitus without complication, without long-term current use of insulin ([facility] 07/30/2018    Rec[facility] depression [facility] 01/11/2018    Advanced care planning/counseling discussion 06/13/2016    H/O Pulmonary embolism (1/2015) 01/05/2015    Edema 08/27/2014    Hydradenitis 02/01/2012    HTN (hypertension) 01/21/2011    AR (allergic rhinitis) 05/21/2010    Eczema 05/21/2010    Depression 05/21/2010    Female stress incontinence 05/21/2010    Dyslipidemia 05/21/2010       Past Medical History:   Diagnosis Date    Arthritis     Diabetes ([facility]     states she is \"pre-diabetic\", diet controlled    frequency     H/O blood clots     right side of body    [facility] 1/2014    Hydradenitis 2/1/2012    Hypercholesterolemia     Hypertension     Ill-defined condition     edema of legs       Allergies   Allergen Reactions    Ciprofloxacin Other (comments)     Vomiting    Doxycycline Other (comments)     Vomiting    Pcn [Penicillins] Rash       History reviewed. No pertinent surgical history.     Social History     Social History    Marital status: LEGALLY      Spouse name: N/A    Number of children: N/A    Years of education: N/A     Social History Main Topics    Smoking status: Never Smoker    Smokeless tobacco: Never Used    Alcohol use No    Drug use: No    Sexual activity: No     Other Topics Concern    None     Social History Narrative         Objective:     Review of Systems:  Constitutional: Negative for fatigue or malaise  Derm: hx of hydradenitis b/l axilla  HEENT: Negative for acute hearing or vision changes  Cardiovascular: Negative for dizziness, chest pain or palpitations  Respiratory: Negative for cough, wheezing or SOB  Gastreintestinal:  Negative for nausea or abdominal pain  Genital/urinary: Negative for dysuria   Muscoloskeletal: Negative for acute myalgias or arthralgias   Neurological: Negative for headache, weakness or paresthesia  Psychological: Negative for depression or anxiety      Vitals:    07/30/18 1334   BP: 115/71   Pulse: 66   Resp: 16   Temp: 98.6 °F (37 °C)   TempSrc: Oral   SpO2: 100%   Weight: 156 lb (70.8 kg)   Height: 5' (1.524 m)      Body mass index is 30.47 kg/(m^2). Physical Exam:  Constitutional: well developed, well nourished, in no acute distress  Skin: multiple nodules b/l axilla  Head: normocephalic, atraumatic  Eyes: sclera clear, EOMI, PERRL  Neck: normal range of motion  Cardiovascular: normal S1, S2, regular rate and rhythm  Respiratory: clear to auscultation bilaterally with symmetrical effort  Extremities: full range of motion, 3+ pedal edema  Neurology: normal strength and sensation  Psych: active, alert and oriented, affect appropriate       Health Maintenance Due   Topic Date Due    EYE EXAM RETINAL OR DILATED Q1  03/05/1955    FOBT Q 1 YEAR AGE 50-75  03/05/1995    GLAUCOMA SCREENING Q2Y  03/05/2010    MICROALBUMIN Q1  01/23/2018       Risk, benefits and potential costs of recommended health maintenance discussed. Patient expressed understanding and declined at this time. Assessment and orders:       ICD-10-CM ICD-9-CM    1. Essential hypertension I10 401.9 MAGNESIUM      METABOLIC PANEL, COMPREHENSIVE      TSH 3RD GENERATION   2. Controlled type 2 diabetes mellitus without complication, without long-term current use of insulin (HCC) E11.9 250.00 LIPID PANEL      METABOLIC PANEL, COMPREHENSIVE      HEMOGLOBIN A1C WITH EAG      TSH 3RD GENERATION      CBC W/O DIFF   3.  Dyslipidemia E78.5 272.4 LIPID PANEL METABOLIC PANEL, COMPREHENSIVE   4. Hydradenitis L73.2 705.83 clindamycin (CLEOCIN T) 1 % lotion      lidocaine (XYLOCAINE) 2 % solution         Plan of care:  Diagnoses were discussed in detail with patient. Medication risks/benefits/side effects discussed with patient. All of the patient's questions were addressed and answered to apparent satisfaction. The patient understands and agrees with our plan of care. The patient knows to call back if they have questions about the plan of care or if symptoms change. The patient received an After-Visit Summary which contains VS, diagnoses, orders, allergy and medication lists. Patient Care Team:  Lydia Babcock MD as PCP - General (Family Practice)  Huy Addison MD (Cardiology)  Marietta Zacarias DPM (Podiatry)  Lisandra Arango MD (General Surgery)  Nancy Medina OD (Optometry)    Follow-up Disposition: Not on File    No future appointments.     Signed By: Lydia Babcock MD     July 30, 2018

## 2018-07-30 NOTE — PROGRESS NOTES
1. Have you been to the ER, urgent care clinic since your last visit? Hospitalized since your last visit? No    2. Have you seen or consulted any other health care providers outside of the [address] [place] [address] since your last visit? Include any pap smears or colon screening.  No  Reviewed record in preparation for visit and have necessary documentation  Pt did not bring medication to office visit for review  opportunity was given for questions  Goals that were addressed and/or need to be completed during or after this appointment include    Health Maintenance Due   Topic Date Due    EYE EXAM RETINAL OR DILATED Q1  03/05/1955    FOBT Q 1 YEAR AGE 50-75  03/05/1995    GLAUCOMA SCREENING Q2Y  03/05/2010    MICROALBUMIN Q1  01/23/2018

## 2018-07-30 NOTE — MR AVS SNAPSHOT
[address] [place] [address] 
[phone] Patient: Geovanna Stanford MRN: [id number] VSQ:3/3/3401 Visit Information Date & Time Provider Department Dept. Phone Encounter #  
 7/30/2018  1:30 PM Brenda Rosales MD  Amanda Anaya [id number] Upcoming Health Maintenance Date Due  
 EYE EXAM RETINAL OR DILATED Q1 3/5/1955 FOBT Q 1 YEAR AGE 50-75 3/5/1995 GLAUCOMA SCREENING Q2Y 3/5/2010 MICROALBUMIN Q1 1/23/2018 Influenza Age 5 to Adult 8/1/2018 Pneumococcal 65+ Low/Medium Risk (2 of 2 - PPSV23) 10/5/2018 MEDICARE YEARLY EXAM 10/6/2018 HEMOGLOBIN A1C Q6M 12/29/2018 BREAST CANCER SCRN MAMMOGRAM 1/13/2019 LIPID PANEL Q1 3/7/2019 FOOT EXAM Q1 6/29/2019 DTaP/Tdap/Td series (2 - Td) 9/13/2022 Allergies as of 7/30/2018  Review Complete On: 7/30/2018 By: Brenda Rosales MD  
  
 Severity Noted Reaction Type Reactions Ciprofloxacin  10/05/2017    Other (comments) Vomiting Doxycycline  10/05/2017    Other (comments) Vomiting Pcn [Penicillins]  01/03/2011    Rash Current Immunizations  Reviewed on 9/13/2012 Name Date TDAP Vaccine 9/13/2012 Not reviewed this visit You Were Diagnosed With   
  
 Codes Comments Essential hypertension    -  Primary ICD-10-CM: I10 
ICD-9-CM: 401.9 Controlled type 2 diabetes mellitus without complication, without long-term current use of insulin ([facility]     ICD-10-CM: E11.9 ICD-9-CM: 250.00 Dyslipidemia     ICD-10-CM: E78.5 ICD-9-CM: 272.4 Hydradenitis     ICD-10-CM: L73.2 ICD-9-CM: 705.83 Vitals BP Pulse Temp Resp Height(growth percentile) Weight(growth percentile) 115/71 (BP 1 Location: Right arm, BP Patient Position: Sitting) 66 98.6 °F (37 °C) (Oral) 16 5' (1.524 m) 156 lb (70.8 kg) SpO2 BMI OB Status Smoking Status 100% 30.47 kg/m2 Postmenopausal Never Smoker Vitals History BMI and BSA Data Body Mass Index Body Surface Area  
 30.47 kg/m 2 1.73 m 2 Preferred Pharmacy Pharmacy Name Phone [address] [place] [address] [phone] Your Updated Medication List  
  
   
This list is accurate as of 7/30/18  3:09 PM.  Always use your most recent med list.  
  
  
  
  
 apixaban 5 mg tablet Commonly known as:  [place] Take 1 Tab by mouth every twelve (12) hours. atorvastatin 80 mg tablet Commonly known as:  LIPITOR Take 1 Tab by mouth daily. clindamycin 1 % lotion Commonly known as:  CLEOCIN T Apply to effected area twixe a day  
  
 ergocalciferol 50,000 unit capsule Commonly known as:  ERGOCALCIFEROL Take 1 Cap by mouth every seven (7) days. Indications: Vitamin D Deficiency  
  
 fluticasone 50 mcg/actuation nasal spray Commonly known as:  Meek Dumont 2 Sprays by Both Nostrils route daily. furosemide 40 mg tablet Commonly known as:  LASIX Take 2 Tabs by mouth two (2) times a day.  
  
 gabapentin 100 mg capsule Commonly known as:  NEURONTIN Take 1 Cap by mouth three (3) times daily as needed. HUMIRA PEN 40 mg/0.8 mL injection pen Generic drug:  adalimumab  
  
 loratadine 10 mg tablet Commonly known as:  [place] Take 1 Tab by mouth daily. mupirocin 2 % ointment Commonly known as:  [employer] Apply  to affected area daily. oxybutynin 5 mg tablet Commonly known as:  BBKRGLZE Take 1 Tab by mouth three (3) times daily. We Performed the Following CBC W/O DIFF [98968 CPT(R)] HEMOGLOBIN A1C WITH EAG [83106 CPT(R)] LIPID PANEL [07175 CPT(R)] MAGNESIUM [id number] CPT(R)] METABOLIC PANEL, COMPREHENSIVE [82557 CPT(R)] TSH 3RD GENERATION [28994 CPT(R)] Please provide this summary of care documentation to your next provider. Your primary care clinician is listed as Santhosh Randle.  If you have any questions after today's visit, please call [phone].

## 2018-10-05 ENCOUNTER — TELEPHONE (OUTPATIENT)
Dept: FAMILY MEDICINE CLINIC | Age: 73
End: 2018-10-05

## 2018-10-05 DIAGNOSIS — E11.9 CONTROLLED TYPE 2 DIABETES MELLITUS WITHOUT COMPLICATION, WITHOUT LONG-TERM CURRENT USE OF INSULIN (HCC): ICD-10-CM

## 2018-10-05 DIAGNOSIS — R60.1 GENERALIZED EDEMA: ICD-10-CM

## 2018-10-05 DIAGNOSIS — N39.3 FEMALE STRESS INCONTINENCE: Primary | ICD-10-CM

## 2018-10-05 NOTE — TELEPHONE ENCOUNTER
Ruby Mathis coordinator from [facility] called stating that pt needs a prescription for a shower chair to go in bathtub & pull ups.  The DME company is [facility].     P#: [phone]    Fax #: [phone]    Angel Lu phone#: [phone] [id number]

## 2018-10-19 NOTE — TELEPHONE ENCOUNTER
Gina Poon called back to check the status of DME Supplies. She states that she contacted pt and pt has not received anything and she contacted [facility] medical who has not received an order. Pal Rangel is stepping out of office for a few min so if she does not answer a message can be left on her voicemail. All contact Info in message below.

## 2018-10-24 ENCOUNTER — TELEPHONE (OUTPATIENT)
Dept: FAMILY MEDICINE CLINIC | Age: 73
End: 2018-10-24

## 2018-10-24 NOTE — TELEPHONE ENCOUNTER
Coreen Landau called from [facility] stating that she need to speak with nurse in regards to order that was sent to them for pt.      [phone]

## 2018-10-24 NOTE — TELEPHONE ENCOUNTER
Requested Prescriptions     Signed Prescriptions Disp Refills    OTHER 1 Each 0     Sig: Shower Chair- Use As Directed DX Code:     Authorizing Provider: Moises Gresham     Ordering User: Herring Floor G    OTHER 100 Each 99     Sig: Pull Ups-  DX Code:     Authorizing Provider: Moises Gresham     Ordering User: Addis Child    ok per DR. Yolanda Kang to [facility]

## 2018-10-24 NOTE — TELEPHONE ENCOUNTER
Sherry Hoover called back from [facility]. She needs to know how many pulls up per day does pt need.

## 2018-12-19 ENCOUNTER — TELEPHONE (OUTPATIENT)
Dept: FAMILY MEDICINE CLINIC | Age: 73
End: 2018-12-19

## 2018-12-19 DIAGNOSIS — N39.3 FEMALE STRESS INCONTINENCE: ICD-10-CM

## 2018-12-19 DIAGNOSIS — R60.1 GENERALIZED EDEMA: ICD-10-CM

## 2018-12-19 DIAGNOSIS — E11.9 CONTROLLED TYPE 2 DIABETES MELLITUS WITHOUT COMPLICATION, WITHOUT LONG-TERM CURRENT USE OF INSULIN (HCC): ICD-10-CM

## 2018-12-19 NOTE — TELEPHONE ENCOUNTER
----- Message from Isabella Acuna sent at 12/19/2018  4:23 PM EST -----  Regarding: / Telephone  Mrs. Waqas Blackwell, pt daughter, requesting that the pt DME supplies be re-ordered through [address] [place] [address] because [facility] no longer participate with [facility]. Mrs. Annemarie Ramsey best contact number is [phone].

## 2018-12-20 NOTE — TELEPHONE ENCOUNTER
Requested Prescriptions     Signed Prescriptions Disp Refills    OTHER 1 Each 0     Sig: Shower Chair- Use As Directed     Authorizing Provider: Emmie Montes     Ordering User: Moon Sandoval    OTHER 100 Each 99     [facility] Provider: Emmie Montes     Ordering User: Cherise Wyman to [facility].

## 2018-12-21 ENCOUNTER — TELEPHONE (OUTPATIENT)
Dept: FAMILY MEDICINE CLINIC | Age: 73
End: 2018-12-21

## 2018-12-21 NOTE — TELEPHONE ENCOUNTER
----- Message from Chasity Cortez sent at 12/21/2018  4:13 PM EST -----  Regarding: Dr. Garcia Peer: [phone]  [place] with [facility] called to advise that they received an order for a shower chair that they do not carry. She says they supply the pull ups but not the chair.  Her ext is [phone]

## 2018-12-24 NOTE — TELEPHONE ENCOUNTER
Called returned to [address] [place] [address]. They will continue with the request for the protective underwear (pull-ups) but do not carry the shower chair.

## 2019-03-08 ENCOUNTER — OFFICE VISIT (OUTPATIENT)
Dept: FAMILY MEDICINE CLINIC | Age: 74
End: 2019-03-08

## 2019-03-08 VITALS
HEIGHT: 60 IN | DIASTOLIC BLOOD PRESSURE: 60 MMHG | BODY MASS INDEX: 30.23 KG/M2 | WEIGHT: 154 LBS | TEMPERATURE: 98.3 F | OXYGEN SATURATION: 96 % | SYSTOLIC BLOOD PRESSURE: 97 MMHG | RESPIRATION RATE: 20 BRPM | HEART RATE: 82 BPM

## 2019-03-08 DIAGNOSIS — I10 ESSENTIAL HYPERTENSION: ICD-10-CM

## 2019-03-08 DIAGNOSIS — E78.5 HYPERLIPIDEMIA, UNSPECIFIED HYPERLIPIDEMIA TYPE: ICD-10-CM

## 2019-03-08 DIAGNOSIS — Z13.31 SCREENING FOR DEPRESSION: ICD-10-CM

## 2019-03-08 DIAGNOSIS — I27.82 OTHER CHRONIC PULMONARY EMBOLISM WITHOUT ACUTE COR PULMONALE (HCC): ICD-10-CM

## 2019-03-08 DIAGNOSIS — Z13.39 SCREENING FOR ALCOHOLISM: ICD-10-CM

## 2019-03-08 DIAGNOSIS — E11.40 NEUROPATHY DUE TO TYPE 2 DIABETES MELLITUS (HCC): ICD-10-CM

## 2019-03-08 DIAGNOSIS — R60.0 PEDAL EDEMA: ICD-10-CM

## 2019-03-08 DIAGNOSIS — N39.3 FEMALE STRESS INCONTINENCE: ICD-10-CM

## 2019-03-08 DIAGNOSIS — Z00.00 MEDICARE ANNUAL WELLNESS VISIT, SUBSEQUENT: ICD-10-CM

## 2019-03-08 LAB — HBA1C MFR BLD HPLC: 6.3 %

## 2019-03-08 RX ORDER — GABAPENTIN 100 MG/1
100 CAPSULE ORAL
Qty: 90 CAP | Refills: 2 | Status: SHIPPED | OUTPATIENT
Start: 2019-03-08 | End: 2019-07-02 | Stop reason: SDUPTHER

## 2019-03-08 RX ORDER — FUROSEMIDE 40 MG/1
80 TABLET ORAL 2 TIMES DAILY
Qty: 120 TAB | Refills: 2 | Status: SHIPPED | OUTPATIENT
Start: 2019-03-08 | End: 2019-07-02 | Stop reason: SDUPTHER

## 2019-03-08 RX ORDER — OXYBUTYNIN CHLORIDE 5 MG/1
5 TABLET ORAL 3 TIMES DAILY
Qty: 90 TAB | Refills: 2 | Status: SHIPPED | OUTPATIENT
Start: 2019-03-08 | End: 2019-07-02 | Stop reason: SDUPTHER

## 2019-03-08 RX ORDER — ATORVASTATIN CALCIUM 80 MG/1
80 TABLET, FILM COATED ORAL DAILY
Qty: 30 TAB | Refills: 2 | Status: SHIPPED | OUTPATIENT
Start: 2019-03-08 | End: 2019-07-02 | Stop reason: SDUPTHER

## 2019-03-08 NOTE — PROGRESS NOTES
1. Have you been to the ER, urgent care clinic since your last visit? Hospitalized since your last visit? No    2. Have you seen or consulted any other health care providers outside of the [address] [place] [address] since your last visit? Include any pap smears or colon screening. No  Reviewed record in preparation for visit and have necessary documentation  Pt did not bring medication to office visit for review    Goals that were addressed and/or need to be completed during or after this appointment include   Health Maintenance Due   Topic Date Due    EYE EXAM RETINAL OR DILATED  03/05/1955    Shingrix Vaccine Age 50> (1 of 2) 03/05/1995    FOBT Q 1 YEAR AGE 50-75  03/05/1995    GLAUCOMA SCREENING Q2Y  03/05/2010    MICROALBUMIN Q1  01/23/2018    Influenza Age 9 to Adult  08/01/2018    Pneumococcal 65+ Low/Medium Risk (2 of 2 - PPSV23) 10/05/2018    MEDICARE YEARLY EXAM  10/06/2018    HEMOGLOBIN A1C Q6M  12/29/2018    BREAST CANCER SCRN MAMMOGRAM  01/13/2019    LIPID PANEL Q1  03/07/2019     Pt refused all vaccines  Body mass index is 30.08 kg/m².

## 2019-03-08 NOTE — PATIENT INSTRUCTIONS
Pulmonary Embolism: Care Instructions  Your Care Instructions    Pulmonary embolism is the sudden blockage of an artery in the lung. Blood clots in the deep veins of the leg or pelvis (deep vein thrombosis, or DVT) are the most common cause. These blood clots can travel to the lungs. Pulmonary embolism can be very serious. Because you have had one pulmonary embolism, you are at greater risk for having another one. But you can take steps to prevent another pulmonary embolism by following your doctor's instructions. You will probably take a prescription blood-thinning medicine to prevent blood clots. A blood thinner can stop a blood clot from growing larger and prevent new clots from forming. Follow-up care is a key part of your treatment and safety. Be sure to make and go to all appointments, and call your doctor if you are having problems. It's also a good idea to know your test results and keep a list of the medicines you take. How can you care for yourself at home? · Take your medicines exactly as prescribed. Call your doctor if you think you are having a problem with your medicine. You will get more details on the specific medicines your doctor prescribes. · If you are taking a blood thinner, be sure you get instructions about how to take your medicine safely. Blood thinners can cause serious bleeding problems. Preventing future pulmonary embolisms  · Exercise. Keep blood moving in your legs to keep clots from forming. If you are traveling by car, stop every hour or so. Get out and walk around for a few minutes. If you are traveling by bus, train, or plane, get out of your seat and walk up and down the aisles every hour or so. You also can do leg exercises while you are seated. Pump your feet up and down by pulling your toes up toward your knees then pointing them down. · Get up out of bed as soon as possible after an illness or surgery. · Do not smoke.  If you need help quitting, talk to your doctor about stop-smoking programs and medicines. These can increase your chances of quitting for good. · Check with your doctor before taking hormone or birth control pills. These may increase your risk of blot clots. · Ask your doctor about wearing compression stockings to help prevent blood clots in your legs. There are different types of stockings, and they need to fit right. So your doctor will recommend what you need. When should you call for help? Call 911 anytime you think you may need emergency care. For example, call if:    · You have shortness of breath.     · You have chest pain.     · You passed out (lost consciousness).     · You cough up blood.    Call your doctor now or seek immediate medical care if:    · You have new or worsening pain or swelling in your leg.    Watch closely for changes in your health, and be sure to contact your doctor if:    · You do not get better as expected. Where can you learn more? Go to http://milagros-vitaliy.info/. Enter Q565 in the search box to learn more about \"Pulmonary Embolism: Care Instructions. \"  Current as of: September 26, 2018  Content Version: 11.9  © 5948-2317 [employer]. Care instructions adapted under license by [employer] (which disclaims liability or warranty for this information). If you have questions about a medical condition or this instruction, always ask your healthcare professional. [address] any warranty or liability for your use of this information. Medicare Wellness Visit, Female     The best way to live healthy is to have a lifestyle where you eat a well-balanced diet, exercise regularly, limit alcohol use, and quit all forms of tobacco/nicotine, if applicable. Regular preventive services are another way to keep healthy. Preventive services (vaccines, screening tests, monitoring & exams) can help personalize your care plan, which helps you manage your own care.  Screening tests can find health problems at the earliest stages, when they are easiest to treat. [facility] follows the current, evidence-based guidelines published by the [facility] (USPSTF) when recommending preventive services for our patients. Because we follow these guidelines, sometimes recommendations change over time as research supports it. (For example, mammograms used to be recommended annually. Even though Medicare will still pay for an annual mammogram, the newer guidelines recommend a mammogram every two years for women of average risk.)  Of course, you and your doctor may decide to screen more often for some diseases, based on your risk and your health status. Preventive services for you include:  - Medicare offers their members a free annual wellness visit, which is time for you and your primary care provider to discuss and plan for your preventive service needs. Take advantage of this benefit every year!  -All adults over the age of 72 should receive the recommended pneumonia vaccines. Current USPSTF guidelines recommend a series of two vaccines for the best pneumonia protection.   -All adults should have a flu vaccine yearly and a tetanus vaccine every 10 years. All adults age 61 and older should receive a shingles vaccine once in their lifetime.    -A bone mass density test is recommended when a woman turns 65 to screen for osteoporosis. This test is only recommended one time, as a screening. Some providers will use this same test as a disease monitoring tool if you already have osteoporosis.   -All adults age 38-68 who are overweight should have a diabetes screening test once every three years.   -Other screening tests and preventive services for persons with diabetes include: an eye exam to screen for diabetic retinopathy, a kidney function test, a foot exam, and stricter control over your cholesterol.   -Cardiovascular screening for adults with routine risk involves an electrocardiogram (ECG) at intervals determined by your doctor.   -Colorectal cancer screenings should be done for adults age 54-65 with no increased risk factors for colorectal cancer. There are a number of acceptable methods of screening for this type of cancer. Each test has its own benefits and drawbacks. Discuss with your doctor what is most appropriate for you during your annual wellness visit. The different tests include: colonoscopy (considered the best screening method), a fecal occult blood test, a fecal DNA test, and sigmoidoscopy. -Breast cancer screenings are recommended every other year for women of normal risk, age 54-69.  -Cervical cancer screenings for women over age 72 are only recommended with certain risk factors.   -All adults born between [facility] should be screened once for Hepatitis C.      Here is a list of your current Health Maintenance items (your personalized list of preventive services) with a due date:  Health Maintenance Due   Topic Date Due    Annual Well Visit  10/06/2018

## 2019-03-18 NOTE — PROGRESS NOTES
Patient: Gus Levy MRN: [id number]  SSN: [id number]    YOB: 1945  Age: 76 y.o. Sex: female        Subjective:     Chief Complaint   Patient presents with    Diabetes       HPI: she is a 76y.o. year old female who presents for follow up of chronic medical conditions. Last OV >7onths ago. Patient with hx of T2D, HTN, HLD, pedal edema and hydradenitis. Patient denies HA, dizziness, SOB, CP, abdominal pain, acute myalgias or arthralgias. She is due for lab work. She needs medication refills. Encounter Diagnoses   Name Primary?  Essential hypertension     Pedal edema     Female stress incontinence     Neuropathy due to type 2 diabetes mellitus (HCC)     Hyperlipidemia, unspecified hyperlipidemia type     Other chronic pulmonary embolism without acute cor pulmonale (HCC)        BP Readings from Last 3 Encounters:   03/08/19 97/60   07/30/18 115/71   06/29/18 116/64       Wt Readings from Last 3 Encounters:   03/08/19 154 lb (69.9 kg)   07/30/18 156 lb (70.8 kg)   06/29/18 157 lb 3.2 oz (71.3 kg)     Body mass index is 30.08 kg/m². Current and past medical information:    Current Medications after this visit[de-identified]     Current Outpatient Medications   Medication Sig    furosemide (LASIX) 40 mg tablet Take 2 Tabs by mouth two (2) times a day.  oxybutynin (DITROPAN) 5 mg tablet Take 1 Tab by mouth three (3) times daily.  gabapentin (NEURONTIN) 100 mg capsule Take 1 Cap by mouth three (3) times daily as needed for Pain.  atorvastatin (LIPITOR) 80 mg tablet Take 1 Tab by mouth daily.  apixaban (ELIQUIS) 5 mg tablet Take 1 Tab by mouth every twelve (12) hours.  OTHER Pull Ups    OTHER Shower Chair- Use As Directed     No current facility-administered medications for this visit.         Patient Active Problem List    Diagnosis Date Noted    Controlled type 2 diabetes mellitus without complication, without long-term current use of insulin ([facility] 07/30/2018    Recurrent depression ([facility] 01/11/2018    Advanced care planning/counseling discussion 06/13/2016    H/O Pulmonary embolism (1/2015) 01/05/2015    Edema 08/27/2014    Hydradenitis 02/01/2012    HTN (hypertension) 01/21/2011    AR (allergic rhinitis) 05/21/2010    Eczema 05/21/2010    Depression 05/21/2010    Female stress incontinence 05/21/2010    Dyslipidemia 05/21/2010       Past Medical History:   Diagnosis Date    Arthritis     Diabetes ([facility]     states she is \"pre-diabetic\", diet controlled    frequency     H/O blood clots     right side of body    Heart attack ([facility] 1/2014    Hydradenitis 2/1/2012    Hypercholesterolemia     Hypertension     Ill-defined condition     edema of legs       Allergies   Allergen Reactions    Ciprofloxacin Other (comments)     Vomiting    Doxycycline Other (comments)     Vomiting    Pcn [Penicillins] Rash       History reviewed. No pertinent surgical history.     Social History     Socioeconomic History    Marital status: LEGALLY      Spouse name: Not on file    Number of children: Not on file    Years of education: Not on file    Highest education level: Not on file   Tobacco Use    Smoking status: Never Smoker    Smokeless tobacco: Never Used   Substance and Sexual Activity    Alcohol use: No     Alcohol/week: 0.0 oz    Drug use: No    Sexual activity: No         Objective:     Review of Systems:  Constitutional: Negative for fatigue or malaise  Derm: hx of hydradenitis b/l axilla  HEENT: Negative for acute hearing or vision changes  Cardiovascular: Negative for dizziness, chest pain or palpitations  Respiratory: Negative for cough, wheezing or SOB  Gastreintestinal:  Negative for nausea or abdominal pain  Genital/urinary: Negative for dysuria   Muscoloskeletal: Negative for acute myalgias or arthralgias   Neurological: Negative for headache, weakness or paresthesia  Psychological: Negative for depression or anxiety      Vitals:    03/08/19 1447   BP: 97/60 Pulse: 82   Resp: 20   Temp: 98.3 °F (36.8 °C)   TempSrc: Oral   SpO2: 96%   Weight: 154 lb (69.9 kg)   Height: 5' (1.524 m)      Body mass index is 30.08 kg/m². Physical Exam:  Constitutional: well developed, well nourished, in no acute distress  Skin: multiple nodules b/l axilla  Head: normocephalic, atraumatic  Eyes: sclera clear, EOMI, PERRL  Neck: normal range of motion  Cardiovascular: normal S1, S2, regular rate and rhythm  Respiratory: clear to auscultation bilaterally with symmetrical effort  Extremities: full range of motion, 3+ pedal edema  Neurology: normal strength and sensation  Psych: active, alert and oriented, affect appropriate           Assessment and orders:       ICD-10-CM ICD-9-CM    1. Essential hypertension I10 401.9 furosemide (LASIX) 40 mg tablet   2. Pedal edema R60.0 782.3 furosemide (LASIX) 40 mg tablet   3. Female stress incontinence N39.3 625.6 oxybutynin (DITROPAN) 5 mg tablet   4. Neuropathy due to type 2 diabetes mellitus (HCC) E11.40 250.60 gabapentin (NEURONTIN) 100 mg capsule     357.2 AMB POC HEMOGLOBIN A1C   5. Hyperlipidemia, unspecified hyperlipidemia type E78.5 272.4 atorvastatin (LIPITOR) 80 mg tablet   6. Other chronic pulmonary embolism without acute cor pulmonale (HCC) I27.82 416.2 apixaban (ELIQUIS) 5 mg tablet         Plan of care:  Diagnoses were discussed in detail with patient. Medication risks/benefits/side effects discussed with patient. All of the patient's questions were addressed and answered to apparent satisfaction. The patient understands and agrees with our plan of care. The patient knows to call back if they have questions about the plan of care or if symptoms change. The patient received an After-Visit Summary which contains VS, diagnoses, orders, allergy and medication lists.       Patient Care Team:  Sandra Carranza MD as PCP - General (Family Practice)  Kishan Bailey MD (Cardiology)  Derrick Pelayo DPM (Podiatry)  Vida Silva MD (General Surgery)  ADELE Guerrerossler-[facility])    Follow-up Disposition:  Return in about 6 weeks (around 4/19/2019). Future Appointments   Date Time Provider Buddy Luii   4/19/2019  1:30 PM Rodger Mayer MD [facility]       Signed By: Marla Cabrales MD     March 17, 2019      The following Annual Medicare Wellness Exam is distinct and separate from the medical evaluation and decision making. This is the Subsequent Medicare Annual Wellness Exam, performed 12 months or more after the Initial AWV or the last Subsequent AWV    I have reviewed the patient's medical history in detail and updated the computerized patient record. History     Past Medical History:   Diagnosis Date    Arthritis     Diabetes ([facility]     states she is \"pre-diabetic\", diet controlled    frequency     H/O blood clots     right side of body    Heart attack ([facility] 1/2014    Hydradenitis 2/1/2012    Hypercholesterolemia     Hypertension     Ill-defined condition     edema of legs      History reviewed. No pertinent surgical history. Current Outpatient Medications   Medication Sig Dispense Refill    furosemide (LASIX) 40 mg tablet Take 2 Tabs by mouth two (2) times a day. 120 Tab 2    oxybutynin (DITROPAN) 5 mg tablet Take 1 Tab by mouth three (3) times daily. 90 Tab 2    gabapentin (NEURONTIN) 100 mg capsule Take 1 Cap by mouth three (3) times daily as needed for Pain. 90 Cap 2    atorvastatin (LIPITOR) 80 mg tablet Take 1 Tab by mouth daily. 30 Tab 2    apixaban (ELIQUIS) 5 mg tablet Take 1 Tab by mouth every twelve (12) hours.  60 Tab 2    OTHER Pull Ups 100 Each 99    OTHER Shower Chair- Use As Directed 1 Each 0     Allergies   Allergen Reactions    Ciprofloxacin Other (comments)     Vomiting    Doxycycline Other (comments)     Vomiting    Pcn [Penicillins] Rash     Family History   Problem Relation Age of Onset    Heart Disease Mother     Cancer Mother     Asthma Father     Alcohol abuse Brother      Social History     Tobacco Use    Smoking status: Never Smoker    Smokeless tobacco: Never Used   Substance Use Topics    Alcohol use: No     Alcohol/week: 0.0 oz     Patient Active Problem List   Diagnosis Code    AR (allergic rhinitis) J30.9    Eczema L30.9    Depression F32.9    Female stress incontinence N39.3    Dyslipidemia E78.5    HTN (hypertension) I10    Hydradenitis L73.2    Edema R60.9    H/O Pulmonary embolism (1/2015) I26.99    Advanced care planning/counseling discussion Z71.89    Recurrent depression ([facility] F33.9    Controlled type 2 diabetes mellitus without complication, without long-term current use of insulin ([facility] E11.9       Depression Risk Factor Screening:     3 most recent PHQ Screens 3/8/2019   Little interest or pleasure in doing things Not at all   Feeling down, depressed, irritable, or hopeless Not at all   Total Score PHQ 2 0     Alcohol Risk Factor Screening: You do not drink alcohol or very rarely. Functional Ability and Level of Safety:   Hearing Loss  Hearing is good. Activities of Daily Living  The home contains: no safety equipment. Patient needs help with:  transportation    Fall Risk  Fall Risk Assessment, last 12 mths 3/8/2019   Able to walk? Yes   Fall in past 12 months?  No   Fall with injury? -   Number of falls in past 12 months -   Fall Risk Score -       Abuse Screen  Patient is not abused    Cognitive Screening   Evaluation of Cognitive Function:  Has your family/caregiver stated any concerns about your memory: no  Normal    Patient Care Team   Patient Care Team:  Lena Major MD as PCP - General (Family Practice)  Laure Doll MD (Cardiology)  Joe Kincaid DPM (Podiatry)  Kwadwo Guan MD (General Surgery)  Alem Pierre OD (Optometry)    Assessment/Plan   Education and counseling provided:  Are appropriate based on today's review and evaluation  End-of-Life planning (with patient's consent)    Diagnoses and all orders for this visit    1. Medicare annual wellness visit, subsequent    2. Screening for depression  -     DEPRESSION SCREEN ANNUAL    3.  Screening for alcoholism  -     DC ANNUAL ALCOHOL SCREEN 15 MIN

## 2019-04-23 ENCOUNTER — OFFICE VISIT (OUTPATIENT)
Dept: FAMILY MEDICINE CLINIC | Age: 74
End: 2019-04-23

## 2019-04-23 VITALS
BODY MASS INDEX: 29.23 KG/M2 | OXYGEN SATURATION: 100 % | RESPIRATION RATE: 20 BRPM | TEMPERATURE: 98.7 F | HEIGHT: 61 IN | WEIGHT: 154.8 LBS | DIASTOLIC BLOOD PRESSURE: 69 MMHG | SYSTOLIC BLOOD PRESSURE: 109 MMHG | HEART RATE: 76 BPM

## 2019-04-23 DIAGNOSIS — J30.89 NON-SEASONAL ALLERGIC RHINITIS: ICD-10-CM

## 2019-04-23 DIAGNOSIS — F33.9 RECURRENT DEPRESSION (HCC): ICD-10-CM

## 2019-04-23 DIAGNOSIS — L73.2 HYDRADENITIS: ICD-10-CM

## 2019-04-23 DIAGNOSIS — E78.5 DYSLIPIDEMIA: ICD-10-CM

## 2019-04-23 DIAGNOSIS — R05.9 COUGH: ICD-10-CM

## 2019-04-23 DIAGNOSIS — E11.9 CONTROLLED TYPE 2 DIABETES MELLITUS WITHOUT COMPLICATION, WITHOUT LONG-TERM CURRENT USE OF INSULIN (HCC): Primary | ICD-10-CM

## 2019-04-23 DIAGNOSIS — I10 ESSENTIAL HYPERTENSION: ICD-10-CM

## 2019-04-23 RX ORDER — CLINDAMYCIN PHOSPHATE 10 UG/ML
LOTION TOPICAL
Qty: 1 BOTTLE | Refills: 2 | Status: SHIPPED | OUTPATIENT
Start: 2019-04-23 | End: 2019-09-16 | Stop reason: SDUPTHER

## 2019-04-23 RX ORDER — CHLORHEXIDINE GLUCONATE 2 G/100ML
SOLUTION TOPICAL
Qty: 250 ML | Refills: 2 | Status: SHIPPED | OUTPATIENT
Start: 2019-04-23 | End: 2019-09-16 | Stop reason: SDUPTHER

## 2019-04-23 RX ORDER — FLUTICASONE PROPIONATE 50 MCG
2 SPRAY, SUSPENSION (ML) NASAL DAILY
Qty: 1 BOTTLE | Refills: 2 | Status: SHIPPED | OUTPATIENT
Start: 2019-04-23 | End: 2019-07-02 | Stop reason: SDUPTHER

## 2019-04-23 NOTE — PROGRESS NOTES
Chief Complaint   Patient presents with    Diabetes     follow up     Visit Vitals  /69 (BP 1 Location: Right arm, BP Patient Position: Sitting)   Pulse 76   Temp 98.7 °F (37.1 °C) (Oral)   Resp 20   Ht 5' 1\" (1.549 m)   Wt 154 lb 12.8 oz (70.2 kg)   SpO2 100%   BMI 29.25 kg/m²     1. Have you been to the ER, urgent care clinic since your last visit? Hospitalized since your last visit? No    2. Have you seen or consulted any other health care providers outside of the [address] [place] [address] since your last visit? Include any pap smears or colon screening.  No    Reviewed record in preparation for visit and have necessary documentation  Pt did not bring medication to office visit for review  opportunity was given for questions  Goals that were addressed and/or need to be completed during or after this appointment include   Health Maintenance Due   Topic Date Due    EYE EXAM RETINAL OR DILATED  03/05/1955    Pneumococcal 65+ years (1 of 2 - PCV13) 03/05/2010    MICROALBUMIN Q1  01/23/2018    BREAST CANCER SCRN MAMMOGRAM  01/13/2019    LIPID PANEL Q1  03/07/2019

## 2019-04-24 LAB
ALBUMIN SERPL-MCNC: 3.6 G/DL (ref 3.5–4.8)
ALBUMIN/GLOB SERPL: 0.9 {RATIO} (ref 1.2–2.2)
ALP SERPL-CCNC: 123 IU/L (ref 39–117)
ALT SERPL-CCNC: 8 IU/L (ref 0–32)
AST SERPL-CCNC: 17 IU/L (ref 0–40)
BASOPHILS # BLD AUTO: 0 X10E3/UL (ref 0–0.2)
BASOPHILS NFR BLD AUTO: 0 %
BILIRUB SERPL-MCNC: 0.4 MG/DL (ref 0–1.2)
BUN SERPL-MCNC: 13 MG/DL (ref 8–27)
BUN/CREAT SERPL: 16 (ref 12–28)
CALCIUM SERPL-MCNC: 9.4 MG/DL (ref 8.7–10.3)
CHLORIDE SERPL-SCNC: 100 MMOL/L (ref 96–106)
CHOLEST SERPL-MCNC: 178 MG/DL (ref 100–199)
CO2 SERPL-SCNC: 26 MMOL/L (ref 20–29)
CREAT SERPL-MCNC: 0.82 MG/DL (ref 0.57–1)
EOSINOPHIL # BLD AUTO: 0.3 X10E3/UL (ref 0–0.4)
EOSINOPHIL NFR BLD AUTO: 3 %
ERYTHROCYTE [DISTWIDTH] IN BLOOD BY AUTOMATED COUNT: 14.6 % (ref 12.3–15.4)
EST. AVERAGE GLUCOSE BLD GHB EST-MCNC: 137 MG/DL
GLOBULIN SER CALC-MCNC: 3.9 G/DL (ref 1.5–4.5)
GLUCOSE SERPL-MCNC: 92 MG/DL (ref 65–99)
HBA1C MFR BLD: 6.4 % (ref 4.8–5.6)
HCT VFR BLD AUTO: 34 % (ref 34–46.6)
HDLC SERPL-MCNC: 49 MG/DL
HGB BLD-MCNC: 10.7 G/DL (ref 11.1–15.9)
IMM GRANULOCYTES # BLD AUTO: 0 X10E3/UL (ref 0–0.1)
IMM GRANULOCYTES NFR BLD AUTO: 0 %
LDLC SERPL CALC-MCNC: 112 MG/DL (ref 0–99)
LYMPHOCYTES # BLD AUTO: 2.3 X10E3/UL (ref 0.7–3.1)
LYMPHOCYTES NFR BLD AUTO: 22 %
MCH RBC QN AUTO: 27.4 PG (ref 26.6–33)
MCHC RBC AUTO-ENTMCNC: 31.5 G/DL (ref 31.5–35.7)
MCV RBC AUTO: 87 FL (ref 79–97)
MONOCYTES # BLD AUTO: 0.7 X10E3/UL (ref 0.1–0.9)
MONOCYTES NFR BLD AUTO: 7 %
NEUTROPHILS # BLD AUTO: 7 X10E3/UL (ref 1.4–7)
NEUTROPHILS NFR BLD AUTO: 68 %
PLATELET # BLD AUTO: 399 X10E3/UL (ref 150–379)
POTASSIUM SERPL-SCNC: 4.4 MMOL/L (ref 3.5–5.2)
PROT SERPL-MCNC: 7.5 G/DL (ref 6–8.5)
RBC # BLD AUTO: 3.91 X10E6/UL (ref 3.77–5.28)
SODIUM SERPL-SCNC: 138 MMOL/L (ref 134–144)
TRIGL SERPL-MCNC: 83 MG/DL (ref 0–149)
TSH SERPL DL<=0.005 MIU/L-ACNC: 2.39 UIU/ML (ref 0.45–4.5)
VLDLC SERPL CALC-MCNC: 17 MG/DL (ref 5–40)
WBC # BLD AUTO: 10.4 X10E3/UL (ref 3.4–10.8)

## 2019-04-24 NOTE — PATIENT INSTRUCTIONS

## 2019-04-24 NOTE — PROGRESS NOTES
Patient: Miroslava Sommer MRN: [id number]  SSN: [id number]    YOB: 1945  Age: 76 y.o. Sex: female        Subjective:     Chief Complaint   Patient presents with    Diabetes     follow up       HPI: she is a 76y.o. year old female who presents for follow up of chronic medical conditions. Patient has been in SNF with medication changes in medication. .Patient with hx of T2D, HTN, HLD and pedal edema. Patient with hx of hydradenitis with draining lesions for which she asks for refills of medication. She complains of congestion and dry cough. Patient denies F/C, HA, dizziness, SOB, CP, abdominal pain, acute myalgias or arthralgias. Encounter Diagnoses   Name Primary?  Controlled type 2 diabetes mellitus without complication, without long-term current use of insulin (HCC) Yes    Essential hypertension     Dyslipidemia     Non-seasonal allergic rhinitis     Cough     Hydradenitis     Recurrent depression (HCC)        BP Readings from Last 3 Encounters:   04/23/19 109/69   03/08/19 97/60   07/30/18 115/71       Wt Readings from Last 3 Encounters:   04/23/19 154 lb 12.8 oz (70.2 kg)   03/08/19 154 lb (69.9 kg)   07/30/18 156 lb (70.8 kg)     Body mass index is 29.25 kg/m². Current and past medical information:    Current Medications after this visit[de-identified]     Current Outpatient Medications   Medication Sig    fluticasone propionate (FLONASE) 50 mcg/actuation nasal spray 2 Sprays by Both Nostrils route daily.  clindamycin (CLEOCIN T) 1 % lotion Apply to axilla twice a day    chlorhexidine gluconate 2 % liqd Apply to axilla daily    furosemide (LASIX) 40 mg tablet Take 2 Tabs by mouth two (2) times a day.  oxybutynin (DITROPAN) 5 mg tablet Take 1 Tab by mouth three (3) times daily.  gabapentin (NEURONTIN) 100 mg capsule Take 1 Cap by mouth three (3) times daily as needed for Pain.  atorvastatin (LIPITOR) 80 mg tablet Take 1 Tab by mouth daily.     apixaban (ELIQUIS) 5 mg tablet Take 1 Tab by mouth every twelve (12) hours.  OTHER Shower Chair- Use As Directed    OTHER Pull Ups     No current facility-administered medications for this visit. Patient Active Problem List    Diagnosis Date Noted    Controlled type 2 diabetes mellitus without complication, without long-term current use of insulin ([facility] 07/30/2018    Recurrent depression ([facility] 01/11/2018    Advanced care planning/counseling discussion 06/13/2016    H/O Pulmonary embolism (1/2015) 01/05/2015    Edema 08/27/2014    Hydradenitis 02/01/2012    HTN (hypertension) 01/21/2011    AR (allergic rhinitis) 05/21/2010    Eczema 05/21/2010    Depression 05/21/2010    Female stress incontinence 05/21/2010    Dyslipidemia 05/21/2010       Past Medical History:   Diagnosis Date    Arthritis     Diabetes ([facility]     states she is \"pre-diabetic\", diet controlled    frequency     H/O blood clots     right side of body    Heart attack ([facility] 1/2014    Hydradenitis 2/1/2012    Hypercholesterolemia     Hypertension     Ill-defined condition     edema of legs       Allergies   Allergen Reactions    Ciprofloxacin Other (comments)     Vomiting    Doxycycline Other (comments)     Vomiting    Pcn [Penicillins] Rash       History reviewed. No pertinent surgical history.     Social History     Socioeconomic History    Marital status: LEGALLY      Spouse name: Not on file    Number of children: Not on file    Years of education: Not on file    Highest education level: Not on file   Tobacco Use    Smoking status: Never Smoker    Smokeless tobacco: Never Used   Substance and Sexual Activity    Alcohol use: No     Alcohol/week: 0.0 oz    Drug use: No    Sexual activity: Never         Objective:     Review of Systems:  Constitutional: Negative for fatigue or malaise  Derm: hx of hydradenitis b/l axilla  HEENT: Negative for acute hearing or vision changes  Cardiovascular: Negative for dizziness, chest pain or palpitations  Respiratory: Negative for cough, wheezing or SOB  Gastreintestinal:  Negative for nausea or abdominal pain  Genital/urinary: hx of incontinence, Negative for dysuria   Muscoloskeletal: Negative for acute myalgias or arthralgias   Neurological: Negative for headache, weakness or paresthesia  Psychological: Negative for depression or anxiety      Vitals:    04/23/19 1353   BP: 109/69   Pulse: 76   Resp: 20   Temp: 98.7 °F (37.1 °C)   TempSrc: Oral   SpO2: 100%   Weight: 154 lb 12.8 oz (70.2 kg)   Height: 5' 1\" (1.549 m)      Body mass index is 29.25 kg/m². Physical Exam:  Constitutional: well developed, well nourished, in no acute distress  Skin: multiple nodules b/l axilla  Head: normocephalic, atraumatic  Eyes: sclera clear, EOMI, PERRL  Neck: normal range of motion  Cardiovascular: normal S1, S2, regular rate and rhythm, 3+ pedal edema  Respiratory: clear to auscultation bilaterally with symmetrical effort  Extremities: full range of motion, antalgic gait  Neurology: normal strength and sensation  Psych: active, alert and oriented, affect appropriate       Assessment and orders:       ICD-10-CM ICD-9-CM    1. Controlled type 2 diabetes mellitus without complication, without long-term current use of insulin ([facility]) E11.9 250.00 LIPID PANEL      METABOLIC PANEL, COMPREHENSIVE      CBC WITH AUTOMATED DIFF      HEMOGLOBIN A1C WITH EAG      TSH 3RD GENERATION   2. Essential hypertension V03 539.1 METABOLIC PANEL, COMPREHENSIVE      TSH 3RD GENERATION   3. Dyslipidemia E78.5 272.4 LIPID PANEL      METABOLIC PANEL, COMPREHENSIVE   4. Non-seasonal allergic rhinitis J30.89 477.8 fluticasone propionate (FLONASE) 50 mcg/actuation nasal spray   5. Cough R05 786.2    6. Hydradenitis L73.2 705.83 clindamycin (CLEOCIN T) 1 % lotion      chlorhexidine gluconate 2 % liqd   7.  Recurrent depression [facility] F33.9 296.30         Plan of care:  Patient with multiple co-morbidities requiring comprehensive review of medical history, medication and complex medical decision making. Diagnoses were discussed in detail with patient. Medication risks/benefits/side effects discussed with patient. All of the patient's questions were addressed and answered to apparent satisfaction. The patient understands and agrees with our plan of care. The patient knows to call back if they have questions about the plan of care or if symptoms change. The patient received an After-Visit Summary which contains VS, diagnoses, orders, allergy and medication lists. Patient Care Team:  Darwyn Bamberger, MD as PCP - General (Family Practice)  Leta Chiu MD (Cardiology)  Cecilia Kumari DPM (Podiatry)  Lavonne Juarez MD (General Surgery)  Chaz Puente OD [facility])    Follow-up and Dispositions    · Return in about 6 months (around 10/23/2019).          Future Appointments   Date Time Provider Buddy Boles   10/23/2019  1:30 PM Darwyn Bamberger, MD [facility]       Signed By: Penelope Ogden MD     April 23, 2019

## 2019-07-02 DIAGNOSIS — E78.5 HYPERLIPIDEMIA, UNSPECIFIED HYPERLIPIDEMIA TYPE: ICD-10-CM

## 2019-07-02 DIAGNOSIS — I10 ESSENTIAL HYPERTENSION: ICD-10-CM

## 2019-07-02 DIAGNOSIS — R60.0 PEDAL EDEMA: ICD-10-CM

## 2019-07-02 DIAGNOSIS — N39.3 FEMALE STRESS INCONTINENCE: ICD-10-CM

## 2019-07-02 DIAGNOSIS — J30.89 NON-SEASONAL ALLERGIC RHINITIS: ICD-10-CM

## 2019-07-02 DIAGNOSIS — E11.40 NEUROPATHY DUE TO TYPE 2 DIABETES MELLITUS (HCC): ICD-10-CM

## 2019-07-02 DIAGNOSIS — I27.82 OTHER CHRONIC PULMONARY EMBOLISM WITHOUT ACUTE COR PULMONALE (HCC): ICD-10-CM

## 2019-07-02 NOTE — TELEPHONE ENCOUNTER
Pt requesting refills on all medications. Pt was unaware of the names. I did advise to call pharmacy but pt states that provider can check and see which medications she's taking.

## 2019-07-03 RX ORDER — ATORVASTATIN CALCIUM 80 MG/1
80 TABLET, FILM COATED ORAL DAILY
Qty: 30 TAB | Refills: 2 | Status: SHIPPED | OUTPATIENT
Start: 2019-07-03 | End: 2019-10-23 | Stop reason: SDUPTHER

## 2019-07-03 RX ORDER — GABAPENTIN 100 MG/1
100 CAPSULE ORAL
Qty: 90 CAP | Refills: 2 | Status: SHIPPED | OUTPATIENT
Start: 2019-07-03 | End: 2020-02-27 | Stop reason: SDUPTHER

## 2019-07-03 RX ORDER — FUROSEMIDE 40 MG/1
80 TABLET ORAL 2 TIMES DAILY
Qty: 120 TAB | Refills: 2 | Status: SHIPPED | OUTPATIENT
Start: 2019-07-03 | End: 2019-09-16 | Stop reason: ALTCHOICE

## 2019-07-03 RX ORDER — FLUTICASONE PROPIONATE 50 MCG
2 SPRAY, SUSPENSION (ML) NASAL DAILY
Qty: 1 BOTTLE | Refills: 2 | Status: SHIPPED | OUTPATIENT
Start: 2019-07-03 | End: 2020-02-27 | Stop reason: SDUPTHER

## 2019-07-03 RX ORDER — OXYBUTYNIN CHLORIDE 5 MG/1
5 TABLET ORAL 3 TIMES DAILY
Qty: 90 TAB | Refills: 2 | Status: SHIPPED | OUTPATIENT
Start: 2019-07-03 | End: 2019-10-23 | Stop reason: SDUPTHER

## 2019-09-16 ENCOUNTER — OFFICE VISIT (OUTPATIENT)
Dept: FAMILY MEDICINE CLINIC | Age: 74
End: 2019-09-16

## 2019-09-16 VITALS
OXYGEN SATURATION: 100 % | RESPIRATION RATE: 18 BRPM | TEMPERATURE: 98.6 F | WEIGHT: 149 LBS | BODY MASS INDEX: 28.13 KG/M2 | DIASTOLIC BLOOD PRESSURE: 72 MMHG | HEIGHT: 61 IN | HEART RATE: 79 BPM | SYSTOLIC BLOOD PRESSURE: 119 MMHG

## 2019-09-16 DIAGNOSIS — R60.0 PEDAL EDEMA: ICD-10-CM

## 2019-09-16 DIAGNOSIS — L73.2 HYDRADENITIS: ICD-10-CM

## 2019-09-16 DIAGNOSIS — L30.9 ECZEMA, UNSPECIFIED TYPE: ICD-10-CM

## 2019-09-16 DIAGNOSIS — I10 ESSENTIAL HYPERTENSION: ICD-10-CM

## 2019-09-16 DIAGNOSIS — E78.5 DYSLIPIDEMIA: ICD-10-CM

## 2019-09-16 DIAGNOSIS — E11.9 CONTROLLED TYPE 2 DIABETES MELLITUS WITHOUT COMPLICATION, WITHOUT LONG-TERM CURRENT USE OF INSULIN (HCC): ICD-10-CM

## 2019-09-16 DIAGNOSIS — L89.301 PRESSURE INJURY OF BUTTOCK, STAGE 1, UNSPECIFIED LATERALITY: Primary | ICD-10-CM

## 2019-09-16 DIAGNOSIS — I87.2 VENOUS STASIS DERMATITIS OF BOTH LOWER EXTREMITIES: ICD-10-CM

## 2019-09-16 RX ORDER — ZINC OXIDE 20 G/100G
OINTMENT TOPICAL AS NEEDED
Qty: 30 G | Refills: 0 | Status: SHIPPED | OUTPATIENT
Start: 2019-09-16 | End: 2021-03-12 | Stop reason: SDUPTHER

## 2019-09-16 RX ORDER — CLINDAMYCIN PHOSPHATE 10 UG/ML
LOTION TOPICAL
Qty: 1 BOTTLE | Refills: 2 | Status: SHIPPED | OUTPATIENT
Start: 2019-09-16 | End: 2020-02-27 | Stop reason: SDUPTHER

## 2019-09-16 RX ORDER — CHLORHEXIDINE GLUCONATE 2 G/100ML
SOLUTION TOPICAL
Qty: 250 ML | Refills: 2 | Status: SHIPPED | OUTPATIENT
Start: 2019-09-16 | End: 2020-02-27

## 2019-09-16 RX ORDER — BUMETANIDE 2 MG/1
2 TABLET ORAL DAILY
Qty: 30 TAB | Refills: 3 | Status: SHIPPED | OUTPATIENT
Start: 2019-09-16 | End: 2020-02-27 | Stop reason: SDUPTHER

## 2019-09-16 NOTE — PROGRESS NOTES
Patient: Justine Hendricks MRN: [id number]  SSN: [id number]    YOB: 1945  Age: 76 y.o. Sex: female        Subjective:     Chief Complaint   Patient presents with    Swelling     in legs and feet       HPI: she is a 76y.o. year old female who presents with her two daughters for follow up of chronic medical conditions. Patient with hx of T2D, HTN, HLD, hydradenitis and pedal edema. Patient denies F/C, HA, dizziness, SOB, CP, abdominal pain, acute myalgias or arthralgias. Encounter Diagnoses   Name Primary?  Pressure injury of buttock, stage 1, unspecified laterality Yes    Controlled type 2 diabetes mellitus without complication, without long-term current use of insulin (HCC)     Dyslipidemia     Essential hypertension     Pedal edema     Hydradenitis     Venous stasis dermatitis of both lower extremities     Eczema, unspecified type        BP Readings from Last 3 Encounters:   09/16/19 119/72   04/23/19 109/69   03/08/19 97/60       Wt Readings from Last 3 Encounters:   09/16/19 149 lb (67.6 kg)   04/23/19 154 lb 12.8 oz (70.2 kg)   03/08/19 154 lb (69.9 kg)     Body mass index is 28.15 kg/m². Current and past medical information:    Current Medications after this visit[de-identified]     Current Outpatient Medications   Medication Sig    triamcinolone acetonide (KENALOG) 0.1 % ointment Apply to bilateral medial feet twice daily, use thin layer    chlorhexidine gluconate 2 % liqd Apply to axilla daily    clindamycin (CLEOCIN T) 1 % lotion Apply to axilla twice a day    bumetanide (BUMEX) 2 mg tablet Take 1 Tab by mouth daily.  zinc oxide 20 % ointment Apply  to affected area as needed for Skin Irritation.  fluticasone propionate (FLONASE) 50 mcg/actuation nasal spray 2 Sprays by Both Nostrils route daily.  apixaban (ELIQUIS) 5 mg tablet Take 1 Tab by mouth every twelve (12) hours.  atorvastatin (LIPITOR) 80 mg tablet Take 1 Tab by mouth daily.     gabapentin (NEURONTIN) 100 mg capsule Take 1 Cap by mouth three (3) times daily as needed for Pain.  oxybutynin (DITROPAN) 5 mg tablet Take 1 Tab by mouth three (3) times daily.  OTHER Shower Chair- Use As Directed    OTHER Pull Ups     No current facility-administered medications for this visit. Patient Active Problem List    Diagnosis Date Noted    Controlled type 2 diabetes mellitus without complication, without long-term current use of insulin ([facility] 07/30/2018    Recurrent depression ([facility] 01/11/2018    Advanced care planning/counseling discussion 06/13/2016    H/O Pulmonary embolism (1/2015) 01/05/2015    Edema 08/27/2014    Hydradenitis 02/01/2012    HTN (hypertension) 01/21/2011    AR (allergic rhinitis) 05/21/2010    Eczema 05/21/2010    Depression 05/21/2010    Female stress incontinence 05/21/2010    Dyslipidemia 05/21/2010       Past Medical History:   Diagnosis Date    Arthritis     Diabetes ([facility]     states she is \"pre-diabetic\", diet controlled    frequency     H/O blood clots     right side of body    Heart attack ([facility] 1/2014    Hydradenitis 2/1/2012    Hypercholesterolemia     Hypertension     Ill-defined condition     edema of legs       Allergies   Allergen Reactions    Ciprofloxacin Other (comments)     Vomiting    Doxycycline Other (comments)     Vomiting    Pcn [Penicillins] Rash       History reviewed. No pertinent surgical history.     Social History     Socioeconomic History    Marital status: LEGALLY      Spouse name: Not on file    Number of children: Not on file    Years of education: Not on file    Highest education level: Not on file   Tobacco Use    Smoking status: Never Smoker    Smokeless tobacco: Never Used   Substance and Sexual Activity    Alcohol use: No     Alcohol/week: 0.0 standard drinks    Drug use: No    Sexual activity: Never         Objective:     Review of Systems:  Constitutional: Negative for fatigue or malaise  Derm: see HPI, hx of hydradenitis b/l axilla  HEENT: Negative for acute hearing or vision changes  Cardiovascular: Negative for dizziness, chest pain or palpitations  Respiratory: Negative for cough, wheezing or SOB  Gastrointestinal:  Negative for nausea or abdominal pain  Genital/urinary: hx of incontinence, Negative for dysuria   Musculoskeletal: Negative for acute myalgias or arthralgias   Neurological: Negative for headache, weakness or paresthesia  Psychological: Negative for depression or anxiety      Vitals:    09/16/19 1342   BP: 119/72   Pulse: 79   Resp: 18   Temp: 98.6 °F (37 °C)   TempSrc: Oral   SpO2: 100%   Weight: 149 lb (67.6 kg)   Height: 5' 1\" (1.549 m)      Body mass index is 28.15 kg/m². Physical Exam:  Constitutional: well developed, well nourished, in no acute distress  Skin: multiple nodules b/l axilla, stage I pressure superior buttocks  Head: normocephalic, atraumatic  Eyes: sclera clear, EOMI  Neck: normal range of motion  Cardiovascular: normal S1, S2, regular rate and rhythm, 1+ pedal edema  Respiratory: clear to auscultation bilaterally with symmetrical effort  Extremities: full range of motion, antalgic gait  Feet: unable to appreciate pedal pulses due to lymphedema, medial hyperpigmented plaques  Neurology: normal strength and sensation  Psych: active, alert and oriented, affect appropriate       Assessment and orders:       ICD-10-CM ICD-9-CM    1. Pressure injury of buttock, stage 1, unspecified laterality L89.301 707.05 zinc oxide 20 % ointment     707.21    2. Controlled type 2 diabetes mellitus without complication, without long-term current use of insulin ([facility]) R35.7 512.25 METABOLIC PANEL, COMPREHENSIVE      MAGNESIUM      TSH 3RD GENERATION      HEMOGLOBIN A1C WITH EAG      CBC W/O DIFF      HM DIABETES FOOT EXAM   3. Dyslipidemia E78.5 272.4 LIPID PANEL      METABOLIC PANEL, COMPREHENSIVE   4.  Essential hypertension I10 401.9 bumetanide (BUMEX) 2 mg tablet      METABOLIC PANEL, COMPREHENSIVE MAGNESIUM   5. Pedal edema R60.0 782.3 bumetanide (BUMEX) 2 mg tablet   6. Hydradenitis L73.2 705.83 chlorhexidine gluconate 2 % liqd      clindamycin (CLEOCIN T) 1 % lotion   7. Venous stasis dermatitis of both lower extremities I87.2 454.1    8. Eczema, unspecified type L30.9 692.9 triamcinolone acetonide (KENALOG) 0.1 % ointment        Plan of care:  Patient with multiple co-morbidities requiring comprehensive review of medical history, medications and complex medical decision making. Diagnoses were discussed in detail with patient. Medication risks/benefits/side effects discussed with patient. All of the patient's questions were addressed and answered to apparent satisfaction. The patient understands and agrees with our plan of care. The patient knows to call back if they have questions about the plan of care or if symptoms change. The patient received an After-Visit Summary which contains VS, diagnoses, orders, allergy and medication lists. Over half of the 40 minutes face to face with Lloyd Anderson and her daughters consisted of counseling and discussing treatment plans in reference to her pressure ulcer, lymphedema and multiple co-morbidities. .     Patient Care Team:  Lance Taylor MD as PCP - General (Family Practice)  Polly Hodge MD (Cardiology)  Will Somers DPM (Podiatry)  Will Toro MD (General Surgery)  Priscilla Fontanez OD [facility])    Follow-up and Dispositions    · Return in about 5 weeks (around 10/23/2019), or if symptoms worsen or fail to improve.          Future Appointments   Date Time Provider Buddy Elvi   10/23/2019  1:30 PM Lance Taylor MD [facility]       Signed By: Abimbola Fortune MD     September 17, 2019

## 2019-09-16 NOTE — PROGRESS NOTES
1. Have you been to the ER, urgent care clinic, or been hospitalized since your last visit? No     2. Have you seen or consulted any other health care providers outside of the [address] [place] [address] since your last visit? No     Reviewed record in preparation for visit and have necessary documentation  Goals that were addressed and/or need to be completed during or after this appointment include   Health Maintenance Due   Topic Date Due    EYE EXAM RETINAL OR DILATED  03/05/1955    Shingrix Vaccine Age 50> (1 of 2) 03/05/1995    FOBT Q 1 YEAR AGE 50-75  03/05/1995    Pneumococcal 65+ years (1 of 2 - PCV13) 03/05/2010    MICROALBUMIN Q1  01/23/2018    BREAST CANCER SCRN MAMMOGRAM  01/13/2019    FOOT EXAM Q1  06/29/2019    Influenza Age 9 to Adult  08/01/2019       Patient is accompanied by self I have received verbal consent from Zoey Metcalf to discuss any/all medical information while they are present in the room.

## 2019-09-17 RX ORDER — TRIAMCINOLONE ACETONIDE 1 MG/G
OINTMENT TOPICAL
Qty: 30 G | Refills: 0 | Status: SHIPPED | OUTPATIENT
Start: 2019-09-17 | End: 2020-02-27 | Stop reason: SDUPTHER

## 2019-09-18 LAB
ALBUMIN SERPL-MCNC: 3 G/DL (ref 3.5–4.8)
ALBUMIN/GLOB SERPL: 0.8 {RATIO} (ref 1.2–2.2)
ALP SERPL-CCNC: 119 IU/L (ref 39–117)
ALT SERPL-CCNC: 15 IU/L (ref 0–32)
AST SERPL-CCNC: 21 IU/L (ref 0–40)
BILIRUB SERPL-MCNC: 0.4 MG/DL (ref 0–1.2)
BUN SERPL-MCNC: 10 MG/DL (ref 8–27)
BUN/CREAT SERPL: 14 (ref 12–28)
CALCIUM SERPL-MCNC: 9.1 MG/DL (ref 8.7–10.3)
CHLORIDE SERPL-SCNC: 103 MMOL/L (ref 96–106)
CHOLEST SERPL-MCNC: 158 MG/DL (ref 100–199)
CO2 SERPL-SCNC: 24 MMOL/L (ref 20–29)
CREAT SERPL-MCNC: 0.72 MG/DL (ref 0.57–1)
ERYTHROCYTE [DISTWIDTH] IN BLOOD BY AUTOMATED COUNT: 13.5 % (ref 12.3–15.4)
EST. AVERAGE GLUCOSE BLD GHB EST-MCNC: 137 MG/DL
GLOBULIN SER CALC-MCNC: 3.9 G/DL (ref 1.5–4.5)
GLUCOSE SERPL-MCNC: 96 MG/DL (ref 65–99)
HBA1C MFR BLD: 6.4 % (ref 4.8–5.6)
HCT VFR BLD AUTO: 30 % (ref 34–46.6)
HDLC SERPL-MCNC: 37 MG/DL
HGB BLD-MCNC: 9.7 G/DL (ref 11.1–15.9)
LDLC SERPL CALC-MCNC: 101 MG/DL (ref 0–99)
MAGNESIUM SERPL-MCNC: 1.8 MG/DL (ref 1.6–2.3)
MCH RBC QN AUTO: 27.9 PG (ref 26.6–33)
MCHC RBC AUTO-ENTMCNC: 32.3 G/DL (ref 31.5–35.7)
MCV RBC AUTO: 86 FL (ref 79–97)
PLATELET # BLD AUTO: 415 X10E3/UL (ref 150–450)
POTASSIUM SERPL-SCNC: 4 MMOL/L (ref 3.5–5.2)
PROT SERPL-MCNC: 6.9 G/DL (ref 6–8.5)
RBC # BLD AUTO: 3.48 X10E6/UL (ref 3.77–5.28)
SODIUM SERPL-SCNC: 142 MMOL/L (ref 134–144)
TRIGL SERPL-MCNC: 100 MG/DL (ref 0–149)
TSH SERPL DL<=0.005 MIU/L-ACNC: 2.19 UIU/ML (ref 0.45–4.5)
VLDLC SERPL CALC-MCNC: 20 MG/DL (ref 5–40)
WBC # BLD AUTO: 9.7 X10E3/UL (ref 3.4–10.8)

## 2019-09-18 NOTE — PATIENT INSTRUCTIONS
Learning About Preventing Pressure Injuries  What are pressure injuries? A pressure injury to the skin is caused by constant pressure over a period of time. The constant pressure blocks the blood supply to the skin. This causes skin cells to die and creates a sore. Pressure injuries are also called bedsores. Pressure injuries usually occur over bony areas, such as the hips, lower back, elbows, heels, and shoulders. Pressure injuries can also occur in places where the skin folds over on itself, or where medical equipment presses on the skin, such as when oxygen tubes press on the ears or cheeks. Pressure injuries can range from red areas on the surface of the skin to severe tissue damage that goes deep into muscle and bone. Severe sores are hard to treat and slow to heal. When pressure injuries do not heal properly, problems such as bone, blood, and skin infections can develop. What causes pressure injuries? Things that cause pressure injuries include:  · Pressure on the skin and tissues. For example, the sores may happen when a person lies in bed or sits in a wheelchair for a long time. This is the most common cause of pressure injuries. · Sliding down in a bed or chair, forcing the skin to fold over itself (shear force). · Being pulled across bed sheets or other surfaces (friction burns). As we get older, our skin gets more thin and dry and less elastic, so it is easier to damage. Poor nutrition and not getting enough fluids make these natural changes in the skin worse. Skin in this condition may easily develop a pressure injury. Skin can also be damaged by sweat, feces, or urine, making pressure injuries more likely and harder to heal.  How can you help prevent pressure injuries? If you are not able to do these things yourself, ask a family member or friend for help. Change position often  · In a bed, change position every 2 hours.   · In a wheelchair or other type of chair, shift your weight every 15 minutes, and give yourself a full relief of weight every hour. ? For a weight shift, lean forward and to the left and right. Push up out of the chair with your arms. If you have a chair that tilts, use the tilt control to help relieve pressure. ? For a full relief of weight, stand up or move to another chair or bed if you are able to. Personal care  · Check your skin every day, especially around bony areas. When a pressure injury is forming, skin temperature can be different than nearby skin. It might be warmer or cooler. The skin can feel either firmer or softer than the surrounding skin. · Keep your skin clean and free of sweat, wound drainage, urine, and feces. · Use skin lotions to keep your skin from drying out and cracking. Barrier lotions or creams have ingredients that can act as a shield to help protect the skin from moisture or irritation. · Try not to slide or slump across sheets in a chair or bed. And try not to sleep in a recliner chair. Lifestyle choices  · Eat healthy foods with plenty of protein to help heal damaged skin and to help new skin grow. · Get plenty of fluids. · Stay at a healthy weight. Being either overweight or underweight can make pressure injuries more likely. · If you smoke, stop. Smoking dries the skin and reduces its blood supply. If you need help quitting, talk to your doctor about stop-smoking programs and medicines. These can increase your chances of quitting for good. Ask about using cushions or pads  · Overlays are special pads you put on top of a mattress. They provide a softer surface that will fit your body's shape better than a regular mattress. · Cushions or devices can be used to reduce pressure on certain areas of the body. For example, you can use a \"medical heel pillow\" to help prevent pressure injuries on heels. You can also get cushions for seating surfaces, such as wheelchair seats. Talk with your doctor about cushions and pads.  Some products, such as doughnut-type devices, may actually cause pressure injuries or make them worse. Where can you learn more? Go to http://milagros-vitlaiy.info/. Enter 981 [id number] in the search box to learn more about \"Learning About Preventing Pressure Injuries. \"  Current as of: September 26, 2018  Content Version: 12.1  © 8542-1227 [employer] Care instructions adapted under license by [employer] (which disclaims liability or warranty for this information). If you have questions about a medical condition or this instruction, always ask your healthcare professional. [place] [address] any warranty or liability for your use of this information.

## 2019-09-23 ENCOUNTER — TELEPHONE (OUTPATIENT)
Dept: FAMILY MEDICINE CLINIC | Age: 74
End: 2019-09-23

## 2019-09-23 DIAGNOSIS — I87.2 VENOUS STASIS DERMATITIS OF BOTH LOWER EXTREMITIES: ICD-10-CM

## 2019-09-23 DIAGNOSIS — E11.9 CONTROLLED TYPE 2 DIABETES MELLITUS WITHOUT COMPLICATION, WITHOUT LONG-TERM CURRENT USE OF INSULIN (HCC): Primary | ICD-10-CM

## 2019-09-23 NOTE — TELEPHONE ENCOUNTER
Telephone Encounter        Caller's first/last name:  Uziel Weathers      Relationship to patient and are they on HIPAA:  Self      Reason for call:  Referral      Further clarification of call:  Pt called wanting to know if provider had put order in for her to go see a Podiatrist.       Does caller want a return call? Yes    Best contact number:  [phone]      Did you check for a duplicate Encounter?   Yes

## 2019-09-25 NOTE — TELEPHONE ENCOUNTER
CHLORHEXIDINE GLUCONATE 2%. Pharmacy has 0.12%. Can they use this and what is the directions. Faith states they sent this message over last week and again this morning with no response. Is the Pt still taking furosemide (LASIX) 40 mg tablet or is she suppose to be just taking the BUMEX 2 MG TABS?

## 2019-09-26 DIAGNOSIS — L73.2 HYDRADENITIS: ICD-10-CM

## 2019-09-26 RX ORDER — CHLORHEXIDINE GLUCONATE 4 G/100ML
SOLUTION TOPICAL
Qty: 236 ML | Refills: 1 | OUTPATIENT
Start: 2019-09-26 | End: 2020-02-27

## 2019-09-26 NOTE — TELEPHONE ENCOUNTER
Requested Prescriptions     Signed Prescriptions Disp Refills    chlorhexidine (HIBICLENS) 4 % liquid 236 mL 1     Sig: Apply to axilla daily. Authorizing Provider: Grecia Beltran     Ordering User: Yaima pham per dr. John Howard.

## 2019-09-26 NOTE — TELEPHONE ENCOUNTER
Chlorhexidine prescription was sent to [address] [place] [address] on 9/16/19. The day of the patient's last OV. She was to stop the lasix and take only the Bumex.

## 2019-10-23 ENCOUNTER — OFFICE VISIT (OUTPATIENT)
Dept: FAMILY MEDICINE CLINIC | Age: 74
End: 2019-10-23

## 2019-10-23 VITALS
TEMPERATURE: 98.1 F | DIASTOLIC BLOOD PRESSURE: 65 MMHG | BODY MASS INDEX: 28.32 KG/M2 | OXYGEN SATURATION: 98 % | RESPIRATION RATE: 16 BRPM | WEIGHT: 150 LBS | SYSTOLIC BLOOD PRESSURE: 107 MMHG | HEIGHT: 61 IN | HEART RATE: 77 BPM

## 2019-10-23 DIAGNOSIS — E55.9 VITAMIN D DEFICIENCY: ICD-10-CM

## 2019-10-23 DIAGNOSIS — E11.9 CONTROLLED TYPE 2 DIABETES MELLITUS WITHOUT COMPLICATION, WITHOUT LONG-TERM CURRENT USE OF INSULIN (HCC): ICD-10-CM

## 2019-10-23 DIAGNOSIS — I27.82 OTHER CHRONIC PULMONARY EMBOLISM WITHOUT ACUTE COR PULMONALE (HCC): ICD-10-CM

## 2019-10-23 DIAGNOSIS — E78.5 HYPERLIPIDEMIA, UNSPECIFIED HYPERLIPIDEMIA TYPE: ICD-10-CM

## 2019-10-23 DIAGNOSIS — N39.3 FEMALE STRESS INCONTINENCE: ICD-10-CM

## 2019-10-23 DIAGNOSIS — D64.9 ANEMIA, UNSPECIFIED TYPE: Primary | ICD-10-CM

## 2019-10-23 LAB
ALBUMIN UR QL STRIP: 10 MG/L
BILIRUB UR QL STRIP: NEGATIVE
CREATININE, URINE POC: 50 MG/DL
GLUCOSE UR-MCNC: NEGATIVE MG/DL
KETONES P FAST UR STRIP-MCNC: NEGATIVE MG/DL
MICROALBUMIN/CREAT RATIO POC: NORMAL MG/G
PH UR STRIP: 7 [PH] (ref 4.6–8)
PROT UR QL STRIP: NEGATIVE
SP GR UR STRIP: 1.01 (ref 1–1.03)
UA UROBILINOGEN AMB POC: NORMAL (ref 0.2–1)
URINALYSIS CLARITY POC: CLEAR
URINALYSIS COLOR POC: YELLOW
URINE BLOOD POC: NORMAL
URINE LEUKOCYTES POC: NORMAL
URINE NITRITES POC: NEGATIVE

## 2019-10-23 RX ORDER — ATORVASTATIN CALCIUM 80 MG/1
80 TABLET, FILM COATED ORAL DAILY
Qty: 30 TAB | Refills: 2 | Status: SHIPPED | OUTPATIENT
Start: 2019-10-23 | End: 2020-02-27 | Stop reason: SDUPTHER

## 2019-10-23 RX ORDER — OXYBUTYNIN CHLORIDE 5 MG/1
5 TABLET ORAL 3 TIMES DAILY
Qty: 90 TAB | Refills: 2 | Status: SHIPPED | OUTPATIENT
Start: 2019-10-23 | End: 2020-02-27 | Stop reason: SDUPTHER

## 2019-10-23 NOTE — PROGRESS NOTES
1. Have you been to the ER, urgent care clinic since your last visit? Hospitalized since your last visit? No    2. Have you seen or consulted any other health care providers outside of the [address] [place] [address] since your last visit? Include any pap smears or colon screening.  No  Reviewed record in preparation for visit and have necessary documentation  Pt did not bring medication to office visit for review    Goals that were addressed and/or need to be completed during or after this appointment include     Health Maintenance Due   Topic Date Due    EYE EXAM RETINAL OR DILATED  03/05/1955    MICROALBUMIN Q1  01/23/2018    Influenza Age 9 to Adult  08/01/2019

## 2019-10-24 LAB
25(OH)D3+25(OH)D2 SERPL-MCNC: 22.4 NG/ML (ref 30–100)
ALBUMIN SERPL-MCNC: 3.4 G/DL (ref 3.5–4.8)
ALBUMIN/GLOB SERPL: 0.8 {RATIO} (ref 1.2–2.2)
ALP SERPL-CCNC: 110 IU/L (ref 39–117)
ALT SERPL-CCNC: 10 IU/L (ref 0–32)
AST SERPL-CCNC: 12 IU/L (ref 0–40)
BASOPHILS # BLD AUTO: 0 X10E3/UL (ref 0–0.2)
BASOPHILS NFR BLD AUTO: 0 %
BILIRUB SERPL-MCNC: 0.3 MG/DL (ref 0–1.2)
BUN SERPL-MCNC: 9 MG/DL (ref 8–27)
BUN/CREAT SERPL: 11 (ref 12–28)
CALCIUM SERPL-MCNC: 9.4 MG/DL (ref 8.7–10.3)
CHLORIDE SERPL-SCNC: 100 MMOL/L (ref 96–106)
CO2 SERPL-SCNC: 25 MMOL/L (ref 20–29)
CREAT SERPL-MCNC: 0.79 MG/DL (ref 0.57–1)
EOSINOPHIL # BLD AUTO: 0.3 X10E3/UL (ref 0–0.4)
EOSINOPHIL NFR BLD AUTO: 2 %
ERYTHROCYTE [DISTWIDTH] IN BLOOD BY AUTOMATED COUNT: 14.8 % (ref 12.3–15.4)
GLOBULIN SER CALC-MCNC: 4.3 G/DL (ref 1.5–4.5)
GLUCOSE SERPL-MCNC: 108 MG/DL (ref 65–99)
HCT VFR BLD AUTO: 32.7 % (ref 34–46.6)
HGB BLD-MCNC: 10.8 G/DL (ref 11.1–15.9)
IMM GRANULOCYTES # BLD AUTO: 0 X10E3/UL (ref 0–0.1)
IMM GRANULOCYTES NFR BLD AUTO: 0 %
IRON SATN MFR SERPL: 11 % (ref 15–55)
IRON SERPL-MCNC: 22 UG/DL (ref 27–139)
LYMPHOCYTES # BLD AUTO: 2.2 X10E3/UL (ref 0.7–3.1)
LYMPHOCYTES NFR BLD AUTO: 18 %
MCH RBC QN AUTO: 29.2 PG (ref 26.6–33)
MCHC RBC AUTO-ENTMCNC: 33 G/DL (ref 31.5–35.7)
MCV RBC AUTO: 88 FL (ref 79–97)
MONOCYTES # BLD AUTO: 0.6 X10E3/UL (ref 0.1–0.9)
MONOCYTES NFR BLD AUTO: 5 %
NEUTROPHILS # BLD AUTO: 8.9 X10E3/UL (ref 1.4–7)
NEUTROPHILS NFR BLD AUTO: 75 %
PLATELET # BLD AUTO: 473 X10E3/UL (ref 150–450)
POTASSIUM SERPL-SCNC: 4.2 MMOL/L (ref 3.5–5.2)
PROT SERPL-MCNC: 7.7 G/DL (ref 6–8.5)
RBC # BLD AUTO: 3.7 X10E6/UL (ref 3.77–5.28)
RETICS/RBC NFR AUTO: 1.3 % (ref 0.6–2.6)
SODIUM SERPL-SCNC: 140 MMOL/L (ref 134–144)
TIBC SERPL-MCNC: 203 UG/DL (ref 250–450)
UIBC SERPL-MCNC: 181 UG/DL (ref 118–369)
WBC # BLD AUTO: 12.1 X10E3/UL (ref 3.4–10.8)

## 2019-10-28 NOTE — PROGRESS NOTES
Patient: Ayanna Story MRN: [id number]  SSN: [id number]    YOB: 1945  Age: 76 y.o. Sex: female        Subjective:     Chief Complaint   Patient presents with    Follow-up    Foot Swelling     bilateral for one week       HPI: she is a 76y.o. year old female who presents with daughter for follow up of her multiple chronic medical conditions. Patient with hx of T2D, HTN, HLD and pedal edema. Patient with hx of hydradenitis. Patient denies F/C, HA, dizziness, SOB, CP, abdominal pain, acute myalgias or arthralgias. Encounter Diagnoses   Name Primary?  Anemia, unspecified type Yes    Controlled type 2 diabetes mellitus without complication, without long-term current use of insulin (HCC)     Hyperlipidemia, unspecified hyperlipidemia type     Female stress incontinence     Other chronic pulmonary embolism without acute cor pulmonale ([facility])     Vitamin D deficiency        Anemia:    Lab Results   Component Value Date/Time    WBC 12.1 (H) 10/23/2019 02:41 PM    HGB 10.8 (L) 10/23/2019 02:41 PM    HCT 32.7 (L) 10/23/2019 02:41 PM    PLATELET 730 (H) 36/42/9176 02:41 PM    MCV 88 10/23/2019 02:41 PM     Diabetes: This patient is being treating under a comprehensive plan of care for diabetes. Overall the patient feels well with good energy level. Insulin dependence: no   Pertinent Labs:   Lab Results   Component Value Date/Time    Hemoglobin A1c 6.4 (H) 09/17/2019 11:31 AM      Body mass index is 28.34 kg/m². Lab Results   Component Value Date/Time    LDL, calculated 101 (H) 09/17/2019 11:31 AM        Wt Readings from Last 3 Encounters:   10/23/19 150 lb (68 kg)   09/16/19 149 lb (67.6 kg)   04/23/19 154 lb 12.8 oz (70.2 kg)        Social History     Tobacco Use   Smoking Status Never Smoker   Smokeless Tobacco Never Used        Medications, diet and exercise as means of diabetic control with a goal of an A1C of less than 7.0% discussed.  Diabetic foot care and annual eye exam discussed as well. Check blood sugars while fasting just before breakfast on most days and occasionally before dinner. Write down readings in a diabetic log book and bring them to the next visit. Call the office for fasting sugars over 200 or below 75 on two or more occasions. Call immediately if having symptoms of high sugar (frequent urination, always thirsty) or low sugar (dizzy, lethargic, sweaty, nauseated, headache). Our overall goal is to reduce or eliminate the long term consequences of poorly controlled diabetes. Patient expresses understanding and agreement with our plan of care. Hypertension:  The patient reports:  taking medications as instructed, no medication side effects noted, no TIA's, no chest pain on exertion, no dyspnea on exertion, noting swelling of ankles. BP Readings from Last 3 Encounters:   10/23/19 107/65   09/16/19 119/72   04/23/19 109/69     Lab Results   Component Value Date/Time    Sodium 140 10/23/2019 02:41 PM    Potassium 4.2 10/23/2019 02:41 PM    Chloride 100 10/23/2019 02:41 PM    CO2 25 10/23/2019 02:41 PM    Anion gap 5 01/08/2015 02:17 AM    Glucose 108 (H) 10/23/2019 02:41 PM    BUN 9 10/23/2019 02:41 PM    Creatinine 0.79 10/23/2019 02:41 PM    BUN/Creatinine ratio 11 (L) 10/23/2019 02:41 PM    GFR est AA 85 10/23/2019 02:41 PM    GFR est non-AA 74 10/23/2019 02:41 PM    Calcium 9.4 10/23/2019 02:41 PM     Patient advised to log blood pressures at home weekly and bring to next visit. Call office as soon as possible if BP's over 140/90 on multiple occasions or with symptoms of dizziness, chest pain, shortness of breath, headache or ankle swelling. Our goal is to normalize the blood pressure to decrease the risks of strokes and heart attacks. The patient is in agreement with the plan.       Current and past medical information:    Current Medications after this visit[de-identified]     Current Outpatient Medications   Medication Sig    apixaban (ELIQUIS) 5 mg tablet Take 1 Tab by mouth every twelve (12) hours.  atorvastatin (LIPITOR) 80 mg tablet Take 1 Tab by mouth daily.  oxybutynin (DITROPAN) 5 mg tablet Take 1 Tab by mouth three (3) times daily.  chlorhexidine (HIBICLENS) 4 % liquid Apply to axilla daily.  triamcinolone acetonide (KENALOG) 0.1 % ointment Apply to bilateral medial feet twice daily, use thin layer    chlorhexidine gluconate 2 % liqd Apply to axilla daily    clindamycin (CLEOCIN T) 1 % lotion Apply to axilla twice a day    bumetanide (BUMEX) 2 mg tablet Take 1 Tab by mouth daily.  zinc oxide 20 % ointment Apply  to affected area as needed for Skin Irritation.  fluticasone propionate (FLONASE) 50 mcg/actuation nasal spray 2 Sprays by Both Nostrils route daily.  gabapentin (NEURONTIN) 100 mg capsule Take 1 Cap by mouth three (3) times daily as needed for Pain.  OTHER Shower Chair- Use As Directed    OTHER Pull Ups     No current facility-administered medications for this visit.         Patient Active Problem List    Diagnosis Date Noted    Controlled type 2 diabetes mellitus without complication, without long-term current use of insulin ([facility] 07/30/2018    Recurrent depression ([facility] 01/11/2018    Advanced care planning/counseling discussion 06/13/2016    H/O Pulmonary embolism (1/2015) 01/05/2015    Edema 08/27/2014    Hydradenitis 02/01/2012    HTN (hypertension) 01/21/2011    AR (allergic rhinitis) 05/21/2010    Eczema 05/21/2010    Depression 05/21/2010    Female stress incontinence 05/21/2010    Dyslipidemia 05/21/2010       Past Medical History:   Diagnosis Date    Arthritis     Diabetes ([address]     states she is \"pre-diabetic\", diet controlled    frequency     H/O blood clots     right side of body    Heart attack ([address] 1/2014    Hydradenitis 2/1/2012    Hypercholesterolemia     Hypertension     Ill-defined condition     edema of legs       Allergies   Allergen Reactions    Ciprofloxacin Other (comments)     Vomiting  Doxycycline Other (comments)     Vomiting    Pcn [Penicillins] Rash       History reviewed. No pertinent surgical history. Social History     Socioeconomic History    Marital status: LEGALLY      Spouse name: Not on file    Number of children: Not on file    Years of education: Not on file    Highest education level: Not on file   Tobacco Use    Smoking status: Never Smoker    Smokeless tobacco: Never Used   Substance and Sexual Activity    Alcohol use: No     Alcohol/week: 0.0 standard drinks    Drug use: No    Sexual activity: Never         Objective:     Review of Systems:  Constitutional: Negative for fatigue or malaise  Derm: hx of hydradenitis b/l axilla  HEENT: Negative for acute hearing or vision changes  Cardiovascular: Negative for dizziness, chest pain or palpitations  Respiratory: Negative for cough, wheezing or SOB  Gastreintestinal:  Negative for nausea or abdominal pain  Genital/urinary: hx of incontinence, Negative for dysuria   Muscoloskeletal: Negative for acute myalgias or arthralgias   Neurological: Negative for headache, weakness or paresthesia  Psychological: Negative for depression or anxiety      Vitals:    10/23/19 1336   BP: 107/65   Pulse: 77   Resp: 16   Temp: 98.1 °F (36.7 °C)   TempSrc: Oral   SpO2: 98%   Weight: 150 lb (68 kg)   Height: 5' 1\" (1.549 m)      Body mass index is 28.34 kg/m².     Physical Exam:  Constitutional: well developed, well nourished, in no acute distress  Skin: multiple nodules b/l axilla  Head: normocephalic, atraumatic  Eyes: sclera clear, EOMI, PERRL  Neck: normal range of motion  Cardiovascular: normal S1, S2, regular rate and rhythm, 3+ pedal edema  Respiratory: clear to auscultation bilaterally with symmetrical effort  Abdomen: soft, normal bowel sounds  Extremities: full range of motion, antalgic gait  Neurology: normal strength and sensation  Psych: active, alert and oriented, affect appropriate       Assessment and orders: ICD-10-CM ICD-9-CM    1. Anemia, unspecified type D64.9 285.9 RETICULOCYTE COUNT      IRON PROFILE      CBC WITH AUTOMATED DIFF   2. Controlled type 2 diabetes mellitus without complication, without long-term current use of insulin (HCC) E11.9 250.00 AMB POC URINALYSIS DIP STICK AUTO W/O MICRO      AMB POC URINE, MICROALBUMIN, SEMIQUANT (3 RESULTS)      CBC WITH AUTOMATED DIFF      METABOLIC PANEL, COMPREHENSIVE   3. Hyperlipidemia, unspecified hyperlipidemia type E78.5 272.4 atorvastatin (LIPITOR) 80 mg tablet      METABOLIC PANEL, COMPREHENSIVE   4. Female stress incontinence N39.3 625.6 oxybutynin (DITROPAN) 5 mg tablet   5. Other chronic pulmonary embolism without acute cor pulmonale (HCC) I27.82 416.2 apixaban (ELIQUIS) 5 mg tablet   6. Vitamin D deficiency E55.9 268.9 VITAMIN D, 25 HYDROXY        Plan of care:  Patient with multiple co-morbidities requiring comprehensive review of medical history, medication and complex medical decision making. Diagnoses were discussed in detail with patient. Medication risks/benefits/side effects discussed with patient. All of the patient's questions were addressed and answered to apparent satisfaction. The patient understands and agrees with our plan of care. The patient knows to call back if they have questions about the plan of care or if symptoms change. The patient received an After-Visit Summary which contains VS, diagnoses, orders, allergy and medication lists. Over half of the 40 minutes face to face with Anayeli Butler consisted of counseling and discussing treatment plans in reference to her anemia and chronic co-morbidities.      Patient Care Team:  Balaji Vela MD as PCP - General (Family Practice)  Benigno Peterson MD (Cardiology)  Farzana Quintero DPM (Podiatry)  Tracy Prince MD (General Surgery)  Yady Greene OD [facility])    Follow-up and Dispositions    · Return in about 3 months (around 1/23/2020), or if symptoms worsen or fail to improve. No future appointments.     Signed By: Swapna Conner MD     October 27, 2019

## 2019-10-28 NOTE — PATIENT INSTRUCTIONS
Anemia: Care Instructions  Your Care Instructions    Anemia is a low level of red blood cells, which carry oxygen throughout your body. Many things can cause anemia. Lack of iron is one of the most common causes. Your body needs iron to make hemoglobin, a substance in red blood cells that carries oxygen from the lungs to your body's cells. Without enough iron, the body produces fewer and smaller red blood cells. As a result, your body's cells do not get enough oxygen, and you feel tired and weak. And you may have trouble concentrating. Bleeding is the most common cause of a lack of iron. You may have heavy menstrual bleeding or bleeding caused by conditions such as ulcers, hemorrhoids, or cancer. Regular use of aspirin or other anti-inflammatory medicines (such as ibuprofen) also can cause bleeding in some people. A lack of iron in your diet also can cause anemia, especially at times when the body needs more iron, such as during pregnancy, infancy, and the teen years. Your doctor may have prescribed iron pills. It may take several months of treatment for your iron levels to return to normal. Your doctor also may suggest that you eat foods that are rich in iron, such as meat and beans. There are many other causes of anemia. It is not always due to a lack of iron. Finding the specific cause of your anemia will help your doctor find the right treatment for you. Follow-up care is a key part of your treatment and safety. Be sure to make and go to all appointments, and call your doctor if you are having problems. It's also a good idea to know your test results and keep a list of the medicines you take. How can you care for yourself at home? · Take your medicines exactly as prescribed. Call your doctor if you think you are having a problem with your medicine. · If your doctor recommends iron pills, take them as directed:  ? Try to take the pills on an empty stomach about 1 hour before or 2 hours after meals. But you may need to take iron with food to avoid an upset stomach. ? Do not take antacids or drink milk or caffeine drinks (such as coffee, tea, or cola) at the same time or within 2 hours of the time that you take your iron. They can make it hard for your body to absorb the iron. ? Vitamin C (from food or supplements) helps your body absorb iron. Try taking iron pills with a glass of orange juice or some other food that is high in vitamin C, such as citrus fruits. ? Iron pills may cause stomach problems, such as heartburn, nausea, diarrhea, constipation, and cramps. Be sure to drink plenty of fluids, and include fruits, vegetables, and fiber in your diet each day. Iron pills often make your bowel movements dark or green. ? If you forget to take an iron pill, do not take a double dose of iron the next time you take a pill. ? Keep iron pills out of the reach of small children. An overdose of iron can be very dangerous. · Follow your doctor's advice about eating iron-rich foods. These include red meat, shellfish, poultry, eggs, beans, raisins, whole-grain bread, and leafy green vegetables. · Steam vegetables to help them keep their iron content. When should you call for help? Call 911 anytime you think you may need emergency care. For example, call if:    · You have symptoms of a heart attack. These may include:  ? Chest pain or pressure, or a strange feeling in the chest.  ? Sweating. ? Shortness of breath. ? Nausea or vomiting. ? Pain, pressure, or a strange feeling in the back, neck, jaw, or upper belly or in one or both shoulders or arms. ? Lightheadedness or sudden weakness. ? A fast or irregular heartbeat. After you call 911, the  may tell you to chew 1 adult-strength or 2 to 4 low-dose aspirin. Wait for an ambulance.  Do not try to drive yourself.     · You passed out (lost consciousness).    Call your doctor now or seek immediate medical care if:    · You have new or increased shortness of breath.     · You are dizzy or lightheaded, or you feel like you may faint.     · Your fatigue and weakness continue or get worse.     · You have any abnormal bleeding, such as:  ? Nosebleeds. ? Vaginal bleeding that is different (heavier, more frequent, at a different time of the month) than what you are used to.  ? Bloody or black stools, or rectal bleeding. ? Bloody or pink urine.    Watch closely for changes in your health, and be sure to contact your doctor if:    · You do not get better as expected. Where can you learn more? Go to http://milagros-vitaliy.info/. Enter R301 in the search box to learn more about \"Anemia: Care Instructions. \"  Current as of: March 28, 2019  Content Version: 12.2  © 6153-7314 [employer]. Care instructions adapted under license by [employer] (which disclaims liability or warranty for this information). If you have questions about a medical condition or this instruction, always ask your healthcare professional. [address] any warranty or liability for your use of this information. 76.7

## 2019-11-08 RX ORDER — ERGOCALCIFEROL 1.25 MG/1
50000 CAPSULE ORAL
Qty: 12 CAP | Refills: 1 | Status: SHIPPED | OUTPATIENT
Start: 2019-11-08 | End: 2021-12-14 | Stop reason: SDUPTHER

## 2019-11-08 RX ORDER — LANOLIN ALCOHOL/MO/W.PET/CERES
325 CREAM (GRAM) TOPICAL DAILY
Qty: 90 TAB | Refills: 1 | Status: SHIPPED | OUTPATIENT
Start: 2019-11-08 | End: 2020-02-27 | Stop reason: SDUPTHER

## 2020-02-27 ENCOUNTER — OFFICE VISIT (OUTPATIENT)
Dept: FAMILY MEDICINE CLINIC | Age: 75
End: 2020-02-27

## 2020-02-27 VITALS
HEART RATE: 83 BPM | TEMPERATURE: 98 F | WEIGHT: 147.4 LBS | BODY MASS INDEX: 27.83 KG/M2 | OXYGEN SATURATION: 100 % | DIASTOLIC BLOOD PRESSURE: 75 MMHG | SYSTOLIC BLOOD PRESSURE: 125 MMHG | RESPIRATION RATE: 16 BRPM | HEIGHT: 61 IN

## 2020-02-27 DIAGNOSIS — I27.82 OTHER CHRONIC PULMONARY EMBOLISM WITHOUT ACUTE COR PULMONALE (HCC): ICD-10-CM

## 2020-02-27 DIAGNOSIS — I89.0 LYMPHEDEMA: ICD-10-CM

## 2020-02-27 DIAGNOSIS — J30.89 NON-SEASONAL ALLERGIC RHINITIS: ICD-10-CM

## 2020-02-27 DIAGNOSIS — L73.2 HYDRADENITIS: ICD-10-CM

## 2020-02-27 DIAGNOSIS — E11.9 CONTROLLED TYPE 2 DIABETES MELLITUS WITHOUT COMPLICATION, WITHOUT LONG-TERM CURRENT USE OF INSULIN (HCC): ICD-10-CM

## 2020-02-27 DIAGNOSIS — E11.40 NEUROPATHY DUE TO TYPE 2 DIABETES MELLITUS (HCC): ICD-10-CM

## 2020-02-27 DIAGNOSIS — I10 ESSENTIAL HYPERTENSION: ICD-10-CM

## 2020-02-27 DIAGNOSIS — F33.9 RECURRENT DEPRESSION (HCC): ICD-10-CM

## 2020-02-27 DIAGNOSIS — L30.9 ECZEMA, UNSPECIFIED TYPE: ICD-10-CM

## 2020-02-27 DIAGNOSIS — N39.3 FEMALE STRESS INCONTINENCE: ICD-10-CM

## 2020-02-27 DIAGNOSIS — N30.01 ACUTE CYSTITIS WITH HEMATURIA: Primary | ICD-10-CM

## 2020-02-27 DIAGNOSIS — E78.5 HYPERLIPIDEMIA, UNSPECIFIED HYPERLIPIDEMIA TYPE: ICD-10-CM

## 2020-02-27 LAB
ALBUMIN UR QL STRIP: 150 MG/L
BILIRUB UR QL STRIP: NORMAL
CREATININE, URINE POC: 300 MG/DL
GLUCOSE UR-MCNC: NEGATIVE MG/DL
KETONES P FAST UR STRIP-MCNC: NORMAL MG/DL
MICROALBUMIN/CREAT RATIO POC: NORMAL MG/G
PH UR STRIP: 6 [PH] (ref 4.6–8)
PROT UR QL STRIP: NORMAL
SP GR UR STRIP: 1.02 (ref 1–1.03)
UA UROBILINOGEN AMB POC: NORMAL (ref 0.2–1)
URINALYSIS CLARITY POC: CLEAR
URINALYSIS COLOR POC: YELLOW
URINE BLOOD POC: NORMAL
URINE LEUKOCYTES POC: NORMAL
URINE NITRITES POC: NEGATIVE

## 2020-02-27 RX ORDER — OXYBUTYNIN CHLORIDE 5 MG/1
5 TABLET ORAL 3 TIMES DAILY
Qty: 270 TAB | Refills: 1 | Status: SHIPPED | OUTPATIENT
Start: 2020-02-27 | End: 2021-12-14 | Stop reason: SDUPTHER

## 2020-02-27 RX ORDER — CLINDAMYCIN PHOSPHATE 10 UG/ML
LOTION TOPICAL
Qty: 1 BOTTLE | Refills: 2 | Status: SHIPPED | OUTPATIENT
Start: 2020-02-27 | End: 2021-03-12 | Stop reason: SDUPTHER

## 2020-02-27 RX ORDER — LANOLIN ALCOHOL/MO/W.PET/CERES
325 CREAM (GRAM) TOPICAL DAILY
Qty: 90 TAB | Refills: 1 | Status: SHIPPED | OUTPATIENT
Start: 2020-02-27 | End: 2022-06-14

## 2020-02-27 RX ORDER — FLUTICASONE PROPIONATE 50 MCG
2 SPRAY, SUSPENSION (ML) NASAL DAILY
Qty: 1 BOTTLE | Refills: 2 | Status: SHIPPED | OUTPATIENT
Start: 2020-02-27 | End: 2021-12-14 | Stop reason: SDUPTHER

## 2020-02-27 RX ORDER — CHLORHEXIDINE GLUCONATE 4 G/100ML
SOLUTION TOPICAL
Qty: 236 ML | Refills: 1 | Status: SHIPPED | OUTPATIENT
Start: 2020-02-27 | End: 2021-03-12 | Stop reason: SDUPTHER

## 2020-02-27 RX ORDER — ATORVASTATIN CALCIUM 80 MG/1
80 TABLET, FILM COATED ORAL DAILY
Qty: 90 TAB | Refills: 1 | Status: SHIPPED | OUTPATIENT
Start: 2020-02-27 | End: 2021-03-12 | Stop reason: SDUPTHER

## 2020-02-27 RX ORDER — NITROFURANTOIN (MACROCRYSTALS) 100 MG/1
100 CAPSULE ORAL 2 TIMES DAILY
Qty: 20 CAP | Refills: 0 | Status: SHIPPED | OUTPATIENT
Start: 2020-02-27 | End: 2020-03-08

## 2020-02-27 RX ORDER — BUMETANIDE 2 MG/1
2 TABLET ORAL DAILY
Qty: 30 TAB | Refills: 3 | Status: SHIPPED | OUTPATIENT
Start: 2020-02-27 | End: 2021-03-12 | Stop reason: SDUPTHER

## 2020-02-27 RX ORDER — TRIAMCINOLONE ACETONIDE 1 MG/G
OINTMENT TOPICAL
Qty: 30 G | Refills: 0 | Status: SHIPPED | OUTPATIENT
Start: 2020-02-27 | End: 2021-03-12 | Stop reason: SDUPTHER

## 2020-02-27 RX ORDER — GABAPENTIN 100 MG/1
100 CAPSULE ORAL
Qty: 90 CAP | Refills: 2 | Status: SHIPPED | OUTPATIENT
Start: 2020-02-27 | End: 2021-12-14 | Stop reason: SDUPTHER

## 2020-02-27 NOTE — PROGRESS NOTES
1. Have you been to the ER, urgent care clinic since your last visit? Hospitalized since your last visit? No    2. Have you seen or consulted any other health care providers outside of the [address] [place] [address] since your last visit? Include any pap smears or colon screening.  No  Reviewed record in preparation for visit and have necessary documentation  Pt did not bring medication to office visit for review    Goals that were addressed and/or need to be completed during or after this appointment include     Health Maintenance Due   Topic Date Due    Eye Exam Retinal or Dilated  03/05/1955    FOBT Q1Y Age 50-75  03/05/1995    Pneumococcal 65+ years (1 of 1 - PPSV23) 03/05/2010    Breast Cancer Screen Mammogram  01/13/2019    Medicare Yearly Exam  03/08/2020

## 2020-02-28 ENCOUNTER — HOSPITAL ENCOUNTER (OUTPATIENT)
Dept: LAB | Age: 75
Discharge: HOME | End: 2020-02-28

## 2020-02-28 DIAGNOSIS — N30.01 ACUTE CYSTITIS WITH HEMATURIA: ICD-10-CM

## 2020-02-28 NOTE — PATIENT INSTRUCTIONS
Learning About Hidradenitis Suppurativa  What is hidradenitis suppurativa? Hidradenitis suppurativa (say \"bhf-pbfr-ii-NY-tus sup-reji-uh-TY-vuh\") is a skin condition that causes lumps on the skin. The lumps look like pimples or boils. The condition can come and go for many years. Doctors don't know exactly how this condition starts. But they do know that something irritates and inflames the hair follicles, causing them to swell and form lumps. This skin condition can't be spread from person to person (isn't contagious). What are the symptoms? Red lumps that may look like pimples, acne, or boils appear on the skin and are usually painful. The lumps:  · Usually occur in areas where skin rubs against skin, such as in the armpit. They can also appear under the breasts, in the groin area, on the buttocks, around the anus, and on the inner thighs. · May go away on their own in a few weeks. But they often come back in the same area. · Can become infected and break open, draining blood and pus that usually smells bad. If the condition isn't treated and gets worse, hollow tunnels can form under the skin. Over time, the infection and tunnels will heal, but a thick scar may form. These scars can keep skin from stretching naturally. How is hidradenitis suppurativa treated? The treatment depends on how serious the condition is. Your doctor may discuss:  · Medicines. You may need to take pills, such as antibiotics, or rub a prescription ointment or cream on the affected skin. · Corticosteroid injections (shots). You may get these shots into the affected areas. · Hormone pills. Some women are helped by taking birth control pills or other medicines that affect their hormones. · Removing infected tissue. Home treatment  Home treatment may provide pain relief and help prevent nodules from coming back. Home treatment includes:  · Wearing loose-fitting clothes. · Washing the area gently with mild soap.   · Staying at a healthy weight. If you are overweight, losing weight may help the condition. · Quitting smoking, if you smoke. Follow-up care is a key part of your treatment and safety. Be sure to make and go to all appointments, and call your doctor if you are having problems. It's also a good idea to know your test results and keep a list of the medicines you take. Where can you learn more? Go to http://milagros-vitaliy.info/. Enter 26 [id number] in the search box to learn more about \"Learning About Hidradenitis Suppurativa. \"  Current as of: April 1, 2019  Content Version: 12.2  © 0604-6029 [email or web address] Care instructions adapted under license by [employer] (which disclaims liability or warranty for this information). If you have questions about a medical condition or this instruction, always ask your healthcare professional. [address] any warranty or liability for your use of this information.

## 2020-03-01 LAB
BACTERIA SPEC CULT: ABNORMAL
CC UR VC: ABNORMAL
SERVICE CMNT-IMP: ABNORMAL

## 2021-03-12 ENCOUNTER — OFFICE VISIT (OUTPATIENT)
Dept: FAMILY MEDICINE CLINIC | Age: 76
End: 2021-03-12
Payer: MEDICARE

## 2021-03-12 VITALS
TEMPERATURE: 97.4 F | BODY MASS INDEX: 29.57 KG/M2 | WEIGHT: 156.6 LBS | SYSTOLIC BLOOD PRESSURE: 123 MMHG | OXYGEN SATURATION: 100 % | HEART RATE: 103 BPM | RESPIRATION RATE: 18 BRPM | DIASTOLIC BLOOD PRESSURE: 72 MMHG | HEIGHT: 61 IN

## 2021-03-12 DIAGNOSIS — I10 ESSENTIAL HYPERTENSION: ICD-10-CM

## 2021-03-12 DIAGNOSIS — L89.301 PRESSURE INJURY OF BUTTOCK, STAGE 1, UNSPECIFIED LATERALITY: ICD-10-CM

## 2021-03-12 DIAGNOSIS — I27.82 OTHER CHRONIC PULMONARY EMBOLISM WITHOUT ACUTE COR PULMONALE (HCC): ICD-10-CM

## 2021-03-12 DIAGNOSIS — I89.0 LYMPHEDEMA: ICD-10-CM

## 2021-03-12 DIAGNOSIS — E11.9 CONTROLLED TYPE 2 DIABETES MELLITUS WITHOUT COMPLICATION, WITHOUT LONG-TERM CURRENT USE OF INSULIN (HCC): Primary | ICD-10-CM

## 2021-03-12 DIAGNOSIS — E78.5 HYPERLIPIDEMIA, UNSPECIFIED HYPERLIPIDEMIA TYPE: ICD-10-CM

## 2021-03-12 DIAGNOSIS — L73.2 HYDRADENITIS: ICD-10-CM

## 2021-03-12 DIAGNOSIS — R60.0 BILATERAL LOWER EXTREMITY EDEMA: ICD-10-CM

## 2021-03-12 DIAGNOSIS — L30.9 ECZEMA, UNSPECIFIED TYPE: ICD-10-CM

## 2021-03-12 PROCEDURE — 99214 OFFICE O/P EST MOD 30 MIN: CPT | Performed by: FAMILY MEDICINE

## 2021-03-12 PROCEDURE — G8754 DIAS BP LESS 90: HCPCS | Performed by: FAMILY MEDICINE

## 2021-03-12 PROCEDURE — G8536 NO DOC ELDER MAL SCRN: HCPCS | Performed by: FAMILY MEDICINE

## 2021-03-12 PROCEDURE — G8427 DOCREV CUR MEDS BY ELIG CLIN: HCPCS | Performed by: FAMILY MEDICINE

## 2021-03-12 PROCEDURE — G8399 PT W/DXA RESULTS DOCUMENT: HCPCS | Performed by: FAMILY MEDICINE

## 2021-03-12 PROCEDURE — G8419 CALC BMI OUT NRM PARAM NOF/U: HCPCS | Performed by: FAMILY MEDICINE

## 2021-03-12 PROCEDURE — 1101F PT FALLS ASSESS-DOCD LE1/YR: CPT | Performed by: FAMILY MEDICINE

## 2021-03-12 PROCEDURE — G8752 SYS BP LESS 140: HCPCS | Performed by: FAMILY MEDICINE

## 2021-03-12 PROCEDURE — G9717 DOC PT DX DEP/BP F/U NT REQ: HCPCS | Performed by: FAMILY MEDICINE

## 2021-03-12 PROCEDURE — 1090F PRES/ABSN URINE INCON ASSESS: CPT | Performed by: FAMILY MEDICINE

## 2021-03-12 RX ORDER — CHLORHEXIDINE GLUCONATE 4 G/100ML
SOLUTION TOPICAL
Qty: 236 ML | Refills: 1 | Status: SHIPPED | OUTPATIENT
Start: 2021-03-12 | End: 2021-12-14

## 2021-03-12 RX ORDER — CLINDAMYCIN PHOSPHATE 10 UG/ML
LOTION TOPICAL
Qty: 1 BOTTLE | Refills: 2 | Status: SHIPPED | OUTPATIENT
Start: 2021-03-12 | End: 2022-02-24 | Stop reason: SDUPTHER

## 2021-03-12 RX ORDER — BUMETANIDE 2 MG/1
2 TABLET ORAL DAILY
Qty: 30 TAB | Refills: 3 | Status: SHIPPED | OUTPATIENT
Start: 2021-03-12 | End: 2021-03-12 | Stop reason: SDUPTHER

## 2021-03-12 RX ORDER — ZINC OXIDE 20 G/100G
OINTMENT TOPICAL AS NEEDED
Qty: 30 G | Refills: 1 | Status: SHIPPED | OUTPATIENT
Start: 2021-03-12

## 2021-03-12 RX ORDER — BUMETANIDE 2 MG/1
2 TABLET ORAL DAILY
Qty: 90 TAB | Refills: 1 | Status: SHIPPED | OUTPATIENT
Start: 2021-03-12 | End: 2021-12-14 | Stop reason: SDUPTHER

## 2021-03-12 RX ORDER — TRIAMCINOLONE ACETONIDE 1 MG/G
OINTMENT TOPICAL
Qty: 30 G | Refills: 0 | Status: SHIPPED | OUTPATIENT
Start: 2021-03-12 | End: 2022-06-14

## 2021-03-12 RX ORDER — ATORVASTATIN CALCIUM 80 MG/1
80 TABLET, FILM COATED ORAL DAILY
Qty: 90 TAB | Refills: 1 | Status: SHIPPED | OUTPATIENT
Start: 2021-03-12 | End: 2021-12-14 | Stop reason: SDUPTHER

## 2021-03-12 NOTE — PROGRESS NOTES
1. Have you been to the ER, urgent care clinic since your last visit? Hospitalized since your last visit? No    2. Have you seen or consulted any other health care providers outside of the [address] [place] [address] since your last visit? Include any pap smears or colon screening. No    Reviewed record in preparation for visit and have necessary documentation  Goals that were addressed and/or need to be completed during or after this appointment include     Health Maintenance Due   Topic Date Due    Eye Exam Retinal or Dilated  Never done    COVID-19 Vaccine (1) Never done    Shingrix Vaccine Age 50> (1 of 2) Never done    GLAUCOMA SCREENING Q2Y  Never done    Pneumococcal 65+ years (1 of 1 - PPSV23) Never done    Medicare Yearly Exam  03/08/2020    Flu Vaccine (1) Never done    Foot Exam Q1  09/17/2020    Lipid Screen  09/17/2020    MICROALBUMIN Q1  02/27/2021       Patient is accompanied by daughter I have received verbal consent from Najma Ludwig to discuss any/all medical information while they are present in the room.

## 2021-03-13 LAB
ALBUMIN SERPL-MCNC: 2.5 G/DL (ref 3.5–5)
ALBUMIN/GLOB SERPL: 0.4 {RATIO} (ref 1.1–2.2)
ALP SERPL-CCNC: 119 U/L (ref 45–117)
ALT SERPL-CCNC: 15 U/L (ref 12–78)
ANION GAP SERPL CALC-SCNC: 6 MMOL/L (ref 5–15)
AST SERPL-CCNC: 19 U/L (ref 15–37)
BILIRUB SERPL-MCNC: 0.4 MG/DL (ref 0.2–1)
BUN SERPL-MCNC: 13 MG/DL (ref 6–20)
BUN/CREAT SERPL: 13 (ref 12–20)
CALCIUM SERPL-MCNC: 8.6 MG/DL (ref 8.5–10.1)
CHLORIDE SERPL-SCNC: 104 MMOL/L (ref 97–108)
CHOLEST SERPL-MCNC: 173 MG/DL
CO2 SERPL-SCNC: 29 MMOL/L (ref 21–32)
CREAT SERPL-MCNC: 0.97 MG/DL (ref 0.55–1.02)
EST. AVERAGE GLUCOSE BLD GHB EST-MCNC: 146 MG/DL
GLOBULIN SER CALC-MCNC: 6.1 G/DL (ref 2–4)
GLUCOSE SERPL-MCNC: 120 MG/DL (ref 65–100)
HBA1C MFR BLD: 6.7 % (ref 4–5.6)
HDLC SERPL-MCNC: 54 MG/DL
HDLC SERPL: 3.2 {RATIO} (ref 0–5)
LDLC SERPL CALC-MCNC: 97.8 MG/DL (ref 0–100)
LIPID PROFILE,FLP: NORMAL
POTASSIUM SERPL-SCNC: 4 MMOL/L (ref 3.5–5.1)
PROT SERPL-MCNC: 8.6 G/DL (ref 6.4–8.2)
SODIUM SERPL-SCNC: 139 MMOL/L (ref 136–145)
TRIGL SERPL-MCNC: 106 MG/DL (ref ?–150)
TSH SERPL DL<=0.05 MIU/L-ACNC: 2.92 UIU/ML (ref 0.36–3.74)
VLDLC SERPL CALC-MCNC: 21.2 MG/DL

## 2021-03-16 NOTE — PROGRESS NOTES
Patient: Luisa Toscano MRN: [id number]  SSN: [id number]    YOB: 1945  Age: 68 y.o. Sex: female        Subjective:     Chief Complaint   Patient presents with    Skin Problem       HPI: she is a 68y.o. year old female who presents with daughter for follow up of her multiple chronic medical conditions. Patient with hx of T2D, HTN, HLD and pedal edema. Patient with hx of hydradenitis. Patient denies F/C, HA, dizziness, SOB, CP, abdominal pain, acute myalgias or arthralgias. Encounter Diagnoses   Name Primary?  Controlled type 2 diabetes mellitus without complication, without long-term current use of insulin ([facility] [address] Yes    Essential hypertension     Hyperlipidemia, unspecified hyperlipidemia type     Lymphedema     Hydradenitis     Other chronic pulmonary embolism without acute cor pulmonale (HCC)     Bilateral lower extremity edema     Eczema, unspecified type     Pressure injury of buttock, stage 1, unspecified laterality        Anemia:    Lab Results   Component Value Date/Time    WBC 12.1 (H) 10/23/2019 02:41 PM    HGB 10.8 (L) 10/23/2019 02:41 PM    HCT 32.7 (L) 10/23/2019 02:41 PM    PLATELET 208 (H) 71/89/2827 02:41 PM    MCV 88 10/23/2019 02:41 PM     Diabetes: This patient is being treating under a comprehensive plan of care for diabetes. Overall the patient feels well with good energy level. Insulin dependence: no   Pertinent Labs:   Lab Results   Component Value Date/Time    Hemoglobin A1c 6.7 (H) 03/12/2021 11:35 AM      Body mass index is 29.59 kg/m².      Lab Results   Component Value Date/Time    LDL, calculated 97.8 03/12/2021 11:35 AM        Wt Readings from Last 3 Encounters:   03/12/21 156 lb 9.6 oz (71 kg)   02/27/20 147 lb 6.4 oz (66.9 kg)   10/23/19 150 lb (68 kg)        Social History     Tobacco Use   Smoking Status Never Smoker   Smokeless Tobacco Never Used        Medications, diet and exercise as means of diabetic control with a goal of an A1C of less than 7.0% discussed. Diabetic foot care and annual eye exam discussed as well. Check blood sugars while fasting just before breakfast on most days and occasionally before dinner. Write down readings in a diabetic log book and bring them to the next visit. Call the office for fasting sugars over 200 or below 75 on two or more occasions. Call immediately if having symptoms of high sugar (frequent urination, always thirsty) or low sugar (dizzy, lethargic, sweaty, nauseated, headache). Our overall goal is to reduce or eliminate the long term consequences of poorly controlled diabetes. Patient expresses understanding and agreement with our plan of care. Hypertension:  The patient reports:  taking medications as instructed, no medication side effects noted, no TIA's, no chest pain on exertion, no dyspnea on exertion, noting swelling of ankles. BP Readings from Last 3 Encounters:   03/12/21 123/72   02/27/20 125/75   10/23/19 107/65     Lab Results   Component Value Date/Time    Sodium 139 03/12/2021 11:35 AM    Potassium 4.0 03/12/2021 11:35 AM    Chloride 104 03/12/2021 11:35 AM    CO2 29 03/12/2021 11:35 AM    Anion gap 6 03/12/2021 11:35 AM    Glucose 120 (H) 03/12/2021 11:35 AM    BUN 13 03/12/2021 11:35 AM    Creatinine 0.97 03/12/2021 11:35 AM    BUN/Creatinine ratio 13 03/12/2021 11:35 AM    GFR est AA >60 03/12/2021 11:35 AM    GFR est non-AA 56 (L) 03/12/2021 11:35 AM    Calcium 8.6 03/12/2021 11:35 AM     Patient advised to log blood pressures at home weekly and bring to next visit. Call office as soon as possible if BP's over 140/90 on multiple occasions or with symptoms of dizziness, chest pain, shortness of breath, headache or ankle swelling. Our goal is to normalize the blood pressure to decrease the risks of strokes and heart attacks. The patient is in agreement with the plan.       Current and past medical information:    Current Medications after this visit[de-identified]     Current Outpatient Medications   Medication Sig    clindamycin (CLEOCIN T) 1 % lotion Apply to axilla twice a day    atorvastatin (LIPITOR) 80 mg tablet Take 1 Tab by mouth daily.  apixaban (ELIQUIS) 5 mg tablet Take 1 Tab by mouth every twelve (12) hours.  miscellaneous medical supply (Anti-Embolism Stockings) misc 1 Units by Does Not Apply route daily. Please measure and fit for one pair of below the knee anti-embolism stockings    bumetanide (BUMEX) 2 mg tablet Take 1 Tab by mouth daily.  triamcinolone acetonide (KENALOG) 0.1 % ointment Apply to bilateral medial feet twice daily, use thin layer    chlorhexidine (HIBICLENS) 4 % liquid Apply to axilla daily.  zinc oxide 20 % ointment Apply  to affected area as needed for Skin Irritation.  ferrous sulfate 325 mg (65 mg iron) tablet Take 1 Tab by mouth daily.  oxybutynin (DITROPAN) 5 mg tablet Take 1 Tab by mouth three (3) times daily.  fluticasone propionate (FLONASE) 50 mcg/actuation nasal spray 2 Sprays by Both Nostrils route daily.  gabapentin (NEURONTIN) 100 mg capsule Take 1 Cap by mouth three (3) times daily as needed for Pain.  ergocalciferol (ERGOCALCIFEROL) 50,000 unit capsule Take 1 Cap by mouth every seven (7) days. Indications: low vitamin D levels    OTHER Shower Chair- Use As Directed    OTHER Pull Ups     No current facility-administered medications for this visit.         Patient Active Problem List    Diagnosis Date Noted    Controlled type 2 diabetes mellitus without complication, without long-term current use of insulin ([facility] [address] 07/30/2018    Recurrent depression ([facility] [address] 01/11/2018    Advanced care planning/counseling discussion 06/13/2016    H/O Pulmonary embolism (1/2015) 01/05/2015    Edema 08/27/2014    Hydradenitis 02/01/2012    HTN (hypertension) 01/21/2011    AR (allergic rhinitis) 05/21/2010    Eczema 05/21/2010    Depression 05/21/2010    Female stress incontinence 05/21/2010    Dyslipidemia 05/21/2010       Past Medical History:   Diagnosis Date    Arthritis     Diabetes ([facility] [address]     states she is \"pre-diabetic\", diet controlled    frequency     H/O blood clots     right side of body    Heart attack ([facility] [address] 1/2014    Hydradenitis 2/1/2012    Hypercholesterolemia     Hypertension     Ill-defined condition     edema of legs       Allergies   Allergen Reactions    Ciprofloxacin Other (comments)     Vomiting    Doxycycline Other (comments)     Vomiting    Pcn [Penicillins] Rash       History reviewed. No pertinent surgical history. Social History     Socioeconomic History    Marital status: LEGALLY      Spouse name: Not on file    Number of children: Not on file    Years of education: Not on file    Highest education level: Not on file   Tobacco Use    Smoking status: Never Smoker    Smokeless tobacco: Never Used   Substance and Sexual Activity    Alcohol use: No     Alcohol/week: 0.0 standard drinks    Drug use: No    Sexual activity: Never         Objective:     Review of Systems:  Constitutional: Negative for fatigue or malaise  Derm: hx of hydradenitis b/l axilla  HEENT: Negative for acute hearing or vision changes  Cardiovascular: Negative for dizziness, chest pain or palpitations  Respiratory: Negative for cough, wheezing or SOB  Gastreintestinal:  Negative for nausea or abdominal pain  Genital/urinary: hx of incontinence, Negative for dysuria   Muscoloskeletal: Negative for acute myalgias or arthralgias   Neurological: Negative for headache, weakness or paresthesia  Psychological: Negative for depression or anxiety      Vitals:    03/12/21 1036 03/12/21 1049   BP: (!) 140/70 123/72   Pulse: (!) 103    Resp: 18    Temp: 97.4 °F (36.3 °C)    TempSrc: Temporal    SpO2: 100%    Weight: 156 lb 9.6 oz (71 kg)    Height: 5' 1\" (1.549 m)       Body mass index is 29.59 kg/m².     Physical Exam:  Constitutional: well developed, well nourished, in no acute distress  Skin: multiple nodules b/l axilla  Head: normocephalic, atraumatic  Eyes: sclera clear, EOMI  Neck: normal range of motion  Cardiovascular: normal S1, S2, regular rate and rhythm, 3+ pedal edema  Respiratory: clear to auscultation bilaterally with symmetrical effort  Abdomen: soft, normal bowel sounds  Extremities: full range of motion, antalgic gait  Neurology: normal strength and sensation  Psych: active, alert and oriented, affect appropriate       Assessment and orders:       ICD-10-CM ICD-9-CM    1. Controlled type 2 diabetes mellitus without complication, without long-term current use of insulin ([facility])  E11.9 250.00 LIPID PANEL      METABOLIC PANEL, COMPREHENSIVE      TSH 3RD GENERATION      HEMOGLOBIN A1C WITH EAG      HEMOGLOBIN A1C WITH EAG      HEMOGLOBIN A1C WITH EAG      TSH 3RD GENERATION      METABOLIC PANEL, COMPREHENSIVE      LIPID PANEL   2. Essential hypertension  D68 078.3 METABOLIC PANEL, COMPREHENSIVE      TSH 3RD GENERATION      bumetanide (BUMEX) 2 mg tablet      TSH 3RD GENERATION      METABOLIC PANEL, COMPREHENSIVE      DISCONTINUED: bumetanide (BUMEX) 2 mg tablet   3. Hyperlipidemia, unspecified hyperlipidemia type  E78.5 272.4 atorvastatin (LIPITOR) 80 mg tablet      LIPID PANEL      METABOLIC PANEL, COMPREHENSIVE      METABOLIC PANEL, COMPREHENSIVE      LIPID PANEL   4. Lymphedema  I71.4 900.8 METABOLIC PANEL, COMPREHENSIVE      bumetanide (BUMEX) 2 mg tablet      METABOLIC PANEL, COMPREHENSIVE      DISCONTINUED: bumetanide (BUMEX) 2 mg tablet   5. Hydradenitis  L73.2 705.83 clindamycin (CLEOCIN T) 1 % lotion      chlorhexidine (HIBICLENS) 4 % liquid   6. Other chronic pulmonary embolism without acute cor pulmonale ([facility])  I27.82 416.2 apixaban (ELIQUIS) 5 mg tablet      miscellaneous medical supply (Anti-Embolism Stockings) misc   7. Bilateral lower extremity edema  R60.0 782.3 miscellaneous medical supply (Anti-Embolism Stockings) misc   8.  Eczema, unspecified type  L30.9 692.9 triamcinolone acetonide (KENALOG) 0.1 % ointment   9. Pressure injury of buttock, stage 1, unspecified laterality  L89.301 707.05 zinc oxide 20 % ointment     707.21         Plan of care:  Diagnoses were discussed in detail with patient.   Medication risks/benefits/side effects discussed with patient.   All of the patient's questions were addressed and answered to apparent satisfaction.   The patient understands and agrees with our plan of care.  The patient knows to call back if they have questions about the plan of care or if symptoms change.  The patient received an After-Visit Summary which contains VS, diagnoses, orders, allergy and medication lists.       Patient Care Team:  Mannie Moore MD as PCP - General (Family Medicine)  Mannie Moore MD as PCP - [facility] Empaneled Provider  Vincent Wilcox MD (Cardiology)  Susan Babcock DPM (Podiatry)  Marciano Barnard MD (General Surgery)  Marcos Burkett, ADELE (Optometry)        No future appointments.    Signed By: Mannie Moore MD     March 15, 2021

## 2021-12-07 ENCOUNTER — TELEPHONE (OUTPATIENT)
Dept: FAMILY MEDICINE CLINIC | Age: 76
End: 2021-12-07

## 2021-12-14 ENCOUNTER — OFFICE VISIT (OUTPATIENT)
Dept: FAMILY MEDICINE CLINIC | Age: 76
End: 2021-12-14
Payer: MEDICARE

## 2021-12-14 VITALS
WEIGHT: 146 LBS | HEIGHT: 61 IN | TEMPERATURE: 99.1 F | HEART RATE: 93 BPM | OXYGEN SATURATION: 100 % | RESPIRATION RATE: 16 BRPM | BODY MASS INDEX: 27.56 KG/M2 | SYSTOLIC BLOOD PRESSURE: 113 MMHG | DIASTOLIC BLOOD PRESSURE: 73 MMHG

## 2021-12-14 DIAGNOSIS — E11.40 NEUROPATHY DUE TO TYPE 2 DIABETES MELLITUS (HCC): ICD-10-CM

## 2021-12-14 DIAGNOSIS — L08.9 INFECTION OF INDEX FINGER: Primary | ICD-10-CM

## 2021-12-14 DIAGNOSIS — N39.3 FEMALE STRESS INCONTINENCE: ICD-10-CM

## 2021-12-14 DIAGNOSIS — Z00.00 MEDICARE ANNUAL WELLNESS VISIT, SUBSEQUENT: ICD-10-CM

## 2021-12-14 DIAGNOSIS — E78.5 HYPERLIPIDEMIA, UNSPECIFIED HYPERLIPIDEMIA TYPE: ICD-10-CM

## 2021-12-14 DIAGNOSIS — I89.0 LYMPHEDEMA: ICD-10-CM

## 2021-12-14 DIAGNOSIS — I10 ESSENTIAL HYPERTENSION: ICD-10-CM

## 2021-12-14 DIAGNOSIS — I27.82 OTHER CHRONIC PULMONARY EMBOLISM WITHOUT ACUTE COR PULMONALE (HCC): ICD-10-CM

## 2021-12-14 DIAGNOSIS — J30.89 NON-SEASONAL ALLERGIC RHINITIS: ICD-10-CM

## 2021-12-14 DIAGNOSIS — E11.9 CONTROLLED TYPE 2 DIABETES MELLITUS WITHOUT COMPLICATION, WITHOUT LONG-TERM CURRENT USE OF INSULIN (HCC): ICD-10-CM

## 2021-12-14 DIAGNOSIS — F33.9 RECURRENT DEPRESSION (HCC): ICD-10-CM

## 2021-12-14 PROCEDURE — 99214 OFFICE O/P EST MOD 30 MIN: CPT | Performed by: FAMILY MEDICINE

## 2021-12-14 PROCEDURE — G8536 NO DOC ELDER MAL SCRN: HCPCS | Performed by: FAMILY MEDICINE

## 2021-12-14 PROCEDURE — G0439 PPPS, SUBSEQ VISIT: HCPCS | Performed by: FAMILY MEDICINE

## 2021-12-14 PROCEDURE — G8752 SYS BP LESS 140: HCPCS | Performed by: FAMILY MEDICINE

## 2021-12-14 PROCEDURE — G8427 DOCREV CUR MEDS BY ELIG CLIN: HCPCS | Performed by: FAMILY MEDICINE

## 2021-12-14 PROCEDURE — G9717 DOC PT DX DEP/BP F/U NT REQ: HCPCS | Performed by: FAMILY MEDICINE

## 2021-12-14 PROCEDURE — G8399 PT W/DXA RESULTS DOCUMENT: HCPCS | Performed by: FAMILY MEDICINE

## 2021-12-14 PROCEDURE — G8419 CALC BMI OUT NRM PARAM NOF/U: HCPCS | Performed by: FAMILY MEDICINE

## 2021-12-14 PROCEDURE — G8754 DIAS BP LESS 90: HCPCS | Performed by: FAMILY MEDICINE

## 2021-12-14 PROCEDURE — 1090F PRES/ABSN URINE INCON ASSESS: CPT | Performed by: FAMILY MEDICINE

## 2021-12-14 PROCEDURE — 1101F PT FALLS ASSESS-DOCD LE1/YR: CPT | Performed by: FAMILY MEDICINE

## 2021-12-14 RX ORDER — CLINDAMYCIN HYDROCHLORIDE 300 MG/1
300 CAPSULE ORAL 3 TIMES DAILY
Qty: 30 CAPSULE | Refills: 0 | Status: SHIPPED | OUTPATIENT
Start: 2021-12-14 | End: 2021-12-24

## 2021-12-14 RX ORDER — BUMETANIDE 2 MG/1
2 TABLET ORAL DAILY
Qty: 90 TABLET | Refills: 1 | Status: SHIPPED | OUTPATIENT
Start: 2021-12-14 | End: 2022-02-15 | Stop reason: SDUPTHER

## 2021-12-14 RX ORDER — OXYBUTYNIN CHLORIDE 5 MG/1
5 TABLET ORAL 3 TIMES DAILY
Qty: 270 TABLET | Refills: 1 | Status: SHIPPED | OUTPATIENT
Start: 2021-12-14 | End: 2022-06-14 | Stop reason: SDUPTHER

## 2021-12-14 RX ORDER — ATORVASTATIN CALCIUM 80 MG/1
80 TABLET, FILM COATED ORAL DAILY
Qty: 90 TABLET | Refills: 1 | Status: SHIPPED | OUTPATIENT
Start: 2021-12-14 | End: 2022-06-14 | Stop reason: SDUPTHER

## 2021-12-14 RX ORDER — FLUTICASONE PROPIONATE 50 MCG
2 SPRAY, SUSPENSION (ML) NASAL DAILY
Qty: 1 EACH | Refills: 2 | Status: SHIPPED | OUTPATIENT
Start: 2021-12-14 | End: 2022-06-14

## 2021-12-14 RX ORDER — ERGOCALCIFEROL 1.25 MG/1
50000 CAPSULE ORAL
Qty: 12 CAPSULE | Refills: 1 | Status: SHIPPED | OUTPATIENT
Start: 2021-12-14 | End: 2022-06-14

## 2021-12-14 RX ORDER — GABAPENTIN 100 MG/1
100 CAPSULE ORAL
Qty: 90 CAPSULE | Refills: 2 | Status: SHIPPED | OUTPATIENT
Start: 2021-12-14 | End: 2022-06-14 | Stop reason: SDUPTHER

## 2021-12-14 NOTE — PROGRESS NOTES
1. Have you been to the ER, urgent care clinic since your last visit? Hospitalized since your last visit? No    2. Have you seen or consulted any other health care providers outside of the [address] [place] [address] since your last visit? Include any pap smears or colon screening. No    Reviewed record in preparation for visit and have necessary documentation  Pt did not bring medication to office visit for review  Patient is accompanied by daughter I have received verbal consent from Rl Dodge to discuss any/all medical information while they are present in the room.     Goals that were addressed and/or need to be completed during or after this appointment include     Health Maintenance Due   Topic Date Due    Eye Exam Retinal or Dilated  Never done    COVID-19 Vaccine (1) Never done    Shingrix Vaccine Age 50> (1 of 2) Never done    Pneumococcal 65+ years (1 of 1 - PPSV23) Never done    Medicare Yearly Exam  03/08/2020    Foot Exam Q1  09/17/2020    MICROALBUMIN Q1  02/27/2021    Flu Vaccine (1) Never done

## 2021-12-16 NOTE — PATIENT INSTRUCTIONS
Medicare Wellness Visit, Female     The best way to live healthy is to have a lifestyle where you eat a well-balanced diet, exercise regularly, limit alcohol use, and quit all forms of tobacco/nicotine, if applicable. Regular preventive services are another way to keep healthy. Preventive services (vaccines, screening tests, monitoring & exams) can help personalize your care plan, which helps you manage your own care. Screening tests can find health problems at the earliest stages, when they are easiest to treat. [facility] follows the current, evidence-based guidelines published by the [facility] P[facility] ([facility]) when recommending preventive services for our patients. Because we follow these guidelines, sometimes recommendations change over time as research supports it. (For example, mammograms used to be recommended annually. Even though Medicare will still pay for an annual mammogram, the newer guidelines recommend a mammogram every two years for women of average risk). Of course, you and your doctor may decide to screen more often for some diseases, based on your risk and your co-morbidities (chronic disease you are already diagnosed with). Preventive services for you include:  - Medicare offers their members a free annual wellness visit, which is time for you and your primary care provider to discuss and plan for your preventive service needs. Take advantage of this benefit every year!  -All adults over the age of 72 should receive the recommended pneumonia vaccines. Current USPSTF guidelines recommend a series of two vaccines for the best pneumonia protection.   -All adults should have a flu vaccine yearly and a tetanus vaccine every 10 years.   -All adults age 48 and older should receive the shingles vaccines (series of two vaccines).       -All adults age 38-68 who are overweight should have a diabetes screening test once every three years.   -All adults born between [birth year] and 1965 should be screened once for Hepatitis C.  -Other screening tests and preventive services for persons with diabetes include: an eye exam to screen for diabetic retinopathy, a kidney function test, a foot exam, and stricter control over your cholesterol.   -Cardiovascular screening for adults with routine risk involves an electrocardiogram (ECG) at intervals determined by your doctor.   -Colorectal cancer screenings should be done for adults age 54-65 with no increased risk factors for colorectal cancer. There are a number of acceptable methods of screening for this type of cancer. Each test has its own benefits and drawbacks. Discuss with your doctor what is most appropriate for you during your annual wellness visit. The different tests include: colonoscopy (considered the best screening method), a fecal occult blood test, a fecal DNA test, and sigmoidoscopy.    -A bone mass density test is recommended when a woman turns 65 to screen for osteoporosis. This test is only recommended one time, as a screening. Some providers will use this same test as a disease monitoring tool if you already have osteoporosis. -Breast cancer screenings are recommended every other year for women of normal risk, age 54-69.  -Cervical cancer screenings for women over age 72 are only recommended with certain risk factors.      Here is a list of your current Health Maintenance items (your personalized list of preventive services) with a due date:  Health Maintenance Due   Topic Date Due    COVID-19 Vaccine (1) Never done    Annual Well Visit  03/08/2020    Diabetic Foot Care  09/17/2020    Albumin Urine Test  02/27/2021    Yearly Flu Vaccine (1) Never done

## 2022-02-15 ENCOUNTER — OFFICE VISIT (OUTPATIENT)
Dept: FAMILY MEDICINE CLINIC | Age: 77
End: 2022-02-15
Payer: MEDICARE

## 2022-02-15 VITALS
SYSTOLIC BLOOD PRESSURE: 148 MMHG | DIASTOLIC BLOOD PRESSURE: 68 MMHG | WEIGHT: 147.6 LBS | OXYGEN SATURATION: 95 % | HEART RATE: 95 BPM | RESPIRATION RATE: 18 BRPM | BODY MASS INDEX: 27.87 KG/M2 | TEMPERATURE: 98.1 F | HEIGHT: 61 IN

## 2022-02-15 DIAGNOSIS — L73.2 HYDRADENITIS: ICD-10-CM

## 2022-02-15 DIAGNOSIS — I89.0 LYMPHEDEMA: Primary | ICD-10-CM

## 2022-02-15 DIAGNOSIS — L89.159 PRESSURE INJURY OF SKIN OF SACRAL REGION, UNSPECIFIED INJURY STAGE: ICD-10-CM

## 2022-02-15 DIAGNOSIS — I10 ESSENTIAL HYPERTENSION: ICD-10-CM

## 2022-02-15 PROCEDURE — G8753 SYS BP > OR = 140: HCPCS | Performed by: STUDENT IN AN ORGANIZED HEALTH CARE EDUCATION/TRAINING PROGRAM

## 2022-02-15 PROCEDURE — G8419 CALC BMI OUT NRM PARAM NOF/U: HCPCS | Performed by: STUDENT IN AN ORGANIZED HEALTH CARE EDUCATION/TRAINING PROGRAM

## 2022-02-15 PROCEDURE — 1101F PT FALLS ASSESS-DOCD LE1/YR: CPT | Performed by: STUDENT IN AN ORGANIZED HEALTH CARE EDUCATION/TRAINING PROGRAM

## 2022-02-15 PROCEDURE — 99214 OFFICE O/P EST MOD 30 MIN: CPT | Performed by: STUDENT IN AN ORGANIZED HEALTH CARE EDUCATION/TRAINING PROGRAM

## 2022-02-15 PROCEDURE — G8536 NO DOC ELDER MAL SCRN: HCPCS | Performed by: STUDENT IN AN ORGANIZED HEALTH CARE EDUCATION/TRAINING PROGRAM

## 2022-02-15 PROCEDURE — 1090F PRES/ABSN URINE INCON ASSESS: CPT | Performed by: STUDENT IN AN ORGANIZED HEALTH CARE EDUCATION/TRAINING PROGRAM

## 2022-02-15 PROCEDURE — G8399 PT W/DXA RESULTS DOCUMENT: HCPCS | Performed by: STUDENT IN AN ORGANIZED HEALTH CARE EDUCATION/TRAINING PROGRAM

## 2022-02-15 PROCEDURE — G9717 DOC PT DX DEP/BP F/U NT REQ: HCPCS | Performed by: STUDENT IN AN ORGANIZED HEALTH CARE EDUCATION/TRAINING PROGRAM

## 2022-02-15 PROCEDURE — G8754 DIAS BP LESS 90: HCPCS | Performed by: STUDENT IN AN ORGANIZED HEALTH CARE EDUCATION/TRAINING PROGRAM

## 2022-02-15 PROCEDURE — G8427 DOCREV CUR MEDS BY ELIG CLIN: HCPCS | Performed by: STUDENT IN AN ORGANIZED HEALTH CARE EDUCATION/TRAINING PROGRAM

## 2022-02-15 RX ORDER — ZINC OXIDE 200 MG/G
OINTMENT TOPICAL AS NEEDED
Qty: 60 G | Refills: 1 | Status: SHIPPED | OUTPATIENT
Start: 2022-02-15 | End: 2022-06-14

## 2022-02-15 RX ORDER — DOXYCYCLINE 100 MG/1
100 CAPSULE ORAL 2 TIMES DAILY
Qty: 20 CAPSULE | Refills: 0 | Status: SHIPPED | OUTPATIENT
Start: 2022-02-15 | End: 2022-02-24 | Stop reason: SINTOL

## 2022-02-15 RX ORDER — BUMETANIDE 2 MG/1
2 TABLET ORAL DAILY
Qty: 90 TABLET | Refills: 1 | Status: SHIPPED | OUTPATIENT
Start: 2022-02-15 | End: 2022-06-10 | Stop reason: SDUPTHER

## 2022-02-15 NOTE — PATIENT INSTRUCTIONS
Lymphedema: Care Instructions  Your Care Instructions     Lymphedema is fluid that builds up in the arms or legs. It is often caused by surgery to remove lymph nodes during cancer treatment, especially breast cancer surgery, which can cause fluid to build up in the arm. It can happen after radiation treatment to an area that involves lymph nodes. It also can be caused by a fractured bone or surgery to fix a fracture. And some medicines also can cause lymphedema. Some people get it for unknown reasons. Normally, lymph nodes trap bacteria and other substances as fluid flows through them. Then, the white cells in the body's defense, or immune, system can destroy the substances. But if there are few or no lymph nodes--or if the lymph system in an arm or leg has been damaged--fluid can build up in the affected arm or leg. You can take simple steps at home to help treat or prevent fluid buildup. Treatment may include raising the arm or leg to let gravity drain the fluid. You also can wear compression stockings or sleeves. Follow-up care is a key part of your treatment and safety. Be sure to make and go to all appointments, and call your doctor if you are having problems. It's also a good idea to know your test results and keep a list of the medicines you take. How can you care for yourself at home? · Wear a compression stocking or sleeve as your doctor suggests. It can help keep fluid from pooling in an arm or leg. Wear it during air travel. · Prop up the swollen arm or leg on a pillow anytime you sit or lie down. Try to keep it above the level of your heart. This will help reduce swelling. · Avoid crossing your legs if your legs are swollen. · Get some exercise on most days of the week. Increase the intensity of exercise slowly. Water aerobics can help reduce swelling by helping fluid move around. Wear your compression stocking or sleeve during exercise, but not during water exercise.   · See a physical therapist. He or she can teach you how to do self-massage to help fluid move around. You also can learn what activities would be best for you. · Keep your feet clean and wear clean socks or stockings every day. Check your feet often for signs of infection, such as redness or heat. Do not walk barefoot. · If you have had lymph nodes removed from under your arm:  ? Do not have blood drawn from the arm on the side of the lymph node surgery. ? Do not allow a blood pressure cuff to be placed on that arm. If you are in the hospital, make sure your nurse and other hospital staff know of your condition. ? Wear gloves when gardening or doing other activities that may lead to cuts on your fingers or hands. · If you have had lymph nodes removed from your groin:  ? Bathe your feet daily in lukewarm, not hot, water. Use a mild soap that has a moisturizer, or use a moisturizer separately. ? Check your feet for blisters or cuts. ? Wear comfortable and supportive shoes that fit properly. ? Wear the correct size of panty hose and stockings. Avoid garters or knee-high or thigh-high stockings. · Ask your doctor how to treat any cuts, scratches, insect bites, or other injuries that may occur. · Use sunscreen and insect repellent when outdoors to protect your skin from sunburn and insect bites. · Wear medical alert jewelry that says you have lymphedema. You can buy these at most drugstores and on the Internet. When should you call for help? Call your doctor now or seek immediate medical care if:    · You have signs of infection, such as:  ? Increased pain, swelling, warmth, or redness. ? Red streaks leading from the area. ? Pus draining from the area. ? A fever. Watch closely for changes in your health, and be sure to contact your doctor if:    · You have new or worse symptoms from lymphedema.     · You do not get better as expected. Where can you learn more?   Go to http://www.gray.com/  Enter V398 in the search box to learn more about \"Lymphedema: Care Instructions. \"  Current as of: December 17, 2020               Content Version: 13.0  © 5019-1646 [employer]. Care instructions adapted under license by [employer] (which disclaims liability or warranty for this information). If you have questions about a medical condition or this instruction, always ask your healthcare professional. [place] [address] any warranty or liability for your use of this information.

## 2022-02-15 NOTE — PROGRESS NOTES
1. Have you been to the ER, urgent care clinic since your last visit? Hospitalized since your last visit? no    2. Have you seen or consulted any other health care providers outside of the [address] [place] [address] since your last visit? Including any pap smears or colon screening. no    Reviewed record in preparation for visit and have necessary documentation    Pt did not bring medication to office visit for review    Information was given to pt on Advanced Directives, Living Will  Opportunity was given for questions    Goals that were addressed and/or need to be completed during or after this appointment include     Health Maintenance Due   Topic Date Due    COVID-19 Vaccine (1) Never done    Pneumococcal 65+ years (1 of 1 - PPSV23) Never done    Foot Exam Q1  09/17/2020    MICROALBUMIN Q1  02/27/2021    Flu Vaccine (1) Never done     There is no height or weight on file to calculate BMI.

## 2022-02-15 NOTE — PROGRESS NOTES
Progress Note    Patient: Loren Moran MRN: [id number]  SSN: [id number]    YOB: 1945  Age: 68 y.o. Sex: female        Chief Complaint   Patient presents with    Facial Swelling         Subjective:     Problems addressed:  Encounter Diagnoses     ICD-10-CM ICD-9-CM   1. Lymphedema  I89.0 457.1   2. Pressure injury of skin of sacral region, unspecified injury stage  L89.159 707.03     707.20   3. Hydradenitis  L73.2 705.83   4. Essential hypertension  I10 401.9       Ms. Sylvester Sosa is a 68year old female presenting with a 2-3 day history of facial swelling and long-standing lymphedema that recently worsened over the past 3-4 days and is now causing the skin on her feet to split. She is also complaining of hydradenitis that is weeping currently, and is developing a sacral pressure wound. The patient denies any other complaints, including shortness of breath or chest pain. Current and past medical information:    Current Medications after this visit[de-identified]     Current Outpatient Medications   Medication Sig    liver oil-zinc oxide ointment Apply  to affected area as needed for Skin Irritation.  doxycycline (MONODOX) 100 mg capsule Take 1 Capsule by mouth two (2) times a day.  bumetanide (BUMEX) 2 mg tablet Take 1 Tablet by mouth daily. Indications: visible water retention    atorvastatin (LIPITOR) 80 mg tablet Take 1 Tablet by mouth daily. Indications: high cholesterol    apixaban (ELIQUIS) 5 mg tablet Take 1 Tablet by mouth every twelve (12) hours. Indications: deep vein thrombosis prevention    oxybutynin (DITROPAN) 5 mg tablet Take 1 Tablet by mouth three (3) times daily. Indications: an increased need to urinate often    ergocalciferol (ERGOCALCIFEROL) 1,250 mcg (50,000 unit) capsule Take 1 Capsule by mouth every seven (7) days. Indications: low vitamin D levels    fluticasone propionate (FLONASE) 50 mcg/actuation nasal spray 2 Sprays by Both Nostrils route daily.  Indications: inflammation of the nose due to an allergy    gabapentin (NEURONTIN) 100 mg capsule Take 1 Capsule by mouth three (3) times daily as needed for Pain.  clindamycin (CLEOCIN T) 1 % lotion Apply to axilla twice a day    miscellaneous medical supply (Anti-Embolism Stockings) misc 1 Units by Does Not Apply route daily. Please measure and fit for one pair of below the knee anti-embolism stockings    triamcinolone acetonide (KENALOG) 0.1 % ointment Apply to bilateral medial feet twice daily, use thin layer    zinc oxide 20 % ointment Apply  to affected area as needed for Skin Irritation.  ferrous sulfate 325 mg (65 mg iron) tablet Take 1 Tab by mouth daily.  OTHER Shower Chair- Use As Directed    OTHER Pull Ups     No current facility-administered medications for this visit. Patient Active Problem List    Diagnosis Date Noted    Controlled type 2 diabetes mellitus without complication, without long-term current use of insulin ([facility] 07/30/2018    Recurrent depression ([facility] 01/11/2018    Advanced care planning/counseling discussion 06/13/2016    H/O Pulmonary embolism (1/2015) 01/05/2015    Edema 08/27/2014    Hydradenitis 02/01/2012    HTN (hypertension) 01/21/2011    AR (allergic rhinitis) 05/21/2010    Eczema 05/21/2010    Depression 05/21/2010    Female stress incontinence 05/21/2010    Dyslipidemia 05/21/2010       Past Medical History:   Diagnosis Date    Arthritis     Diabetes ([facility]     states she is \"pre-diabetic\", diet controlled    frequency     H/O blood clots     right side of body    Heart attack ([facility] 1/2014    Hydradenitis 2/1/2012    Hypercholesterolemia     Hypertension     Ill-defined condition     edema of legs       Allergies   Allergen Reactions    Ciprofloxacin Other (comments)     Vomiting    Doxycycline Other (comments)     Vomiting    Pcn [Penicillins] Rash       No past surgical history on file.     Social History     Socioeconomic History    Marital status: LEGALLY    Tobacco Use    Smoking status: Never Smoker    Smokeless tobacco: Never Used   Substance and Sexual Activity    Alcohol use: No     Alcohol/week: 0.0 standard drinks    Drug use: No    Sexual activity: Never         Review of Systems   Constitutional: Negative for chills and fever. Eyes: Negative for blurred vision, discharge and redness. Respiratory: Negative for cough, sputum production and shortness of breath. Cardiovascular: Positive for leg swelling. Negative for chest pain and orthopnea. Gastrointestinal: Negative for abdominal pain, nausea and vomiting. Genitourinary: Negative for dysuria and frequency. Musculoskeletal: Negative for myalgias. Skin: Negative for itching and rash. Neurological: Negative for dizziness, focal weakness and headaches. Objective:     Vitals:    02/15/22 1625   BP: (!) 148/68   Pulse: 95   Resp: 18   Temp: 98.1 °F (36.7 °C)   TempSrc: Oral   SpO2: 95%   Weight: 147 lb 9.6 oz (67 kg)   Height: 5' 1\" (1.549 m)      Body mass index is 27.89 kg/m². Physical Exam  Constitutional:       General: She is not in acute distress. Appearance: She is normal weight. HENT:      Head:      Comments: Mild, bilateral swelling present around the mandible. Nose: Nose normal. No congestion or rhinorrhea. Mouth/Throat:      Mouth: Mucous membranes are moist.      Pharynx: Oropharynx is clear. No oropharyngeal exudate or posterior oropharyngeal erythema. Eyes:      General: No visual field deficit. Right eye: No discharge. Left eye: No discharge. Extraocular Movements: Extraocular movements intact. Conjunctiva/sclera: Conjunctivae normal.      Right eye: Right conjunctiva is not injected. Left eye: Left conjunctiva is not injected. Comments: Mild, bilateral servando-ocular edema noted. Cardiovascular:      Rate and Rhythm: Normal rate and regular rhythm.       Pulses:           Dorsalis pedis pulses are detected w/ Doppler on the right side and detected w/ Doppler on the left side. Posterior tibial pulses are detected w/ Doppler on the right side and detected w/ Doppler on the left side. Heart sounds: Normal heart sounds. No murmur heard. Pulmonary:      Effort: Pulmonary effort is normal.      Breath sounds: Normal breath sounds. No rales. Abdominal:      General: Abdomen is flat. There is no distension. Palpations: Abdomen is soft. Tenderness: There is no abdominal tenderness. Musculoskeletal:      Right forearm: No edema. Left forearm: No edema. Cervical back: Neck supple. No tenderness. Right lower leg: No tenderness. 4+ Edema present. Left lower leg: Tenderness present. 4+ Edema present. Right ankle: Swelling present. Left ankle: Swelling present. Right foot: Swelling present. Left foot: Swelling present. Feet:      Right foot:      Skin integrity: No ulcer or skin breakdown. Left foot:      Skin integrity: Ulcer and skin breakdown present. Lymphadenopathy:      Cervical: No cervical adenopathy. Skin:     Capillary Refill: Capillary refill takes less than 2 seconds. Findings: Rash present. Rash is crusting. Comments: Bilateral, open hydradenitis lesions of the axilla noted. Neurological:      General: No focal deficit present. Mental Status: She is alert and oriented to person, place, and time. Health Maintenance Due   Topic Date Due    COVID-19 Vaccine (1) Never done    Pneumococcal 65+ years (1 of 1 - PPSV23) Never done    Foot Exam Q1  09/17/2020    MICROALBUMIN Q1  02/27/2021    Flu Vaccine (1) Never done       Assessment and orders:     Encounter Diagnoses     ICD-10-CM ICD-9-CM   1. Lymphedema  I89.0 457.1   2. Pressure injury of skin of sacral region, unspecified injury stage  L89.159 707.03     707.20   3. Hydradenitis  L73.2 705.83   4.  Essential hypertension  I10 401.9 This is a 49-year-old female with long-standing lymphedema acutely worsened by likely medication non-compliance, as she reports not knowing where her Bumex is currently. Will write for a new prescription and have patient return in two days to assess for improvement. 1. Lymphedema: likely med non-compliance. Edema is 4+ bilaterally, the skin is very tough and the feet are weeping. No si/sx of heart failure. Pulses are present in the LE bilaterally, confirmed with doppler U/S.  - liver oil-zinc oxide ointment; Apply  to affected area as needed for Skin Irritation. Dispense: 60 g; Refill: 1  - bumetanide (BUMEX) 2 mg tablet; Take 1 Tablet by mouth daily. Indications: visible water retention  Dispense: 90 Tablet; Refill: 1    2. Pressure injury of skin of sacral region, unspecified injury stage: reported by patient. Will reassess at follow up. Will refer to wound care. - liver oil-zinc oxide ointment; Apply  to affected area as needed for Skin Irritation. Dispense: 60 g; Refill: 1  - REFERRAL TO WOUND CARE    3. Hydradenitis: recurrent, currently inflamed. Patient typically uses topical clindamycin. Given severity of acute episode will give a 10-day course of oral doxycycline given broader coverage.  - doxycycline (MONODOX) 100 mg capsule; Take 1 Capsule by mouth two (2) times a day. Dispense: 20 Capsule; Refill: 0  - REFERRAL TO WOUND CARE    4. Essential hypertension: /68 today. Will re-check at follow up in two days after taking Bumex. - bumetanide (BUMEX) 2 mg tablet; Take 1 Tablet by mouth daily. Indications: visible water retention  Dispense: 90 Tablet; Refill: 1              Plan of care:  Discussed diagnoses in detail with patient. Medication risks/benefits/side effects discussed with patient. All of the patient's questions were addressed. The patient understands and agrees with our plan of care.     The patient knows to call back if they are unsure of or forget any changes we discussed today or if the symptoms change. The patient received an After-Visit Summary which contains VS, orders, medication list and allergy list. This can be used as a \"mini-medical record\" should they have to seek medical care while out of town. Follow-up and Dispositions    · Return in about 2 days (around 2/17/2022) for edema re-check. .         Future Appointments   Date Time Provider Buddy Boles   2/17/2022  2:50 PM Tara Luong MD [facility] BS AMB   6/14/2022  1:40 PM [facility]       Signed By: Tito Funez MD     February 17, 2022

## 2022-02-16 ENCOUNTER — TELEPHONE (OUTPATIENT)
Dept: FAMILY MEDICINE CLINIC | Age: 77
End: 2022-02-16

## 2022-02-16 NOTE — TELEPHONE ENCOUNTER
Pt's Dtr called stating Dr Sachin Benítez wanted her to bring the Pt back in on 2-17-22 to assess the facial swelling with the mediation he prescribed her. Pleas contact Rogerio Horvath and let her know what time she can bring Pt in tomorrow.

## 2022-02-17 ENCOUNTER — TELEPHONE (OUTPATIENT)
Dept: FAMILY MEDICINE CLINIC | Age: 77
End: 2022-02-17

## 2022-02-17 NOTE — TELEPHONE ENCOUNTER
Attempted to call. No answer. Message left. Advise patient she does not need to come to her appt today. Advise she needs to  her medication as soon as possible and start taking it. Appt needs to be rescheduled next week with Dr Kishor Tate.

## 2022-02-17 NOTE — TELEPHONE ENCOUNTER
----- Message from Mehran Granda sent at 2/17/2022 10:25 AM EST -----  Subject: Message to Provider    QUESTIONS  Information for Provider? Patient was unable to  her medicine and   she would like to know if she still needs to come to her appt today.  ---------------------------------------------------------------------------  --------------  CALL BACK INFO  What is the best way for the office to contact you? Do not leave any   message, patient will call back for answer  Preferred Call Back Phone Number? [phone]  ---------------------------------------------------------------------------  --------------  SCRIPT ANSWERS  Relationship to Patient?  Self

## 2022-02-24 ENCOUNTER — OFFICE VISIT (OUTPATIENT)
Dept: FAMILY MEDICINE CLINIC | Age: 77
End: 2022-02-24
Payer: MEDICARE

## 2022-02-24 VITALS
DIASTOLIC BLOOD PRESSURE: 68 MMHG | WEIGHT: 134.4 LBS | RESPIRATION RATE: 20 BRPM | HEART RATE: 92 BPM | BODY MASS INDEX: 25.37 KG/M2 | TEMPERATURE: 97.9 F | OXYGEN SATURATION: 100 % | SYSTOLIC BLOOD PRESSURE: 124 MMHG | HEIGHT: 61 IN

## 2022-02-24 DIAGNOSIS — R60.1 GENERALIZED EDEMA: ICD-10-CM

## 2022-02-24 DIAGNOSIS — L73.2 HYDRADENITIS: ICD-10-CM

## 2022-02-24 DIAGNOSIS — I27.82 OTHER CHRONIC PULMONARY EMBOLISM WITHOUT ACUTE COR PULMONALE (HCC): ICD-10-CM

## 2022-02-24 DIAGNOSIS — E11.9 CONTROLLED TYPE 2 DIABETES MELLITUS WITHOUT COMPLICATION, WITHOUT LONG-TERM CURRENT USE OF INSULIN (HCC): ICD-10-CM

## 2022-02-24 DIAGNOSIS — R60.0 BILATERAL LOWER EXTREMITY EDEMA: ICD-10-CM

## 2022-02-24 DIAGNOSIS — N39.3 FEMALE STRESS INCONTINENCE: ICD-10-CM

## 2022-02-24 DIAGNOSIS — I89.0 LYMPHEDEMA: Primary | ICD-10-CM

## 2022-02-24 PROCEDURE — 1090F PRES/ABSN URINE INCON ASSESS: CPT | Performed by: STUDENT IN AN ORGANIZED HEALTH CARE EDUCATION/TRAINING PROGRAM

## 2022-02-24 PROCEDURE — G8427 DOCREV CUR MEDS BY ELIG CLIN: HCPCS | Performed by: STUDENT IN AN ORGANIZED HEALTH CARE EDUCATION/TRAINING PROGRAM

## 2022-02-24 PROCEDURE — 99214 OFFICE O/P EST MOD 30 MIN: CPT | Performed by: STUDENT IN AN ORGANIZED HEALTH CARE EDUCATION/TRAINING PROGRAM

## 2022-02-24 PROCEDURE — G9717 DOC PT DX DEP/BP F/U NT REQ: HCPCS | Performed by: STUDENT IN AN ORGANIZED HEALTH CARE EDUCATION/TRAINING PROGRAM

## 2022-02-24 PROCEDURE — G8752 SYS BP LESS 140: HCPCS | Performed by: STUDENT IN AN ORGANIZED HEALTH CARE EDUCATION/TRAINING PROGRAM

## 2022-02-24 PROCEDURE — G8419 CALC BMI OUT NRM PARAM NOF/U: HCPCS | Performed by: STUDENT IN AN ORGANIZED HEALTH CARE EDUCATION/TRAINING PROGRAM

## 2022-02-24 PROCEDURE — G8536 NO DOC ELDER MAL SCRN: HCPCS | Performed by: STUDENT IN AN ORGANIZED HEALTH CARE EDUCATION/TRAINING PROGRAM

## 2022-02-24 PROCEDURE — 1101F PT FALLS ASSESS-DOCD LE1/YR: CPT | Performed by: STUDENT IN AN ORGANIZED HEALTH CARE EDUCATION/TRAINING PROGRAM

## 2022-02-24 PROCEDURE — G8399 PT W/DXA RESULTS DOCUMENT: HCPCS | Performed by: STUDENT IN AN ORGANIZED HEALTH CARE EDUCATION/TRAINING PROGRAM

## 2022-02-24 PROCEDURE — G8754 DIAS BP LESS 90: HCPCS | Performed by: STUDENT IN AN ORGANIZED HEALTH CARE EDUCATION/TRAINING PROGRAM

## 2022-02-24 RX ORDER — CLINDAMYCIN PHOSPHATE 10 UG/ML
LOTION TOPICAL
Qty: 1 BOTTLE | Refills: 2 | Status: SHIPPED | OUTPATIENT
Start: 2022-02-24 | End: 2022-06-02

## 2022-02-24 NOTE — PROGRESS NOTES
1. Have you been to the ER, urgent care clinic since your last visit? Hospitalized since your last visit? No    2. Have you seen or consulted any other health care providers outside of the [address] [place] [address] since your last visit? Including any pap smears or colon screening.  No   Health Maintenance Due   Topic Date Due    COVID-19 Vaccine (1) Never done    Pneumococcal 65+ years (1 of 1 - PPSV23) Never done    Foot Exam Q1  09/17/2020    MICROALBUMIN Q1  02/27/2021    Flu Vaccine (1) Never done

## 2022-02-24 NOTE — PROGRESS NOTES
Progress Note    Patient: Neftali Davis MRN: [id number]  SSN: [id number]    YOB: 1945  Age: 68 y.o. Sex: female        Chief Complaint   Patient presents with    Follow-up         Subjective:     Problems addressed:  Encounter Diagnoses     ICD-10-CM ICD-9-CM   1. Lymphedema  I89.0 457.1   2. Hydradenitis  L73.2 705.83     Ms. Carlos Manuel Luna is a 68year old female presenting for follow-up after presenting last week with a 2-3 day history of facial swelling and long-standing lymphedema that had recently worsened over the prior 3-4 days and was causing the skin on her feet to split. She also complained of hydradenitis that was weeping, and reported developing a sacral pressure wound. Worsening edema likely d/t med non-compliance so pt restarted on her daily Bumex and told to return after taking a couple doses. She reports responding well to the Bumex, and claims she has at this point taken three doses. She has a reaction to the doxycycline that was prescribed for her hydradenitis, causing right servando-ocular swelling. She stopped taking the doxy but has been applying topical ointment to her axillary lesions. She has been using Desitin on her legs and she claims this has been helpful. The patient denies any other complaints, including shortness of breath or chest pain. Current and past medical information:    Current Medications after this visit[de-identified]     Current Outpatient Medications   Medication Sig    clindamycin (CLEOCIN T) 1 % lotion Apply to axilla twice a day    liver oil-zinc oxide ointment Apply  to affected area as needed for Skin Irritation. (Patient not taking: Reported on 2/24/2022)    doxycycline (MONODOX) 100 mg capsule Take 1 Capsule by mouth two (2) times a day. (Patient not taking: Reported on 2/24/2022)    bumetanide (BUMEX) 2 mg tablet Take 1 Tablet by mouth daily.  Indications: visible water retention (Patient not taking: Reported on 2/24/2022)    atorvastatin (LIPITOR) 80 mg tablet Take 1 Tablet by mouth daily. Indications: high cholesterol (Patient not taking: Reported on 2/24/2022)    apixaban (ELIQUIS) 5 mg tablet Take 1 Tablet by mouth every twelve (12) hours. Indications: deep vein thrombosis prevention (Patient not taking: Reported on 2/24/2022)    oxybutynin (DITROPAN) 5 mg tablet Take 1 Tablet by mouth three (3) times daily. Indications: an increased need to urinate often (Patient not taking: Reported on 2/24/2022)    ergocalciferol (ERGOCALCIFEROL) 1,250 mcg (50,000 unit) capsule Take 1 Capsule by mouth every seven (7) days. Indications: low vitamin D levels (Patient not taking: Reported on 2/24/2022)    fluticasone propionate (FLONASE) 50 mcg/actuation nasal spray 2 Sprays by Both Nostrils route daily. Indications: inflammation of the nose due to an allergy (Patient not taking: Reported on 2/24/2022)    gabapentin (NEURONTIN) 100 mg capsule Take 1 Capsule by mouth three (3) times daily as needed for Pain. (Patient not taking: Reported on 2/24/2022)    miscellaneous medical supply (Anti-Embolism Stockings) misc 1 Units by Does Not Apply route daily. Please measure and fit for one pair of below the knee anti-embolism stockings (Patient not taking: Reported on 2/24/2022)    triamcinolone acetonide (KENALOG) 0.1 % ointment Apply to bilateral medial feet twice daily, use thin layer (Patient not taking: Reported on 2/24/2022)    zinc oxide 20 % ointment Apply  to affected area as needed for Skin Irritation. (Patient not taking: Reported on 2/24/2022)    ferrous sulfate 325 mg (65 mg iron) tablet Take 1 Tab by mouth daily. (Patient not taking: Reported on 2/24/2022)    OTHER Shower Chair- Use As Directed (Patient not taking: Reported on 2/24/2022)   [address] (Patient not taking: Reported on 2/24/2022)     No current facility-administered medications for this visit.        Patient Active Problem List    Diagnosis Date Noted    Controlled type 2 diabetes mellitus without complication, [facility] long[facility]term [facility] 07/30/2018    Recurrent depression ([facility] 01/11/2018    Advanced care planning/counseling discussion 06/13/2016    H/O Pulmonary embolism (1/2015) 01/05/2015    Edema 08/27/2014    Hydradenitis 02/01/2012    HTN (hypertension) 01/21/2011    AR (allergic rhinitis) 05/21/2010    Eczema 05/21/2010    Depression 05/21/2010    Female stress incontinence 05/21/2010    Dyslipidemia 05/21/2010       Past Medical History:   Diagnosis Date    Arthritis     [facility]     states she is \"pre-diabetic\", diet controlled    frequency     H/O blood clots     right side of body    Heart attack ([facility] 1/2014    Hydradenitis 2/1/2012    Hypercholesterolemia     Hypertension     Ill-defined condition     edema of legs       Allergies   Allergen Reactions    Ciprofloxacin Other (comments)     Vomiting    Doxycycline Other (comments)     Vomiting    Pcn [Penicillins] Rash       History reviewed. No pertinent surgical history. Social History     Socioeconomic History    Marital status: LEGALLY    Tobacco Use    Smoking status: Never Smoker    Smokeless tobacco: Never Used   Substance and Sexual Activity    Alcohol use: No     Alcohol/week: 0.0 standard drinks    Drug use: No    Sexual activity: Never         Review of Systems   Constitutional: Negative for chills, fever and malaise/fatigue. Respiratory: Negative for cough and shortness of breath. Cardiovascular: Positive for leg swelling. Negative for chest pain and palpitations. Gastrointestinal: Negative for abdominal pain, nausea and vomiting. Genitourinary: Negative for dysuria and urgency. Neurological: Negative for dizziness, tingling, focal weakness and headaches.         Objective:     Vitals:    02/24/22 1323   BP: 124/68   Pulse: 92   Resp: 20   Temp: 97.9 °F (36.6 °C)   SpO2: 100%   Weight: 134 lb 6.4 oz (61 kg)   Height: 5' 1\" (1.549 m)      Body mass index is 25.39 kg/m². Physical Exam  Constitutional:       General: She is not in acute distress. Appearance: Normal appearance. Eyes:      General:         Right eye: No discharge. Left eye: No discharge. Extraocular Movements: Extraocular movements intact. Conjunctiva/sclera: Conjunctivae normal.   Cardiovascular:      Rate and Rhythm: Normal rate and regular rhythm. Pulses: Normal pulses. Heart sounds: Normal heart sounds. No murmur heard. No friction rub. Pulmonary:      Effort: Pulmonary effort is normal.      Breath sounds: Normal breath sounds. No wheezing, rhonchi or rales. Abdominal:      General: Abdomen is flat. There is no distension. Palpations: Abdomen is soft. Musculoskeletal:      Cervical back: Neck supple. No rigidity or tenderness. Right lower leg: Swelling and tenderness present. 4+ Pitting Edema present. Left lower leg: Swelling and tenderness present. 4+ Pitting Edema present. Right ankle: Swelling present. Left ankle: Swelling present. Right foot: Swelling present. Left foot: Swelling present. Comments: Edema improved from prior with significantly less weeping. Skin is dry with crusting. Lymphadenopathy:      Cervical: No cervical adenopathy. Skin:     General: Skin is warm. Capillary Refill: Capillary refill takes less than 2 seconds. Findings: Lesion and rash present. Rash is crusting and pustular. Comments: Bilateral, open/crusting hydradenitic lesions in the axilla bilateral, subjectively better-appearing from prior visit. Neurological:      General: No focal deficit present. Mental Status: She is alert and oriented to person, place, and time.           Health Maintenance Due   Topic Date Due    COVID-19 Vaccine (1) Never done    Pneumococcal 65+ years (1 of 1 - PPSV23) Never done    Foot Exam Q1  09/17/2020    MICROALBUMIN Q1  02/27/2021    Flu Vaccine (1) Never done Assessment and orders:     Encounter Diagnoses     ICD-10-CM ICD-9-CM   1. Lymphedema  I89.0 457.1   2. Hydradenitis  L73.2 705.83       This is a 80-year-old female with long-standing lymphedema acutely worsened by likely medication non-compliance, with interval improvement since her last visit given diuretic therapy. 1. Lymphedema: Improved, still extensive, 4+ bilaterally but with significantly less weeping.  - Continue Bumex 1mg daily    2. Hydradenitis: Pt had a reaction to oral doxycycline causing right eye swelling. Will start her on topical Clindamycin and have her call back if symptoms worsen, which may indicate a need to start alternate PO regimen with clindamycin and rifampin. - clindamycin (CLEOCIN T) 1 % lotion; Apply to axilla twice a day  Dispense: 1 Bottle; Refill: 2          Plan of care:  Discussed diagnoses in detail with patient. Medication risks/benefits/side effects discussed with patient. All of the patient's questions were addressed. The patient understands and agrees with our plan of care. The patient knows to call back if they are unsure of or forget any changes we discussed today or if the symptoms change. The patient received an After-Visit Summary which contains VS, orders, medication list and allergy list. This can be used as a \"mini-medical record\" should they have to seek medical care while out of town. Follow-up and Dispositions    · Return in about 3 months (around 5/24/2022) for Routine follow-up. Return sooner if symptoms worsen or fail to improve. .         Future Appointments   Date Time Provider Buddy Boles   3/3/2022  1:30 PM Mallory Corbin MD [address] [place] [address]   6/14/2022  1:40 PM Randall Sarmiento MD [facility] AMB       Signed By: Foreign Patterson MD     February 24, 2022

## 2022-02-24 NOTE — PATIENT INSTRUCTIONS
Hidradenitis Suppurativa: Care Instructions  Your Care Instructions     Hidradenitis suppurativa (say \"uha-zldb-il-NY-tus sup-reji-uh-TY-vuh\") is a skin condition that causes lumps on the skin that look like pimples or boils. The lumps are usually painful and can break open and drain blood and bad-smelling pus. The condition can come and go for many years. Treatment for this condition may include antibiotics and other medicines. You may need surgery to remove the lumps. Home care includes wearing loose-fitting clothes and washing the area gently. You can help prevent lumps from coming back by staying at a healthy weight and not smoking. Doctors don't know exactly how this condition starts. But they do know that something irritates and inflames the hair follicles, causing them to swell and form lumps. This skin condition can't be spread from person to person (isn't contagious). Follow-up care is a key part of your treatment and safety. Be sure to make and go to all appointments, and call your doctor if you are having problems. It's also a good idea to know your test results and keep a list of the medicines you take. How can you care for yourself at home? Skin care    · Wash the area every day with mild soap. Use your hands rather than a washcloth or sponge when you wash that part of your body.     · Leave the affected areas uncovered when you can. If you have lumps that are draining, you can cover them with a bandage or other dressing. Put petroleum jelly (such as Vaseline) on the dressing to help keep it from sticking.     · Wear-loose fitting clothes that don't rub against the area. Avoid activities that cause skin to rub together.     · If you have pain, try a warm compress. Soak a towel or washcloth in warm water, wring it out, and place it on the affected skin for about 10 minutes. Medicines    · Be safe with medicines. Take your medicines exactly as prescribed.  Call your doctor if you think you are having a problem with your medicine. You will get more details on the specific medicines your doctor prescribes.     · If your doctor prescribed antibiotics, take them as directed. Do not stop taking them just because you feel better. You need to take the full course of antibiotics. Lifestyle choices    · If you smoke, think about quitting. Smoking can make the condition worse. If you need help quitting, talk to your doctor about stop-smoking programs and medicines. These can increase your chances of quitting for good.     · Stay at a healthy weight, or lose weight, by eating healthy foods and being physically active. Being overweight could make this condition worse. When should you call for help? Call your doctor now or seek immediate medical care if:    · You have symptoms of infection, such as:  ? Increased pain, swelling, warmth, or redness. ? Red streaks leading from the area. ? Pus draining from the area. ? A fever. Watch closely for changes in your health, and be sure to contact your doctor if:    · You do not get better as expected. Where can you learn more? Go to http://www.gray.com/  Enter X869 in the search box to learn more about \"Hidradenitis Suppurativa: Care Instructions. \"  Current as of: March 3, 2021               Content Version: 13.0  © 2006-2021 Healthwise[employer]. Care instructions adapted under license by [employer] (which disclaims liability or warranty for this information). If you have questions about a medical condition or this instruction, always ask your healthcare professional. [place] [address] any warranty or liability for your use of this information.

## 2022-03-03 ENCOUNTER — HOSPITAL ENCOUNTER (OUTPATIENT)
Dept: WOUND CARE | Age: 77
Discharge: HOME | End: 2022-03-03

## 2022-03-03 NOTE — DISCHARGE INSTRUCTIONS
Discharge Instructions for  [facility]  [address] [place], [address] [facility]  Telephone: [phone]    [address] [place] [address] Information: Should you experience any significant changes in your wound(s) or have questions about your wound care, please contact the [facility] at [address] [place] [address] 8:00 am - 4:30. If you need help with your wound outside these hours and cannot wait until we are again available, contact your PCP or go to the hospital emergency room. NAME:  Lilly Dodge  YOB: 1945  DATE:  3/3/2022    : Alejandra     [] Wound and dressing supply provider: {DME provider:58971}  [] Home Healthcare: {Home Health :74976}    Wound Cleansing:   Do not scrub or use excessive force. Cleanse wound prior to applying a clean dressing with:  [] Normal Saline   [] Keep Wound Dry in Shower - may purchase a cast cover at local pharmacy     [] Cleanse wound with Mild Soap & Water    [] May Shower at Discharge: remove dressing 1st, redress wound right after with a new dressing  [] Do not shower  [] cleanse with baby shampoo lather leave 2-3 then rinse with water    Topical Treatments:  Do not apply lotions, creams, or ointments to wound bed unless directed.    [] Apply moisturizing lotion {nobles lotions :06993} to skin surrounding the wound prior to dressing change.  [] Bactroban/Mupirocin  [] Gentamicin ointment    [] Gentian violet to wound bed and periwound  [] Other:     Dressings:                   Wound Location ***     Apply Primary Dressing:      []        Pack wound loosely with:                                                   [] Iodoform       [] Plain Packing       [] Mesalt        [] Other:    Cover and Secure with:  [] Gauze [] ABD [] exudry     [] Brown [] Kerlix [] Mepilex Border  [] Ace Wrap [] Roll Tape   [] Other:      Change dressing:   [] Daily      [] Every Other Day   [] Three times per week  [] Once a week   [] Do Not Change Dressing     [] Other:    Pressure Relief:  [] When sitting, shift position or do seat lifts every 15 minutes.  [] Wheelchair cushion   [] Specialty Bed/Mattress  [] Turn every 2 hours when in bed. Avoid position directing pressure on wound site. Off-Loading:   [] Off-loading when [] walking  [] in bed [] sitting     Edema Control: Every morning immediately when getting up should be applied to affected leg(s) from mid foot to knee making sure to cover the heel. Remove every night before going to bed if desired. Apply: [] Compression Stocking      []Right Leg           []Left Leg   [] Tubigrip {tubigrip:01776}   [] Right Leg Single Layer  [] Right Leg Double Layer                              [] Left Leg Single Layer [] Left Leg Double Layer      Compression: Do not get leg(s) with wrap wet. If wraps become too tight call the center or completely remove the wrap. Apply: [] Three Layer Compression Wrap  []RightLeg []Left Leg  [] Four layer Compression Wrap      []RightLeg []Left Leg   []  Unna's boot                                  [] Right Leg   [] Left Leg       [] Elevate leg(s) above the level of the heart when sitting. [] Avoid prolonged standing in one place. Dietary:  [] Diet as tolerated [] Diabetic Diet   [] Increase Protein: examples (Meat, cheese, eggs, greek yogurt, fish, nuts)   [] Osbaldo Therapeutic Nutrition Powder  [] Other:  [] Dial a Dietician : Call [employer] at [phone] enter code ([phone]) when prompted. M-F 9am-5pm EST. Return Appointment:  [] Nurse Visit at wound center in *** days   [] Return Appointment: With {giuliano OSULLIVAN list :58711}  [] Ordered tests:     Electronically signed on 3/3/2022 at 8:20 AM     PLEASE NOTE: IF YOU ARE UNABLE TO [address], CONTINUE TO USE  [address] UNTIL YOU ARE ABLE TO [facility]. IT IS MOST IMPORTANT TO KEEP THE WOUND COVERED AT ALL TIMES.      Physician Signature:_______________________  Dr. Timothy Evans

## 2022-03-17 ENCOUNTER — HOSPITAL ENCOUNTER (OUTPATIENT)
Dept: WOUND CARE | Age: 77
Discharge: HOME | End: 2022-03-17
Payer: MEDICARE

## 2022-03-17 VITALS
DIASTOLIC BLOOD PRESSURE: 68 MMHG | HEIGHT: 61 IN | SYSTOLIC BLOOD PRESSURE: 105 MMHG | BODY MASS INDEX: 27.94 KG/M2 | WEIGHT: 148 LBS | HEART RATE: 89 BPM | TEMPERATURE: 97.5 F | RESPIRATION RATE: 18 BRPM

## 2022-03-17 PROBLEM — I89.0 LYMPHEDEMA: Status: ACTIVE | Noted: 2022-03-17

## 2022-03-17 PROBLEM — L02.31 ABSCESS OF MULTIPLE SITES OF BUTTOCK: Status: ACTIVE | Noted: 2022-03-17

## 2022-03-17 PROBLEM — S01.80XA OPEN WOUND OF CHIN: Status: ACTIVE | Noted: 2022-03-17

## 2022-03-17 PROBLEM — S41.101A OPEN WOUND OF RIGHT AXILLARY REGION: Status: ACTIVE | Noted: 2022-03-17

## 2022-03-17 PROBLEM — L97.221 CALF ULCER, LEFT, LIMITED TO BREAKDOWN OF SKIN (HCC): Status: ACTIVE | Noted: 2022-03-17

## 2022-03-17 PROBLEM — S41.102A OPEN WOUND OF LEFT AXILLARY REGION: Status: ACTIVE | Noted: 2022-03-17

## 2022-03-17 PROCEDURE — 99204 OFFICE O/P NEW MOD 45 MIN: CPT

## 2022-03-17 PROCEDURE — 74011000250 HC RX REV CODE- 250: Performed by: FAMILY MEDICINE

## 2022-03-17 RX ORDER — LIDOCAINE HYDROCHLORIDE 40 MG/ML
SOLUTION TOPICAL ONCE
OUTPATIENT
Start: 2022-03-17 | End: 2022-03-17

## 2022-03-17 RX ORDER — MUPIROCIN 20 MG/G
OINTMENT TOPICAL ONCE
OUTPATIENT
Start: 2022-03-17 | End: 2022-03-17

## 2022-03-17 RX ORDER — GENTAMICIN SULFATE 1 MG/G
OINTMENT TOPICAL ONCE
OUTPATIENT
Start: 2022-03-17 | End: 2022-03-17

## 2022-03-17 RX ORDER — LIDOCAINE 40 MG/G
CREAM TOPICAL ONCE
OUTPATIENT
Start: 2022-03-17 | End: 2022-03-17

## 2022-03-17 RX ORDER — BACITRACIN 500 [USP'U]/G
OINTMENT TOPICAL ONCE
OUTPATIENT
Start: 2022-03-17 | End: 2022-03-17

## 2022-03-17 RX ORDER — LIDOCAINE HYDROCHLORIDE 20 MG/ML
JELLY TOPICAL ONCE
OUTPATIENT
Start: 2022-03-17 | End: 2022-03-17

## 2022-03-17 RX ORDER — LIDOCAINE 50 MG/G
OINTMENT TOPICAL ONCE
OUTPATIENT
Start: 2022-03-17 | End: 2022-03-17

## 2022-03-17 RX ORDER — CLOBETASOL PROPIONATE 0.5 MG/G
OINTMENT TOPICAL ONCE
OUTPATIENT
Start: 2022-03-17 | End: 2022-03-17

## 2022-03-17 RX ORDER — TRIAMCINOLONE ACETONIDE 1 MG/G
OINTMENT TOPICAL ONCE
OUTPATIENT
Start: 2022-03-17 | End: 2022-03-17

## 2022-03-17 RX ADMIN — Medication: at 14:38

## 2022-03-17 NOTE — PROGRESS NOTES
Wound Center  Progress Note     Subjective:     Chief Complaint:  Judie Dorsey is a 68 y.o.  female  with multiple wound of longstanding duration. Referred by:  Dr Bridgett Alva    HPI:     Long history of hidradenitis  Wounds in axilla, chin, buttocks,  Also has wound on legs  Here to assess    Has not been taking any of her meds for last several days due to swelling of right ear- suspects its one of her medicatinos- following up with pcp for this reason      History/Chart/Medications reviewed    Wound caused by: hidradenitis  Current wound care:See flowsheet  Offloading wound: n/a  Appetite: fair  Wound associated pain: See flowsheet  Diabetic: pre-  Smoker: no  ROS: no N/V/D, no T/chills; no local rash, no chest pain or shortness of breath, no headache or dizzyness         Objective:     Physical Exam:   See flowsheet / nursing notes for vitals  Visit Vitals  /68 (BP 1 Location: Right upper arm, BP Patient Position: Sitting)   Pulse 89   Temp 97.5 °F (36.4 °C)   Resp 18   Ht 5' 1\" (1.549 m)   Wt 67.1 kg (148 lb)   BMI 27.96 kg/m²     General: NAD. Hygiene good  Psych: cooperative. No anxiety or depression. Normal mood and affect. Neuro: alert and oriented to person/place/situation. Otherwise nonfocal.  Derm: Normal  turgor for age, dry skin  HEENT: Normocephalic, atraumatic. EOMI. Conjunctiva clear. No scleral icterus. Neck: Normal range of motion. No masses. Chest: Respirations nonlabored  Lower extremities: color normal; temperature normal.  Edema both legs  Ry, hyperpigmented and hyperlichenified lower part of legs  Some weeping noted from left leg with wound    Ulcer Description:   See Flowsheet           Data Review:          WOUND POA CONDITIONS    Wound Thigh Left;Medial;Upper (Active)   Number of days: 2635       Wound Buttocks Left (Active)   Number of days: 2635       Wound Leg lower Left; Anterior #1 (Active)   Wound Image   03/17/22 1419   Dressing/Treatment Xeroform;Gauze dressing/dressing sponge 03/17/22 1430   Wound Length (cm) 1.7 cm 03/17/22 1419   Wound Width (cm) 0.3 cm 03/17/22 1419   Wound Depth (cm) 0.1 cm 03/17/22 1419   Wound Surface Area (cm^2) 0.51 cm^2 03/17/22 1419   Wound Volume (cm^3) 0.051 cm^3 03/17/22 1419   Wound Assessment Pink/red;Epithelialization 03/17/22 1419   Drainage Amount Small 03/17/22 1419   Drainage Description Serosanguinous 03/17/22 1419   Wound Odor None 03/17/22 1419   Edges Flat/open edges 03/17/22 1419   Wound Thickness Description Partial thickness 03/17/22 1419   Number of days: 7       Wound Axilla Right #2 (Active)   Wound Image   03/17/22 1419   Dressing/Treatment Alginate; Foam 03/17/22 1430   Wound Length (cm) 5 cm 03/17/22 1419   Wound Width (cm) 9.5 cm 03/17/22 1419   Wound Depth (cm) 0.7 cm 03/17/22 1419   Wound Surface Area (cm^2) 47.5 cm^2 03/17/22 1419   Wound Volume (cm^3) 33.25 cm^3 03/17/22 1419   Wound Assessment Other (Comment) 03/17/22 1419   Drainage Amount Moderate 03/17/22 1419   Drainage Description Serous;Purulent 03/17/22 1419   Wound Odor None 03/17/22 1419   Edges Attached edges 03/17/22 1419   Wound Thickness Description Full thickness 03/17/22 1419   Number of days: 7       Wound Axilla Left #3 (Active)   Wound Image   03/17/22 1419   Dressing/Treatment Alginate; Foam 03/17/22 1430   Wound Length (cm) 6 cm 03/17/22 1419   Wound Width (cm) 12 cm 03/17/22 1419   Wound Depth (cm) 1 cm 03/17/22 1419   Wound Surface Area (cm^2) 72 cm^2 03/17/22 1419   Wound Volume (cm^3) 72 cm^3 03/17/22 1419   Wound Assessment Other (Comment) 03/17/22 1359   Drainage Amount Moderate 03/17/22 1359   Drainage Description Purulent;Serous 03/17/22 1359   Wound Odor None 03/17/22 1359   Renée-Wound/Incision Assessment Other (Comment) 03/17/22 1359   Edges Attached edges 03/17/22 1359   Wound Thickness Description Full thickness 03/17/22 1359   Number of days: 7       Wound Chin #4 (Active)   Wound Image   03/17/22 1359   Dressing/Treatment Alginate; Foam 03/17/22 1430   Wound Length (cm) 0.2 cm 03/17/22 1359   Wound Width (cm) 0.2 cm 03/17/22 1359   Wound Depth (cm) 0.1 cm 03/17/22 1359   Wound Surface Area (cm^2) 0.04 cm^2 03/17/22 1359   Wound Volume (cm^3) 0.004 cm^3 03/17/22 1359   Wound Assessment Other (Comment) 03/17/22 1359   Drainage Amount Moderate 03/17/22 1359   Drainage Description Purulent 03/17/22 1359   Wound Odor None 03/17/22 1359   Renée-Wound/Incision Assessment Intact 03/17/22 1359   Edges Attached edges 03/17/22 1359   Wound Thickness Description Full thickness 03/17/22 1359   Number of days: 7       Wound Buttocks Right #5 (Active)   Wound Image   03/17/22 1359   Dressing/Treatment Alginate; Foam 03/17/22 1430   Wound Length (cm) 0.1 cm 03/17/22 1359   Wound Width (cm) 0.1 cm 03/17/22 1359   Wound Depth (cm) 0.1 cm 03/17/22 1359   Wound Surface Area (cm^2) 0.01 cm^2 03/17/22 1359   Wound Volume (cm^3) 0.001 cm^3 03/17/22 1359   Wound Assessment Other (Comment) 03/17/22 1359   Drainage Amount Moderate 03/17/22 1359   Drainage Description Purulent 03/17/22 1359   Wound Odor None 03/17/22 1359   Renée-Wound/Incision Assessment Blanchable erythema 03/17/22 1359   Edges Attached edges 03/17/22 1359   Wound Thickness Description Full thickness 03/17/22 1359   Number of days: 7           Assessment/Plan     68 y.o. female with     Wounds due to  Hidradenitis  B/l Axilla- draining - culture done  Chin  Buttocks      Refer to Dr Zane Carcamo      Leg wounds/swelling  unnas both legs  Order [facility]        Following discussed with patient / dtr  Needs :  Serial debridement- debrided today- see note below    Good local wound care  Dressing:  unnas  Frequency : three times a week    Order/initiate Home Health      -Edema management  Elevate legs  compression        -Good offloading    Patient/ dtr understood and agrees with plan. Questions answered. Serial visits and serial debridements also discussed.     Follow up with me in 2 week        Procedure: n/a      -------    Past Medical History:   Diagnosis Date    Arthritis     Diabetes ([facility]     states she is \"pre-diabetic\", diet controlled    frequency     H/O blood clots     right side of body    Heart attack ([facility] 1/2014    Hydradenitis 2/1/2012    Hypercholesterolemia     Hypertension     Ill-defined condition     edema of legs    Thromboembolus ([facility]       History reviewed. No pertinent surgical history. Family History   Problem Relation Age of Onset    Heart Disease Mother     Cancer Mother     Asthma Father     Alcohol abuse Brother       Social History     Tobacco Use    Smoking status: Never Smoker    Smokeless tobacco: Never Used   Substance Use Topics    Alcohol use: No     Alcohol/week: 0.0 standard drinks       Prior to Admission medications    Medication Sig Start Date End Date Taking? Authorizing Provider   OTHER Pull Ups 2/25/22  Yes Jarek Rodriguez MD   clindamycin (CLEOCIN T) 1 % lotion Apply to axilla twice a day 2/24/22  Yes John Fernando MD   liver oil-zinc oxide ointment Apply  to affected area as needed for Skin Irritation. 2/15/22  Yes John Fernando MD   bumetanide (BUMEX) 2 mg tablet Take 1 Tablet by mouth daily. Indications: visible water retention 2/15/22  Yes John Fernando MD   atorvastatin (LIPITOR) 80 mg tablet Take 1 Tablet by mouth daily. Indications: high cholesterol 12/14/21  Yes Jarek Rodriguez MD   apixaban Verneita Prabha) 5 mg tablet Take 1 Tablet by mouth every twelve (12) hours. Indications: deep vein thrombosis prevention 12/14/21  Yes Jarek Rodriguez MD   oxybutynin (DITROPAN) 5 mg tablet Take 1 Tablet by mouth three (3) times daily. Indications: an increased need to urinate often 12/14/21  Yes Jarek Rodriguez MD   ergocalciferol (ERGOCALCIFEROL) 1,250 mcg (50,000 unit) capsule Take 1 Capsule by mouth every seven (7) days.  Indications: low vitamin D levels 12/14/21  Yes Jarek Rodriguez MD   fluticasone propionate (FLONASE) 50 mcg/actuation nasal spray 2 Sprays by Both Nostrils route daily. Indications: inflammation of the nose due to an allergy 12/14/21  Yes Radha Wynn MD   gabapentin (NEURONTIN) 100 mg capsule Take 1 Capsule by mouth three (3) times daily as needed for Pain. 12/14/21  Yes Radha Wynn MD   triamcinolone acetonide (KENALOG) 0.1 % ointment Apply to bilateral medial feet twice daily, use thin layer 3/12/21  Yes Radha Wynn MD   zinc oxide 20 % ointment Apply  to affected area as needed for Skin Irritation. 3/12/21  Yes Radha Wynn MD   ferrous sulfate 325 mg (65 mg iron) tablet Take 1 Tab by mouth daily. 2/27/20  Yes Radha Wynn MD   OTHER Shower Chair- Use As Directed 12/20/18  Yes Radha Wynn MD   miscellaneous medical supply (Anti-Embolism Stockings) misc Please measure and fit for one pair of below the knee anti-embolism stockings 2/25/22 2/25/23  Radha Wynn MD     Allergies   Allergen Reactions    Ciprofloxacin Other (comments)     Vomiting    Doxycycline Other (comments)     Facial swelling.     Pcn [Penicillins] Rash        Signed By: Chante Veras MD     March 17, 2022

## 2022-03-17 NOTE — WOUND CARE
03/17/22 1419   Wound Leg lower Left; Anterior #1   Date First Assessed/Time First Assessed: 03/17/22 1417   Primary Wound Type: Other (comment)  Location: Leg lower  Wound Location Orientation: Left; Anterior  Wound Description: #1   Wound Image    Wound Length (cm) 1.7 cm   Wound Width (cm) 0.3 cm   Wound Depth (cm) 0.1 cm   Wound Surface Area (cm^2) 0.51 cm^2   Wound Volume (cm^3) 0.051 cm^3   Wound Assessment Pink/red;Epithelialization   Drainage Amount Small   Drainage Description Serosanguinous   Wound Odor None   Edges Flat/open edges   Wound Thickness Description Partial thickness   Wound Axilla Right #2   Date First Assessed/Time First Assessed: 03/17/22 1418   Primary Wound Type: Other (comment)  Location: Axilla  Wound Location Orientation: Right  Wound Description: #2   Wound Image    Wound Length (cm) 5 cm   Wound Width (cm) 9.5 cm   Wound Depth (cm) 0.7 cm   Wound Surface Area (cm^2) 47.5 cm^2   Wound Volume (cm^3) 33.25 cm^3   Wound Assessment Other (Comment)   Drainage Amount Moderate   Drainage Description Serous;Purulent   Wound Odor None   Renée-Wound/Incision Assessment   (Scarring)   Edges Attached edges   Wound Thickness Description Full thickness   Wound Axilla Left #3   Date First Assessed/Time First Assessed: 03/17/22 1419   Present on Hospital Admission: Yes  Primary Wound Type:  Other (comment)  Location: Axilla  Wound Location Orientation: Left  Wound Description: #3   Wound Image    Wound Length (cm) 6 cm   Wound Width (cm) 12 cm   Wound Depth (cm) 1 cm   Wound Surface Area (cm^2) 72 cm^2   Wound Volume (cm^3) 72 cm^3     Visit Vitals  /68 (BP 1 Location: Right upper arm, BP Patient Position: Sitting)   Pulse 89   Temp 97.5 °F (36.4 °C)   Resp 18   Ht 5' 1\" (1.549 m)   Wt 67.1 kg (148 lb)   BMI 27.96 kg/m²   '

## 2022-03-17 NOTE — WOUND CARE
[facility]  [address] [place], [address] [facility]  Telephone: [phone]    NAME:  Maryann Macias OF BIRTH:  1945  DATE:  3/17/2022    Case Management Note    Wound Care Management Outside of Clinic:  [] Wound and dressing supply provider:   [] Patient/family performs own wound care  [x] Home Healthcare: TO: Anika Dumont [facility] - referral for lymphedema nurse and wrap change   3/21/22 awaiting MD note needed to send referral  3/24/22 referral sent  [] Dressing changes performed once a week at wound care center    Advanced/Additional Wound Treatment (If applicable):   [] Vascular Referral:   [] Plastic Surgery Referral:  [] Dermatology Referral:  [] SNAP Vac:  [] Wound Vac:  [] Skin Substitute:   [] Pressure Reducing Surfaces:  [] Wheelchair/Cushion Assessment:   [] HBO Therapy:   [] IV Antibiotics:  [x] Other: Surgical consult to Dr. Jackie Willis regarding hidradenitis  3/21/22 awaiting MD note needed to send referral  3/28/2022 referral sent to Dr. Jackie Willis    Other Notes:  []

## 2022-03-18 NOTE — WOUND CARE
03/17/22 1430   Right Leg Edema Point of Measurement   Compression Therapy Unna boot   Left Leg Edema Point of Measurement   Compression Therapy Unna boot   Wound Leg lower Left; Anterior #1   Date First Assessed/Time First Assessed: 03/17/22 1417   Primary Wound Type: Other (comment)  Location: Leg lower  Wound Location Orientation: Left; Anterior  Wound Description: #1   Dressing/Treatment Xeroform;Gauze dressing/dressing sponge   Wound Axilla Right #2   Date First Assessed/Time First Assessed: 03/17/22 1418   Primary Wound Type: Other (comment)  Location: Axilla  Wound Location Orientation: Right  Wound Description: #2   Dressing/Treatment Alginate; Foam   Wound Axilla Left #3   Date First Assessed/Time First Assessed: 03/17/22 1419   Present on Hospital Admission: Yes  Primary Wound Type: Other (comment)  Location: Axilla  Wound Location Orientation: Left  Wound Description: #3   Dressing/Treatment Alginate; Foam   Wound Chin #4   Date First Assessed/Time First Assessed: 03/17/22 1419   Present on Hospital Admission: Yes  Primary Wound Type: Other (comment)  Location: Chin  Wound Description: #4   Dressing/Treatment Alginate; Foam   Wound Buttocks Right #5   Date First Assessed/Time First Assessed: 03/17/22 1425   Present on Hospital Admission: Yes  Primary Wound Type: Abscess  Location: Buttocks  Wound Location Orientation: Right  Wound Description: #5   Dressing/Treatment Alginate; Foam     Unna Boot Application   Below Knee    NAME:  Julisa More  YOB: 1945  MEDICAL RECORD NUMBER:  [id number]  DATE:  3/17/2022    Applied moisturizing agent to dry skin as needed. Appied primary and secondary dressing as ordered. Applied Unna roll from toes to knee overlapping each time. Applied ace wrap or coban from toes to below the knee. Secured with tape and/or metal clips covered with tape. Instructed patient/caregiver to keep dressing dry and intact. DO NOT REMOVE DRESSING.    Instructed pt/family/caregiver to report excessive draining, loose bandage, wet dressing, severe pain or tingling in toes. Applied [employer] dressing below the knee to bilateral lower legs. Unna Boot(s) were applied per  Guidelines.     Response to treatment: Well tolerated by patient      Electronically signed by Komal Faustin RN on 3/18/2022 at 8:19 AM

## 2022-03-19 PROBLEM — E11.9 CONTROLLED TYPE 2 DIABETES MELLITUS WITHOUT COMPLICATION, WITHOUT LONG-TERM CURRENT USE OF INSULIN (HCC): Status: ACTIVE | Noted: 2018-07-30

## 2022-03-20 PROBLEM — F33.9 RECURRENT DEPRESSION (HCC): Status: ACTIVE | Noted: 2018-01-11

## 2022-03-24 PROBLEM — S41.102A OPEN WOUND OF LEFT AXILLARY REGION: Status: ACTIVE | Noted: 2022-03-17

## 2022-03-24 PROBLEM — S01.80XA OPEN WOUND OF CHIN: Status: ACTIVE | Noted: 2022-03-17

## 2022-03-24 PROBLEM — I89.0 LYMPHEDEMA: Status: ACTIVE | Noted: 2022-03-17

## 2022-03-24 PROBLEM — L02.31 ABSCESS OF MULTIPLE SITES OF BUTTOCK: Status: ACTIVE | Noted: 2022-03-17

## 2022-03-24 PROBLEM — L97.221 CALF ULCER, LEFT, LIMITED TO BREAKDOWN OF SKIN (HCC): Status: ACTIVE | Noted: 2022-03-17

## 2022-03-24 PROBLEM — S41.101A OPEN WOUND OF RIGHT AXILLARY REGION: Status: ACTIVE | Noted: 2022-03-17

## 2022-03-31 ENCOUNTER — HOSPITAL ENCOUNTER (OUTPATIENT)
Dept: WOUND CARE | Age: 77
Discharge: HOME | End: 2022-03-31

## 2022-03-31 DIAGNOSIS — L97.221 CALF ULCER, LEFT, LIMITED TO BREAKDOWN OF SKIN (HCC): ICD-10-CM

## 2022-03-31 DIAGNOSIS — S41.102A OPEN WOUND OF LEFT AXILLARY REGION, INITIAL ENCOUNTER: ICD-10-CM

## 2022-03-31 DIAGNOSIS — S41.101A OPEN WOUND OF RIGHT AXILLARY REGION, INITIAL ENCOUNTER: ICD-10-CM

## 2022-03-31 DIAGNOSIS — S01.80XA OPEN WOUND OF CHIN, INITIAL ENCOUNTER: ICD-10-CM

## 2022-03-31 NOTE — DISCHARGE INSTRUCTIONS
Discharge Instructions for  [facility]  [address] [place], [address] [facility]  Telephone: [phone]    [address] [place] [address] Information: Should you experience any significant changes in your wound(s) or have questions about your wound care, please contact the [facility] Main at [address] [place] [address] 8:00 am - 4:30. If you need help with your wound outside these hours and cannot wait until we are again available, contact your PCP or go to the hospital emergency room. 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 Dwight John:   Do not scrub or use excessive force. Cleanse wound prior to applying a clean dressing with:  [x] Cleanse wound with Mild Soap & Water      Topical Treatments:  Do not apply lotions, creams, or ointments to wound bed unless directed.    [x] Apply moisturizing lotion A&D ointment to skin surrounding the wound prior to dressing change. - daily to bilateral lower legs  [] Other:     Dressings:                Wound Location Chin, Left and Right Axilla, Right Buttocks          Apply Primary Dressing:      [x] alginate     Cover and Secure with:  [x] Gauze [] ABD  [] exudry     [] Brown [] Kerlix          [x] Mepilex Border  [] Ace Wrap [] Other:   [] Roll Tape       Change dressing:   [] Daily      [] Every Other Day   [x] Three times per week  [] Once a week   [] Do Not Change Dressing     [] Other:    Dressings:                   Wound Location Left Anterior lower Leg    Apply Primary Dressing:      [x]  Xeroform, unna boot    Cover and Secure with:  [x] Gauze [] ABD [] exudry     [] Brown [] Kerlix [] Mepilex Border  [] Ace Wrap [] Roll Tape   [x] Other: unna boot (unna boot to right leg as well)     Change dressing:   [] Daily      [] Every Other Day   [x] Three times per week  [] Once a week   [] Do Not Change Dressing     [] Other:    Pressure Relief:  [x] When sitting, shift position or do seat lifts every 15 minutes.  [] Wheelchair cushion   [] Specialty Bed/Mattress  [x] Turn every 2 hours when in bed. Avoid position directing pressure on wound site. Compression: Do not get leg(s) with wrap wet. If wraps become too tight call the center or completely remove the wrap. Apply: [] Three Layer Compression Wrap  []RightLeg []Left Leg  [] Four layer Compression Wrap      []RightLeg []Left Leg   [x]  Unna's boot                                  [x] Right Leg   [x] Left Leg       [x] Elevate leg(s) above the level of the heart when sitting. [x] Avoid prolonged standing in one place. Dietary:  [x] Diabetic Diet   [x] Increase Protein: examples (Meat, cheese, eggs, greek yogurt, fish, nuts)   [] Osbaldo Therapeutic Nutrition Powder  [] Dial a Dietician : Call [employer] at [phone] enter code ([phone]) when prompted. M-F 9am-5pm EST. Return Appointment:  [] Nurse Visit at wound center in *** days   [] Return Appointment: With Dr. [place] Rey 2 week(s)  [] Ordered tests:     Electronically signed on 3/31/2022 at 8:11 AM     PLEASE NOTE: IF YOU ARE UNABLE TO [address], CONTINUE TO USE THE SUPPLIES YOU HAVE AVAILABLE UNTIL YOU ARE ABLE TO 73 [facility]. IT IS MOST IMPORTANT TO KEEP THE WOUND COVERED AT ALL TIMES.      Physician Signature:_______________________  Dr. [place] Rey

## 2022-04-14 ENCOUNTER — PATIENT OUTREACH (OUTPATIENT)
Dept: CASE MANAGEMENT | Age: 77
End: 2022-04-14

## 2022-04-14 NOTE — PROGRESS NOTES
04/14/22  2:19 PM  Attempted ACM outreach, no answer on available phone numbers, did leave a non specific message for a return call on daughter's phone number. 04/15/22  10:25 AM  2nd attempt for ACM outreach, no answer and no callback. No plans for further outreach at this time.

## 2022-04-21 ENCOUNTER — HOSPITAL ENCOUNTER (OUTPATIENT)
Dept: WOUND CARE | Age: 77
Discharge: HOME | End: 2022-04-21

## 2022-04-21 DIAGNOSIS — S41.102A OPEN WOUND OF LEFT AXILLARY REGION, INITIAL ENCOUNTER: ICD-10-CM

## 2022-04-21 DIAGNOSIS — S01.80XA OPEN WOUND OF CHIN, INITIAL ENCOUNTER: ICD-10-CM

## 2022-04-21 DIAGNOSIS — L97.221 CALF ULCER, LEFT, LIMITED TO BREAKDOWN OF SKIN (HCC): ICD-10-CM

## 2022-04-21 DIAGNOSIS — S41.101A OPEN WOUND OF RIGHT AXILLARY REGION, INITIAL ENCOUNTER: ICD-10-CM

## 2022-04-21 NOTE — DISCHARGE INSTRUCTIONS
Discharge Instructions for  [facility]  [address] [place], [address] [facility]  Telephone: [phone] 215 [facility] Information: Should you experience any significant changes in your wound(s) or have questions about your wound care, please contact the [facility] [address] at [address] 8:00 am - 4:30. If you need help with your wound outside these hours and cannot wait until we are again available, contact your PCP or go to the hospital emergency room. 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 Zhang Needles:   Do not scrub or use excessive force. Cleanse wound prior to applying a clean dressing with:  [x] Cleanse wound with Mild Soap & Water      Topical Treatments:  Do not apply lotions, creams, or ointments to wound bed unless directed.    [x] Apply moisturizing lotion A&D ointment to skin surrounding the wound prior to dressing change. - daily to bilateral lower legs  [] Other:     Dressings:                Wound Location Chin, Left and Right Axilla, Right Buttocks          Apply Primary Dressing:      [x] alginate     Cover and Secure with:  [x] Gauze [] ABD  [] exudry     [] Brown [] Kerlix          [x] Mepilex Border  [] Ace Wrap [] Other:   [] Roll Tape       Change dressing:   [] Daily      [] Every Other Day   [x] Three times per week  [] Once a week   [] Do Not Change Dressing     [] Other:    Dressings:                   Wound Location Left Anterior lower Leg    Apply Primary Dressing:      [x]  Xeroform, unna boot    Cover and Secure with:  [x] Gauze [] ABD [] exudry     [] Brown [] Kerlix [] Mepilex Border  [] Ace Wrap [] Roll Tape   [x] Other: unna boot (unna boot to right leg as well)     Change dressing:   [] Daily      [] Every Other Day   [x] Three times per week  [] Once a week   [] Do Not Change Dressing     [] Other:    Pressure Relief:  [x] When sitting, shift position or do seat lifts every 15 minutes.  [] Wheelchair cushion   [] Specialty Bed/Mattress  [x] Turn every 2 hours when in bed. Avoid position directing pressure on wound site. Compression: Do not get leg(s) with wrap wet. If wraps become too tight call the center or completely remove the wrap. Apply: [] Three Layer Compression Wrap  []RightLeg []Left Leg  [] Four layer Compression Wrap      []RightLeg []Left Leg   [x]  Unna's boot                                  [x] Right Leg   [x] Left Leg       [x] Elevate leg(s) above the level of the heart when sitting. [x] Avoid prolonged standing in one place. Dietary:  [x] Diabetic Diet   [x] Increase Protein: examples (Meat, cheese, eggs, greek yogurt, fish, nuts)   [] Osbaldo Therapeutic Nutrition Powder  [] Dial a Dietician : Call [employer] at [phone] enter code ([phone]) when prompted. M-F 9am-5pm EST. Return Appointment:  [] Nurse Visit at wound center in *** days   [] Return Appointment: With Dr. Michelle Frazier 2 week(s)  [] Ordered tests:     Electronically signed on 4/21/2022 at 8:11 AM     PLEASE NOTE: IF YOU ARE UNABLE TO [address], CONTINUE TO USE THE SUPPLIES YOU HAVE AVAILABLE UNTIL YOU ARE ABLE TO 73 [facility]. IT IS MOST IMPORTANT TO KEEP THE WOUND COVERED AT ALL TIMES.      Physician Signature:_______________________  Dr. Michelle Frazier

## 2022-04-28 ENCOUNTER — HOSPITAL ENCOUNTER (OUTPATIENT)
Dept: WOUND CARE | Age: 77
Discharge: HOME | End: 2022-04-28

## 2022-04-28 DIAGNOSIS — S01.80XA OPEN WOUND OF CHIN, INITIAL ENCOUNTER: ICD-10-CM

## 2022-04-28 DIAGNOSIS — S41.101A OPEN WOUND OF RIGHT AXILLARY REGION, INITIAL ENCOUNTER: ICD-10-CM

## 2022-04-28 DIAGNOSIS — L97.221 CALF ULCER, LEFT, LIMITED TO BREAKDOWN OF SKIN (HCC): ICD-10-CM

## 2022-04-28 DIAGNOSIS — S41.102A OPEN WOUND OF LEFT AXILLARY REGION, INITIAL ENCOUNTER: ICD-10-CM

## 2022-04-28 NOTE — DISCHARGE INSTRUCTIONS
Discharge Instructions for  [facility]  [address] [place], [address] [facility]  Telephone: [phone]    [address] [place] [address] Information: Should you experience any significant changes in your wound(s) or have questions about your wound care, please contact the [facility] Main at [address] 8:00 am - 4:30. If you need help with your wound outside these hours and cannot wait until we are again available, contact your PCP or go to the hospital emergency room. 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 Jeanie Collins:   Do not scrub or use excessive force. Cleanse wound prior to applying a clean dressing with:  [x] Cleanse wound with Mild Soap & Water      Topical Treatments:  Do not apply lotions, creams, or ointments to wound bed unless directed.    [x] Apply moisturizing lotion A&D ointment to skin surrounding the wound prior to dressing change. - daily to bilateral lower legs  [] Other:     Dressings:                Wound Location Chin, Left and Right Axilla, Right Buttocks          Apply Primary Dressing:      [x] alginate     Cover and Secure with:  [x] Gauze [] ABD  [] exudry     [] Brown [] Kerlix          [x] Mepilex Border  [] Ace Wrap [] Other:   [] Roll Tape       Change dressing:   [] Daily      [] Every Other Day   [x] Three times per week  [] Once a week   [] Do Not Change Dressing     [] Other:    Dressings:                   Wound Location Left Anterior lower Leg    Apply Primary Dressing:      [x]  Xeroform, unna boot    Cover and Secure with:  [x] Gauze [] ABD [] exudry     [] Brown [] Kerlix [] Mepilex Border  [] Ace Wrap [] Roll Tape   [x] Other: unna boot (unna boot to right leg as well)     Change dressing:   [] Daily      [] Every Other Day   [x] Three times per week  [] Once a week   [] Do Not Change Dressing     [] Other:    Pressure Relief:  [x] When sitting, shift position or do seat lifts every 15 minutes.  [] Wheelchair cushion   [] Specialty Bed/Mattress  [x] Turn every 2 hours when in bed. Avoid position directing pressure on wound site. Compression: Do not get leg(s) with wrap wet. If wraps become too tight call the center or completely remove the wrap. Apply: [] Three Layer Compression Wrap  []RightLeg []Left Leg  [] Four layer Compression Wrap      []RightLeg []Left Leg   [x]  Unna's boot                                  [x] Right Leg   [x] Left Leg       [x] Elevate leg(s) above the level of the heart when sitting. [x] Avoid prolonged standing in one place. Dietary:  [x] Diabetic Diet   [x] Increase Protein: examples (Meat, cheese, eggs, greek yogurt, fish, nuts)   [] Osbaldo Therapeutic Nutrition Powder  [] Dial a Dietician : Call [employer] at [phone] enter code ([phone]) when prompted. M-F 9am-5pm EST. Return Appointment:  [] Nurse Visit at wound center in *** days   [] Return Appointment: With Dr. Chery Nagel 2 week(s)  [] Ordered tests:     Electronically signed on 4/28/2022 at 8:11 AM     PLEASE NOTE: IF YOU ARE UNABLE TO [address], CONTINUE TO USE THE SUPPLIES YOU HAVE AVAILABLE UNTIL YOU ARE ABLE TO 73 [facility]. IT IS MOST IMPORTANT TO KEEP THE WOUND COVERED AT ALL TIMES.      Physician Signature:_______________________  Dr. Morton Prim

## 2022-05-12 ENCOUNTER — HOSPITAL ENCOUNTER (OUTPATIENT)
Dept: WOUND CARE | Age: 77
Discharge: HOME | End: 2022-05-12

## 2022-05-12 DIAGNOSIS — S41.101A OPEN WOUND OF RIGHT AXILLARY REGION, INITIAL ENCOUNTER: ICD-10-CM

## 2022-05-12 DIAGNOSIS — S41.102A OPEN WOUND OF LEFT AXILLARY REGION, INITIAL ENCOUNTER: ICD-10-CM

## 2022-05-12 DIAGNOSIS — S01.80XA OPEN WOUND OF CHIN, INITIAL ENCOUNTER: ICD-10-CM

## 2022-05-12 DIAGNOSIS — L97.221 CALF ULCER, LEFT, LIMITED TO BREAKDOWN OF SKIN (HCC): ICD-10-CM

## 2022-05-12 NOTE — DISCHARGE INSTRUCTIONS
Discharge Instructions for  [facility]  [address] [place], [address] [facility]  Telephone: [phone] 215 [facility] Information: Should you experience any significant changes in your wound(s) or have questions about your wound care, please contact the [facility] Main at [address] [place] [address] Street 8:00 am - 4:30. If you need help with your wound outside these hours and cannot wait until we are again available, contact your PCP or go to the hospital emergency room. 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 Kendell Kerns:   Do not scrub or use excessive force. Cleanse wound prior to applying a clean dressing with:  [x] Cleanse wound with Mild Soap & Water      Topical Treatments:  Do not apply lotions, creams, or ointments to wound bed unless directed.    [x] Apply moisturizing lotion A&D ointment to skin surrounding the wound prior to dressing change. - daily to bilateral lower legs  [] Other:     Dressings:                Wound Location Chin, Left and Right Axilla, Right Buttocks          Apply Primary Dressing:      [x] alginate     Cover and Secure with:  [x] Gauze [] ABD  [] exudry     [] Brown [] Kerlix          [x] Mepilex Border  [] Ace Wrap [] Other:   [] Roll Tape       Change dressing:   [] Daily      [] Every Other Day   [x] Three times per week  [] Once a week   [] Do Not Change Dressing     [] Other:    Dressings:                   Wound Location Left Anterior lower Leg    Apply Primary Dressing:      [x]  Xeroform, unna boot    Cover and Secure with:  [x] Gauze [] ABD [] exudry     [] Brown [] Kerlix [] Mepilex Border  [] Ace Wrap [] Roll Tape   [x] Other: unna boot (unna boot to right leg as well)     Change dressing:   [] Daily      [] Every Other Day   [x] Three times per week  [] Once a week   [] Do Not Change Dressing     [] Other:    Pressure Relief:  [x] When sitting, shift position or do seat lifts every 15 minutes.  [] Wheelchair cushion   [] Specialty Bed/Mattress  [x] Turn every 2 hours when in bed. Avoid position directing pressure on wound site. Compression: Do not get leg(s) with wrap wet. If wraps become too tight call the center or completely remove the wrap. Apply: [] Three Layer Compression Wrap  []RightLeg []Left Leg  [] Four layer Compression Wrap      []RightLeg []Left Leg   [x]  Unna's boot                                  [x] Right Leg   [x] Left Leg       [x] Elevate leg(s) above the level of the heart when sitting. [x] Avoid prolonged standing in one place. Dietary:  [x] Diabetic Diet   [x] Increase Protein: examples (Meat, cheese, eggs, greek yogurt, fish, nuts)   [] Osbaldo Therapeutic Nutrition Powder  [] Dial a Dietician : Call [employer] at [phone] enter code ([phone]) when prompted. M-F 9am-5pm EST. Return Appointment:  [] Nurse Visit at wound center in *** days   [] Return Appointment: With Dr. Lisa Christian 2 week(s)  [] Ordered tests:     Electronically signed on 5/12/2022 at 8:11 AM     PLEASE NOTE: IF YOU ARE UNABLE TO [address], CONTINUE TO USE THE SUPPLIES YOU HAVE AVAILABLE UNTIL YOU ARE ABLE TO 73 [facility]. IT IS MOST IMPORTANT TO KEEP THE WOUND COVERED AT ALL TIMES.      Physician Signature:_______________________  Dr. Lisa Christian

## 2022-06-02 ENCOUNTER — HOSPITAL ENCOUNTER (OUTPATIENT)
Dept: WOUND CARE | Age: 77
Discharge: HOME | End: 2022-06-02
Payer: MEDICARE

## 2022-06-02 VITALS
RESPIRATION RATE: 16 BRPM | TEMPERATURE: 97.8 F | SYSTOLIC BLOOD PRESSURE: 119 MMHG | DIASTOLIC BLOOD PRESSURE: 71 MMHG | HEART RATE: 92 BPM

## 2022-06-02 DIAGNOSIS — S01.80XA OPEN WOUND OF CHIN, INITIAL ENCOUNTER: Primary | ICD-10-CM

## 2022-06-02 DIAGNOSIS — S41.101A OPEN WOUND OF RIGHT AXILLARY REGION, INITIAL ENCOUNTER: ICD-10-CM

## 2022-06-02 DIAGNOSIS — S41.102A OPEN WOUND OF LEFT AXILLARY REGION, INITIAL ENCOUNTER: ICD-10-CM

## 2022-06-02 DIAGNOSIS — L97.221 CALF ULCER, LEFT, LIMITED TO BREAKDOWN OF SKIN (HCC): ICD-10-CM

## 2022-06-02 PROCEDURE — 99213 OFFICE O/P EST LOW 20 MIN: CPT

## 2022-06-02 RX ORDER — LIDOCAINE 50 MG/G
OINTMENT TOPICAL ONCE
OUTPATIENT
Start: 2022-06-02 | End: 2022-06-02

## 2022-06-02 RX ORDER — TRIAMCINOLONE ACETONIDE 1 MG/G
OINTMENT TOPICAL ONCE
OUTPATIENT
Start: 2022-06-02 | End: 2022-06-02

## 2022-06-02 RX ORDER — LIDOCAINE HYDROCHLORIDE 40 MG/ML
SOLUTION TOPICAL ONCE
OUTPATIENT
Start: 2022-06-02 | End: 2022-06-02

## 2022-06-02 RX ORDER — BACITRACIN 500 [USP'U]/G
OINTMENT TOPICAL ONCE
OUTPATIENT
Start: 2022-06-02 | End: 2022-06-02

## 2022-06-02 RX ORDER — MUPIROCIN 20 MG/G
OINTMENT TOPICAL ONCE
OUTPATIENT
Start: 2022-06-02 | End: 2022-06-02

## 2022-06-02 RX ORDER — LIDOCAINE HYDROCHLORIDE 20 MG/ML
JELLY TOPICAL ONCE
OUTPATIENT
Start: 2022-06-02 | End: 2022-06-02

## 2022-06-02 RX ORDER — LIDOCAINE 40 MG/G
CREAM TOPICAL ONCE
OUTPATIENT
Start: 2022-06-02 | End: 2022-06-02

## 2022-06-02 RX ORDER — CLOBETASOL PROPIONATE 0.5 MG/G
OINTMENT TOPICAL ONCE
OUTPATIENT
Start: 2022-06-02 | End: 2022-06-02

## 2022-06-02 RX ORDER — GENTAMICIN SULFATE 1 MG/G
OINTMENT TOPICAL ONCE
OUTPATIENT
Start: 2022-06-02 | End: 2022-06-02

## 2022-06-02 NOTE — DISCHARGE INSTRUCTIONS
Discharge Instructions for  [facility]  [address] [place], [address] [facility]  Telephone: [phone] 215 [facility] Information: Should you experience any significant changes in your wound(s) or have questions about your wound care, please contact the [facility] Main at [address] [place] [address] Street 8:00 am - 4:30. If you need help with your wound outside these hours and cannot wait until we are again available, contact your PCP or go to the hospital emergency room. 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 Diana Berry:   Do not scrub or use excessive force. Cleanse wound prior to applying a clean dressing with:  [x] Cleanse wound with Mild Soap & Water      Topical Treatments:  Do not apply lotions, creams, or ointments to wound bed unless directed.    [x] Apply moisturizing lotion A&D ointment to skin surrounding the wound prior to dressing change. - daily to bilateral lower legs  [] Other:     Dressings:                Wound Location Chin, Left and Right Axilla, Right Buttocks          Apply Primary Dressing:      [x] alginate     Cover and Secure with:  [x] Gauze [] ABD  [] exudry     [] Brown [] Kerlix          [x] Mepilex Border  [] Ace Wrap [] Other:   [] Roll Tape       Change dressing:   [] Daily      [] Every Other Day   [x] Three times per week  [] Once a week   [] Do Not Change Dressing     [] Other:    Dressings:                   Wound Location Left Anterior lower Leg    Apply Primary Dressing:      [x]  Xeroform, unna boot    Cover and Secure with:  [x] Gauze [] ABD [] exudry     [] Brown [] Kerlix [] Mepilex Border  [] Ace Wrap [] Roll Tape   [x] Other: unna boot (unna boot to right leg as well)     Change dressing:   [] Daily      [] Every Other Day   [x] Three times per week  [] Once a week   [] Do Not Change Dressing     [] Other:    Pressure Relief:  [x] When sitting, shift position or do seat lifts every 15 minutes.  [] Wheelchair cushion   [] Specialty Bed/Mattress  [x] Turn every 2 hours when in bed. Avoid position directing pressure on wound site. Compression: Do not get leg(s) with wrap wet. If wraps become too tight call the center or completely remove the wrap. Apply: [] Three Layer Compression Wrap  []RightLeg []Left Leg  [] Four layer Compression Wrap      []RightLeg []Left Leg   [x]  Unna's boot                                  [x] Right Leg   [x] Left Leg       [x] Elevate leg(s) above the level of the heart when sitting. [x] Avoid prolonged standing in one place. Dietary:  [x] Diabetic Diet   [x] Increase Protein: examples (Meat, cheese, eggs, greek yogurt, fish, nuts)   [] Osbaldo Therapeutic Nutrition Powder  [] Dial a Dietician : Call [employer] at [phone] enter code ([phone]) when prompted. M-F 9am-5pm EST. Return Appointment:  [] Nurse Visit at wound center in *** days   [] Return Appointment: With Dr. Faith Moon 2 week(s)  [] Ordered tests:     Electronically signed on 3/17/2022 at 8:11 AM     PLEASE NOTE: IF YOU ARE UNABLE TO [address], CONTINUE TO USE THE SUPPLIES YOU HAVE AVAILABLE UNTIL YOU ARE ABLE TO 73 [facility]. IT IS MOST IMPORTANT TO KEEP THE WOUND COVERED AT ALL TIMES.      Physician Signature:_______________________  Dr. Faith Moon

## 2022-06-02 NOTE — PROGRESS NOTES
Wound Center  Progress Note     Subjective:     Chief Complaint:  Agustín Kelsey is a 68 y.o.  female  with multiple wound of longstanding duration. Referred by:  Dr Wang Murphy    HPI:     Long history of hidradenitis  Wounds in axilla, chin, buttocks,  Also has wound on legs  Here to assess    Has not been taking any of her meds for last several days due to swelling of right ear- suspects its one of her medicatinos- following up with pcp for this reason      History/Chart/Medications reviewed    Since last visit:  Has not been here in almost 3 months due to transportation canceling on them last minute  Refused HH- didn't need them  Did not go to see Dr Lisa Colon  Has been cleaning wounds and applying vaseline ointment    Wound caused by: hidradenitis  Current wound care:See flowsheet  Offloading wound: n/a  Appetite: fair  Wound associated pain: See flowsheet  Diabetic: pre-  Smoker: no  ROS: no N/V/D, no T/chills; no local rash, no chest pain or shortness of breath, no headache or dizzyness         Objective:     Physical Exam:   See flowsheet / nursing notes for vitals  Visit Vitals  /71 (BP 1 Location: Right upper arm, BP Patient Position: Sitting)   Pulse 92   Temp 97.8 °F (36.6 °C)   Resp 16     General: NAD. Hygiene good  Psych: cooperative. No anxiety or depression. Normal mood and affect. Neuro: alert and oriented to person/place/situation. Otherwise nonfocal.  Derm: Normal  turgor for age, dry skin  HEENT: Normocephalic, atraumatic. EOMI. Conjunctiva clear. No scleral icterus. Neck: Normal range of motion. No masses.   Chest: Respirations nonlabored  Lower extremities: color normal; temperature normal.  Edema both legs  Ry, hyperpigmented and hyperlichenified lower part of legs  Some weeping noted from left leg with wound    Ulcer Description:   See Flowsheet           Data Review:              Assessment/Plan     68 y.o. female with     Wounds due to  Hidradenitis  B/l Axilla  Chin  Buttocks  Most areas are clusterd and have only tiny opening of drainage  No active signs of infection    Refer to Dr Sloane Horowitz-  Patient may go    For now patient okay using vaseline ointment to wounds    Have left it as this as wounds are stable and not getting worse-  Patient is not interested in surgical intervention and does not want to go see anyone else  Pt to reach out to Dr Sloane Horowitz if wants further eval and treatment        Patient/ dtr understood and agrees with plan. Questions answered. Follow up with me prn        Procedure:       n/a  -------    Wound Thigh Left;Medial;Upper (Active)   Number of days: 2711       Wound Buttocks Left (Active)   Number of days: 2711       Wound Leg lower Left; Anterior #1 (Active)   Wound Image   06/02/22 1410   Cleansed Cleansed with saline 06/02/22 1410   Dressing/Treatment Xeroform;Gauze dressing/dressing sponge 03/17/22 1430   Wound Length (cm) 0.1 cm 06/02/22 1410   Wound Width (cm) 0.1 cm 06/02/22 1410   Wound Depth (cm) 0.1 cm 06/02/22 1410   Wound Surface Area (cm^2) 0.01 cm^2 06/02/22 1410   Change in Wound Size % 98.04 06/02/22 1410   Wound Volume (cm^3) 0.001 cm^3 06/02/22 1410   Wound Healing % 98 06/02/22 1410   Wound Assessment Epithelialization 06/02/22 1410   Drainage Amount None 06/02/22 1410   Drainage Description Serosanguinous 03/17/22 1419   Wound Odor None 06/02/22 1410   Edges Flat/open edges 03/17/22 1419   Wound Thickness Description Partial thickness 03/17/22 1419   Number of days: 83       Wound Axilla Right #2 (Active)   Wound Image   06/02/22 1410   Cleansed Cleansed with saline 06/02/22 1410   Dressing/Treatment Alginate; Foam 03/17/22 1430   Wound Length (cm) 8.6 cm 06/02/22 1410   Wound Width (cm) 4.7 cm 06/02/22 1410   Wound Depth (cm) 0.2 cm 06/02/22 1410   Wound Surface Area (cm^2) 40.42 cm^2 06/02/22 1410   Change in Wound Size % 14.91 06/02/22 1410   Wound Volume (cm^3) 8.084 cm^3 06/02/22 1410   Wound Healing % 76 06/02/22 1410   Wound Assessment Pink/red;Slough 06/02/22 1410   Drainage Amount Moderate 06/02/22 1410   Drainage Description Serous 06/02/22 1410   Wound Odor None 06/02/22 1410   Edges Attached edges 06/02/22 1410   Wound Thickness Description Full thickness 03/17/22 1419   Number of days: 83       Wound Axilla Left #3 (Active)   Wound Image   06/02/22 1410   Cleansed Cleansed with saline 06/02/22 1410   Dressing/Treatment Alginate; Foam 03/17/22 1430   Wound Length (cm) 10 cm 06/02/22 1410   Wound Width (cm) 10.5 cm 06/02/22 1410   Wound Depth (cm) 0.4 cm 06/02/22 1410   Wound Surface Area (cm^2) 105 cm^2 06/02/22 1410   Change in Wound Size % -45.83 06/02/22 1410   Wound Volume (cm^3) 42 cm^3 06/02/22 1410   Wound Healing % 42 06/02/22 1410   Wound Assessment Other (Comment);Pink/red;Slough 06/02/22 1410   Drainage Amount Moderate 06/02/22 1410   Drainage Description Purulent;Serous 06/02/22 1410   Wound Odor None 06/02/22 1410   Renée-Wound/Incision Assessment Other (Comment) 03/17/22 1359   Edges Attached edges 06/02/22 1410   Wound Thickness Description Full thickness 03/17/22 1359   Number of days: 83       Wound Chin #4 (Active)   Wound Image   06/02/22 1410   Cleansed Cleansed with saline 06/02/22 1410   Dressing/Treatment Alginate; Foam 03/17/22 1430   Wound Length (cm) 0.2 cm 06/02/22 1410   Wound Width (cm) 0.2 cm 06/02/22 1410   Wound Depth (cm) 0.1 cm 06/02/22 1410   Wound Surface Area (cm^2) 0.04 cm^2 06/02/22 1410   Change in Wound Size % 0 06/02/22 1410   Wound Volume (cm^3) 0.004 cm^3 06/02/22 1410   Wound Healing % 0 06/02/22 1410   Wound Assessment Pink/red 06/02/22 1410   Drainage Amount Moderate 06/02/22 1410   Drainage Description Purulent 06/02/22 1410   Wound Odor None 06/02/22 1410   Renée-Wound/Incision Assessment Intact 06/02/22 1410   Edges Defined edges 06/02/22 1410   Wound Thickness Description Full thickness 03/17/22 1359   Number of days: 83       Wound #6 Renée-anal (Active)   Wound Image   06/02/22 1410   Cleansed Cleansed with saline 06/02/22 1410   Wound Length (cm) 1.9 cm 06/02/22 1410   Wound Width (cm) 1.6 cm 06/02/22 1410   Wound Depth (cm) 0.1 cm 06/02/22 1410   Wound Surface Area (cm^2) 3.04 cm^2 06/02/22 1410   Wound Volume (cm^3) 0.304 cm^3 06/02/22 1410   Wound Assessment Pink/red;Slough 06/02/22 1410   Drainage Amount Moderate 06/02/22 1410   Drainage Description Serosanguinous 06/02/22 1410   Wound Odor None 06/02/22 1410   Renée-Wound/Incision Assessment Excoriated 06/02/22 1410   Edges Undefined edges 06/02/22 1410   Wound Thickness Description Partial thickness 06/02/22 1410   Number of days: 6       Wound Leg lower Right;Medial #6 (Active)   Wound Image   06/02/22 1410   Cleansed Cleansed with saline 06/02/22 1410   Wound Length (cm) 1.8 cm 06/02/22 1410   Wound Width (cm) 2 cm 06/02/22 1410   Wound Depth (cm) 0.1 cm 06/02/22 1410   Wound Surface Area (cm^2) 3.6 cm^2 06/02/22 1410   Wound Volume (cm^3) 0.36 cm^3 06/02/22 1410   Wound Assessment Pink/red 06/02/22 1410   Drainage Amount Small 06/02/22 1410   Drainage Description Serous 06/02/22 1410   Wound Odor None 06/02/22 1410   Renée-Wound/Incision Assessment Dry/flaky 06/02/22 1410   Number of days: 6          Past Medical History:   Diagnosis Date    Arthritis     Diabetes ([address]     states she is \"pre-diabetic\", diet controlled    frequency     H/O blood clots     right side of body    Heart attack ([address] 1/2014    Hydradenitis 2/1/2012    Hypercholesterolemia     Hypertension     Ill-defined condition     edema of legs    Thromboembolus ([address]       History reviewed. No pertinent surgical history.   Family History   Problem Relation Age of Onset    Heart Disease Mother     Cancer Mother     Asthma Father     Alcohol abuse Brother       Social History     Tobacco Use    Smoking status: Never Smoker    Smokeless tobacco: Never Used   Substance Use Topics    Alcohol use: No     Alcohol/week: 0.0 standard drinks Prior to Admission medications    Medication Sig Start Date End Date Taking? Authorizing Provider   miscellaneous medical supply (Anti-Embolism Stockings) misc Please measure and fit for one pair of below the knee anti-embolism stockings 2/25/22 2/25/23  Selene Fernandez MD   OTHER Pull Ups 2/25/22   Selene Fernandez MD   liver oil-zinc oxide ointment Apply  to affected area as needed for Skin Irritation. 2/15/22   Mio Cameron MD   bumetanide (BUMEX) 2 mg tablet Take 1 Tablet by mouth daily. Indications: visible water retention 2/15/22   Mio Cameron MD   atorvastatin (LIPITOR) 80 mg tablet Take 1 Tablet by mouth daily. Indications: high cholesterol 12/14/21   Selene Fernandez MD   apixaban (ELIQUIS) 5 mg tablet Take 1 Tablet by mouth every twelve (12) hours. Indications: deep vein thrombosis prevention 12/14/21   Selene Fernandez MD   oxybutynin (DITROPAN) 5 mg tablet Take 1 Tablet by mouth three (3) times daily. Indications: an increased need to urinate often 12/14/21   Selene Fernandez MD   ergocalciferol (ERGOCALCIFEROL) 1,250 mcg (50,000 unit) capsule Take 1 Capsule by mouth every seven (7) days. Indications: low vitamin D levels 12/14/21   Selene Fernandez MD   fluticasone propionate (FLONASE) 50 mcg/actuation nasal spray 2 Sprays by Both Nostrils route daily. Indications: inflammation of the nose due to an allergy 12/14/21   Selene Fernandez MD   gabapentin (NEURONTIN) 100 mg capsule Take 1 Capsule by mouth three (3) times daily as needed for Pain. 12/14/21   Selene Fernandez MD   triamcinolone acetonide (KENALOG) 0.1 % ointment Apply to bilateral medial feet twice daily, use thin layer 3/12/21   Selene Fernandez MD   zinc oxide 20 % ointment Apply  to affected area as needed for Skin Irritation. 3/12/21   Selene Fernandez MD   ferrous sulfate 325 mg (65 mg iron) tablet Take 1 Tab by mouth daily.  2/27/20   Selene Fernandez MD   OTHER Shower Chair- Use As Directed 12/20/18   Selene Fernandez MD     Allergies Allergen Reactions    Ciprofloxacin Other (comments)     Vomiting    Doxycycline Other (comments)     Facial swelling.     Pcn [Penicillins] Rash        Signed By: Troy Camargo MD     June 2, 2022

## 2022-06-02 NOTE — WOUND CARE
06/02/22 1459   Right Leg Edema Point of Measurement   Compression Therapy Tubular elastic support bandage   Left Leg Edema Point of Measurement   Compression Therapy Tubular elastic support bandage     Discharge Condition: Stable     Pain: 5    Ambulatory Status: Walking    Discharge Destination: Home     Transportation: Car    Accompanied by: Family/Caregiver     Discharge instructions reviewed with Family/Caregiver  and copy or written instructions have been provided. All questions/concerns have been addressed at this time.

## 2022-06-02 NOTE — DISCHARGE INSTRUCTIONS
Discharge Instructions for  [facility]  [address] [place], [address] [facility]  Telephone: [phone]    [address] [place] [address] Information: Should you experience any significant changes in your wound(s) or have questions about your wound care, please contact the [facility] at [address] 8:00 am - 4:30. If you need help with your wound outside these hours and cannot wait until we are again available, contact your PCP or go to the hospital emergency room. NAME:  Selina Dixon  YOB: 1945  DATE:  6/2/2022    : Billie Fountain     Referral sent back in March to Dr. Hugo Cartwright - Surgeon - [phone]    Wound Cleansing:   Do not scrub or use excessive force. Cleanse wound prior to applying a clean dressing with:  [x] Cleanse wound with Mild Soap & Water      Topical Treatments:  Do not apply lotions, creams, or ointments to wound bed unless directed. [x] Apply moisturizing lotion A&D ointment to skin surrounding the wound prior to dressing change. - daily to bilateral lower legs  [] Other:     Continue home treatment for arm pit wounds and chin wounds    Dressings:                Wound Location Sacrum          Apply Primary Dressing:      [x] A&D ointment or Vaseline or Peewee Butter      Change dressing:   [x] Daily          Pressure Relief:  [x] When sitting, shift position or do seat lifts every 15 minutes. [x] Turn every 2 hours when in bed. Avoid position directing pressure on wound site. Compression: Do not get leg(s) with wrap wet. If wraps become too tight call the center or completely remove the wrap. Apply: [x] Double Tubi  bilaterally       [x] Elevate leg(s) above the level of the heart when sitting. [x] Avoid prolonged standing in one place.     Dietary:  [x] Diabetic Diet   [x] Increase Protein: examples (Meat, cheese, eggs, greek yogurt, fish, nuts)   [] [facility] Nutrition Powder  [] Dial a Dietician : Call [facility] at [phone] enter code [phone]) when prompted. M-F 9am-5pm EST. Return Appointment:  [] Nurse Visit at wound center in *** days   [x] Return Appointment: With Dr. Ellyn Conde 2 week(s)  [] Ordered tests:     Electronically signed on 6/2/2022 at 8:11 AM     PLEASE NOTE: IF YOU ARE UNABLE TO [address], CONTINUE TO USE THE SUPPLIES YOU HAVE AVAILABLE UNTIL YOU ARE ABLE TO [facility]. IT IS MOST IMPORTANT TO KEEP THE WOUND COVERED AT ALL TIMES.      Physician Signature:_______________________  Dr. Ellyn Conde

## 2022-06-02 NOTE — WOUND CARE
06/02/22 1410   Right Leg Edema Point of Measurement   Leg circumference 41.5 cm   Ankle circumference 31 cm   Left Leg Edema Point of Measurement   Leg circumference 45 cm   Ankle circumference 33 cm   LLE Peripheral Vascular    Capillary Refill Less than/equal to 3 seconds   Color   (hyperpigmentation)   Temperature Warm   Pedal Pulse Doppler   RLE Peripheral Vascular    Capillary Refill Less than/equal to 3 seconds   Color   (hyperpigmentation)   Temperature Warm   Pedal Pulse Doppler   Wound #6 Renée-anal   Date First Assessed/Time First Assessed: 06/02/22 1409   Present on Hospital Admission: Yes  Wound Description: #6 Renée-anal   Wound Image    Cleansed Cleansed with saline   Wound Length (cm) 1.9 cm   Wound Width (cm) 1.6 cm   Wound Depth (cm) 0.1 cm   Wound Surface Area (cm^2) 3.04 cm^2   Wound Volume (cm^3) 0.304 cm^3   Wound Assessment Guadalupe/red;Slough   Drainage Amount Moderate   Drainage Description Serosanguinous   Wound Odor None   Renée-Wound/Incision Assessment Excoriated   Edges Undefined edges   Wound Thickness Description Partial thickness   Wound Leg lower Left; Anterior #1   Date First Assessed/Time First Assessed: 03/17/22 1417   Primary Wound Type: Other (comment)  Location: Leg lower  Wound Location Orientation: Left; Anterior  Wound Description: #1   Wound Image    Cleansed Cleansed with saline   Wound Length (cm) 0.1 cm   Wound Width (cm) 0.1 cm   Wound Depth (cm) 0.1 cm   Wound Surface Area (cm^2) 0.01 cm^2   Change in Wound Size % 98.04   Wound Volume (cm^3) 0.001 cm^3   Wound Healing % 98   Wound Assessment Epithelialization   Drainage Amount None   Wound Odor None   Wound Axilla Right #2   Date First Assessed/Time First Assessed: 03/17/22 1418   Primary Wound Type:  Other (comment)  Location: Axilla  Wound Location Orientation: Right  Wound Description: #2   Wound Image    Cleansed Cleansed with saline   Wound Length (cm) 8.6 cm   Wound Width (cm) 4.7 cm   Wound Depth (cm) 0.2 cm   Wound Surface Area (cm^2) 40.42 cm^2   Change in Wound Size % 14.91   Wound Volume (cm^3) 8.084 cm^3   Wound Healing % 76   Wound Assessment Pink/red;Slough  (dried exudate)   Drainage Amount Moderate   Drainage Description Serous   Wound Odor None   Renée-Wound/Incision Assessment   (scarring)   Edges Attached edges   Wound Axilla Left #3   Date First Assessed/Time First Assessed: 03/17/22 1419   Present on Hospital Admission: Yes  Primary Wound Type: Other (comment)  Location: Axilla  Wound Location Orientation: Left  Wound Description: #3   Wound Image    Cleansed Cleansed with saline   Wound Length (cm) 10 cm   Wound Width (cm) 10.5 cm   Wound Depth (cm) 0.4 cm   Wound Surface Area (cm^2) 105 cm^2   Change in Wound Size % -45.83   Wound Volume (cm^3) 42 cm^3   Wound Healing % 42   Wound Assessment Other (Comment);[place]/red;Slough   Drainage Amount Moderate   Drainage Description Purulent;Serous   Wound Odor None   Renée-Wound/Incision Assessment   (scarrin)   Edges Attached edges   Wound Chin #4   Date First Assessed/Time First Assessed: 03/17/22 1419   Present on Hospital Admission: Yes  Primary Wound Type:  Other (comment)  Location: Chin  Wound Description: #4   Wound Image    Cleansed Cleansed with saline   Wound Length (cm) 0.2 cm   Wound Width (cm) 0.2 cm   Wound Depth (cm) 0.1 cm   Wound Surface Area (cm^2) 0.04 cm^2   Change in Wound Size % 0   Wound Volume (cm^3) 0.004 cm^3   Wound Healing % 0   Wound Assessment Pink/red  (dried exudate)   Drainage Amount Moderate   Drainage Description Purulent   Wound Odor None   Renée-Wound/Incision Assessment Intact   Edges Defined edges   Wound Leg lower Right;Medial #6   Date First Assessed/Time First Assessed: 06/02/22 1421   Present on Hospital Admission: Yes  Location: Leg lower  Wound Location Orientation: Right;Medial  Wound Description: #6   Wound Image    Cleansed Cleansed with saline   Wound Length (cm) 1.8 cm   Wound Width (cm) 2 cm   Wound Depth (cm) 0.1 cm   Wound Surface Area (cm^2) 3.6 cm^2   Wound Volume (cm^3) 0.36 cm^3   Wound Assessment Pink/red   Drainage Amount Small   Drainage Description Serous   Wound Odor None   Renée-Wound/Incision Assessment Dry/flaky     Visit Vitals  /71 (BP 1 Location: Right upper arm, BP Patient Position: Sitting)   Pulse 92   Temp 97.8 °F (36.6 °C)   Resp 16

## 2022-06-10 DIAGNOSIS — I89.0 LYMPHEDEMA: ICD-10-CM

## 2022-06-10 DIAGNOSIS — I10 ESSENTIAL HYPERTENSION: ICD-10-CM

## 2022-06-10 NOTE — TELEPHONE ENCOUNTER
Received request from pharmacy   Not able to get 1mg or 2 mg tab of Buxetanide        How owul you like for them to dispence?   They have 0.5mg tablets    Please send new order to [facility] with clarifications

## 2022-06-14 ENCOUNTER — OFFICE VISIT (OUTPATIENT)
Dept: FAMILY MEDICINE CLINIC | Age: 77
End: 2022-06-14
Payer: MEDICARE

## 2022-06-14 VITALS
DIASTOLIC BLOOD PRESSURE: 66 MMHG | HEIGHT: 61 IN | RESPIRATION RATE: 18 BRPM | TEMPERATURE: 98.5 F | OXYGEN SATURATION: 100 % | WEIGHT: 137.4 LBS | HEART RATE: 84 BPM | SYSTOLIC BLOOD PRESSURE: 102 MMHG | BODY MASS INDEX: 25.94 KG/M2

## 2022-06-14 DIAGNOSIS — F33.9 RECURRENT DEPRESSION (HCC): ICD-10-CM

## 2022-06-14 DIAGNOSIS — N39.3 FEMALE STRESS INCONTINENCE: ICD-10-CM
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
## 2022-06-14 DIAGNOSIS — S09.93XD FACIAL INJURY, SUBSEQUENT ENCOUNTER: Primary | ICD-10-CM

## 2022-06-14 DIAGNOSIS — E11.40 NEUROPATHY DUE TO TYPE 2 DIABETES MELLITUS (HCC): ICD-10-CM

## 2022-06-14 DIAGNOSIS — E78.5 HYPERLIPIDEMIA, UNSPECIFIED HYPERLIPIDEMIA TYPE: ICD-10-CM

## 2022-06-14 DIAGNOSIS — I10 ESSENTIAL HYPERTENSION: ICD-10-CM

## 2022-06-14 DIAGNOSIS — R60.0 BILATERAL LOWER EXTREMITY EDEMA: ICD-10-CM

## 2022-06-14 DIAGNOSIS — I27.82 OTHER CHRONIC PULMONARY EMBOLISM WITHOUT ACUTE COR PULMONALE (HCC): ICD-10-CM

## 2022-06-14 PROCEDURE — G8754 DIAS BP LESS 90: HCPCS | Performed by: FAMILY MEDICINE

## 2022-06-14 PROCEDURE — G8536 NO DOC ELDER MAL SCRN: HCPCS | Performed by: FAMILY MEDICINE

## 2022-06-14 PROCEDURE — 1090F PRES/ABSN URINE INCON ASSESS: CPT | Performed by: FAMILY MEDICINE

## 2022-06-14 PROCEDURE — 99214 OFFICE O/P EST MOD 30 MIN: CPT | Performed by: FAMILY MEDICINE

## 2022-06-14 PROCEDURE — G8427 DOCREV CUR MEDS BY ELIG CLIN: HCPCS | Performed by: FAMILY MEDICINE

## 2022-06-14 PROCEDURE — G8417 CALC BMI ABV UP PARAM F/U: HCPCS | Performed by: FAMILY MEDICINE

## 2022-06-14 PROCEDURE — G8399 PT W/DXA RESULTS DOCUMENT: HCPCS | Performed by: FAMILY MEDICINE

## 2022-06-14 PROCEDURE — G8752 SYS BP LESS 140: HCPCS | Performed by: FAMILY MEDICINE

## 2022-06-14 PROCEDURE — G9717 DOC PT DX DEP/BP F/U NT REQ: HCPCS | Performed by: FAMILY MEDICINE

## 2022-06-14 PROCEDURE — 1123F ACP DISCUSS/DSCN MKR DOCD: CPT | Performed by: FAMILY MEDICINE

## 2022-06-14 PROCEDURE — 1101F PT FALLS ASSESS-DOCD LE1/YR: CPT | Performed by: FAMILY MEDICINE

## 2022-06-14 RX ORDER — OXYBUTYNIN CHLORIDE 5 MG/1
5 TABLET ORAL 3 TIMES DAILY
Qty: 270 TABLET | Refills: 1 | Status: SHIPPED | OUTPATIENT
Start: 2022-06-14

## 2022-06-14 RX ORDER — GABAPENTIN 100 MG/1
100 CAPSULE ORAL
Qty: 90 CAPSULE | Refills: 2 | Status: SHIPPED | OUTPATIENT
Start: 2022-06-14

## 2022-06-14 RX ORDER — CLINDAMYCIN PHOSPHATE 10 UG/ML
LOTION TOPICAL
COMMUNITY
Start: 2022-06-08

## 2022-06-14 RX ORDER — ATORVASTATIN CALCIUM 80 MG/1
80 TABLET, FILM COATED ORAL DAILY
Qty: 90 TABLET | Refills: 1 | Status: SHIPPED | OUTPATIENT
Start: 2022-06-14

## 2022-06-14 RX ORDER — BUMETANIDE 0.5 MG/1
2 TABLET ORAL DAILY
Qty: 120 TABLET | Refills: 1 | Status: SHIPPED | OUTPATIENT
Start: 2022-06-14

## 2022-06-14 NOTE — PROGRESS NOTES
1. Have you been to the ER, urgent care clinic since your last visit? Hospitalized since your last visit? Yes When: [facility] 6/4/22    2. Have you seen or consulted any other health care providers outside of the [address] [place] [address] since your last visit? Include any pap smears or colon screening. No     3. For patients aged 39-70: Has the patient had a colonoscopy / FIT/ Cologuard? NA - based on age    If the patient is female:    4. For patients aged 41-77: Has the patient had a mammogram within the past 2 years? NA - based on age or sex      11. For patients aged 21-65: Has the patient had a pap smear? NA - based on age or sex     Reviewed record in preparation for visit and have necessary documentation  Pt did not bring medication to office visit for review  Patient is accompanied by daughter I have received verbal consent from Shavonne Julien to discuss any/all medical information while they are present in the room.     Goals that were addressed and/or need to be completed during or after this appointment include     Health Maintenance Due   Topic Date Due    COVID-19 Vaccine (1) Never done    Pneumococcal 65+ years (1 - PCV) Never done    Eye Exam Retinal or Dilated  Never done    Foot Exam Q1  09/17/2020    MICROALBUMIN Q1  02/27/2021    Lipid Screen  03/12/2022

## 2022-06-20 NOTE — PROGRESS NOTES
Patient: Tania Han MRN: [id number]  SSN: [id number]    YOB: 1945  Age: 68 y.o. Sex: female        Subjective:     Chief Complaint   Patient presents with    Follow Up Chronic Condition     6 month f/u, ED [facility] fall 6/4/22, pt complaining of right ear/facial dryness x2 months     Medication Refill     briefs, chucks? HPI: she is a 68y.o. year old female who presents with daughter for follow up ED encounter for fall. Patient with facial injury. Patient with hx of T2D, HTN, HLD, hydradenitis and pedal edema. Patient denies HA, dizziness, SOB, CP, abdominal pain, dysuria, acute myalgias or arthralgias. Encounter Diagnoses   Name Primary?  Facial injury, subsequent encounter Yes    Fall, subsequent encounter     Bilateral lower extremity edema     Essential hypertension     Hyperlipidemia, unspecified hyperlipidemia type     Other chronic pulmonary embolism without acute cor pulmonale ([address]     Female stress incontinence     Neuropathy due to type 2 diabetes mellitus (HCC)     Recurrent depression (HCC)        BP Readings from Last 3 Encounters:   06/14/22 102/66   06/02/22 119/71   03/17/22 105/68      Wt Readings from Last 3 Encounters:   06/14/22 137 lb 6.4 oz (62.3 kg)   03/17/22 148 lb (67.1 kg)   02/24/22 134 lb 6.4 oz (61 kg)     Social History     Tobacco Use   Smoking Status Never Smoker   Smokeless Tobacco Never Used      Current and past medical information:    Current Medications after this visit[de-identified]     Current Outpatient Medications   Medication Sig    bumetanide (BUMEX) 0.5 mg tablet Take 4 Tablets by mouth daily. Indications: visible water retention    clindamycin (CLEOCIN T) 1 % lotion APPLY TO AXILLA TWICE DAILY    atorvastatin (LIPITOR) 80 mg tablet Take 1 Tablet by mouth daily. Indications: high cholesterol    apixaban (ELIQUIS) 5 mg tablet Take 1 Tablet by mouth every twelve (12) hours.  Indications: deep vein thrombosis prevention    oxybutynin (DITROPAN) 5 mg tablet Take 1 Tablet by mouth three (3) times daily. Indications: an increased need to urinate often    gabapentin (NEURONTIN) 100 mg capsule Take 1 Capsule by mouth three (3) times daily as needed for Pain.  OTHER rollator    zinc oxide 20 % ointment Apply  to affected area as needed for Skin Irritation.  miscellaneous medical supply (Anti-Embolism Stockings) misc Please measure and fit for one pair of below the knee anti-embolism stockings    OTHER Pull Ups    OTHER Shower Chair- Use As Directed     No current facility-administered medications for this visit. Patient Active Problem List    Diagnosis Date Noted    Open wound of chin 03/17/2022    Abscess of multiple sites of buttock 03/17/2022    Open wound of left axillary region 03/17/2022    Open wound of right axillary region 03/17/2022    Lymphedema 03/17/2022    Calf ulcer, left, limited to breakdown of skin ([address] 03/17/2022    Controlled type 2 diabetes mellitus without complication, without long-term current use of insulin ([address] 07/30/2018    Recurrent depression ([address] 01/11/2018    Advanced care planning/counseling discussion 06/13/2016    H/O Pulmonary embolism (1/2015) 01/05/2015    Edema 08/27/2014    Hydradenitis 02/01/2012    HTN (hypertension) 01/21/2011    AR (allergic rhinitis) 05/21/2010    Eczema 05/21/2010    Depression 05/21/2010    Female stress incontinence 05/21/2010    Dyslipidemia 05/21/2010       Past Medical History:   Diagnosis Date    Arthritis     Diabetes ([address]     states she is \"pre-diabetic\", diet controlled    frequency     H/O blood clots     right side of body    Heart attack ([address] 1/2014    Hydradenitis 2/1/2012    Hypercholesterolemia     Hypertension     Ill-defined condition     edema of legs    Thromboembolus (HCC)        Allergies   Allergen Reactions    Ciprofloxacin Other (comments)     Vomiting    Doxycycline Other (comments)     Facial swelling.     Pcn [Penicillins] Rash       History reviewed. No pertinent surgical history. Social History     Socioeconomic History    Marital status: LEGALLY    Tobacco Use    Smoking status: Never Smoker    Smokeless tobacco: Never Used   Vaping Use    Vaping Use: Never used   Substance and Sexual Activity    Alcohol use: No     Alcohol/week: 0.0 standard drinks    Drug use: No    Sexual activity: Never         Objective:     Review of Systems:  Constitutional: Negative for fatigue or malaise  Derm: hx of hydradenitis b/l axilla  HEENT: right facial swelling  Cardiovascular: Negative for dizziness, chest pain or palpitations  Respiratory: Negative for cough, wheezing or SOB  Gastreintestinal:  Negative for nausea or abdominal pain  Genital/urinary: hx of incontinence, Negative for dysuria   Muscoloskeletal: Negative for acute myalgias or arthralgias   Neurological: Negative for headache, weakness or paresthesia  Psychological: Negative for depression or anxiety      Vitals:    06/14/22 1349   BP: 102/66   Pulse: 84   Resp: 18   Temp: 98.5 °F (36.9 °C)   TempSrc: Oral   SpO2: 100%   Weight: 137 lb 6.4 oz (62.3 kg)   Height: 5' 1\" (1.549 m)      Body mass index is 25.96 kg/m². Physical Exam:  Constitutional: well developed, well nourished, in no acute distress  Skin: multiple nodules b/l axilla  Head: normocephalic, atraumatic  Eyes: sclera clear, EOMI  Neck: normal range of motion  Cardiovascular: normal S1, S2, regular rate and rhythm, 3+ pedal edema  Respiratory: clear to auscultation bilaterally with symmetrical effort  Extremities: full range of motion, antalgic gait  Neurology: normal strength and sensation  Psych: active, alert and oriented, affect appropriate       Assessment and orders:       ICD-10-CM ICD-9-CM    1. Facial injury, subsequent encounter  S09. 93XD V58.89      959.09    2. Fall, subsequent encounter  W19. XXXD V58.89      E888.9    3. Bilateral lower extremity edema  R60.0 782.3    4. Essential hypertension  I10 401.9    5. Hyperlipidemia, unspecified hyperlipidemia type  E78.5 272.4 atorvastatin (LIPITOR) 80 mg tablet   6. Other chronic pulmonary embolism without acute cor pulmonale (HCC)  I27.82 416.2 apixaban (ELIQUIS) 5 mg tablet   7. Female stress incontinence  N39.3 625.6 oxybutynin (DITROPAN) 5 mg tablet   8. Neuropathy due to type 2 diabetes mellitus (HCC)  E11.40 250.60 gabapentin (NEURONTIN) 100 mg capsule     357.2    9. Recurrent depression ([facility]  F33.9 296.30         Plan of care:  Diagnoses were discussed in detail with patient. Medications reviewed and appropriate. Patient to continue current prescribed medications as written. Medication risks/benefits/side effects discussed with patient. All of the patient's questions were addressed and answered to apparent satisfaction. The patient understands and agrees with our plan of care. The patient knows to call back if they have questions about the plan of care or if symptoms change. The patient received an After-Visit Summary which contains VS, diagnoses, orders, allergy and medication lists. Patient Care Team:  Betina San MD as PCP - General (Family Medicine)  Betina San MD as PCP - [facility] Empaneled Provider  Jeanie Jones MD (Cardiovascular Disease Physician)  Jhonny Maharaj DPM (Podiatry)  Socrates Feliz MD (General Surgery)  ADELE [facility])    Follow-up and Dispositions    · Return in about 3 months (around 9/14/2022), or if symptoms worsen or fail to improve.          Future Appointments   Date Time Provider Buddy Boles   7/6/2022  2:00 PM Jessica Mojica MD [address]   9/14/2022  2:00 PM Betina San MD [facility] BS AMB       Signed By: Dustin Campuzano MD     June 19, 2022

## 2022-07-18 ENCOUNTER — OFFICE VISIT (OUTPATIENT)
Dept: FAMILY MEDICINE CLINIC | Age: 77
End: 2022-07-18
Payer: MEDICARE

## 2022-07-18 VITALS
HEART RATE: 86 BPM | HEIGHT: 61 IN | WEIGHT: 134 LBS | TEMPERATURE: 99 F | BODY MASS INDEX: 25.3 KG/M2 | RESPIRATION RATE: 16 BRPM | DIASTOLIC BLOOD PRESSURE: 59 MMHG | OXYGEN SATURATION: 100 % | SYSTOLIC BLOOD PRESSURE: 99 MMHG

## 2022-07-18 DIAGNOSIS — E11.9 CONTROLLED TYPE 2 DIABETES MELLITUS WITHOUT COMPLICATION, WITHOUT LONG-TERM CURRENT USE OF INSULIN (HCC): ICD-10-CM

## 2022-07-18 DIAGNOSIS — R63.4 WEIGHT LOSS: ICD-10-CM

## 2022-07-18 DIAGNOSIS — M54.9 BACK PAIN, UNSPECIFIED BACK LOCATION, UNSPECIFIED BACK PAIN LATERALITY, UNSPECIFIED CHRONICITY: Primary | ICD-10-CM

## 2022-07-18 DIAGNOSIS — L28.0 LICHEN SIMPLEX CHRONICUS: ICD-10-CM

## 2022-07-18 PROCEDURE — 99213 OFFICE O/P EST LOW 20 MIN: CPT | Performed by: FAMILY MEDICINE

## 2022-07-18 PROCEDURE — 1123F ACP DISCUSS/DSCN MKR DOCD: CPT | Performed by: FAMILY MEDICINE

## 2022-07-18 RX ORDER — DICLOFENAC SODIUM 10 MG/G
4 GEL TOPICAL 4 TIMES DAILY
Qty: 100 G | Refills: 1 | Status: SHIPPED | OUTPATIENT
Start: 2022-07-18

## 2022-07-18 RX ORDER — FEEDER CONTAINER W-GRAVITY SET
1 EACH MISCELLANEOUS DAILY
Qty: 30 EACH | Refills: 5 | Status: SHIPPED | OUTPATIENT
Start: 2022-07-18

## 2022-07-18 NOTE — PATIENT INSTRUCTIONS
OA - Advised otc tylenol arthritis 650 mg 2 tabs every 12 hr, topical lidocaine patch 12 hr on, 12 hr off  Take gabapentin at bedtime as it can cause sedation during day. F/U ASAP for worsening pain or swelling or decreased ROM.

## 2022-07-18 NOTE — PROGRESS NOTES
[facility]    History of Present Illness:   Cullen Reyna is a 68 y.o. female here for   Chief Complaint   Patient presents with    Back Pain    Arm Pain         HPI:  Here for continued back pain after a fall on 6/4. She was seen in the emergency room and had negative x-rays. She is on gabapentin from her PCP but says it makes her sleepy so she does not take it. On Eliquis and cannot take p.o. NSAIDs. Her caregiver is also concerned with recent weight loss. She is down 12 pounds since February. She is diabetic and they ask about Glucerna prescription. She also has longstanding swelling of both lower extremities and thickening of the skin. She has difficulty finding shoes to fit. She did not take the Bumex today as she was coming to the doctor and it makes her urinate. Health Maintenance  Health Maintenance Due   Topic Date Due    COVID-19 Vaccine (1) Never done    Pneumococcal 65+ years (1 - PCV) Never done    Eye Exam Retinal or Dilated  Never done    Foot Exam Q1  09/17/2020    MICROALBUMIN Q1  02/27/2021    Lipid Screen  03/12/2022       Past Medical, Family, and Social History:     Past Medical History:   Diagnosis Date    Arthritis     Diabetes ([address]     states she is \"pre-diabetic\", diet controlled    frequency     H/O blood clots     right side of body    Heart attack ([address] 1/2014    Hydradenitis 2/1/2012    Hypercholesterolemia     Hypertension     Ill-defined condition     edema of legs    Thromboembolus ([address]       History reviewed. No pertinent surgical history. Current Outpatient Medications on File Prior to Visit   Medication Sig Dispense Refill    bumetanide (BUMEX) 0.5 mg tablet Take 4 Tablets by mouth daily. Indications: visible water retention 120 Tablet 1    clindamycin (CLEOCIN T) 1 % lotion APPLY TO AXILLA TWICE DAILY      atorvastatin (LIPITOR) 80 mg tablet Take 1 Tablet by mouth daily.  Indications: high cholesterol 90 Tablet 1  apixaban (ELIQUIS) 5 mg tablet Take 1 Tablet by mouth every twelve (12) hours. Indications: deep vein thrombosis prevention 180 Tablet 1    oxybutynin (DITROPAN) 5 mg tablet Take 1 Tablet by mouth three (3) times daily. Indications: an increased need to urinate often 270 Tablet 1    gabapentin (NEURONTIN) 100 mg capsule Take 1 Capsule by mouth three (3) times daily as needed for Pain. 90 Capsule 2    OTHER rollator 1 Each 0    miscellaneous medical supply (Anti-Embolism Stockings) misc Please measure and fit for one pair of below the knee anti-embolism stockings 2 Each 0    OTHER Pull Ups 100 Each 5    zinc oxide 20 % ointment Apply  to affected area as needed for Skin Irritation. 30 g 1    OTHER Shower Chair- Use As Directed 1 Each 0     No current facility-administered medications on file prior to visit. Patient Active Problem List   Diagnosis Code    AR (allergic rhinitis) J30.9    Eczema L30.9    Depression F32. A    Female stress incontinence N39.3    Dyslipidemia E78.5    HTN (hypertension) I10    Hydradenitis L73.2    Edema R60.9    H/O Pulmonary embolism (1/2015) I26.99    Advanced care planning/counseling discussion Z71.89    Recurrent depression ([facility] F33.9    Controlled type 2 diabetes mellitus without complication, without long-term current use of insulin ([facility]) E11.9    Open wound of chin S01.80XA    Abscess of multiple sites of buttock L02.31    Open wound of left axillary region S41.102A    Open wound of right axillary region S41.101A    Lymphedema I89.0    Calf ulcer, left, limited to breakdown of skin ([address] L97.221       Social History     Socioeconomic History    Marital status: LEGALLY    Tobacco Use    Smoking status: Never Smoker    Smokeless tobacco: Never Used   Vaping Use    Vaping Use: Never used   Substance and Sexual Activity    Alcohol use: No     Alcohol/week: 0.0 standard drinks    Drug use: No    Sexual activity: Never        Review of Systems   Review of Systems   Constitutional: Negative for fever. Cardiovascular: Positive for leg swelling. Musculoskeletal: Positive for back pain. Objective:     Visit Vitals  BP (!) 99/59 (BP 1 Location: Right arm, BP Patient Position: Sitting, BP Cuff Size: Adult)   Pulse 86   Temp 99 °F (37.2 °C) (Oral)   Resp 16   Ht 5' 1\" (1.549 m)   Wt 134 lb (60.8 kg)   SpO2 100%   BMI 25.32 kg/m²          PHYSICAL EXAM:  Gen: Pt sitting in wheelchair, in NAD  Head: Normocephalic, atraumatic  Eyes: Sclera anicteric, EOM grossly intact,  CVS: Normal S1, S2, no m/r/g  Resp: CTAB, no wheezes or rales  Extrem: Atraumatic, grossly edematous BLE  Pulses: 1+ DP  Skin: Hyperpigmented, thickened skin bilateral lower extremities from ankles - foot  Neuro: Alert, oriented, appropriate  MSK: no tenderness to palpation of cervical thoracic or lumbar spine. Assessment and orders:       ICD-10-CM ICD-9-CM    1. Back pain, unspecified back location, unspecified back pain laterality, unspecified chronicity  M54.9 724.5 diclofenac (VOLTAREN) 1 % gel   2. Lichen simplex chronicus  L28.0 698.3 REFERRAL TO DERMATOLOGY   3. Weight loss  R63.4 783.21 nut.tx.gluc intol,lf,soy-fiber (Glucerna 1 Jose C) 0.04-1 gram-kcal/mL liqd   4. Controlled type 2 diabetes mellitus without complication, without long-term current use of insulin ([facility])  E11.9 250.00 nut.tx.gluc intol,lf,soy-fiber (Glucerna 1 Jose C) 0.04-1 gram-kcal/mL liqd     Diagnoses and all orders for this visit:    1. Back pain, unspecified back location, unspecified back pain laterality, unspecified chronicity  -     diclofenac (VOLTAREN) 1 % gel; Apply 4 g to affected area four (4) times daily. back  OA - Advised otc tylenol arthritis, topical diclofenac. F/U ASAP for worsening pain or swelling or decreased ROM. 2. Lichen simplex chronicus  -     REFERRAL TO DERMATOLOGY    3. Weight loss  -     nut.tx.gluc intol,lf,soy-fiber (Glucerna 1 Jose C) 0.04-1 gram-kcal/mL liqd;  Take 1 Bottle by mouth daily. 4. Controlled type 2 diabetes mellitus without complication, without long-term current use of insulin (HCC)  -     nut.tx.gluc intol,lf,soy-fiber (Glucerna 1 Jose C) 0.04-1 gram-kcal/mL liqd; Take 1 Bottle by mouth daily. reviewed medications and side effects in detail    I have discussed the diagnosis with the patient and the intended plan as seen in the above orders. Social history, medical history, and labs were reviewed. The patient has received an after-visit summary and questions were answered concerning future plans. I have discussed medication side effects and warnings with the patient as well. Patient/guardian verbalized understanding and accepts plan & risks.      MD ELIZABET Maher & PER ANDERSON [facility]  07/18/22

## 2022-07-18 NOTE — PROGRESS NOTES
1. \"Have you been to the ER, urgent care clinic since your last visit? Hospitalized since your last visit? \" No    2. \"Have you seen or consulted any other health care providers outside of the [address] [place] [address] since your last visit? \" No     3. For patients aged 39-70: Has the patient had a colonoscopy / FIT/ Cologuard? NA - based on age      If the patient is female:    4. For patients aged 41-77: Has the patient had a mammogram within the past 2 years? NA - based on age or sex      11. For patients aged 21-65: Has the patient had a pap smear?  NA - based on age or sex    Health Maintenance Due   Topic Date Due    COVID-19 Vaccine (1) Never done    Pneumococcal 65+ years (1 - PCV) Never done    Eye Exam Retinal or Dilated  Never done    Foot Exam Q1  09/17/2020    MICROALBUMIN Q1  02/27/2021    Lipid Screen  03/12/2022

## 2022-08-03 ENCOUNTER — TELEPHONE (OUTPATIENT)
Dept: FAMILY MEDICINE CLINIC | Age: 77
End: 2022-08-03

## 2022-08-03 NOTE — TELEPHONE ENCOUNTER
PT's dtr called back> I gave her the phone # for dermatology. She states they will contact them and set up an appt.

## 2022-08-09 ENCOUNTER — TELEPHONE (OUTPATIENT)
Dept: FAMILY MEDICINE CLINIC | Age: 77
End: 2022-08-09

## 2022-08-09 NOTE — TELEPHONE ENCOUNTER
Pt's dtr states the Dermatologist says they can not take her since they do not accept her insurance.  Please send to another Dermatologist.

## 2022-08-23 ENCOUNTER — NURSE TRIAGE (OUTPATIENT)
Dept: OTHER | Facility: CLINIC | Age: 77
End: 2022-08-23

## 2022-08-23 NOTE — TELEPHONE ENCOUNTER
Received call from [place] at [facility] with Red Flag Complaint. Daughter, Gurvinder Calvillo, is calling, patient is nearby. Subjective: Caller states \"left leg swelling\"     Current Symptoms: see above, whole leg is swollen, there was fluid coming out but that has resolved    Onset: 5 days ago; gradual    Associated Symptoms: reduced activity    Pain Severity: 0/10; Temperature: denies fever     What has been tried: nothing    LMP: NA Pregnant: NA    Recommended disposition: See HCP within 4 Hours (or PCP triage)    Care advice provided, patient verbalizes understanding; denies any other questions or concerns; instructed to call back for any new or worsening symptoms. Patient/Caller agrees with recommended disposition; writer provided warm transfer to [place] at [facility] for appointment scheduling    Attention Provider: Thank you for allowing me to participate in the care of your patient. The patient was connected to triage in response to information provided to the ECC. Please do not respond through this encounter as the response is not directed to a shared pool.     Reason for Disposition   SEVERE leg swelling (e.g., swelling extends above knee, entire leg is swollen, weeping fluid)    Protocols used: Leg Swelling and Edema-ADULT-

## 2022-09-14 ENCOUNTER — OFFICE VISIT (OUTPATIENT)
Dept: FAMILY MEDICINE CLINIC | Age: 77
End: 2022-09-14
Payer: MEDICARE

## 2022-09-14 ENCOUNTER — TELEPHONE (OUTPATIENT)
Dept: FAMILY MEDICINE CLINIC | Age: 77
End: 2022-09-14

## 2022-09-14 VITALS
DIASTOLIC BLOOD PRESSURE: 80 MMHG | RESPIRATION RATE: 16 BRPM | HEIGHT: 61 IN | OXYGEN SATURATION: 100 % | HEART RATE: 97 BPM | TEMPERATURE: 98.6 F | WEIGHT: 133.8 LBS | BODY MASS INDEX: 25.26 KG/M2 | SYSTOLIC BLOOD PRESSURE: 126 MMHG

## 2022-09-14 DIAGNOSIS — M54.9 BACK PAIN, UNSPECIFIED BACK LOCATION, UNSPECIFIED BACK PAIN LATERALITY, UNSPECIFIED CHRONICITY: Primary | ICD-10-CM

## 2022-09-14 DIAGNOSIS — E11.40 NEUROPATHY DUE TO TYPE 2 DIABETES MELLITUS (HCC): ICD-10-CM
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
## 2022-09-14 DIAGNOSIS — E11.9 CONTROLLED TYPE 2 DIABETES MELLITUS WITHOUT COMPLICATION, WITHOUT LONG-TERM CURRENT USE OF INSULIN (HCC): ICD-10-CM

## 2022-09-14 DIAGNOSIS — I10 ESSENTIAL HYPERTENSION: ICD-10-CM

## 2022-09-14 PROCEDURE — G8399 PT W/DXA RESULTS DOCUMENT: HCPCS | Performed by: FAMILY MEDICINE

## 2022-09-14 PROCEDURE — 1090F PRES/ABSN URINE INCON ASSESS: CPT | Performed by: FAMILY MEDICINE

## 2022-09-14 PROCEDURE — G8417 CALC BMI ABV UP PARAM F/U: HCPCS | Performed by: FAMILY MEDICINE

## 2022-09-14 PROCEDURE — G8752 SYS BP LESS 140: HCPCS | Performed by: FAMILY MEDICINE

## 2022-09-14 PROCEDURE — G8754 DIAS BP LESS 90: HCPCS | Performed by: FAMILY MEDICINE

## 2022-09-14 PROCEDURE — 99214 OFFICE O/P EST MOD 30 MIN: CPT | Performed by: FAMILY MEDICINE

## 2022-09-14 PROCEDURE — 1123F ACP DISCUSS/DSCN MKR DOCD: CPT | Performed by: FAMILY MEDICINE

## 2022-09-14 PROCEDURE — 1101F PT FALLS ASSESS-DOCD LE1/YR: CPT | Performed by: FAMILY MEDICINE

## 2022-09-14 PROCEDURE — G8536 NO DOC ELDER MAL SCRN: HCPCS | Performed by: FAMILY MEDICINE

## 2022-09-14 PROCEDURE — G8427 DOCREV CUR MEDS BY ELIG CLIN: HCPCS | Performed by: FAMILY MEDICINE

## 2022-09-14 PROCEDURE — G9717 DOC PT DX DEP/BP F/U NT REQ: HCPCS | Performed by: FAMILY MEDICINE

## 2022-09-14 NOTE — PROGRESS NOTES
1. Have you been to the ER, urgent care clinic since your last visit? Hospitalized since your last visit? No    2. Have you seen or consulted any other health care providers outside of the [address] [place] [address] since your last visit? Include any pap smears or colon screening. No    3. For patients aged 39-70: Has the patient had a colonoscopy / FIT/ Cologuard? NA - based on age    If the patient is female:    4. For patients aged 41-77: Has the patient had a mammogram within the past 2 years? NA - based on age or sex      11. For patients aged 21-65: Has the patient had a pap smear? NA - based on age or sex     Reviewed record in preparation for visit and have necessary documentation  Pt did not bring medication to office visit for review  Patient is accompanied by daughter I have received verbal consent from Terese Morales to discuss any/all medical information while they are present in the room.     Goals that were addressed and/or need to be completed during or after this appointment include     Health Maintenance Due   Topic Date Due    COVID-19 Vaccine (1) Never done    Pneumococcal 65+ years (1 - PCV) Never done    Eye Exam Retinal or Dilated  Never done    Foot Exam Q1  09/17/2020    MICROALBUMIN Q1  02/27/2021    Lipid Screen  03/12/2022    Flu Vaccine (1) Never done    DTaP/Tdap/Td series (2 - Td or Tdap) 09/13/2022

## 2022-09-15 LAB
ALBUMIN SERPL-MCNC: 2.3 G/DL (ref 3.5–5)
ALBUMIN/GLOB SERPL: 0.3 {RATIO} (ref 1.1–2.2)
ALP SERPL-CCNC: 105 U/L (ref 45–117)
ALT SERPL-CCNC: 12 U/L (ref 12–78)
ANION GAP SERPL CALC-SCNC: 3 MMOL/L (ref 5–15)
AST SERPL-CCNC: 17 U/L (ref 15–37)
BILIRUB SERPL-MCNC: 0.3 MG/DL (ref 0.2–1)
BUN SERPL-MCNC: 14 MG/DL (ref 6–20)
BUN/CREAT SERPL: 17 (ref 12–20)
CALCIUM SERPL-MCNC: 9.1 MG/DL (ref 8.5–10.1)
CHLORIDE SERPL-SCNC: 103 MMOL/L (ref 97–108)
CO2 SERPL-SCNC: 30 MMOL/L (ref 21–32)
CREAT SERPL-MCNC: 0.81 MG/DL (ref 0.55–1.02)
GLOBULIN SER CALC-MCNC: 6.6 G/DL (ref 2–4)
GLUCOSE SERPL-MCNC: 112 MG/DL (ref 65–100)
POTASSIUM SERPL-SCNC: 4.5 MMOL/L (ref 3.5–5.1)
PROT SERPL-MCNC: 8.9 G/DL (ref 6.4–8.2)
SODIUM SERPL-SCNC: 136 MMOL/L (ref 136–145)
TSH SERPL DL<=0.05 MIU/L-ACNC: 3.92 UIU/ML (ref 0.36–3.74)

## 2022-09-16 ENCOUNTER — TELEPHONE (OUTPATIENT)
Dept: FAMILY MEDICINE CLINIC | Age: 77
End: 2022-09-16

## 2022-09-19 NOTE — PROGRESS NOTES
Patient: Kala Boyd MRN: [id number]  SSN: [id number]    YOB: 1945  Age: 68 y.o. Sex: female        Subjective:     Chief Complaint   Patient presents with    Follow-up     3 month follow up        HPI: she is a 68y.o. year old female who presents with daughter for follow up. She has had a recent fall. A cane is insufficient for her safety. Patient can safely use a rollator. Though she has not received ordered rollator. Patient with hx of T2D, HTN, HLD, hydradenitis and pedal edema. Patient denies HA, dizziness, SOB, CP, abdominal pain, dysuria, acute myalgias or arthralgias. Encounter Diagnoses   Name Primary? Back pain, unspecified back location, unspecified back pain laterality, unspecified chronicity Yes    Neuropathy due to type 2 diabetes mellitus ([facility]     Fall, subsequent encounter     Controlled type 2 diabetes mellitus without complication, without long-term current use of insulin ([facility])     Essential hypertension        BP Readings from Last 3 Encounters:   09/14/22 126/80   07/18/22 (!) 99/59   06/14/22 102/66      Wt Readings from Last 3 Encounters:   09/14/22 133 lb 12.8 oz (60.7 kg)   07/18/22 134 lb (60.8 kg)   06/14/22 137 lb 6.4 oz (62.3 kg)     Social History     Tobacco Use   Smoking Status Never   Smokeless Tobacco Never      Current and past medical information:    Current Medications after this visit[de-identified]     Current Outpatient Medications   Medication Sig    OTHER rollator    diclofenac (VOLTAREN) 1 % gel Apply 4 g to affected area four (4) times daily. back    nut.tx.gluc intol,lf,soy-fiber (Glucerna 1 Jose C) 0.04-1 gram-kcal/mL liqd Take 1 Bottle by mouth daily. bumetanide (BUMEX) 0.5 mg tablet Take 4 Tablets by mouth daily. Indications: visible water retention    clindamycin (CLEOCIN T) 1 % lotion APPLY TO AXILLA TWICE DAILY    atorvastatin (LIPITOR) 80 mg tablet Take 1 Tablet by mouth daily.  Indications: high cholesterol    apixaban (ELIQUIS) 5 mg tablet Take 1 Tablet by mouth every twelve (12) hours. Indications: deep vein thrombosis prevention    oxybutynin (DITROPAN) 5 mg tablet Take 1 Tablet by mouth three (3) times daily. Indications: an increased need to urinate often    gabapentin (NEURONTIN) 100 mg capsule Take 1 Capsule by mouth three (3) times daily as needed for Pain.    miscellaneous medical supply (Anti-Embolism Stockings) misc Please measure and fit for one pair of below the knee anti-embolism stockings    OTHER Pull Ups    zinc oxide 20 % ointment Apply  to affected area as needed for Skin Irritation. OTHER Shower Chair- Use As Directed     No current facility-administered medications for this visit.        Patient Active Problem List    Diagnosis Date Noted    Open wound of chin 03/17/2022    Abscess of multiple sites of buttock 03/17/2022    Open wound of left axillary region 03/17/2022    Open wound of right axillary region 03/17/2022    Lymphedema 03/17/2022    Calf ulcer, left, limited to breakdown of skin ([address] 03/17/2022    Controlled type 2 diabetes mellitus without complication, without long-term current use of insulin ([address] 07/30/2018    Recurrent depression ([address] 01/11/2018    Advanced care planning/counseling discussion 06/13/2016    H/O Pulmonary embolism (1/2015) 01/05/2015    Edema 08/27/2014    Hydradenitis 02/01/2012    HTN (hypertension) 01/21/2011    AR (allergic rhinitis) 05/21/2010    Eczema 05/21/2010    Depression 05/21/2010    Female stress incontinence 05/21/2010    Dyslipidemia 05/21/2010       Past Medical History:   Diagnosis Date    Arthritis     Diabetes ([address]     states she is \"pre-diabetic\", diet controlled    frequency     H/O blood clots     right side of body    Heart attack ([address] 1/2014    Hydradenitis 2/1/2012    Hypercholesterolemia     Hypertension     Ill-defined condition     edema of legs    Thromboembolus (H[facility])        Allergies   Allergen Reactions    Ciprofloxacin Other (comments)     Vomiting    Doxycycline Other (comments)     Facial swelling. Pcn [Penicillins] Rash       History reviewed. No pertinent surgical history. Social History     Socioeconomic History    Marital status: LEGALLY    Tobacco Use    Smoking status: Never    Smokeless tobacco: Never   Vaping Use    Vaping Use: Never used   Substance and Sexual Activity    Alcohol use: No     Alcohol/week: 0.0 standard drinks    Drug use: No    Sexual activity: Never         Objective:     Review of Systems:  Constitutional: Negative for fatigue or malaise  Derm: hx of hydradenitis b/l axilla  HEENT: right facial swelling  Cardiovascular: Negative for dizziness, chest pain or palpitations  Respiratory: Negative for cough, wheezing or SOB  Gastreintestinal:  Negative for nausea or abdominal pain  Genital/urinary: hx of incontinence, Negative for dysuria   Muscoloskeletal: Negative for acute myalgias or arthralgias   Neurological: Negative for headache, weakness or paresthesia  Psychological: Negative for depression or anxiety      Vitals:    09/14/22 1410   BP: 126/80   Pulse: 97   Resp: 16   Temp: 98.6 °F (37 °C)   TempSrc: Oral   SpO2: 100%   Weight: 133 lb 12.8 oz (60.7 kg)   Height: 5' 1\" (1.549 m)      Body mass index is 25.28 kg/m². Physical Exam:  Constitutional: well developed, well nourished, in no acute distress  Skin: multiple nodules b/l axilla  Head: normocephalic, atraumatic  Eyes: sclera clear, EOMI  Neck: normal range of motion  Cardiovascular: normal S1, S2, regular rate and rhythm, 3+ pedal edema  Respiratory: clear to auscultation bilaterally with symmetrical effort  Extremities: full range of motion, antalgic gait  Neurology: normal strength and sensation  Psych: active, alert and oriented, affect appropriate       Assessment and orders:       ICD-10-CM ICD-9-CM    1. Back pain, unspecified back location, unspecified back pain laterality, unspecified chronicity  M54.9 724.5 OTHER      2.  Neuropathy due to type 2 diabetes mellitus ([facility]  E11.40 250.60 OTHER     357.2       3. Fall, subsequent encounter  W19. XXXD V58.89 OTHER     E888.9       4. Controlled type 2 diabetes mellitus without complication, without long-term current use of insulin ([facility])  E33.9 032.32 METABOLIC PANEL, COMPREHENSIVE      TSH 3RD GENERATION      TSH 3RD GENERATION      METABOLIC PANEL, COMPREHENSIVE      CANCELED: HEMOGLOBIN A1C WITH EAG      CANCELED: HEMOGLOBIN A1C WITH EAG      5. Essential hypertension  O85 577.2 METABOLIC PANEL, COMPREHENSIVE      METABOLIC PANEL, COMPREHENSIVE           Plan of care:  Diagnoses were discussed in detail with patient. Medications reviewed and appropriate. Patient to continue current prescribed medications as written. Medication risks/benefits/side effects discussed with patient. All of the patient's questions were addressed and answered to apparent satisfaction. The patient understands and agrees with our plan of care. The patient knows to call back if they have questions about the plan of care or if symptoms change. The patient received an After-Visit Summary which contains VS, diagnoses, orders, allergy and medication lists.        Patient Care Team:  Akash Romero MD as PCP - General (Family Medicine)  Akash Romero MD as PCP - [facility] Empaneled Provider  No Patel MD (Cardiovascular Disease Physician)  Felix Saint, DPM (Podiatry)  Jennifer Duke MD (General Surgery)  Jozef Bailey OD (Optometry)          Future Appointments   Date Time Provider Buddy Boles   12/16/2022  2:00 PM Akash Romero MD [facility] BS AMB       Signed By: Linda Alfaro MD     September 18, 2022

## 2022-09-21 ENCOUNTER — TELEPHONE (OUTPATIENT)
Dept: FAMILY MEDICINE CLINIC | Age: 77
End: 2022-09-21

## 2022-09-21 NOTE — TELEPHONE ENCOUNTER
Recd call from [facility] asking to addend OV note to say \"A cane is insufficient. Patient can safely use a rollator. \"

## 2023-01-19 ENCOUNTER — APPOINTMENT (OUTPATIENT)
Dept: ULTRASOUND IMAGING | Age: 78
DRG: 699 | End: 2023-01-19
Attending: FAMILY MEDICINE
Payer: MEDICARE

## 2023-01-19 ENCOUNTER — HOSPITAL ENCOUNTER (INPATIENT)
Age: 78
LOS: 7 days | Discharge: NURSING, REHAB OR LONG-TERM CARE | DRG: 699 | End: 2023-01-26
Attending: STUDENT IN AN ORGANIZED HEALTH CARE EDUCATION/TRAINING PROGRAM | Admitting: FAMILY MEDICINE
Payer: MEDICARE

## 2023-01-19 ENCOUNTER — APPOINTMENT (OUTPATIENT)
Dept: CT IMAGING | Age: 78
DRG: 699 | End: 2023-01-19
Attending: STUDENT IN AN ORGANIZED HEALTH CARE EDUCATION/TRAINING PROGRAM
Payer: MEDICARE

## 2023-01-19 DIAGNOSIS — N30.90 CYSTITIS: Primary | ICD-10-CM

## 2023-01-19 DIAGNOSIS — K92.1 BLOOD IN STOOL: ICD-10-CM

## 2023-01-19 PROBLEM — N17.9 AKI (ACUTE KIDNEY INJURY) (HCC): Status: ACTIVE | Noted: 2023-01-19

## 2023-01-19 PROBLEM — K92.2 GI BLEED: Status: ACTIVE | Noted: 2023-01-19

## 2023-01-19 LAB
ALBUMIN SERPL-MCNC: 2.8 G/DL (ref 3.5–5)
ALBUMIN/GLOB SERPL: 0.4 (ref 1.1–2.2)
ALP SERPL-CCNC: 193 U/L (ref 45–117)
ALT SERPL-CCNC: 36 U/L (ref 12–78)
ANION GAP SERPL CALC-SCNC: 11 MMOL/L (ref 5–15)
APPEARANCE UR: ABNORMAL
AST SERPL W P-5'-P-CCNC: 51 U/L (ref 15–37)
BACTERIA URNS QL MICRO: NEGATIVE /HPF
BASOPHILS # BLD: 0 K/UL (ref 0–0.1)
BASOPHILS NFR BLD: 0 % (ref 0–1)
BILIRUB SERPL-MCNC: 0.2 MG/DL (ref 0.2–1)
BILIRUB UR QL: NEGATIVE
BNP SERPL-MCNC: 1091 PG/ML
BUN SERPL-MCNC: 170 MG/DL (ref 6–20)
BUN/CREAT SERPL: 30 (ref 12–20)
CA-I BLD-MCNC: 9.6 MG/DL (ref 8.5–10.1)
CHLORIDE SERPL-SCNC: 107 MMOL/L (ref 97–108)
CK SERPL-CCNC: 106 U/L (ref 26–192)
CO2 SERPL-SCNC: 19 MMOL/L (ref 21–32)
COLLECT DATE STL: ABNORMAL
COLOR UR: ABNORMAL
CREAT SERPL-MCNC: 5.68 MG/DL (ref 0.55–1.02)
DIFFERENTIAL METHOD BLD: ABNORMAL
EOSINOPHIL # BLD: 0.1 K/UL (ref 0–0.4)
EOSINOPHIL NFR BLD: 1 % (ref 0–7)
EPITH CASTS URNS QL MICRO: ABNORMAL /LPF
ERYTHROCYTE [DISTWIDTH] IN BLOOD BY AUTOMATED COUNT: 17.8 % (ref 11.5–14.5)
GLOBULIN SER CALC-MCNC: 7.3 G/DL (ref 2–4)
GLUCOSE BLD STRIP.AUTO-MCNC: 101 MG/DL (ref 65–100)
GLUCOSE SERPL-MCNC: 134 MG/DL (ref 65–100)
GLUCOSE UR STRIP.AUTO-MCNC: NEGATIVE MG/DL
HCT VFR BLD AUTO: 32.6 % (ref 35–47)
HEMOCCULT SP1 STL QL: POSITIVE
HGB BLD-MCNC: 10.4 G/DL (ref 11.5–16)
HGB UR QL STRIP: ABNORMAL
IMM GRANULOCYTES # BLD AUTO: 0 K/UL (ref 0–0.04)
IMM GRANULOCYTES NFR BLD AUTO: 0 % (ref 0–0.5)
KETONES UR QL STRIP.AUTO: NEGATIVE MG/DL
LACTATE SERPL-SCNC: 0.8 MMOL/L (ref 0.4–2)
LEUKOCYTE ESTERASE UR QL STRIP.AUTO: ABNORMAL
LYMPHOCYTES # BLD: 1.3 K/UL (ref 0.8–3.5)
LYMPHOCYTES NFR BLD: 10 % (ref 12–49)
MCH RBC QN AUTO: 25.9 PG (ref 26–34)
MCHC RBC AUTO-ENTMCNC: 31.9 G/DL (ref 30–36.5)
MCV RBC AUTO: 81.3 FL (ref 80–99)
MONOCYTES # BLD: 0.6 K/UL (ref 0–1)
MONOCYTES NFR BLD: 5 % (ref 5–13)
NEUTS SEG # BLD: 10.5 K/UL (ref 1.8–8)
NEUTS SEG NFR BLD: 84 % (ref 32–75)
NITRITE UR QL STRIP.AUTO: NEGATIVE
NRBC # BLD: 0 K/UL (ref 0–0.01)
NRBC BLD-RTO: 0 PER 100 WBC
PERFORMED BY, TECHID: ABNORMAL
PH UR STRIP: 5
PLATELET # BLD AUTO: 482 K/UL (ref 150–400)
PMV BLD AUTO: 10.3 FL (ref 8.9–12.9)
POTASSIUM SERPL-SCNC: 4.9 MMOL/L (ref 3.5–5.1)
PROT SERPL-MCNC: 10.1 G/DL (ref 6.4–8.2)
PROT UR STRIP-MCNC: 100 MG/DL
RBC # BLD AUTO: 4.01 M/UL (ref 3.8–5.2)
RBC #/AREA URNS HPF: >100 /HPF (ref 0–5)
SODIUM SERPL-SCNC: 137 MMOL/L (ref 136–145)
SP GR UR REFRACTOMETRY: 1.02 (ref 1–1.03)
TROPONIN-HIGH SENSITIVITY: 12 NG/L (ref 0–51)
UROBILINOGEN UR QL STRIP.AUTO: 0.1 EU/DL (ref 0.2–1)
WBC # BLD AUTO: 12.5 K/UL (ref 3.6–11)
WBC CASTS URNS QL MICRO: PRESENT
WBC URNS QL MICRO: >100 /HPF (ref 0–4)

## 2023-01-19 PROCEDURE — 76770 US EXAM ABDO BACK WALL COMP: CPT

## 2023-01-19 PROCEDURE — 93005 ELECTROCARDIOGRAM TRACING: CPT

## 2023-01-19 PROCEDURE — 83605 ASSAY OF LACTIC ACID: CPT

## 2023-01-19 PROCEDURE — 81001 URINALYSIS AUTO W/SCOPE: CPT

## 2023-01-19 PROCEDURE — 82607 VITAMIN B-12: CPT

## 2023-01-19 PROCEDURE — 36415 COLL VENOUS BLD VENIPUNCTURE: CPT

## 2023-01-19 PROCEDURE — 99285 EMERGENCY DEPT VISIT HI MDM: CPT

## 2023-01-19 PROCEDURE — 74011250636 HC RX REV CODE- 250/636: Performed by: FAMILY MEDICINE

## 2023-01-19 PROCEDURE — 80053 COMPREHEN METABOLIC PANEL: CPT

## 2023-01-19 PROCEDURE — C9113 INJ PANTOPRAZOLE SODIUM, VIA: HCPCS | Performed by: FAMILY MEDICINE

## 2023-01-19 PROCEDURE — 51702 INSERT TEMP BLADDER CATH: CPT

## 2023-01-19 PROCEDURE — 74011000250 HC RX REV CODE- 250: Performed by: FAMILY MEDICINE

## 2023-01-19 PROCEDURE — 74011250636 HC RX REV CODE- 250/636: Performed by: STUDENT IN AN ORGANIZED HEALTH CARE EDUCATION/TRAINING PROGRAM

## 2023-01-19 PROCEDURE — 65270000029 HC RM PRIVATE

## 2023-01-19 PROCEDURE — 82746 ASSAY OF FOLIC ACID SERUM: CPT

## 2023-01-19 PROCEDURE — 70450 CT HEAD/BRAIN W/O DYE: CPT

## 2023-01-19 PROCEDURE — 85025 COMPLETE CBC W/AUTO DIFF WBC: CPT

## 2023-01-19 PROCEDURE — 74011000250 HC RX REV CODE- 250: Performed by: STUDENT IN AN ORGANIZED HEALTH CARE EDUCATION/TRAINING PROGRAM

## 2023-01-19 PROCEDURE — 82962 GLUCOSE BLOOD TEST: CPT

## 2023-01-19 PROCEDURE — 94760 N-INVAS EAR/PLS OXIMETRY 1: CPT

## 2023-01-19 PROCEDURE — 83540 ASSAY OF IRON: CPT

## 2023-01-19 PROCEDURE — 87040 BLOOD CULTURE FOR BACTERIA: CPT

## 2023-01-19 PROCEDURE — 96374 THER/PROPH/DIAG INJ IV PUSH: CPT

## 2023-01-19 PROCEDURE — 83880 ASSAY OF NATRIURETIC PEPTIDE: CPT

## 2023-01-19 PROCEDURE — 83036 HEMOGLOBIN GLYCOSYLATED A1C: CPT

## 2023-01-19 PROCEDURE — 82272 OCCULT BLD FECES 1-3 TESTS: CPT

## 2023-01-19 PROCEDURE — 82550 ASSAY OF CK (CPK): CPT

## 2023-01-19 PROCEDURE — 84484 ASSAY OF TROPONIN QUANT: CPT

## 2023-01-19 PROCEDURE — 82306 VITAMIN D 25 HYDROXY: CPT

## 2023-01-19 RX ORDER — SODIUM CHLORIDE 9 MG/ML
100 INJECTION, SOLUTION INTRAVENOUS CONTINUOUS
Status: DISCONTINUED | OUTPATIENT
Start: 2023-01-19 | End: 2023-01-22

## 2023-01-19 RX ORDER — ACETAMINOPHEN 650 MG/1
650 SUPPOSITORY RECTAL
Status: DISCONTINUED | OUTPATIENT
Start: 2023-01-19 | End: 2023-01-26 | Stop reason: HOSPADM

## 2023-01-19 RX ORDER — ACETAMINOPHEN 325 MG/1
650 TABLET ORAL
Status: DISCONTINUED | OUTPATIENT
Start: 2023-01-19 | End: 2023-01-26 | Stop reason: HOSPADM

## 2023-01-19 RX ORDER — ONDANSETRON 4 MG/1
4 TABLET, ORALLY DISINTEGRATING ORAL
Status: DISCONTINUED | OUTPATIENT
Start: 2023-01-19 | End: 2023-01-26 | Stop reason: HOSPADM

## 2023-01-19 RX ORDER — IBUPROFEN 200 MG
4 TABLET ORAL AS NEEDED
Status: DISCONTINUED | OUTPATIENT
Start: 2023-01-19 | End: 2023-01-26 | Stop reason: HOSPADM

## 2023-01-19 RX ORDER — ONDANSETRON 2 MG/ML
4 INJECTION INTRAMUSCULAR; INTRAVENOUS
Status: DISCONTINUED | OUTPATIENT
Start: 2023-01-19 | End: 2023-01-26 | Stop reason: HOSPADM

## 2023-01-19 RX ORDER — CEPHALEXIN 500 MG/1
500 CAPSULE ORAL 4 TIMES DAILY
Qty: 28 CAPSULE | Refills: 0 | Status: SHIPPED | OUTPATIENT
Start: 2023-01-19 | End: 2023-01-26

## 2023-01-19 RX ORDER — POLYETHYLENE GLYCOL 3350 17 G/17G
17 POWDER, FOR SOLUTION ORAL DAILY PRN
Status: DISCONTINUED | OUTPATIENT
Start: 2023-01-19 | End: 2023-01-26 | Stop reason: HOSPADM

## 2023-01-19 RX ORDER — INSULIN LISPRO 100 [IU]/ML
INJECTION, SOLUTION INTRAVENOUS; SUBCUTANEOUS
Status: DISCONTINUED | OUTPATIENT
Start: 2023-01-19 | End: 2023-01-26 | Stop reason: HOSPADM

## 2023-01-19 RX ADMIN — SODIUM CHLORIDE 500 ML: 9 INJECTION, SOLUTION INTRAVENOUS at 13:20

## 2023-01-19 RX ADMIN — SODIUM CHLORIDE, PRESERVATIVE FREE 40 MG: 5 INJECTION INTRAVENOUS at 22:21

## 2023-01-19 RX ADMIN — CEFTRIAXONE SODIUM 1 G: 1 INJECTION, POWDER, FOR SOLUTION INTRAMUSCULAR; INTRAVENOUS at 13:20

## 2023-01-19 NOTE — PROGRESS NOTES
1/19/23 HERMAN informed by staff that pts daughter wanted to speak with CNM. CM spoke with daughter Jerome Roca) - who stated she had POA & lost it & wanted POA forms - daughter informed how to obtain forms online & message left for [employer] assist. Daughter with increased complaints regarding facility ( [facility]) - facility did not know which hospital pt was sent too & that pt was not eating - per daughter she will speak with administration @ the facility if pt returns.

## 2023-01-19 NOTE — DISCHARGE INSTRUCTIONS
Discharge Instructions       PATIENT ID: Linnette Junior  MRN: [id number]   YOB: 1945    DATE OF ADMISSION: [unfilled]    DATE OF DISCHARGE: 1/26/2023    PRIMARY CARE PROVIDER: @PCP@     ATTENDING PHYSICIAN: [unfilled]  DISCHARGING PROVIDER: Dalia Morales MD    To contact this individual call [phone] and ask the  to page. If unavailable ask to be transferred the Adult Hospitalist Department. DISCHARGE DIAGNOSES GI bleed acute kidney injury    CONSULTATIONS: [unfilled]    PROCEDURES/SURGERIES: Procedure(s):  COLONOSCOPY  COLON BIOPSY    PENDING TEST RESULTS:   At the time of discharge the following test results are still pending: None    FOLLOW UP APPOINTMENTS:   [unfilled]     ADDITIONAL CARE RECOMMENDATIONS: Follow-up renal function and sodium level    DIET: Resume previous diet      ACTIVITY: Activity as tolerated    Wound care: Wound Care Order: submitted to [facility] Please view [email or web address]    EQUIPMENT needed: ***      DISCHARGE MEDICATIONS:   See Medication Reconciliation Form    It is important that you take the medication exactly as they are prescribed. Keep your medication in the bottles provided by the pharmacist and keep a list of the medication names, dosages, and times to be taken in your wallet. Do not take other medications without consulting your doctor. NOTIFY YOUR PHYSICIAN FOR ANY OF THE FOLLOWING:   Fever over 101 degrees for 24 hours. Chest pain, shortness of breath, fever, chills, nausea, vomiting, diarrhea, change in mentation, falling, weakness, bleeding. Severe pain or pain not relieved by medications. Or, any other signs or symptoms that you may have questions about.       DISPOSITION:    Home With:   OT  PT  HH  RN       SNF/Inpatient Rehab/LTAC    Independent/assisted living    Hospice    Other:         PROBLEM LIST Updated:  Yes ***       Signed:   Dalia Morales MD  1/26/2023  12:03 PM

## 2023-01-19 NOTE — PROGRESS NOTES
Spiritual Care Assessment/Progress Note  [facility]      NAME: Arcelia Hong      MRN: [id number]  AGE: 68 y.o.  SEX: female  Episcopal Affiliation: Religious   Language: English     1/19/2023           Spiritual Assessment begun in [facility] EMERGENCY DEPT through conversation with:         [x]Patient        [x] Family    [] Friend(s)        Reason for Consult: Advance medical directive consult, Crisis, Request by staff, Request by family/friend(s)     Spiritual beliefs: (Please include comment if needed)     [x] Identifies with a rebecca tradition:  Religious       [] Supported by a rebecca community:            [] Claims no spiritual orientation:           [] Seeking spiritual identity:                [] Adheres to an individual form of spirituality:           [] Not able to assess:                           Identified resources for coping:      [x] Prayer                               [] Music                  [] Guided Imagery     [x] Family/friends                 [] Pet visits     [] Devotional reading                         [] Unknown     [] Other:                                               Interventions offered during this visit: (See comments for more details)    Patient Interventions: Advance medical directive consult, Affirmation of emotions/emotional suffering, Affirmation of rebecca, Coping skills reviewed/reinforced, Iconic (affirming the presence of God/Higher Power), Prayer (actual), Prayer (assurance of), Episcopal beliefs/image of God discussed     Family/Friend(s): Advance medical directive consult, Affirmation of emotions/emotional suffering, Affirmation of rebecca, Catharsis/review of pertinent events in supportive environment, Coping skills reviewed/reinforced, Iconic (affirming the presence of God/Higher Power), Normalization of emotional/spiritual concerns, Prayer (actual), Prayer (assurance of), Episcopal beliefs/image of God discussed     Plan of Care:     [x] Support spiritual and/or cultural needs    [] Support AMD and/or advance care planning process      [] Support grieving process   [] Coordinate Rites and/or Rituals    [] Coordination with community clergy   [] No spiritual needs identified at this time   [] Detailed Plan of Care below (See Comments)  [] Make referral to Music Therapy  [] Make referral to Pet Therapy     [] Make referral to Addiction services  [] Make referral to [facility]  [] Make referral to Spiritual Care Partner  [] No future visits requested        [x] Contact Spiritual Care for further referrals     Comments: Responded to page from  in the Emergency Room that patient and her daughter who was present at bedside would like support, and possibly complete and ACP. Visited with patient and her daughter; introduced myself and offered spiritual care and emotional support.  prayed for patient and her family, provided words of encouragement, affirmation, and understanding. I explained the ACD to patient and her daughter Niurka Fournier and we filled it out but patient said she was not signing it right now. Patient indicated that she does not want to be resuscitated and would like her daughter Gregoria Loya to be her primary decision maker and daughter Roxana Palmer to be her secondary decision maker. Patient attends [facility] in [address]. She has support. She came to the ED from a [address] and has been there since around Jim time. Patient's daughter shared that patient's health seems to have declined since being there. Advised of  availability. Rev.  Margo Toro [address], [address]

## 2023-01-19 NOTE — Clinical Note
Status[de-identified] INPATIENT [101]   Type of Bed: Medical [8]   Inpatient Hospitalization Certified Necessary for the Following Reasons: 3. Patient receiving treatment that can only be provided in an inpatient setting (further clarification in H&P documentation)   Admitting Diagnosis: Cystitis [426357]   Admitting Diagnosis: Blood in stool [578. 1. ICD-9-CM]   Admitting Physician: Shukri lGeason [[id number]]   Attending Physician: Shukri Gleason [[id number]]   Estimated Length of Stay: 2 Midnights   Discharge Plan[de-identified] Home with Office Follow-up

## 2023-01-19 NOTE — ED PROVIDER NOTES
[facility] EMERGENCY DEPT  EMERGENCY DEPARTMENT HISTORY AND PHYSICAL EXAM      Date: 1/19/2023  Patient Name: Amirah Churchill  MRN: [id number]  [place]: 1945  Date of evaluation: 1/19/2023  Provider: Sophia Olivas MD   Note Started: 6:02 PM 1/19/23    HISTORY OF PRESENT ILLNESS     Chief Complaint   Patient presents with    Lethargy       History Provided By: Patient and EMS    HPI: Amirah Churchill, 68 y.o. female with past medical history as reviewed below presents from [address] and rehab due to being more lethargic recently. Unknown over what time period this has developed. Patient is oriented to self and place but does note not know why she was sent to the emergency department. She states that she feels tired. She denies any specific complaint, no chest pain, abdominal pain, nausea or vomiting, diarrhea or constipation. She is Espitia dependent. She denies any focal motor weakness or loss of sensation. PAST MEDICAL HISTORY   Past Medical History:  Past Medical History:   Diagnosis Date    Arthritis     Diabetes ([address]     states she is \"pre-diabetic\", diet controlled    frequency     H/O blood clots     right side of body    Heart attack ([address] 1/2014    Hydradenitis 2/1/2012    Hypercholesterolemia     Hypertension     Ill-defined condition     edema of legs    Thromboembolus ([address]        Past Surgical History:  No past surgical history on file. Family History:  Family History   Problem Relation Age of Onset    Heart Disease Mother     Cancer Mother     Asthma Father     Alcohol abuse Brother        Social History:  Social History     Tobacco Use    Smoking status: Never    Smokeless tobacco: Never   Vaping Use    Vaping Use: Never used   Substance Use Topics    Alcohol use: No     Alcohol/week: 0.0 standard drinks    Drug use: No       Allergies: Allergies   Allergen Reactions    Ciprofloxacin Other (comments)     Vomiting    Doxycycline Other (comments)     Facial swelling.     Pcn [Penicillins] Rash       PCP: Val Rdz MD    Current Meds:   Previous Medications    APIXABAN (ELIQUIS) 5 MG TABLET    Take 1 Tablet by mouth every twelve (12) hours. Indications: deep vein thrombosis prevention    ATORVASTATIN (LIPITOR) 80 MG TABLET    Take 1 Tablet by mouth daily. Indications: high cholesterol    BUMETANIDE (BUMEX) 0.5 MG TABLET    Take 4 Tablets by mouth daily. Indications: visible water retention    CLINDAMYCIN (CLEOCIN T) 1 % LOTION    APPLY TO AXILLA TWICE DAILY    DICLOFENAC (VOLTAREN) 1 % GEL    Apply 4 g to affected area four (4) times daily. back    GABAPENTIN (NEURONTIN) 100 MG CAPSULE    Take 1 Capsule by mouth three (3) times daily as needed for Pain. MISCELLANEOUS MEDICAL SUPPLY (ANTI-EMBOLISM STOCKINGS) MISC    Please measure and fit for one pair of below the knee anti-embolism stockings    NUT. TX.GLUC INTOL,LF,SOY-FIBER (GLUCERNA 1 SUSI) 0.04-1 GRAM-KCAL/ML LIQD    Take 1 Bottle by mouth daily. OTHER    Shower Chair- Use As Directed    OTHER    Pull Ups    OTHER    rollator    OXYBUTYNIN (DITROPAN) 5 MG TABLET    Take 1 Tablet by mouth three (3) times daily. Indications: an increased need to urinate often    ZINC OXIDE 20 % OINTMENT    Apply  to affected area as needed for Skin Irritation. REVIEW OF SYSTEMS   Review of Systems   Constitutional:  Negative for fever. HENT:  Negative for congestion. Respiratory:  Negative for cough and shortness of breath. Cardiovascular:  Negative for chest pain. Gastrointestinal:  Negative for abdominal pain, constipation, nausea and vomiting. Genitourinary:  Negative for dysuria. Musculoskeletal:  Negative for arthralgias and myalgias. Skin:  Negative for rash. Allergic/Immunologic: Negative for immunocompromised state. Neurological:  Negative for syncope. Psychiatric/Behavioral:  Negative for confusion. Positives and Pertinent negatives as per HPI.     PHYSICAL EXAM     ED Triage Vitals   ED Encounter Vitals Group      BP 01/19/23 0958 (!) 132/110      Pulse (Heart Rate) 01/19/23 0958 84      Resp Rate 01/19/23 0958 20      Temp 01/19/23 0958 98.6 °F (37 °C)      Temp src --       O2 Sat (%) 01/19/23 0958 100 %      Weight 01/19/23 0956 140 lb      Height 01/19/23 0956 5' 7\"      Physical Exam  Constitutional:       Appearance: Normal appearance. HENT:      Head: Normocephalic and atraumatic. Nose: Nose normal.      Mouth/Throat:      Mouth: Mucous membranes are moist.   Eyes:      Conjunctiva/sclera: Conjunctivae normal.      Pupils: Pupils are equal, round, and reactive to light. Cardiovascular:      Rate and Rhythm: Normal rate and regular rhythm. Heart sounds: Normal heart sounds. Pulmonary:      Effort: Pulmonary effort is normal.      Breath sounds: Normal breath sounds. Abdominal:      General: There is no distension. Palpations: Abdomen is soft. Tenderness: There is no abdominal tenderness. Genitourinary:     Comments: Espitia in place draining pink-tinged cloudy urine  Musculoskeletal:         General: No tenderness or deformity. Normal range of motion. Cervical back: Normal range of motion and neck supple. Skin:     General: Skin is warm and dry. Neurological:      General: No focal deficit present. Mental Status: She is alert and oriented to person, place, and time.    Psychiatric:         Mood and Affect: Mood normal.         Behavior: Behavior normal.       SCREENINGS               No data recorded        LAB, EKG AND DIAGNOSTIC RESULTS   Labs:  Recent Results (from the past 12 hour(s))   CBC WITH AUTOMATED DIFF    Collection Time: 01/19/23 10:23 AM   Result Value Ref Range    WBC 12.5 (H) 3.6 - 11.0 K/uL    RBC 4.01 3.80 - 5.20 M/uL    HGB 10.4 (L) 11.5 - 16.0 g/dL    HCT 32.6 (L) 35.0 - 47.0 %    MCV 81.3 80.0 - 99.0 FL    MCH 25.9 (L) 26.0 - 34.0 PG    MCHC 31.9 30.0 - 36.5 g/dL    RDW 17.8 (H) 11.5 - 14.5 %    PLATELET 473 (H) 283 - 400 K/uL    MPV 10.3 8.9 - 12.9 FL    NRBC 0.0 0.0  WBC    ABSOLUTE NRBC 0.00 0.00 - 0.01 K/uL    NEUTROPHILS 84 (H) 32 - 75 %    LYMPHOCYTES 10 (L) 12 - 49 %    MONOCYTES 5 5 - 13 %    EOSINOPHILS 1 0 - 7 %    BASOPHILS 0 0 - 1 %    IMMATURE GRANULOCYTES 0 0 - 0.5 %    ABS. NEUTROPHILS 10.5 (H) 1.8 - 8.0 K/UL    ABS. LYMPHOCYTES 1.3 0.8 - 3.5 K/UL    ABS. MONOCYTES 0.6 0.0 - 1.0 K/UL    ABS. EOSINOPHILS 0.1 0.0 - 0.4 K/UL    ABS. BASOPHILS 0.0 0.0 - 0.1 K/UL    ABS. IMM. GRANS. 0.0 0.00 - 0.04 K/UL    DF AUTOMATED     METABOLIC PANEL, COMPREHENSIVE    Collection Time: 01/19/23 10:23 AM   Result Value Ref Range    Sodium 137 136 - 145 mmol/L    Potassium 4.9 3.5 - 5.1 mmol/L    Chloride 107 97 - 108 mmol/L    CO2 19 (L) 21 - 32 mmol/L    Anion gap 11 5 - 15 mmol/L    Glucose 134 (H) 65 - 100 mg/dL     (H) 6 - 20 mg/dL    Creatinine 5.68 (H) 0.55 - 1.02 mg/dL    BUN/Creatinine ratio 30 (H) 12 - 20      eGFR 7 (L) >60 ml/min/1.73m2    Calcium 9.6 8.5 - 10.1 mg/dL    Bilirubin, total 0.2 0.2 - 1.0 mg/dL    AST (SGOT) 51 (H) 15 - 37 U/L    ALT (SGPT) 36 12 - 78 U/L    Alk.  phosphatase 193 (H) 45 - 117 U/L    Protein, total 10.1 (H) 6.4 - 8.2 g/dL    Albumin 2.8 (L) 3.5 - 5.0 g/dL    Globulin 7.3 (H) 2.0 - 4.0 g/dL    A-G Ratio 0.4 (L) 1.1 - 2.2     LACTIC ACID    Collection Time: 01/19/23 10:23 AM   Result Value Ref Range    Lactic acid 0.8 0.4 - 2.0 mmol/L   URINALYSIS W/ RFLX MICROSCOPIC    Collection Time: 01/19/23 10:23 AM   Result Value Ref Range    Color Dark Yellow      Appearance Turbid (A) Clear      Specific gravity 1.020 1.003 - 1.030      pH (UA) 5.0      Protein 100 (A) Negative mg/dL    Glucose Negative Negative mg/dL    Ketone Negative Negative mg/dL    Bilirubin Negative Negative      Blood Large (A) Negative      Urobilinogen 0.1 (L) 0.2 - 1.0 EU/dL    Nitrites Negative Negative      Leukocyte Esterase Large (A) Negative     TROPONIN-HIGH SENSITIVITY    Collection Time: 01/19/23 10:23 AM   Result Value Ref Range Troponin-High Sensitivity 12 0 - 51 ng/L   NT-PRO BNP    Collection Time: 01/19/23 10:23 AM   Result Value Ref Range    NT pro-BNP 1,091 (H) <450 pg/mL   URINE MICROSCOPIC    Collection Time: 01/19/23 10:23 AM   Result Value Ref Range    WBC >100 (H) 0 - 4 /hpf    RBC >100 (H) 0 - 5 /hpf    Epithelial cells Many (A) Few /lpf    Bacteria Negative Negative /hpf    Budding yeast Present (A) Negative     CK    Collection Time: 01/19/23 10:30 AM   Result Value Ref Range     26 - 192 U/L            Radiologic Studies:  Non-plain film images such as CT, Ultrasound and MRI are read by the radiologist. Plain radiographic images are visualized and preliminarily interpreted by the ED Provider with the below findings:       Interpretation per the Radiologist below, if available at the time of this note:  CT HEAD WO CONT    Result Date: 1/19/2023  EXAM: CT HEAD WO CONT INDICATION: ams COMPARISON: None. CONTRAST: None. TECHNIQUE: Unenhanced CT of the head was performed using 5 mm images. Brain and bone windows were generated. Coronal and sagittal reformats. CT dose reduction was achieved through use of a standardized protocol tailored for this examination and automatic exposure control for dose modulation. FINDINGS: The ventricles and sulci are normal in size, shape and configuration. There is no significant white matter disease. There is no intracranial hemorrhage, extra-axial collection, or mass effect. The basilar cisterns are open. No CT evidence of acute infarct. The bone windows demonstrate no abnormalities. The visualized portions of the paranasal sinuses and mastoid air cells are clear. No acute abnormality. PROCEDURES   Unless otherwise noted below, none.   Performed by: Pily Anderson MD   Procedures      CRITICAL CARE TIME        ED COURSE and DIFFERENTIAL DIAGNOSIS/MDM   Vitals:    Vitals:    01/19/23 1600 01/19/23 1630 01/19/23 1651 01/19/23 1730   BP:  105/67  95/72   Pulse: 76 83  82   Resp: 16  20   Temp:  98.2 °F (36.8 °C)  97.5 °F (36.4 °C)   SpO2:   100% 98%   Weight:       Height:            Patient was given the following medications:  Medications   sodium chloride 0.9 % bolus infusion 500 mL (500 mL IntraVENous Bolus 1/19/23 1320)   cefTRIAXone (ROCEPHIN) 1 g in sterile water (preservative free) 10 mL IV syringe (1 g IntraVENous Given 1/19/23 1320)       CONSULTS: (Who and What was discussed)  None        CC/HPI Summary, DDx, ED Course, and Reassessment:   66-year-old female presents for evaluation of lethargy. Nonfocal neurologic exam so low suspicion for acute CVA but will intracranial mass or bleed with CT of the head. Otherwise broad lab work to evaluate for dehydration, electrolyte imbalance, evidence of infection, UTI, anemia. Lab work includes CBC, BMP    ED Course as of 01/19/23 1802   Thu Jan 19, 2023   1518 ECG performed at 955 and interpreted by me shows sinus rhythm with short MD of 110, normal intervals, no ST elevation [BQ]      ED Course User Index  [BQ] Dorrie Kawasaki, MD     Cystitis identified on UA and patient treated with Rocephin. Patient then had a large bloody bowel movement so will admit for further monitoring. FINAL IMPRESSION     1. Cystitis    2. Blood in stool          DISPOSITION/PLAN   Admitted    Admit Note: Pt is being admitted by Eva. The results of their tests and reason(s) for their admission have been discussed with pt and/or available family. They convey agreement and understanding for the need to be admitted and for the admission diagnosis. I am the Primary Clinician of Record: Jarek Jo MD (electronically signed)    (Please note that parts of this dictation were completed with voice recognition software. Quite often unanticipated grammatical, syntax, homophones, and other interpretive errors are inadvertently transcribed by the computer software. Please disregards these errors.  Please excuse any errors that have escaped final proofreading.)

## 2023-01-19 NOTE — ACP (ADVANCE CARE PLANNING)
Advance Care Planning   Healthcare Decision Maker:       Primary Decision Maker: Bayron Santana - [facility] - [phone]

## 2023-01-19 NOTE — PROGRESS NOTES
Responded to page from  in reference to patient and family wanting to do a POA. I explained that we only do Advance Care Plans, and explained this to the patient and her daughter when I met with them. Although we filled out an ACP patient stated that she was not signing anything at the moment, but stated that she would like to be DNR. Patient identified her daughter Tae Beckett as her primary decision maker and her daughter Rian Nunez, who was present at bedside as her secondary decision maker. I informed them unless she signs the paper work the state of [place]'s laws indicate that all children have equal say and input as it pertains to patients care and treatment, and she can let the doctor know that she would like to be a DNR.

## 2023-01-19 NOTE — ED TRIAGE NOTES
Coming from [address] rehab, more lethargic than normal, responds to pain, sorenson catheter present. Unknown when patient started to decline, signs of dehydration, patient alert but slow to respond.  Hypotensive for ems, vitals within normal limits

## 2023-01-19 NOTE — ACP (ADVANCE CARE PLANNING)
Advance Care Planning   Healthcare Decision Maker:       Primary Decision Maker: Monika Tatum - Daughter - [phone]    Secondary Decision Maker: Bisi Lora - Daughter - [phone]

## 2023-01-20 ENCOUNTER — ANESTHESIA (OUTPATIENT)
Dept: ENDOSCOPY | Age: 78
DRG: 699 | End: 2023-01-20
Payer: MEDICARE

## 2023-01-20 ENCOUNTER — APPOINTMENT (OUTPATIENT)
Dept: ENDOSCOPY | Age: 78
DRG: 699 | End: 2023-01-20
Attending: INTERNAL MEDICINE
Payer: MEDICARE

## 2023-01-20 ENCOUNTER — ANESTHESIA EVENT (OUTPATIENT)
Dept: ENDOSCOPY | Age: 78
DRG: 699 | End: 2023-01-20
Payer: MEDICARE

## 2023-01-20 LAB
ALBUMIN SERPL-MCNC: 2.7 G/DL (ref 3.5–5)
ALBUMIN/GLOB SERPL: 0.4 (ref 1.1–2.2)
ALP SERPL-CCNC: 182 U/L (ref 45–117)
ALT SERPL-CCNC: 32 U/L (ref 12–78)
ANION GAP SERPL CALC-SCNC: 9 MMOL/L (ref 5–15)
AST SERPL W P-5'-P-CCNC: ABNORMAL U/L (ref 15–37)
ATRIAL RATE: 84 BPM
BASOPHILS # BLD: 0 K/UL (ref 0–0.1)
BASOPHILS NFR BLD: 0 % (ref 0–1)
BILIRUB SERPL-MCNC: 0.3 MG/DL (ref 0.2–1)
BUN SERPL-MCNC: 147 MG/DL (ref 6–20)
BUN/CREAT SERPL: 33 (ref 12–20)
CA-I BLD-MCNC: 9.7 MG/DL (ref 8.5–10.1)
CALCULATED P AXIS, ECG09: 79 DEGREES
CALCULATED R AXIS, ECG10: 76 DEGREES
CALCULATED T AXIS, ECG11: 64 DEGREES
CHLORIDE SERPL-SCNC: 113 MMOL/L (ref 97–108)
CO2 SERPL-SCNC: 19 MMOL/L (ref 21–32)
CREAT SERPL-MCNC: 4.51 MG/DL (ref 0.55–1.02)
DIAGNOSIS, 93000: NORMAL
DIFFERENTIAL METHOD BLD: ABNORMAL
EOSINOPHIL # BLD: 0.1 K/UL (ref 0–0.4)
EOSINOPHIL NFR BLD: 1 % (ref 0–7)
ERYTHROCYTE [DISTWIDTH] IN BLOOD BY AUTOMATED COUNT: 18.6 % (ref 11.5–14.5)
EST. AVERAGE GLUCOSE BLD GHB EST-MCNC: 137 MG/DL
FOLATE SERPL-MCNC: 4.9 NG/ML (ref 5–21)
GLOBULIN SER CALC-MCNC: 7 G/DL (ref 2–4)
GLUCOSE BLD STRIP.AUTO-MCNC: 121 MG/DL (ref 65–100)
GLUCOSE BLD STRIP.AUTO-MCNC: 124 MG/DL (ref 65–100)
GLUCOSE BLD STRIP.AUTO-MCNC: 131 MG/DL (ref 65–100)
GLUCOSE SERPL-MCNC: 110 MG/DL (ref 65–100)
HBA1C MFR BLD: 6.4 % (ref 4–5.6)
HCT VFR BLD AUTO: 35 % (ref 35–47)
HGB BLD-MCNC: 10.9 G/DL (ref 11.5–16)
IMM GRANULOCYTES # BLD AUTO: 0 K/UL (ref 0–0.04)
IMM GRANULOCYTES NFR BLD AUTO: 0 % (ref 0–0.5)
IRON SATN MFR SERPL: 21 % (ref 20–50)
IRON SERPL-MCNC: 51 UG/DL (ref 35–150)
LYMPHOCYTES # BLD: 1.1 K/UL (ref 0.8–3.5)
LYMPHOCYTES NFR BLD: 8 % (ref 12–49)
MCH RBC QN AUTO: 26.2 PG (ref 26–34)
MCHC RBC AUTO-ENTMCNC: 31.1 G/DL (ref 30–36.5)
MCV RBC AUTO: 84.1 FL (ref 80–99)
MONOCYTES # BLD: 0.6 K/UL (ref 0–1)
MONOCYTES NFR BLD: 5 % (ref 5–13)
NEUTS SEG # BLD: 10.9 K/UL (ref 1.8–8)
NEUTS SEG NFR BLD: 86 % (ref 32–75)
NRBC # BLD: 0 K/UL (ref 0–0.01)
NRBC BLD-RTO: 0 PER 100 WBC
P-R INTERVAL, ECG05: 110 MS
PERFORMED BY, TECHID: ABNORMAL
PLATELET # BLD AUTO: 385 K/UL (ref 150–400)
PMV BLD AUTO: 10.8 FL (ref 8.9–12.9)
POTASSIUM SERPL-SCNC: ABNORMAL MMOL/L (ref 3.5–5.1)
PROT SERPL-MCNC: 9.7 G/DL (ref 6.4–8.2)
Q-T INTERVAL, ECG07: 360 MS
QRS DURATION, ECG06: 90 MS
QTC CALCULATION (BEZET), ECG08: 425 MS
RBC # BLD AUTO: 4.16 M/UL (ref 3.8–5.2)
SODIUM SERPL-SCNC: 141 MMOL/L (ref 136–145)
TIBC SERPL-MCNC: 240 UG/DL (ref 250–450)
VENTRICULAR RATE, ECG03: 84 BPM
VIT B12 SERPL-MCNC: 866 PG/ML (ref 193–986)
WBC # BLD AUTO: 12.8 K/UL (ref 3.6–11)

## 2023-01-20 PROCEDURE — 82962 GLUCOSE BLOOD TEST: CPT

## 2023-01-20 PROCEDURE — 74011250636 HC RX REV CODE- 250/636: Performed by: FAMILY MEDICINE

## 2023-01-20 PROCEDURE — 74011250637 HC RX REV CODE- 250/637: Performed by: FAMILY MEDICINE

## 2023-01-20 PROCEDURE — C9113 INJ PANTOPRAZOLE SODIUM, VIA: HCPCS | Performed by: FAMILY MEDICINE

## 2023-01-20 PROCEDURE — 76060000032 HC ANESTHESIA 0.5 TO 1 HR: Performed by: INTERNAL MEDICINE

## 2023-01-20 PROCEDURE — 65270000029 HC RM PRIVATE

## 2023-01-20 PROCEDURE — 74011000250 HC RX REV CODE- 250: Performed by: ANESTHESIOLOGY

## 2023-01-20 PROCEDURE — 74011000250 HC RX REV CODE- 250: Performed by: FAMILY MEDICINE

## 2023-01-20 PROCEDURE — 76040000007: Performed by: INTERNAL MEDICINE

## 2023-01-20 PROCEDURE — 85025 COMPLETE CBC W/AUTO DIFF WBC: CPT

## 2023-01-20 PROCEDURE — 80053 COMPREHEN METABOLIC PANEL: CPT

## 2023-01-20 PROCEDURE — 74011250637 HC RX REV CODE- 250/637: Performed by: INTERNAL MEDICINE

## 2023-01-20 PROCEDURE — 2709999900 HC NON-CHARGEABLE SUPPLY: Performed by: INTERNAL MEDICINE

## 2023-01-20 PROCEDURE — 0DJ08ZZ INSPECTION OF UPPER INTESTINAL TRACT, VIA NATURAL OR ARTIFICIAL OPENING ENDOSCOPIC: ICD-10-PCS | Performed by: INTERNAL MEDICINE

## 2023-01-20 PROCEDURE — 74011250636 HC RX REV CODE- 250/636: Performed by: ANESTHESIOLOGY

## 2023-01-20 RX ORDER — PROPOFOL 10 MG/ML
INJECTION, EMULSION INTRAVENOUS AS NEEDED
Status: DISCONTINUED | OUTPATIENT
Start: 2023-01-20 | End: 2023-01-20 | Stop reason: HOSPADM

## 2023-01-20 RX ORDER — PANTOPRAZOLE SODIUM 40 MG/1
40 TABLET, DELAYED RELEASE ORAL
Status: DISCONTINUED | OUTPATIENT
Start: 2023-01-20 | End: 2023-01-21

## 2023-01-20 RX ORDER — LIDOCAINE HYDROCHLORIDE 20 MG/ML
INJECTION, SOLUTION EPIDURAL; INFILTRATION; INTRACAUDAL; PERINEURAL AS NEEDED
Status: DISCONTINUED | OUTPATIENT
Start: 2023-01-20 | End: 2023-01-20 | Stop reason: HOSPADM

## 2023-01-20 RX ORDER — SODIUM CHLORIDE, SODIUM LACTATE, POTASSIUM CHLORIDE, CALCIUM CHLORIDE 600; 310; 30; 20 MG/100ML; MG/100ML; MG/100ML; MG/100ML
INJECTION, SOLUTION INTRAVENOUS
Status: DISCONTINUED | OUTPATIENT
Start: 2023-01-20 | End: 2023-01-20 | Stop reason: HOSPADM

## 2023-01-20 RX ADMIN — PROPOFOL 40 MG: 10 INJECTION, EMULSION INTRAVENOUS at 13:11

## 2023-01-20 RX ADMIN — PHENYLEPHRINE HYDROCHLORIDE 200 MCG: 10 INJECTION INTRAVENOUS at 13:13

## 2023-01-20 RX ADMIN — SODIUM CHLORIDE, PRESERVATIVE FREE 40 MG: 5 INJECTION INTRAVENOUS at 11:10

## 2023-01-20 RX ADMIN — PANTOPRAZOLE SODIUM 40 MG: 40 TABLET, DELAYED RELEASE ORAL at 15:57

## 2023-01-20 RX ADMIN — ACETAMINOPHEN 650 MG: 325 TABLET ORAL at 22:40

## 2023-01-20 RX ADMIN — SODIUM CHLORIDE, POTASSIUM CHLORIDE, SODIUM LACTATE AND CALCIUM CHLORIDE: 600; 310; 30; 20 INJECTION, SOLUTION INTRAVENOUS at 12:58

## 2023-01-20 RX ADMIN — LIDOCAINE HYDROCHLORIDE 60 MG: 20 INJECTION, SOLUTION EPIDURAL; INFILTRATION; INTRACAUDAL; PERINEURAL at 13:11

## 2023-01-20 RX ADMIN — ACETAMINOPHEN 650 MG: 325 TABLET ORAL at 11:10

## 2023-01-20 RX ADMIN — CEFTRIAXONE SODIUM 1 G: 1 INJECTION, POWDER, FOR SOLUTION INTRAMUSCULAR; INTRAVENOUS at 15:57

## 2023-01-20 NOTE — CONSULTS
Consult    Patient: Agatha Knight MRN: [id number]  SSN: [id number]    YOB: 1945  Age: 68 y.o. Sex: female      Subjective: Agatha Knight is a 68 y.o. female who is being seen for gi bleeding . history of DVT on Eliquis hypertension hyperlipidemia came to emergency room because of lethargic , patient not eating drinking well, reportly  patient have large dark bloody bowel movement, ER physician work-up done in the [facility] work shows WBC is 12.6Hb drop some from baseline. UA came back positive for UTI patient have a chronic Espitia  Renal function shows BUN of 170 creatinine 5.66, Patient was admitted acute GI bleed UTI/renal failure. No hematemesis,  Past Medical History:   Diagnosis Date    Arthritis     Diabetes ([facility]     states she is \"pre-diabetic\", diet controlled    frequency     H/O blood clots     right side of body    Heart attack ([facility] [address] 1/2014    Hydradenitis 2/1/2012    Hypercholesterolemia     Hypertension     Ill-defined condition     edema of legs    Thromboembolus ([facility])      No past surgical history on file.    Family History   Problem Relation Age of Onset    Heart Disease Mother     Cancer Mother     Asthma Father     Alcohol abuse Brother      Social History     Tobacco Use    Smoking status: Never    Smokeless tobacco: Never   Substance Use Topics    Alcohol use: No     Alcohol/week: 0.0 standard drinks      Current Facility-Administered Medications   Medication Dose Route Frequency Provider Last Rate Last Admin    0.9% sodium chloride infusion  75 mL/hr IntraVENous CONTINUOUS Fadi Velasquez MD        acetaminophen (TYLENOL) tablet 650 mg  650 mg Oral Q6H PRN Fadi Velasquez MD   650 mg at 01/20/23 1110    Or    acetaminophen (TYLENOL) suppository 650 mg  650 mg Rectal Q6H PRN Mohiuddin, Romana Dallas, MD        polyethylene glycol (MIRALAX) packet 17 g  17 g Oral DAILY PRN Fadi Velasquez MD        ondansetron (ZOFRAN ODT) tablet 4 mg  4 mg Oral Q8H PRN Mohiuddin, Romana Dallas, MD        Or    ondansetron (ZOFRAN) injection 4 mg  4 mg IntraVENous Q6H PRN Alex Velasquez MD        pantoprazole (PROTONIX) 40 mg in 0.9% sodium chloride 10 mL injection  40 mg IntraVENous Q12H Fadi Velasquez MD   40 mg at 01/20/23 1110    insulin lispro (HUMALOG) injection   SubCUTAneous AC&HS Angela Johnson MD        glucose chewable tablet 16 g  4 Tablet Oral PRN Alex Velasquez MD        glucagon (GLUCAGEN) injection 1 mg  1 mg IntraMUSCular PRN Alex Velasquez MD        cefTRIAXone (ROCEPHIN) 1 g in sterile water (preservative free) 10 mL IV syringe  1 g IntraVENous Q24H Fadi Velasquez MD            Allergies   Allergen Reactions    Ciprofloxacin Other (comments)     Vomiting    Doxycycline Other (comments)     Facial swelling. Pcn [Penicillins] Rash       Review of Systems:  Review of Systems   Constitutional:  Negative for malaise/fatigue. Eyes:  Positive for blurred vision. Respiratory:  Positive for shortness of breath. Cardiovascular: Negative. Gastrointestinal:  Positive for blood in stool. Genitourinary:  Positive for urgency. Musculoskeletal:  Positive for back pain and neck pain. Neurological:  Positive for weakness. Psychiatric/Behavioral: Negative. Objective:     Vitals:    01/19/23 2101 01/20/23 0450 01/20/23 0538 01/20/23 0730   BP: 116/67  (!) 96/58 92/60   Pulse: 87  91 84   Resp: 12  20 20   Temp:   97.8 °F (36.6 °C) 97.4 °F (36.3 °C)   SpO2:  100% 100% 100%   Weight:       Height:            Physical Exam:  Physical Exam  Constitutional:       Appearance: She is ill-appearing. HENT:      Head: Atraumatic. Mouth/Throat:      Mouth: Mucous membranes are dry. Eyes:      General: No scleral icterus. Cardiovascular:      Rate and Rhythm: Regular rhythm. Heart sounds: Normal heart sounds. Pulmonary:      Effort: Pulmonary effort is normal.   Abdominal:      General: Abdomen is flat. Palpations: Abdomen is soft.    Musculoskeletal: Cervical back: Neck supple. Skin:     General: Skin is warm. Neurological:      Mental Status: Mental status is at baseline. Psychiatric:         Mood and Affect: Mood normal.        Recent Results (from the past 24 hour(s))   OCCULT BLOOD, STOOL    Collection Time: 01/19/23  5:54 PM   Result Value Ref Range    Occult Blood,day 1 Positive (A) Negative      Day 1 date: 7,910,520     GLUCOSE, POC    Collection Time: 01/19/23  9:17 PM   Result Value Ref Range    Glucose (POC) 101 (H) 65 - 100 mg/dL    Performed by HUMA [address], COMPREHENSIVE    Collection Time: 01/20/23  3:46 AM   Result Value Ref Range    Sodium 141 136 - 145 mmol/L    Potassium Hemolyzed, recollect requested 3.5 - 5.1 mmol/L    Chloride 113 (H) 97 - 108 mmol/L    CO2 19 (L) 21 - 32 mmol/L    Anion gap 9 5 - 15 mmol/L    Glucose 110 (H) 65 - 100 mg/dL     (H) 6 - 20 mg/dL    Creatinine 4.51 (H) 0.55 - 1.02 mg/dL    BUN/Creatinine ratio 33 (H) 12 - 20      eGFR 10 (L) >60 ml/min/1.73m2    Calcium 9.7 8.5 - 10.1 mg/dL    Bilirubin, total 0.3 0.2 - 1.0 mg/dL    AST (SGOT) Hemolyzed, recollect requested 15 - 37 U/L    ALT (SGPT) 32 12 - 78 U/L    Alk.  phosphatase 182 (H) 45 - 117 U/L    Protein, total 9.7 (H) 6.4 - 8.2 g/dL    Albumin 2.7 (L) 3.5 - 5.0 g/dL    Globulin 7.0 (H) 2.0 - 4.0 g/dL    A-G Ratio 0.4 (L) 1.1 - 2.2     CBC WITH AUTOMATED DIFF    Collection Time: 01/20/23  3:46 AM   Result Value Ref Range    WBC 12.8 (H) 3.6 - 11.0 K/uL    RBC 4.16 3.80 - 5.20 M/uL    HGB 10.9 (L) 11.5 - 16.0 g/dL    HCT 35.0 35.0 - 47.0 %    MCV 84.1 80.0 - 99.0 FL    MCH 26.2 26.0 - 34.0 PG    MCHC 31.1 30.0 - 36.5 g/dL    RDW 18.6 (H) 11.5 - 14.5 %    PLATELET 111 017 - 569 K/uL    MPV 10.8 8.9 - 12.9 FL    NRBC 0.0 0.0  WBC    ABSOLUTE NRBC 0.00 0.00 - 0.01 K/uL    NEUTROPHILS 86 (H) 32 - 75 %    LYMPHOCYTES 8 (L) 12 - 49 %    MONOCYTES 5 5 - 13 %    EOSINOPHILS 1 0 - 7 %    BASOPHILS 0 0 - 1 %    IMMATURE GRANULOCYTES 0 0 - 0.5 %    ABS. NEUTROPHILS 10.9 (H) 1.8 - 8.0 K/UL    ABS. LYMPHOCYTES 1.1 0.8 - 3.5 K/UL    ABS. MONOCYTES 0.6 0.0 - 1.0 K/UL    ABS. EOSINOPHILS 0.1 0.0 - 0.4 K/UL    ABS. BASOPHILS 0.0 0.0 - 0.1 K/UL    ABS. IMM. GRANS. 0.0 0.00 - 0.04 K/UL    DF AUTOMATED     GLUCOSE, POC    Collection Time: 01/20/23  8:21 AM   Result Value Ref Range    Glucose (POC) 121 (H) 65 - 100 mg/dL    Performed by Shelbi Rojas         US RETROPERITONEUM COMP   Final Result   Medical renal disease. No hydronephrosis demonstrated. CT HEAD WO CONT   Final Result   No acute abnormality.                Assessment:     Hospital Problems  Date Reviewed: 7/18/2022            Codes Class Noted POA    Blood in stool ICD-10-CM: K92.1  ICD-9-CM: 578.1  1/19/2023 Unknown        Cystitis ICD-10-CM: N30.90  ICD-9-CM: 595.9  1/19/2023 Unknown        GI bleed ICD-10-CM: K92.2  ICD-9-CM: 578.9  1/19/2023 Unknown        TAL (acute kidney injury) ([facility] [address] ICD-10-CM: N17.9  ICD-9-CM: 584.9  1/19/2023 Unknown       , GI bleeding  UPPER vs lower  GI bleeding  Mild anemia  UTI, possible sepsis,  Plan:   Continue current monitor hemoglobin  PPI as ordered  N.p.o. for now  EGD rescheduled for today evaluation indications, options,  Risk discussed with patient    Recommendation made after endoscopy studies,    Signed By: Magaly Blount MD     January 20, 2023         Thank you for allowing me to participate in this patients care  Cc Referring Physician   Estelita Nazario MD

## 2023-01-20 NOTE — CONSULTS
NAME:  Kelin Dean   :   1945   MRN:   [id number]     ATTENDING: Claudy Vasquez MD  PCP:  Tunde Clark MD    Date/Time:  2023 6:19 PM      Subjective:   REQUESTING PHYSICIAN:Fadi Velasquez MD  REASON FOR CONSULT: TAL       History of presenting illness: Patient is a 49-year-old -American female with past medical history of hypertension, type 2 diabetes, history of DVT on anticoagulation with Eliquis presented to the emergency room with complaints of altered mental status and GI bleed. Patient apparently had a large bloody bowel movement and presented to the emergency room. Initial labs showed a high creatinine of 5.68 and a high BUN of 170, nephrology consultation is requested for further evaluation management. Patient seen in the endoscopy suite today, she was evaluated by gastroenterology and scheduled for an EGD. She is alert awake, poor historian appears forgetful and confused. No acute distress, no complaints of any chest pain or shortness of breath, no complaints of any swelling in her lower extremities. She denies any voiding difficulties. Some improvement in renal functions noted with IV hydration since admission. She has borderline hypotension. No evidence of any use of NSAIDs or angiotensin blocker on review of home medications. Past Medical History:   Diagnosis Date    Arthritis     Diabetes ([facility] [address]     states she is \"pre-diabetic\", diet controlled    frequency     H/O blood clots     right side of body    Heart attack ([facility] [address] 2014    Hydradenitis 2012    Hypercholesterolemia     Hypertension     Ill-defined condition     edema of legs    Thromboembolus (HCC)       No past surgical history on file.   Social History     Tobacco Use    Smoking status: Never    Smokeless tobacco: Never   Substance Use Topics    Alcohol use: No     Alcohol/week: 0.0 standard drinks      Family History   Problem Relation Age of Onset    Heart Disease Mother     Cancer Mother Asthma Father     Alcohol abuse Brother        Allergies   Allergen Reactions    Ciprofloxacin Other (comments)     Vomiting    Doxycycline Other (comments)     Facial swelling. Pcn [Penicillins] Rash      Prior to Admission medications    Medication Sig Start Date End Date Taking? Authorizing Provider   cephALEXin (Keflex) 500 mg capsule Take 1 Capsule by mouth four (4) times daily for 7 days. 1/19/23 1/26/23 Yes Kishan Lujan MD   OTHER rollator 9/14/22   Charley Geller MD   diclofenac (VOLTAREN) 1 % gel Apply 4 g to affected area four (4) times daily. back 7/18/22   Aleena Bonner MD   nut.tx.gluc intol,lf,soy-fiber (Glucerna 1 Jose C) 0.04-1 gram-kcal/mL liqd Take 1 Bottle by mouth daily. 7/18/22   Aleena Bonner MD   bumetanide (BUMEX) 0.5 mg tablet Take 4 Tablets by mouth daily. Indications: visible water retention 6/14/22   Julia Vega MD   clindamycin (CLEOCIN T) 1 % lotion APPLY TO AXILLA TWICE DAILY 6/8/22   Provider, Historical   atorvastatin (LIPITOR) 80 mg tablet Take 1 Tablet by mouth daily. Indications: high cholesterol 6/14/22   Charley Geller MD   apixaban (ELIQUIS) 5 mg tablet Take 1 Tablet by mouth every twelve (12) hours. Indications: deep vein thrombosis prevention 6/14/22   Charley Geller MD   oxybutynin (DITROPAN) 5 mg tablet Take 1 Tablet by mouth three (3) times daily. Indications: an increased need to urinate often 6/14/22   Charley Geller MD   gabapentin (NEURONTIN) 100 mg capsule Take 1 Capsule by mouth three (3) times daily as needed for Pain. 6/14/22   Charley Geller MD   miscellaneous medical supply (Anti-Embolism Stockings) misc Please measure and fit for one pair of below the knee anti-embolism stockings 2/25/22 2/25/23  Charley Geller MD   OTHER Pull Ups 2/25/22   Charley Geller MD   zinc oxide 20 % ointment Apply  to affected area as needed for Skin Irritation.  3/12/21   Charley Geller MD   OTHER Shower Chair- Use As Directed 12/20/18   Charley Geller MD     Current Facility-Administered Medications   Medication Dose Route Frequency    pantoprazole (PROTONIX) tablet 40 mg  40 mg Oral ACB&D    0.9% sodium chloride infusion  75 mL/hr IntraVENous CONTINUOUS    acetaminophen (TYLENOL) tablet 650 mg  650 mg Oral Q6H PRN    Or    acetaminophen (TYLENOL) suppository 650 mg  650 mg Rectal Q6H PRN    polyethylene glycol (MIRALAX) packet 17 g  17 g Oral DAILY PRN    ondansetron (ZOFRAN ODT) tablet 4 mg  4 mg Oral Q8H PRN    Or    ondansetron (ZOFRAN) injection 4 mg  4 mg IntraVENous Q6H PRN    insulin lispro (HUMALOG) injection   SubCUTAneous AC&HS    glucose chewable tablet 16 g  4 Tablet Oral PRN    glucagon (GLUCAGEN) injection 1 mg  1 mg IntraMUSCular PRN    cefTRIAXone (ROCEPHIN) 1 g in sterile water (preservative free) 10 mL IV syringe  1 g IntraVENous Q24H       REVIEW OF SYSTEMS:     Complaints as mentioned in the history of presenting illness. No other significant complaints on complete review of systems. Objective:   VITALS:    Visit Vitals  BP (!) 105/54 (BP 1 Location: Right upper arm)   Pulse 89   Temp 97.8 °F (36.6 °C)   Resp 16   Ht 5' 7\" (1.702 m)   Wt 63.5 kg (140 lb)   SpO2 100%   BMI 21.93 kg/m²     Temp (24hrs), Av.7 °F (36.5 °C), Min:97.4 °F (36.3 °C), Max:97.8 °F (36.6 °C)      PHYSICAL EXAM:   General: alert awake , confused, no acute distress. HEENT: EOMI, no Icterus, no Pallor, pupils reactive, mucosa dry   normal inspection of ears and nose, throat clear. Neck: Neck is supple, No JVD, no thyromegaly,  Lungs: breathsounds normal, no respiratory distress on inspection, no rhonchi, no rales,  CVS: heart sounds normal, no murmurs, no rubs. GI: soft, nontender, normal BS, no palpable organomegaly  Extremeties: no clubbing, no cyanosis, no edema,  Neuro: Alert, awake, answering simple questions but appears forgetful and confused, moving all extremeties   Skin: Poor skin turgor, dark discoloration of skin noted in lower extremities.   Musculoskeletal: no acute joint swellings    LAB DATA REVIEWED:    Recent Results (from the past 24 hour(s))   GLUCOSE, POC    Collection Time: 01/19/23  9:17 PM   Result Value Ref Range    Glucose (POC) 101 (H) 65 - 100 mg/dL    Performed by Nadia Hoffman    HEMOGLOBIN A1C WITH EAG    Collection Time: 01/19/23 10:19 PM   Result Value Ref Range    Hemoglobin A1c 6.4 (H) 4.0 - 5.6 %    Est. average glucose 079 mg/dL   METABOLIC PANEL, COMPREHENSIVE    Collection Time: 01/20/23  3:46 AM   Result Value Ref Range    Sodium 141 136 - 145 mmol/L    Potassium Hemolyzed, recollect requested 3.5 - 5.1 mmol/L    Chloride 113 (H) 97 - 108 mmol/L    CO2 19 (L) 21 - 32 mmol/L    Anion gap 9 5 - 15 mmol/L    Glucose 110 (H) 65 - 100 mg/dL     (H) 6 - 20 mg/dL    Creatinine 4.51 (H) 0.55 - 1.02 mg/dL    BUN/Creatinine ratio 33 (H) 12 - 20      eGFR 10 (L) >60 ml/min/1.73m2    Calcium 9.7 8.5 - 10.1 mg/dL    Bilirubin, total 0.3 0.2 - 1.0 mg/dL    AST (SGOT) Hemolyzed, recollect requested 15 - 37 U/L    ALT (SGPT) 32 12 - 78 U/L    Alk. phosphatase 182 (H) 45 - 117 U/L    Protein, total 9.7 (H) 6.4 - 8.2 g/dL    Albumin 2.7 (L) 3.5 - 5.0 g/dL    Globulin 7.0 (H) 2.0 - 4.0 g/dL    A-G Ratio 0.4 (L) 1.1 - 2.2     CBC WITH AUTOMATED DIFF    Collection Time: 01/20/23  3:46 AM   Result Value Ref Range    WBC 12.8 (H) 3.6 - 11.0 K/uL    RBC 4.16 3.80 - 5.20 M/uL    HGB 10.9 (L) 11.5 - 16.0 g/dL    HCT 35.0 35.0 - 47.0 %    MCV 84.1 80.0 - 99.0 FL    MCH 26.2 26.0 - 34.0 PG    MCHC 31.1 30.0 - 36.5 g/dL    RDW 18.6 (H) 11.5 - 14.5 %    PLATELET 290 765 - 210 K/uL    MPV 10.8 8.9 - 12.9 FL    NRBC 0.0 0.0  WBC    ABSOLUTE NRBC 0.00 0.00 - 0.01 K/uL    NEUTROPHILS 86 (H) 32 - 75 %    LYMPHOCYTES 8 (L) 12 - 49 %    MONOCYTES 5 5 - 13 %    EOSINOPHILS 1 0 - 7 %    BASOPHILS 0 0 - 1 %    IMMATURE GRANULOCYTES 0 0 - 0.5 %    ABS. NEUTROPHILS 10.9 (H) 1.8 - 8.0 K/UL    ABS. LYMPHOCYTES 1.1 0.8 - 3.5 K/UL    ABS. MONOCYTES 0.6 0.0 - 1.0 K/UL    ABS. EOSINOPHILS 0.1 0.0 - 0.4 K/UL    ABS. BASOPHILS 0.0 0.0 - 0.1 K/UL    ABS. IMM. GRANS. 0.0 0.00 - 0.04 K/UL    DF AUTOMATED     GLUCOSE, POC    Collection Time: 01/20/23  8:21 AM   Result Value Ref Range    Glucose (POC) 121 (H) 65 - 100 mg/dL    Performed by Jannice Res    GLUCOSE, POC    Collection Time: 01/20/23  4:02 PM   Result Value Ref Range    Glucose (POC) 131 (H) 65 - 100 mg/dL    Performed by Jannice Res        Assessment and plan:     Acute Kidney Injury : probably prerenal azotemia secondary to   Hypotension and poor p.o. intake   Possible ATN. No evidence of obstruction on renal ultrasound  recommend to continue IV hydration with normal saline at 100 mL/h  Recommend to check urinalysis,urine random electrolytes, creatinine and protein. Check CPK levels to rule out rhabdomyolysis or myopathy  will continue to monitor renal functions and adjust management as needed  No history of chronic kidney disease, review of old labs showed normal renal functions last month    2. Acute GI bleed : Probably related to anticoagulation  Stable hemoglobins, continue to monitor H&H  Currently off anticoagulation  GI following the patient and EGD scheduled for today    3. Hypotension: Probably related to dehydration and poor intake/GI bleed  Increase IV fluids to 100 mL/h  Will continue to monitor hemodynamic status  Recommend to give a dose of midodrine 10 mg today    4. History of DVT: Was on anticoagulation  On hold for acute GI bleed    5. Metabolic acidosis:Secondary to TAL  CO2 level is 19  Will continue to monitor Co2 levels    6. Diabetes mellitus: stable. Continue to monitor Accu-Cheks, hemoglobin A1c was 6.4    7.   Patient on chronic Espitia drainage: ? Etiology  Leukocytosis present, suspected UTI  Continue antibiotics        Signed: Jaspreet Dobbs MD

## 2023-01-20 NOTE — PROGRESS NOTES
Physician Progress Note      Lanre Scruggs  CSN #:                  [id number]  :                       1945  ADMIT DATE:       2023 9:50 AM  DISCH DATE:  RESPONDING  PROVIDER #:        Volanda Denver MD Cicero Friedman MD          QUERY TEXT:    Dear Dr. Jovanna White,    Pt admitted with UTI. Pt noted to have chronic indwelling urinary catheter. If possible, please document in the progress notes and discharge summary if you are evaluating and/or treating any of the following: The medical record reflects the following:  Risk Factors: 68year old female, lethargy, positive UA  Clinical Indicators:  H&P - UTI with chronic Espitai  Treatment: IV Ceftriaxone, IVF NS    Please email [email or web address] with any questions  Options provided:  -- UTI due to chronic indwelling urinary catheter  -- UTI not due to indwelling urinary catheter  -- Other - I will add my own diagnosis  -- Disagree - Not applicable / Not valid  -- Disagree - Clinically unable to determine / Unknown  -- Refer to Clinical Documentation Reviewer    PROVIDER RESPONSE TEXT:    UTI is due to the chronic indwelling urinary catheter. Query created by:  Opal Edwards on 2023 12:01 PM      Electronically signed by:  Volanda Denver MD Cicero Friedman MD 2023 3:49 PM

## 2023-01-20 NOTE — ANESTHESIA PREPROCEDURE EVALUATION
Relevant Problems   NEUROLOGY   (+) Depression   (+) Recurrent depression (HCC)      CARDIOVASCULAR   (+) HTN (hypertension)      RENAL FAILURE   (+) TAL (acute kidney injury) ([facility] [address]      ENDOCRINE   (+) Controlled type 2 diabetes mellitus without complication, without long-term current use of insulin (HCC)       Anesthetic History   No history of anesthetic complications            Review of Systems / Medical History  Patient summary reviewed and pertinent labs reviewed    Pulmonary  Within defined limits                 Neuro/Psych   Within defined limits           Cardiovascular    Hypertension          Past MI and CAD         GI/Hepatic/Renal  Within defined limits              Endo/Other    Diabetes    Arthritis     Other Findings            Past Medical History:   Diagnosis Date    Arthritis     Diabetes ([facility] [address]     states she is \"pre-diabetic\", diet controlled    frequency     H/O blood clots     right side of body    Heart attack ([facility] [address] 1/2014    Hydradenitis 2/1/2012    Hypercholesterolemia     Hypertension     Ill-defined condition     edema of legs    Thromboembolus ([facility] [address]        No past surgical history on file.     Current Outpatient Medications   Medication Instructions    apixaban (ELIQUIS) 5 mg, Oral, EVERY 12 HOURS    atorvastatin (LIPITOR) 80 mg, Oral, DAILY    bumetanide (BUMEX) 2 mg, Oral, DAILY    cephALEXin (KEFLEX) 500 mg, Oral, 4 TIMES DAILY    clindamycin (CLEOCIN T) 1 % lotion APPLY TO AXILLA TWICE DAILY    diclofenac (VOLTAREN) 4 g, Topical, 4 TIMES DAILY, back    gabapentin (NEURONTIN) 100 mg, Oral, 3 TIMES DAILY AS NEEDED    miscellaneous medical supply (Anti-Embolism Stockings) misc Please measure and fit for one pair of below the knee anti-embolism stockings    nut.tx.gluc intol,lf,soy-fiber (Glucerna 1 Jose C) 0.04-1 gram-kcal/mL liqd 1 Bottle, Oral, DAILY    OTHER Shower Chair- Use As Directed    OTHER Pull Ups    OTHER rollator    oxybutynin (DITROPAN) 5 mg, Oral, 3 TIMES DAILY    zinc oxide 20 % ointment Topical, AS NEEDED       Current Facility-Administered Medications   Medication Dose Route Frequency    0.9% sodium chloride infusion  75 mL/hr IntraVENous CONTINUOUS    acetaminophen (TYLENOL) tablet 650 mg  650 mg Oral Q6H PRN    Or    acetaminophen (TYLENOL) suppository 650 mg  650 mg Rectal Q6H PRN    polyethylene glycol (MIRALAX) packet 17 g  17 g Oral DAILY PRN    ondansetron (ZOFRAN ODT) tablet 4 mg  4 mg Oral Q8H PRN    Or    ondansetron (ZOFRAN) injection 4 mg  4 mg IntraVENous Q6H PRN    pantoprazole (PROTONIX) 40 mg in 0.9% sodium chloride 10 mL injection  40 mg IntraVENous Q12H    insulin lispro (HUMALOG) injection   SubCUTAneous AC&HS    glucose chewable tablet 16 g  4 Tablet Oral PRN    glucagon (GLUCAGEN) injection 1 mg  1 mg IntraMUSCular PRN    cefTRIAXone (ROCEPHIN) 1 g in sterile water (preservative free) 10 mL IV syringe  1 g IntraVENous Q24H       Patient Vitals for the past 24 hrs:   Temp Pulse Resp BP SpO2   01/20/23 0730 36.3 °C (97.4 °F) 84 20 92/60 100 %   01/20/23 0538 36.6 °C (97.8 °F) 91 20 (!) 96/58 100 %   01/20/23 0450 -- -- -- -- 100 %   01/19/23 2101 -- 87 12 116/67 --   01/19/23 1730 36.4 °C (97.5 °F) 82 20 95/72 98 %   01/19/23 1651 -- -- -- -- 100 %   01/19/23 1630 36.8 °C (98.2 °F) 83 16 105/67 --   01/19/23 1600 -- 76 -- -- --   01/19/23 1500 -- 82 11 110/67 97 %   01/19/23 1430 36.8 °C (98.2 °F) 72 12 105/73 96 %   01/19/23 1400 -- 72 16 111/80 96 %   01/19/23 1312 36.2 °C (97.2 °F) 83 12 101/76 96 %       Lab Results   Component Value Date/Time    WBC 12.8 (H) 01/20/2023 03:46 AM    HGB 10.9 (L) 01/20/2023 03:46 AM    HCT 35.0 01/20/2023 03:46 AM    PLATELET 552 19/48/3234 03:46 AM    MCV 84.1 01/20/2023 03:46 AM     Lab Results   Component Value Date/Time    Sodium 141 01/20/2023 03:46 AM    Potassium Hemolyzed, recollect requested 01/20/2023 03:46 AM    Chloride 113 (H) 01/20/2023 03:46 AM    CO2 19 (L) 01/20/2023 03:46 AM Anion gap 9 01/20/2023 03:46 AM    Glucose 110 (H) 01/20/2023 03:46 AM     (H) 01/20/2023 03:46 AM    Creatinine 4.51 (H) 01/20/2023 03:46 AM    BUN/Creatinine ratio 33 (H) 01/20/2023 03:46 AM    GFR est AA >60 09/14/2022 03:25 PM    GFR est non-AA >60 09/14/2022 03:25 PM    Calcium 9.7 01/20/2023 03:46 AM     No results found for: APTT, PTP, INR, INREXT  Lab Results   Component Value Date/Time    Glucose 110 (H) 01/20/2023 03:46 AM    Glucose (POC) 121 (H) 01/20/2023 08:21 AM         Physical Exam    Airway  Mallampati: II  TM Distance: 4 - 6 cm  Neck ROM: normal range of motion   Mouth opening: Normal     Cardiovascular    Rhythm: regular  Rate: normal         Dental  No notable dental hx       Pulmonary  Breath sounds clear to auscultation               Abdominal  GI exam deferred       Other Findings            Anesthetic Plan    ASA: 3  Anesthesia type: total IV anesthesia and MAC    Monitoring Plan: Continuous noninvasive hemodynamic monitoring      Induction: Intravenous  Anesthetic plan and risks discussed with: Patient

## 2023-01-20 NOTE — PROGRESS NOTES
General Daily Progress Note          Patient Name:   Sara Marie       YOB: 1945       Age:  68 y.o. Admit Date: 1/19/2023      Subjective:     Patient is a 68y.o. year old female with signal past medical history of diabetes diet-controlled history of DVT on Eliquis hypertension hyperlipidemia came to emergency room because of lethargic patient not eating drinking well weak tired patient have large bloody bowel movement today came to emergency room seen by the ER physician work-up done in the [facility] work shows WBC is 12.6  UA came back positive for UTI patient have a chronic Espitia  Renal function shows BUN of 170 creatinine 5.66     Patient was admitted with acute kidney injury acute GI bleed UTI    1/20  Patient resting comfortably in bed, but reports she is still experiencing generalized abdominal pain. Renal US complete: Medical renal disease. No hydronephrosis demonstrated.     GI and nephrology consult pending       Objective:     Visit Vitals  BP 92/60 (BP 1 Location: Left upper arm)   Pulse 84   Temp 97.4 °F (36.3 °C)   Resp 20   Ht 5' 7\" (1.702 m)   Wt 63.5 kg (140 lb)   SpO2 100%   BMI 21.93 kg/m²        Recent Results (from the past 24 hour(s))   OCCULT BLOOD, STOOL    Collection Time: 01/19/23  5:54 PM   Result Value Ref Range    Occult Blood,day 1 Positive (A) Negative      Day 1 date: 1,150,135     GLUCOSE, POC    Collection Time: 01/19/23  9:17 PM   Result Value Ref Range    Glucose (POC) 101 (H) 65 - 100 mg/dL    Performed by Mehreen Vanegas    HEMOGLOBIN A1C WITH EAG    Collection Time: 01/19/23 10:19 PM   Result Value Ref Range    Hemoglobin A1c 6.4 (H) 4.0 - 5.6 %    Est. average glucose 038 mg/dL   METABOLIC PANEL, COMPREHENSIVE    Collection Time: 01/20/23  3:46 AM   Result Value Ref Range    Sodium 141 136 - 145 mmol/L    Potassium Hemolyzed, recollect requested 3.5 - 5.1 mmol/L    Chloride 113 (H) 97 - 108 mmol/L    CO2 19 (L) 21 - 32 mmol/L    Anion gap 9 5 - 15 mmol/L    Glucose 110 (H) 65 - 100 mg/dL     (H) 6 - 20 mg/dL    Creatinine 4.51 (H) 0.55 - 1.02 mg/dL    BUN/Creatinine ratio 33 (H) 12 - 20      eGFR 10 (L) >60 ml/min/1.73m2    Calcium 9.7 8.5 - 10.1 mg/dL    Bilirubin, total 0.3 0.2 - 1.0 mg/dL    AST (SGOT) Hemolyzed, recollect requested 15 - 37 U/L    ALT (SGPT) 32 12 - 78 U/L    Alk. phosphatase 182 (H) 45 - 117 U/L    Protein, total 9.7 (H) 6.4 - 8.2 g/dL    Albumin 2.7 (L) 3.5 - 5.0 g/dL    Globulin 7.0 (H) 2.0 - 4.0 g/dL    A-G Ratio 0.4 (L) 1.1 - 2.2     CBC WITH AUTOMATED DIFF    Collection Time: 01/20/23  3:46 AM   Result Value Ref Range    WBC 12.8 (H) 3.6 - 11.0 K/uL    RBC 4.16 3.80 - 5.20 M/uL    HGB 10.9 (L) 11.5 - 16.0 g/dL    HCT 35.0 35.0 - 47.0 %    MCV 84.1 80.0 - 99.0 FL    MCH 26.2 26.0 - 34.0 PG    MCHC 31.1 30.0 - 36.5 g/dL    RDW 18.6 (H) 11.5 - 14.5 %    PLATELET 650 392 - 330 K/uL    MPV 10.8 8.9 - 12.9 FL    NRBC 0.0 0.0  WBC    ABSOLUTE NRBC 0.00 0.00 - 0.01 K/uL    NEUTROPHILS 86 (H) 32 - 75 %    LYMPHOCYTES 8 (L) 12 - 49 %    MONOCYTES 5 5 - 13 %    EOSINOPHILS 1 0 - 7 %    BASOPHILS 0 0 - 1 %    IMMATURE GRANULOCYTES 0 0 - 0.5 %    ABS. NEUTROPHILS 10.9 (H) 1.8 - 8.0 K/UL    ABS. LYMPHOCYTES 1.1 0.8 - 3.5 K/UL    ABS. MONOCYTES 0.6 0.0 - 1.0 K/UL    ABS. EOSINOPHILS 0.1 0.0 - 0.4 K/UL    ABS. BASOPHILS 0.0 0.0 - 0.1 K/UL    ABS. IMM. GRANS. 0.0 0.00 - 0.04 K/UL    DF AUTOMATED     GLUCOSE, POC    Collection Time: 01/20/23  8:21 AM   Result Value Ref Range    Glucose (POC) 121 (H) 65 - 100 mg/dL    Performed by [place]      [unfilled]      Review of Systems    Constitutional: Negative for chills and fever. HENT: Negative. Eyes: Negative. Respiratory: Negative. Cardiovascular: Negative. Gastrointestinal: Positive for abdominal pain  Skin: Negative. Neurological: Negative. Physical Exam:      Constitutional: pt is oriented to person, place, and time.    HENT:   Head: Normocephalic and atraumatic. Eyes: Pupils are equal, round, and reactive to light. EOM are normal.   Cardiovascular: Normal rate, regular rhythm and normal heart sounds. Pulmonary/Chest: Breath sounds normal. No wheezes. No rales. Exhibits no tenderness. Abdominal: Soft. Bowel sounds are normal. Generalized abdominal tenderness. There is no rebound and no guarding. Musculoskeletal: Normal range of motion. : sorenson placed   Neurological: pt is alert and oriented to person, place, and time. Alert. Normal strength. No cranial nerve deficit or sensory deficit. US RETROPERITONEUM COMP   Final Result   Medical renal disease. No hydronephrosis demonstrated. CT HEAD WO CONT   Final Result   No acute abnormality. Recent Results (from the past 24 hour(s))   OCCULT BLOOD, STOOL    Collection Time: 01/19/23  5:54 PM   Result Value Ref Range    Occult Blood,day 1 Positive (A) Negative      Day 1 date: 0,474,511     GLUCOSE, POC    Collection Time: 01/19/23  9:17 PM   Result Value Ref Range    Glucose (POC) 101 (H) 65 - 100 mg/dL    Performed by [employer]    HEMOGLOBIN A1C WITH EAG    Collection Time: 01/19/23 10:19 PM   Result Value Ref Range    Hemoglobin A1c 6.4 (H) 4.0 - 5.6 %    Est. average glucose 621 mg/dL   METABOLIC PANEL, COMPREHENSIVE    Collection Time: 01/20/23  3:46 AM   Result Value Ref Range    Sodium 141 136 - 145 mmol/L    Potassium Hemolyzed, recollect requested 3.5 - 5.1 mmol/L    Chloride 113 (H) 97 - 108 mmol/L    CO2 19 (L) 21 - 32 mmol/L    Anion gap 9 5 - 15 mmol/L    Glucose 110 (H) 65 - 100 mg/dL     (H) 6 - 20 mg/dL    Creatinine 4.51 (H) 0.55 - 1.02 mg/dL    BUN/Creatinine ratio 33 (H) 12 - 20      eGFR 10 (L) >60 ml/min/1.73m2    Calcium 9.7 8.5 - 10.1 mg/dL    Bilirubin, total 0.3 0.2 - 1.0 mg/dL    AST (SGOT) Hemolyzed, recollect requested 15 - 37 U/L    ALT (SGPT) 32 12 - 78 U/L    Alk.  phosphatase 182 (H) 45 - 117 U/L    Protein, total 9.7 (H) 6.4 - 8.2 g/dL Albumin 2.7 (L) 3.5 - 5.0 g/dL    Globulin 7.0 (H) 2.0 - 4.0 g/dL    A-G Ratio 0.4 (L) 1.1 - 2.2     CBC WITH AUTOMATED DIFF    Collection Time: 01/20/23  3:46 AM   Result Value Ref Range    WBC 12.8 (H) 3.6 - 11.0 K/uL    RBC 4.16 3.80 - 5.20 M/uL    HGB 10.9 (L) 11.5 - 16.0 g/dL    HCT 35.0 35.0 - 47.0 %    MCV 84.1 80.0 - 99.0 FL    MCH 26.2 26.0 - 34.0 PG    MCHC 31.1 30.0 - 36.5 g/dL    RDW 18.6 (H) 11.5 - 14.5 %    PLATELET 133 974 - 604 K/uL    MPV 10.8 8.9 - 12.9 FL    NRBC 0.0 0.0  WBC    ABSOLUTE NRBC 0.00 0.00 - 0.01 K/uL    NEUTROPHILS 86 (H) 32 - 75 %    LYMPHOCYTES 8 (L) 12 - 49 %    MONOCYTES 5 5 - 13 %    EOSINOPHILS 1 0 - 7 %    BASOPHILS 0 0 - 1 %    IMMATURE GRANULOCYTES 0 0 - 0.5 %    ABS. NEUTROPHILS 10.9 (H) 1.8 - 8.0 K/UL    ABS. LYMPHOCYTES 1.1 0.8 - 3.5 K/UL    ABS. MONOCYTES 0.6 0.0 - 1.0 K/UL    ABS. EOSINOPHILS 0.1 0.0 - 0.4 K/UL    ABS. BASOPHILS 0.0 0.0 - 0.1 K/UL    ABS. IMM.  GRANS. 0.0 0.00 - 0.04 K/UL    DF AUTOMATED     GLUCOSE, POC    Collection Time: 01/20/23  8:21 AM   Result Value Ref Range    Glucose (POC) 121 (H) 65 - 100 mg/dL    Performed by Tania Lott        Results       Procedure Component Value Units Date/Time    CULTURE, URINE [[id number]]     Order Status: Sent Specimen: Urine from Clean catch     CULTURE, BLOOD, PAIRED [[id number]] Collected: 01/19/23 1021    Order Status: Completed Specimen: Blood Updated: 01/20/23 0757     Special Requests: No Special Requests        Culture result: No growth after 21 hours                Labs:     Recent Labs     01/20/23  0346 01/19/23  1023   WBC 12.8* 12.5*   HGB 10.9* 10.4*   HCT 35.0 32.6*    482*       Recent Labs     01/20/23  0346 01/19/23  1023    137   K Hemolyzed, recollect requested 4.9   * 107   CO2 19* 19*   * 170*   CREA 4.51* 5.68*   * 134*   CA 9.7 9.6       Recent Labs     01/20/23  0346 01/19/23  1023   ALT 32 36   * 193*   TBILI 0.3 0.2   TP 9.7* 10.1*   ALB 2.7* 2.8*   GLOB 7.0* 7.3*       No results for input(s): INR, PTP, APTT, INREXT, INREXT in the last 72 hours. No results for input(s): FE, TIBC, PSAT, FERR in the last 72 hours. No results found for: FOL, RBCF   No results for input(s): PH, PCO2, PO2 in the last 72 hours. Recent Labs     01/19/23  1030          Lab Results   Component Value Date/Time    Cholesterol, total 173 03/12/2021 11:35 AM    HDL Cholesterol 54 03/12/2021 11:35 AM    LDL, calculated 97.8 03/12/2021 11:35 AM    Triglyceride 106 03/12/2021 11:35 AM    CHOL/HDL Ratio 3.2 03/12/2021 11:35 AM     Lab Results   Component Value Date/Time    Glucose (POC) 121 (H) 01/20/2023 08:21 AM    Glucose (POC) 101 (H) 01/19/2023 09:17 PM     Lab Results   Component Value Date/Time    Color Dark Yellow 01/19/2023 10:23 AM    Appearance Turbid (A) 01/19/2023 10:23 AM    Specific gravity 1.020 01/19/2023 10:23 AM    pH (UA) 5.0 01/19/2023 10:23 AM    Protein 100 (A) 01/19/2023 10:23 AM    Glucose Negative 01/19/2023 10:23 AM    Ketone Negative 01/19/2023 10:23 AM    Bilirubin Negative 01/19/2023 10:23 AM    Urobilinogen 0.1 (L) 01/19/2023 10:23 AM    Nitrites Negative 01/19/2023 10:23 AM    Leukocyte Esterase Large (A) 01/19/2023 10:23 AM    Epithelial cells Many (A) 01/19/2023 10:23 AM    Bacteria Negative 01/19/2023 10:23 AM    WBC >100 (H) 01/19/2023 10:23 AM    RBC >100 (H) 01/19/2023 10:23 AM         Assessment/Plan:   Acute kidney injury (improving 5.68 -> 4.51)  Acute GI bleed  History of DVT  History of diabetes diet-controlled  Hypertension  Hyperlipidemia  UTI with chronic Espitia  Leukocytosis (WBC 12.8)        Continue Protonix 40 IV twice daily  IV fluids  Blood cultures pending   Collect urine cultures  Continue IV Rocephin  Renal ultrasound for acute kidney injury complete:Medical renal disease. No hydronephrosis demonstrated.   Sliding-scale insulin  GI and nephology consult pending     Discussed with the patient family at the bedside        Current Facility-Administered Medications:     0.9% sodium chloride infusion, 75 mL/hr, IntraVENous, CONTINUOUS, Benita Velasquez MD    acetaminophen (TYLENOL) tablet 650 mg, 650 mg, Oral, Q6H PRN, 650 mg at 01/20/23 1110 **OR** acetaminophen (TYLENOL) suppository 650 mg, 650 mg, Rectal, Q6H PRN, Fadi Velasquez MD    polyethylene glycol (MIRALAX) packet 17 g, 17 g, Oral, DAILY PRN, Fadi Velasquez MD    ondansetron (ZOFRAN ODT) tablet 4 mg, 4 mg, Oral, Q8H PRN **OR** ondansetron (ZOFRAN) injection 4 mg, 4 mg, IntraVENous, Q6H PRN, Fadi Velasquez MD    pantoprazole (PROTONIX) 40 mg in 0.9% sodium chloride 10 mL injection, 40 mg, IntraVENous, Q12H, Fadi Velasquez MD, 40 mg at 01/20/23 1110    insulin lispro (HUMALOG) injection, , SubCUTAneous, AC&HS, Fadi Velasquez MD    glucose chewable tablet 16 g, 4 Tablet, Oral, PRN, Fadi Velasquez MD    glucagon (GLUCAGEN) injection 1 mg, 1 mg, IntraMUSCular, PRN, Fadi Velasquez MD    cefTRIAXone (ROCEPHIN) 1 g in sterile water (preservative free) 10 mL IV syringe, 1 g, IntraVENous, Q24H, Benita Velasquez MD

## 2023-01-20 NOTE — PROGRESS NOTES
0845: Chart reviewed. CM will completed DCP assessment on unit. CM will continue to follow patient and recs of medical team.    1130:   Reason for Admission:   CYSTITIS, BLOOD IN STOOL, TAL                    RUR Score:  16%                PCP: First and Last name:   Dr. Vimal Rogers at [facility]     Name of Practice:    Are you a current patient: Yes/No: YES   Approximate date of last visit:    Can you participate in a virtual visit if needed:     Do you (patient/family) have any concerns for transition/discharge? Not at this time               Plan for utilizing home health:   No - SNF    Current Advanced Directive/Advance Care Plan:  Full Code      Healthcare Decision Maker:   Click here to complete [address] including selection of the Healthcare Decision Maker Relationship (ie \"Primary\")            Primary Decision MakerElharis Arauz - Daughter - [phone]    Secondary Decision Maker: Kierra Whitehead - Daughter - [phone]    Transition of Care Plan:   CM telephoned patient's daughter Grant Mata and met with patient and daughter, Annika June in room. Demos verified as accurate. Patient lives with her daughter in a one story home. Patient was admitted from [facility] for skilled care. Daughters do not wish for her to return there. Choice letter received for SNF facilities within 30 miles. Referrals sent. DME: [facility], care. Prior to SNF admission, patient was receiving some assistance with ADLs. Medications obtained from [facility]. When medically stable, patient to be transported via [facility] [address] transport or family to SNF. CM will continue to follow patient and recs of medical team.     Current Dispo: SNF    1440: CM attempted to contact Annika June regarding two SNF acceptances. No answer.

## 2023-01-20 NOTE — H&P
History and Physical    NAME: Agatha Knight   :  1945   MRN:  [id number]     Date/Time:  2023 8:13 PM    Patient PCP: Nathaniel Miranda MD  ______________________________________________________________________             Subjective:     CHIEF COMPLAINT:     Lethargic    HISTORY OF PRESENT ILLNESS:       Patient is a 68y.o. year old female with signal past medical history of diabetes diet-controlled history of DVT on Eliquis hypertension hyperlipidemia came to emergency room because of lethargic patient not eating drinking well weak tired patient have large bloody bowel movement today came to emergency room seen by the ER physician work-up done in the [facility] work shows WBC is 12.6  UA came back positive for UTI patient have a chronic Espitia  Renal function shows BUN of 170 creatinine 5.66      Patient was admitted with acute kidney injury acute GI bleed UTI    Past Medical History:   Diagnosis Date    Arthritis     Diabetes ([facility] [address]     states she is \"pre-diabetic\", diet controlled    frequency     H/O blood clots     right side of body    Heart attack ([address] 2014    Hydradenitis 2012    Hypercholesterolemia     Hypertension     Ill-defined condition     edema of legs    Thromboembolus ([facility] [address]         No past surgical history on file. Social History     Tobacco Use    Smoking status: Never    Smokeless tobacco: Never   Substance Use Topics    Alcohol use: No     Alcohol/week: 0.0 standard drinks        Family History   Problem Relation Age of Onset    Heart Disease Mother     Cancer Mother     Asthma Father     Alcohol abuse Brother        Allergies   Allergen Reactions    Ciprofloxacin Other (comments)     Vomiting    Doxycycline Other (comments)     Facial swelling. Pcn [Penicillins] Rash        Prior to Admission medications    Medication Sig Start Date End Date Taking? Authorizing Provider   cephALEXin (Keflex) 500 mg capsule Take 1 Capsule by mouth four (4) times daily for 7 days.  23 1/26/23 Yes Mia Platt MD   OTHER rollator 9/14/22   Renuka Bravo MD   diclofenac (VOLTAREN) 1 % gel Apply 4 g to affected area four (4) times daily. back 7/18/22   Tavia Escobar MD   nut.tx.gluc intol,lf,soy-fiber (Glucerna 1 Jose C) 0.04-1 gram-kcal/mL liqd Take 1 Bottle by mouth daily. 7/18/22   Tavia Escobar MD   bumetanide (BUMEX) 0.5 mg tablet Take 4 Tablets by mouth daily. Indications: visible water retention 6/14/22   Ana María Hdez MD   clindamycin (CLEOCIN T) 1 % lotion APPLY TO AXILLA TWICE DAILY 6/8/22   Provider, Historical   atorvastatin (LIPITOR) 80 mg tablet Take 1 Tablet by mouth daily. Indications: high cholesterol 6/14/22   Renuka Bravo MD   apixaban (ELIQUIS) 5 mg tablet Take 1 Tablet by mouth every twelve (12) hours. Indications: deep vein thrombosis prevention 6/14/22   Renuka Bravo MD   oxybutynin (DITROPAN) 5 mg tablet Take 1 Tablet by mouth three (3) times daily. Indications: an increased need to urinate often 6/14/22   Renuka Bravo MD   gabapentin (NEURONTIN) 100 mg capsule Take 1 Capsule by mouth three (3) times daily as needed for Pain. 6/14/22   Renuka Bravo MD   miscellaneous medical supply (Anti-Embolism Stockings) misc Please measure and fit for one pair of below the knee anti-embolism stockings 2/25/22 2/25/23  Renuka Bravo MD   OTHER Pull Ups 2/25/22   Renuka Bravo MD   zinc oxide 20 % ointment Apply  to affected area as needed for Skin Irritation.  3/12/21   Renuka Bravo MD   OTHER Shower Chair- Use As Directed 12/20/18   Renuka Bravo MD         Current Facility-Administered Medications:     0.9% sodium chloride infusion, 75 mL/hr, IntraVENous, CONTINUOUS, Lina Velasquez MD    acetaminophen (TYLENOL) tablet 650 mg, 650 mg, Oral, Q6H PRN **OR** acetaminophen (TYLENOL) suppository 650 mg, 650 mg, Rectal, Q6H PRN, Fadi Velasquez MD    polyethylene glycol (MIRALAX) packet 17 g, 17 g, Oral, DAILY PRN, Lina Velasquez MD    ondansetron (ZOFRAN ODT) tablet 4 mg, 4 mg, Oral, Q8H PRN **OR** ondansetron (ZOFRAN) injection 4 mg, 4 mg, IntraVENous, Q6H PRN, Fadi Velasquez MD    pantoprazole (PROTONIX) 40 mg in 0.9% sodium chloride 10 mL injection, 40 mg, IntraVENous, Q12H, Fadi Velasquez MD    Current Outpatient Medications:     cephALEXin (Keflex) 500 mg capsule, Take 1 Capsule by mouth four (4) times daily for 7 days. , Disp: 28 Capsule, Rfl: 0    OTHER, rollator, Disp: 1 Each, Rfl: 0    diclofenac (VOLTAREN) 1 % gel, Apply 4 g to affected area four (4) times daily. back, Disp: 100 g, Rfl: 1    nut.tx.gluc intol,lf,soy-fiber (Glucerna 1 Jose C) 0.04-1 gram-kcal/mL liqd, Take 1 Bottle by mouth daily. , Disp: 30 Each, Rfl: 5    bumetanide (BUMEX) 0.5 mg tablet, Take 4 Tablets by mouth daily. Indications: visible water retention, Disp: 120 Tablet, Rfl: 1    clindamycin (CLEOCIN T) 1 % lotion, APPLY TO AXILLA TWICE DAILY, Disp: , Rfl:     atorvastatin (LIPITOR) 80 mg tablet, Take 1 Tablet by mouth daily. Indications: high cholesterol, Disp: 90 Tablet, Rfl: 1    apixaban (ELIQUIS) 5 mg tablet, Take 1 Tablet by mouth every twelve (12) hours. Indications: deep vein thrombosis prevention, Disp: 180 Tablet, Rfl: 1    oxybutynin (DITROPAN) 5 mg tablet, Take 1 Tablet by mouth three (3) times daily. Indications: an increased need to urinate often, Disp: 270 Tablet, Rfl: 1    gabapentin (NEURONTIN) 100 mg capsule, Take 1 Capsule by mouth three (3) times daily as needed for Pain., Disp: 90 Capsule, Rfl: 2    miscellaneous medical supply (Anti-Embolism Stockings) misc, Please measure and fit for one pair of below the knee anti-embolism stockings, Disp: 2 Each, Rfl: 0    OTHER, Pull Ups, Disp: 100 Each, Rfl: 5    zinc oxide 20 % ointment, Apply  to affected area as needed for Skin Irritation. , Disp: 30 g, Rfl: 1    OTHER, Shower Chair- Use As Directed, Disp: 1 Each, Rfl: 0    LAB DATA REVIEWED:    Recent Results (from the past 24 hour(s))   CBC WITH AUTOMATED DIFF    Collection Time: 01/19/23 10:23 AM   Result Value Ref Range    WBC 12.5 (H) 3.6 - 11.0 K/uL    RBC 4.01 3.80 - 5.20 M/uL    HGB 10.4 (L) 11.5 - 16.0 g/dL    HCT 32.6 (L) 35.0 - 47.0 %    MCV 81.3 80.0 - 99.0 FL    MCH 25.9 (L) 26.0 - 34.0 PG    MCHC 31.9 30.0 - 36.5 g/dL    RDW 17.8 (H) 11.5 - 14.5 %    PLATELET 009 (H) 256 - 400 K/uL    MPV 10.3 8.9 - 12.9 FL    NRBC 0.0 0.0  WBC    ABSOLUTE NRBC 0.00 0.00 - 0.01 K/uL    NEUTROPHILS 84 (H) 32 - 75 %    LYMPHOCYTES 10 (L) 12 - 49 %    MONOCYTES 5 5 - 13 %    EOSINOPHILS 1 0 - 7 %    BASOPHILS 0 0 - 1 %    IMMATURE GRANULOCYTES 0 0 - 0.5 %    ABS. NEUTROPHILS 10.5 (H) 1.8 - 8.0 K/UL    ABS. LYMPHOCYTES 1.3 0.8 - 3.5 K/UL    ABS. MONOCYTES 0.6 0.0 - 1.0 K/UL    ABS. EOSINOPHILS 0.1 0.0 - 0.4 K/UL    ABS. BASOPHILS 0.0 0.0 - 0.1 K/UL    ABS. IMM. GRANS. 0.0 0.00 - 0.04 K/UL    DF AUTOMATED     METABOLIC PANEL, COMPREHENSIVE    Collection Time: 01/19/23 10:23 AM   Result Value Ref Range    Sodium 137 136 - 145 mmol/L    Potassium 4.9 3.5 - 5.1 mmol/L    Chloride 107 97 - 108 mmol/L    CO2 19 (L) 21 - 32 mmol/L    Anion gap 11 5 - 15 mmol/L    Glucose 134 (H) 65 - 100 mg/dL     (H) 6 - 20 mg/dL    Creatinine 5.68 (H) 0.55 - 1.02 mg/dL    BUN/Creatinine ratio 30 (H) 12 - 20      eGFR 7 (L) >60 ml/min/1.73m2    Calcium 9.6 8.5 - 10.1 mg/dL    Bilirubin, total 0.2 0.2 - 1.0 mg/dL    AST (SGOT) 51 (H) 15 - 37 U/L    ALT (SGPT) 36 12 - 78 U/L    Alk.  phosphatase 193 (H) 45 - 117 U/L    Protein, total 10.1 (H) 6.4 - 8.2 g/dL    Albumin 2.8 (L) 3.5 - 5.0 g/dL    Globulin 7.3 (H) 2.0 - 4.0 g/dL    A-G Ratio 0.4 (L) 1.1 - 2.2     LACTIC ACID    Collection Time: 01/19/23 10:23 AM   Result Value Ref Range    Lactic acid 0.8 0.4 - 2.0 mmol/L   URINALYSIS W/ RFLX MICROSCOPIC    Collection Time: 01/19/23 10:23 AM   Result Value Ref Range    Color Dark Yellow      Appearance Turbid (A) Clear      Specific gravity 1.020 1.003 - 1.030      pH (UA) 5.0      Protein 100 (A) Negative mg/dL    Glucose Negative Negative mg/dL    Ketone Negative Negative mg/dL    Bilirubin Negative Negative      Blood Large (A) Negative      Urobilinogen 0.1 (L) 0.2 - 1.0 EU/dL    Nitrites Negative Negative      Leukocyte Esterase Large (A) Negative     TROPONIN-HIGH SENSITIVITY    Collection Time: 01/19/23 10:23 AM   Result Value Ref Range    Troponin-High Sensitivity 12 0 - 51 ng/L   NT-PRO BNP    Collection Time: 01/19/23 10:23 AM   Result Value Ref Range    NT pro-BNP 1,091 (H) <450 pg/mL   URINE MICROSCOPIC    Collection Time: 01/19/23 10:23 AM   Result Value Ref Range    WBC >100 (H) 0 - 4 /hpf    RBC >100 (H) 0 - 5 /hpf    Epithelial cells Many (A) Few /lpf    Bacteria Negative Negative /hpf    Budding yeast Present (A) Negative     CK    Collection Time: 01/19/23 10:30 AM   Result Value Ref Range     26 - 192 U/L   OCCULT BLOOD, STOOL    Collection Time: 01/19/23  5:54 PM   Result Value Ref Range    Occult Blood,day 1 Positive (A) Negative      Day 1 date: [phone]         XR Results (most recent):  Results from Hospital Encounter encounter on 11/12/15    XR CHEST PA LAT    Narrative  **Final Report**      ICD Codes / Adm. Diagnosis: 786.50  786.2 / Chest pain, unspecified  Cough  Examination:  CR CHEST PA AND LATERAL  - [id number] - Nov 12 2015  2:42PM  Accession No:  [id number]  Reason:  chest pain with cough      REPORT:  PA AND LATERAL CHEST RADIOGRAPHS: 11/12/2015 2:42 PM    CLINICAL HISTORY: Chest pain and cough. COMPARISON: 1/5/2015, 6/30/2014. TECHNIQUE: Frontal and lateral radiographs of the chest.    FINDINGS:  The lungs are clear. The central airways are patent. The heart size is  normal. No pneumothorax or pleural effusion. Impression  :  Clear lungs. Signing/Reading Doctor: Fernanda Wing ([id number])  Approved: Fernanda Wing ([id number])  Nov 12 2015  3:17PM         CT HEAD WO CONT   Final Result   No acute abnormality.             US RETROPERITONEUM COMP    (Results Pending)        Review of Systems:  Constitutional: Negative for chills and fever. HENT: Negative. Eyes: Negative. Respiratory: Negative. Cardiovascular: Negative. Gastrointestinal: Negative for abdominal pain and nausea. Skin: Negative. Neurological: Negative. Objective:   VITALS:    Visit Vitals  BP 95/72   Pulse 82   Temp 97.5 °F (36.4 °C)   Resp 20   Ht 5' 7\" (1.702 m)   Wt 63.5 kg (140 lb)   SpO2 98%   BMI 21.93 kg/m²       Physical Exam:   Constitutional: pt is oriented to person, place, and time. HENT:   Head: Normocephalic and atraumatic. Eyes: Pupils are equal, round, and reactive to light. EOM are normal.   Cardiovascular: Normal rate, regular rhythm and normal heart sounds. Pulmonary/Chest: Breath sounds normal. No wheezes. No rales. Exhibits no tenderness. Abdominal: Soft. Bowel sounds are normal. There is no abdominal tenderness. There is no rebound and no guarding. Musculoskeletal: Normal range of motion. Neurological: pt is alert and oriented to person, place, and time. Alert. Normal strength. No cranial nerve deficit or sensory deficit.    ASSESSMENT & PLAN:    Acute kidney injury  Acute GI bleed  History of DVT  History of diabetes diet-controlled  Hypertension  Hyperlipidemia  UTI with chronic Espitia      Admit to medical telemetry floor start on Protonix 40 IV twice daily  IV fluids  Blood cultures urine culture start on IV Rocephin  Renal ultrasound for acute kidney injury  Change Espitia  Sliding-scale insulin      Current Facility-Administered Medications:     0.9% sodium chloride infusion, 75 mL/hr, IntraVENous, CONTINUOUS, Fadi Velasquez MD    acetaminophen (TYLENOL) tablet 650 mg, 650 mg, Oral, Q6H PRN **OR** acetaminophen (TYLENOL) suppository 650 mg, 650 mg, Rectal, Q6H PRN, Fadi Velasquez MD    polyethylene glycol (MIRALAX) packet 17 g, 17 g, Oral, DAILY PRN, Fadi Velasquez MD    ondansetron (ZOFRAN ODT) tablet 4 mg, 4 mg, Oral, Q8H PRN **OR** ondansetron (ZOFRAN) injection 4 mg, 4 mg, IntraVENous, Q6H PRN, Fadi Velasquez MD    pantoprazole (PROTONIX) 40 mg in 0.9% sodium chloride 10 mL injection, 40 mg, IntraVENous, Q12H, Fadi Velasquez MD    Current Outpatient Medications:     cephALEXin (Keflex) 500 mg capsule, Take 1 Capsule by mouth four (4) times daily for 7 days. , Disp: 28 Capsule, Rfl: 0    OTHER, rollator, Disp: 1 Each, Rfl: 0    diclofenac (VOLTAREN) 1 % gel, Apply 4 g to affected area four (4) times daily. back, Disp: 100 g, Rfl: 1    nut.tx.gluc intol,lf,soy-fiber (Glucerna 1 Jose C) 0.04-1 gram-kcal/mL liqd, Take 1 Bottle by mouth daily. , Disp: 30 Each, Rfl: 5    bumetanide (BUMEX) 0.5 mg tablet, Take 4 Tablets by mouth daily. Indications: visible water retention, Disp: 120 Tablet, Rfl: 1    clindamycin (CLEOCIN T) 1 % lotion, APPLY TO AXILLA TWICE DAILY, Disp: , Rfl:     atorvastatin (LIPITOR) 80 mg tablet, Take 1 Tablet by mouth daily. Indications: high cholesterol, Disp: 90 Tablet, Rfl: 1    apixaban (ELIQUIS) 5 mg tablet, Take 1 Tablet by mouth every twelve (12) hours. Indications: deep vein thrombosis prevention, Disp: 180 Tablet, Rfl: 1    oxybutynin (DITROPAN) 5 mg tablet, Take 1 Tablet by mouth three (3) times daily. Indications: an increased need to urinate often, Disp: 270 Tablet, Rfl: 1    gabapentin (NEURONTIN) 100 mg capsule, Take 1 Capsule by mouth three (3) times daily as needed for Pain., Disp: 90 Capsule, Rfl: 2    miscellaneous medical supply (Anti-Embolism Stockings) misc, Please measure and fit for one pair of below the knee anti-embolism stockings, Disp: 2 Each, Rfl: 0    OTHER, Pull Ups, Disp: 100 Each, Rfl: 5    zinc oxide 20 % ointment, Apply  to affected area as needed for Skin Irritation. , Disp: 30 g, Rfl: 1    OTHER, Shower Chair- Use As Directed, Disp: 1 Each, Rfl: 0       ________________________________________________________________________    Signed: Marichuy Magallanes MD

## 2023-01-20 NOTE — ED NOTES
Patient daughter concerned for patient well being at nursing home. Per daughter nursing home is not changing sorenson regularly. Has not changed sorenson catheter since she has been their. No performing ADLs like need to be done. Concerned for patient nutrition intake at facility. Patient has multiple wounds scattered around body. Daughter states weight has dropped significatly since admission to nurse home. Would like patient to be placed in another nursing home.

## 2023-01-20 NOTE — ROUTINE PROCESS
TRANSFER - OUT REPORT:    Verbal/telephone report given to Miriam RN(name) on Larance Miguel Angel  being transferred to [facility](unit) for routine post - op     Post endo procedure   Report consisted of patients Situation, Background, Assessment and   Recommendations(SBAR). Information from the following report(s) Procedure Summary was reviewed with the receiving nurse. Opportunity for questions and clarification was provided.

## 2023-01-20 NOTE — PROGRESS NOTES
The request for Nephrology Consultation was noted. The chart was reviewed. The consult was actually placed for Dr. Tre Castro ([facility]). I spoke with Dr. Tre Castro (via telephone) this morning. He will see Ms Robyn  today.

## 2023-01-20 NOTE — PROGRESS NOTES
Bedside and Verbal shift change report given to Iris (oncoming nurse) by Ramone Solomon (offgoing nurse). Report included the following information SBAR, Kardex, ED Summary, OR Summary, Procedure Summary, Intake/Output, MAR, Accordion, Recent Results, and Med Rec Status.

## 2023-01-21 LAB
25(OH)D3 SERPL-MCNC: 24.5 NG/ML (ref 30–100)
ALBUMIN SERPL-MCNC: 2.3 G/DL (ref 3.5–5)
ALBUMIN/GLOB SERPL: 0.4 (ref 1.1–2.2)
ALP SERPL-CCNC: 139 U/L (ref 45–117)
ALT SERPL-CCNC: 22 U/L (ref 12–78)
ANION GAP SERPL CALC-SCNC: 5 MMOL/L (ref 5–15)
AST SERPL W P-5'-P-CCNC: 31 U/L (ref 15–37)
BILIRUB SERPL-MCNC: 0.2 MG/DL (ref 0.2–1)
BUN SERPL-MCNC: 94 MG/DL (ref 6–20)
BUN/CREAT SERPL: 40 (ref 12–20)
CA-I BLD-MCNC: 9.5 MG/DL (ref 8.5–10.1)
CHLORIDE SERPL-SCNC: 127 MMOL/L (ref 97–108)
CK SERPL-CCNC: 82 U/L (ref 26–192)
CO2 SERPL-SCNC: 18 MMOL/L (ref 21–32)
CREAT SERPL-MCNC: 2.35 MG/DL (ref 0.55–1.02)
ERYTHROCYTE [DISTWIDTH] IN BLOOD BY AUTOMATED COUNT: 18.6 % (ref 11.5–14.5)
GLOBULIN SER CALC-MCNC: 6.5 G/DL (ref 2–4)
GLUCOSE BLD STRIP.AUTO-MCNC: 103 MG/DL (ref 65–100)
GLUCOSE BLD STRIP.AUTO-MCNC: 104 MG/DL (ref 65–100)
GLUCOSE BLD STRIP.AUTO-MCNC: 115 MG/DL (ref 65–100)
GLUCOSE BLD STRIP.AUTO-MCNC: 90 MG/DL (ref 65–100)
GLUCOSE SERPL-MCNC: 123 MG/DL (ref 65–100)
HCT VFR BLD AUTO: 29 % (ref 35–47)
HGB BLD-MCNC: 8.9 G/DL (ref 11.5–16)
MCH RBC QN AUTO: 26 PG (ref 26–34)
MCHC RBC AUTO-ENTMCNC: 30.7 G/DL (ref 30–36.5)
MCV RBC AUTO: 84.8 FL (ref 80–99)
NRBC # BLD: 0 K/UL (ref 0–0.01)
NRBC BLD-RTO: 0 PER 100 WBC
PERFORMED BY, TECHID: ABNORMAL
PERFORMED BY, TECHID: NORMAL
PLATELET # BLD AUTO: 361 K/UL (ref 150–400)
PMV BLD AUTO: 10.1 FL (ref 8.9–12.9)
POTASSIUM SERPL-SCNC: 4.4 MMOL/L (ref 3.5–5.1)
PROT SERPL-MCNC: 8.8 G/DL (ref 6.4–8.2)
RBC # BLD AUTO: 3.42 M/UL (ref 3.8–5.2)
SODIUM SERPL-SCNC: 150 MMOL/L (ref 136–145)
WBC # BLD AUTO: 11.5 K/UL (ref 3.6–11)

## 2023-01-21 PROCEDURE — C9113 INJ PANTOPRAZOLE SODIUM, VIA: HCPCS | Performed by: INTERNAL MEDICINE

## 2023-01-21 PROCEDURE — 85027 COMPLETE CBC AUTOMATED: CPT

## 2023-01-21 PROCEDURE — 74011250637 HC RX REV CODE- 250/637: Performed by: FAMILY MEDICINE

## 2023-01-21 PROCEDURE — 74011250636 HC RX REV CODE- 250/636: Performed by: FAMILY MEDICINE

## 2023-01-21 PROCEDURE — 82550 ASSAY OF CK (CPK): CPT

## 2023-01-21 PROCEDURE — 74011000250 HC RX REV CODE- 250: Performed by: INTERNAL MEDICINE

## 2023-01-21 PROCEDURE — 80053 COMPREHEN METABOLIC PANEL: CPT

## 2023-01-21 PROCEDURE — 65270000029 HC RM PRIVATE

## 2023-01-21 PROCEDURE — 82962 GLUCOSE BLOOD TEST: CPT

## 2023-01-21 PROCEDURE — 74011000250 HC RX REV CODE- 250: Performed by: FAMILY MEDICINE

## 2023-01-21 PROCEDURE — 83970 ASSAY OF PARATHORMONE: CPT

## 2023-01-21 PROCEDURE — 36415 COLL VENOUS BLD VENIPUNCTURE: CPT

## 2023-01-21 PROCEDURE — 85018 HEMOGLOBIN: CPT

## 2023-01-21 PROCEDURE — 74011250636 HC RX REV CODE- 250/636: Performed by: INTERNAL MEDICINE

## 2023-01-21 PROCEDURE — 74011250637 HC RX REV CODE- 250/637: Performed by: INTERNAL MEDICINE

## 2023-01-21 RX ADMIN — SODIUM CHLORIDE, PRESERVATIVE FREE 40 MG: 5 INJECTION INTRAVENOUS at 21:02

## 2023-01-21 RX ADMIN — CEFTRIAXONE SODIUM 1 G: 1 INJECTION, POWDER, FOR SOLUTION INTRAMUSCULAR; INTRAVENOUS at 12:45

## 2023-01-21 RX ADMIN — SODIUM CHLORIDE 100 ML/HR: 9 INJECTION, SOLUTION INTRAVENOUS at 08:28

## 2023-01-21 RX ADMIN — ACETAMINOPHEN 650 MG: 325 TABLET ORAL at 17:35

## 2023-01-21 RX ADMIN — PANTOPRAZOLE SODIUM 40 MG: 40 TABLET, DELAYED RELEASE ORAL at 08:20

## 2023-01-21 NOTE — PROGRESS NOTES
Renal Progress Note    Patient: Arcelia Hong MRN: [id number]  SSN: [id number]    YOB: 1945  Age: 68 y.o. Sex: female      Admit Date: 1/19/2023    LOS: 2 days     Subjective:   Patient seen at bedside. Alert and awake, no acute distress. confused   No swelling in lower extremities. No complaints of shortness of breath. Pt apparently had a dark BM with GI bleed today  S/p EGD but report not available      Current Facility-Administered Medications   Medication Dose Route Frequency    pantoprazole (PROTONIX) 40 mg in 0.9% sodium chloride 10 mL injection  40 mg IntraVENous Q12H    0.9% sodium chloride infusion  100 mL/hr IntraVENous CONTINUOUS    acetaminophen (TYLENOL) tablet 650 mg  650 mg Oral Q6H PRN    Or    acetaminophen (TYLENOL) suppository 650 mg  650 mg Rectal Q6H PRN    polyethylene glycol (MIRALAX) packet 17 g  17 g Oral DAILY PRN    ondansetron (ZOFRAN ODT) tablet 4 mg  4 mg Oral Q8H PRN    Or    ondansetron (ZOFRAN) injection 4 mg  4 mg IntraVENous Q6H PRN    insulin lispro (HUMALOG) injection   SubCUTAneous AC&HS    glucose chewable tablet 16 g  4 Tablet Oral PRN    glucagon (GLUCAGEN) injection 1 mg  1 mg IntraMUSCular PRN    cefTRIAXone (ROCEPHIN) 1 g in sterile water (preservative free) 10 mL IV syringe  1 g IntraVENous Q24H        Vitals:    01/20/23 1640 01/20/23 1648 01/20/23 2007 01/21/23 0737   BP: (!) 105/54  119/75 111/64   Pulse: 89  93 74   Resp: 16  16 20   Temp: 97.8 °F (36.6 °C)  97.7 °F (36.5 °C) 97.5 °F (36.4 °C)   SpO2: (!) 78% 100% 98% 99%   Weight:       Height:         Objective:   General: alert awake , confused, no acute distress. HEENT: EOMI, no Icterus, no Pallor, pupils reactive, mucosa dry  Neck: Neck is supple, No JVD,   Lungs: breathsounds normal, no respiratory distress on inspection, no rhonchi, no rales,  CVS: heart sounds normal, no murmurs, no rubs.   GI: soft, nontender, normal BS, no palpable organomegaly  Extremeties:  no cyanosis, no edema,  Neuro: Alert, awake, answering simple questions but appears forgetful and confused, moving all extremeties   Skin: Poor skin turgor, dark discoloration of skin noted in lower extremities. Intake and Output:  Current Shift: No intake/output data recorded. Last three shifts: 01/19 1901 - 01/21 0700  In: -   Out: 1300 [Urine:1300]      Lab/Data Review:  Recent Labs     01/21/23  1513 01/20/23  0346 01/19/23  1023   WBC 11.5* 12.8* 12.5*   HGB 8.9* 10.9* 10.4*   HCT 29.0* 35.0 32.6*    385 482*     Recent Labs     01/21/23  1513 01/20/23  0346 01/19/23  1023   * 141 137   K 4.4 Hemolyzed, recollect requested 4.9   * 113* 107   CO2 18* 19* 19*   * 110* 134*   BUN 94* 147* 170*   CREA 2.35* 4.51* 5.68*   CA 9.5 9.7 9.6   ALB 2.3* 2.7* 2.8*   TBILI 0.2 0.3 0.2   ALT 22 32 36     No results for input(s): PH, PCO2, PO2, HCO3, FIO2 in the last 72 hours.   Recent Results (from the past 24 hour(s))   GLUCOSE, POC    Collection Time: 01/20/23  9:39 PM   Result Value Ref Range    Glucose (POC) 124 (H) 65 - 100 mg/dL    Performed by Ceasar Montalvo    GLUCOSE, POC    Collection Time: 01/21/23  7:40 AM   Result Value Ref Range    Glucose (POC) 90 65 - 100 mg/dL    Performed by Ace Oats    GLUCOSE, POC    Collection Time: 01/21/23 11:30 AM   Result Value Ref Range    Glucose (POC) 103 (H) 65 - 100 mg/dL    Performed by Ace Rdz    CBC W/O DIFF    Collection Time: 01/21/23  3:13 PM   Result Value Ref Range    WBC 11.5 (H) 3.6 - 11.0 K/uL    RBC 3.42 (L) 3.80 - 5.20 M/uL    HGB 8.9 (L) 11.5 - 16.0 g/dL    HCT 29.0 (L) 35.0 - 47.0 %    MCV 84.8 80.0 - 99.0 FL    MCH 26.0 26.0 - 34.0 PG    MCHC 30.7 30.0 - 36.5 g/dL    RDW 18.6 (H) 11.5 - 14.5 %    PLATELET 825 672 - 609 K/uL    MPV 10.1 8.9 - 12.9 FL    NRBC 0.0 0.0  WBC    ABSOLUTE NRBC 0.00 0.00 - 0.01 K/uL   CK    Collection Time: 01/21/23  3:13 PM   Result Value Ref Range    CK 82 26 - 815 U/L   METABOLIC PANEL, COMPREHENSIVE    Collection Time: 01/21/23  3:13 PM   Result Value Ref Range    Sodium 150 (H) 136 - 145 mmol/L    Potassium 4.4 3.5 - 5.1 mmol/L    Chloride 127 (H) 97 - 108 mmol/L    CO2 18 (L) 21 - 32 mmol/L    Anion gap 5 5 - 15 mmol/L    Glucose 123 (H) 65 - 100 mg/dL    BUN 94 (H) 6 - 20 mg/dL    Creatinine 2.35 (H) 0.55 - 1.02 mg/dL    BUN/Creatinine ratio 40 (H) 12 - 20      eGFR 21 (L) >60 ml/min/1.73m2    Calcium 9.5 8.5 - 10.1 mg/dL    Bilirubin, total 0.2 0.2 - 1.0 mg/dL    AST (SGOT) 31 15 - 37 U/L    ALT (SGPT) 22 12 - 78 U/L    Alk. phosphatase 139 (H) 45 - 117 U/L    Protein, total 8.8 (H) 6.4 - 8.2 g/dL    Albumin 2.3 (L) 3.5 - 5.0 g/dL    Globulin 6.5 (H) 2.0 - 4.0 g/dL    A-G Ratio 0.4 (L) 1.1 - 2.2          Assessment and Plan:     Acute Kidney Injury : probably prerenal azotemia secondary to   Hypotension and poor p.o. intake    No evidence of obstruction on renal ultrasound  Improving renal functions noted, creatinine improved from 5.68 --4.51--2.35 today  recommend to continue IV hydration with normal saline at 100 mL/h  no rhabdomyolysis   will continue to monitor renal functions   No history of chronic kidney disease, review of old labs showed normal renal functions last month    2. Acute GI bleed : Probably related to anticoagulation  Stable hemoglobins, continue to monitor H&H  Currently off anticoagulation  GI following the patient and EGD scheduled for today     3. Hypotension: Probably related to dehydration and poor intake/GI bleed  Improved hemodynamic status   Continue IV fluids at 100 mL/h  Will continue to monitor hemodynamic status     4. History of DVT: Was on anticoagulation  On hold for acute GI bleed    5. Metabolic acidosis:Secondary to TAL  CO2 level is 19-18  Will continue to monitor Co2 levels     6. Diabetes mellitus: stable. Continue to monitor Accu-Cheks, hemoglobin A1c was 6.4    7.   Patient on chronic Espitia drainage: ? Etiology  Leukocytosis present, suspected UTI  Continue antibiotics    Signed By: Lola Balderrama MD     January 21, 2023

## 2023-01-21 NOTE — PROGRESS NOTES
Bedside and Verbal shift change report given to Wm. Grisel Samayoa (oncoming nurse) by Alvino Wagner  (offgoing nurse). Report included the following information SBAR, Kardex, Procedure Summary, Intake/Output, MAR, and Accordion.

## 2023-01-21 NOTE — PROGRESS NOTES
Problem: Falls - Risk of  Goal: *Absence of Falls  Description: Document Marilia Bare Fall Risk and appropriate interventions in the flowsheet.   Note: Fall Risk Interventions:

## 2023-01-21 NOTE — PROGRESS NOTES
Progress Note    Patient: Geovanna Salcedo MRN: [id number]  SSN: [id number]    YOB: 1945  Age: 68 y.o. Sex: female      Admit Date: 1/19/2023    LOS: 2 days     Subjective:     Patient being evaluated for GI bleed hemoglobin 10.9. Patient had bloody stool today    Objective:     Vitals:    01/20/23 1640 01/20/23 1648 01/20/23 2007 01/21/23 0737   BP: (!) 105/54  119/75 111/64   Pulse: 89  93 74   Resp: 16  16 20   Temp: 97.8 °F (36.6 °C)  97.7 °F (36.5 °C) 97.5 °F (36.4 °C)   SpO2: (!) 78% 100% 98% 99%   Weight:       Height:            Intake and Output:  Current Shift: No intake/output data recorded. Last three shifts: 01/19 1901 - 01/21 0700  In: -   Out: 56 [Urine:1300]    Physical Exam:   General:  Alert, cooperative, no distress, appears stated age. Eyes:  Conjunctivae/corneas clear. PERRL, EOMs intact. Fundi benign   Ears:  Normal TMs and external ear canals both ears. Nose: Nares normal. Septum midline. Mucosa normal. No drainage or sinus tenderness. Mouth/Throat: Lips, mucosa, and tongue normal. Teeth and gums normal.   Neck: Supple, symmetrical, trachea midline, no adenopathy, thyroid: no enlargment/tenderness/nodules, no carotid bruit and no JVD. Back:   Symmetric, no curvature. ROM normal. No CVA tenderness. Lungs:   Clear to auscultation bilaterally. Heart:  Regular rate and rhythm, S1, S2 normal, no murmur, click, rub or gallop. Abdomen:   Soft, non-tender. Bowel sounds normal. No masses,  No organomegaly. Extremities: Extremities normal, atraumatic, no cyanosis or edema. Pulses: 2+ and symmetric all extremities. Skin: Skin color, texture, turgor normal. No rashes or lesions   Lymph nodes: Cervical, supraclavicular, and axillary nodes normal.   Neurologic: CNII-XII intact. Normal strength, sensation and reflexes throughout. Lab/Data Review: All lab results for the last 24 hours reviewed.      left  Recent Results (from the past 24 hour(s))   GLUCOSE, POC    Collection Time: 01/20/23  4:02 PM   Result Value Ref Range    Glucose (POC) 131 (H) 65 - 100 mg/dL    Performed by Jeanine Alvarez, POC    Collection Time: 01/20/23  9:39 PM   Result Value Ref Range    Glucose (POC) 124 (H) 65 - 100 mg/dL    Performed by Alvarez Degroot, POC    Collection Time: 01/21/23  7:40 AM   Result Value Ref Range    Glucose (POC) 90 65 - 100 mg/dL    Performed by Kieran Lopez    GLUCOSE, POC    Collection Time: 01/21/23 11:30 AM   Result Value Ref Range    Glucose (POC) 103 (H) 65 - 100 mg/dL    Performed by Kieran Lopez          Assessment:     Active Problems:    Blood in stool (1/19/2023)      Cystitis (1/19/2023)      GI bleed (1/19/2023)      TAL (acute kidney injury) ([facility] [address] (1/19/2023)      Anemia secondary to GI bleed  Plan:     Hemoglobin  IV Protonix GI following    Signed By: Maggie Roth MD     January 21, 2023

## 2023-01-21 NOTE — PROGRESS NOTES
Pt is A/O4 and very cooperative. Pt did have large BM with blood and urine is dark. Pt c/o of sore throat and was given tylenol  for pain. Daughter at bedside and often gives requests for staff. Pt has multiple sores on body and dressing changes made on 1/20 at 9pm. Pt slept comfortable throughout the shift and no other issues noted at this time.

## 2023-01-22 ENCOUNTER — APPOINTMENT (OUTPATIENT)
Dept: ENDOSCOPY | Age: 78
DRG: 699 | End: 2023-01-22
Attending: INTERNAL MEDICINE
Payer: MEDICARE

## 2023-01-22 LAB
ALBUMIN SERPL-MCNC: 2.1 G/DL (ref 3.5–5)
ANION GAP SERPL CALC-SCNC: 6 MMOL/L (ref 5–15)
BUN SERPL-MCNC: 65 MG/DL (ref 6–20)
BUN/CREAT SERPL: 36 (ref 12–20)
CA-I BLD-MCNC: 9.1 MG/DL (ref 8.5–10.1)
CA-I BLD-MCNC: 9.2 MG/DL (ref 8.5–10.1)
CHLORIDE SERPL-SCNC: 133 MMOL/L (ref 97–108)
CO2 SERPL-SCNC: 17 MMOL/L (ref 21–32)
CREAT SERPL-MCNC: 1.79 MG/DL (ref 0.55–1.02)
FERRITIN SERPL-MCNC: 491 NG/ML (ref 8–252)
GLUCOSE BLD STRIP.AUTO-MCNC: 128 MG/DL (ref 65–100)
GLUCOSE BLD STRIP.AUTO-MCNC: 135 MG/DL (ref 65–100)
GLUCOSE BLD STRIP.AUTO-MCNC: 82 MG/DL (ref 65–100)
GLUCOSE BLD STRIP.AUTO-MCNC: 94 MG/DL (ref 65–100)
GLUCOSE BLD STRIP.AUTO-MCNC: 94 MG/DL (ref 65–100)
GLUCOSE SERPL-MCNC: 162 MG/DL (ref 65–100)
HCT VFR BLD AUTO: 27.6 % (ref 35–47)
HCT VFR BLD AUTO: 34.1 % (ref 35–47)
HCT VFR BLD AUTO: 38.3 % (ref 35–47)
HGB BLD-MCNC: 10.4 G/DL (ref 11.5–16)
HGB BLD-MCNC: 8.2 G/DL (ref 11.5–16)
HGB BLD-MCNC: 9.6 G/DL (ref 11.5–16)
IRON SATN MFR SERPL: 26 % (ref 20–50)
IRON SERPL-MCNC: 44 UG/DL (ref 35–150)
PERFORMED BY, TECHID: ABNORMAL
PERFORMED BY, TECHID: ABNORMAL
PERFORMED BY, TECHID: NORMAL
PHOSPHATE SERPL-MCNC: 1.5 MG/DL (ref 2.6–4.7)
POTASSIUM SERPL-SCNC: 4.5 MMOL/L (ref 3.5–5.1)
PTH-INTACT SERPL-MCNC: 141.9 PG/ML (ref 18.4–88)
SODIUM SERPL-SCNC: 156 MMOL/L (ref 136–145)
TIBC SERPL-MCNC: 168 UG/DL (ref 250–450)

## 2023-01-22 PROCEDURE — 80069 RENAL FUNCTION PANEL: CPT

## 2023-01-22 PROCEDURE — 74011250636 HC RX REV CODE- 250/636: Performed by: INTERNAL MEDICINE

## 2023-01-22 PROCEDURE — 74011000250 HC RX REV CODE- 250: Performed by: FAMILY MEDICINE

## 2023-01-22 PROCEDURE — 74011000250 HC RX REV CODE- 250: Performed by: INTERNAL MEDICINE

## 2023-01-22 PROCEDURE — 65270000029 HC RM PRIVATE

## 2023-01-22 PROCEDURE — C9113 INJ PANTOPRAZOLE SODIUM, VIA: HCPCS | Performed by: INTERNAL MEDICINE

## 2023-01-22 PROCEDURE — 83540 ASSAY OF IRON: CPT

## 2023-01-22 PROCEDURE — 74011250636 HC RX REV CODE- 250/636: Performed by: FAMILY MEDICINE

## 2023-01-22 PROCEDURE — 74011250637 HC RX REV CODE- 250/637: Performed by: INTERNAL MEDICINE

## 2023-01-22 PROCEDURE — 82962 GLUCOSE BLOOD TEST: CPT

## 2023-01-22 PROCEDURE — 82728 ASSAY OF FERRITIN: CPT

## 2023-01-22 RX ORDER — SODIUM CHLORIDE 450 MG/100ML
100 INJECTION, SOLUTION INTRAVENOUS CONTINUOUS
Status: DISCONTINUED | OUTPATIENT
Start: 2023-01-22 | End: 2023-01-23

## 2023-01-22 RX ORDER — SODIUM BICARBONATE 650 MG/1
650 TABLET ORAL 2 TIMES DAILY
Status: DISCONTINUED | OUTPATIENT
Start: 2023-01-22 | End: 2023-01-23

## 2023-01-22 RX ADMIN — SODIUM BICARBONATE 650 MG: 650 TABLET ORAL at 21:57

## 2023-01-22 RX ADMIN — SODIUM CHLORIDE 100 ML/HR: 4.5 INJECTION, SOLUTION INTRAVENOUS at 12:14

## 2023-01-22 RX ADMIN — CEFTRIAXONE SODIUM 1 G: 1 INJECTION, POWDER, FOR SOLUTION INTRAMUSCULAR; INTRAVENOUS at 12:14

## 2023-01-22 RX ADMIN — SODIUM CHLORIDE 100 ML/HR: 9 INJECTION, SOLUTION INTRAVENOUS at 06:02

## 2023-01-22 RX ADMIN — SODIUM CHLORIDE, PRESERVATIVE FREE 40 MG: 5 INJECTION INTRAVENOUS at 08:13

## 2023-01-22 RX ADMIN — POLYETHYLENE GLYCOL 3350, SODIUM SULFATE ANHYDROUS, SODIUM BICARBONATE, SODIUM CHLORIDE, POTASSIUM CHLORIDE 2000 ML: 236; 22.74; 6.74; 5.86; 2.97 POWDER, FOR SOLUTION ORAL at 16:10

## 2023-01-22 RX ADMIN — SODIUM CHLORIDE 100 ML/HR: 4.5 INJECTION, SOLUTION INTRAVENOUS at 22:07

## 2023-01-22 RX ADMIN — SODIUM CHLORIDE, PRESERVATIVE FREE 40 MG: 5 INJECTION INTRAVENOUS at 21:54

## 2023-01-22 NOTE — PROGRESS NOTES
Progress Note    Patient: Elva Keith MRN: [id number]  SSN: [id number]    YOB: 1945  Age: 68 y.o.   Sex: female      Admit Date: 1/19/2023    LOS: 3 days     Subjective:   Still has  bloody stool,  No pain, no nausea vomiting,  Past Medical History:   Diagnosis Date    Arthritis     Diabetes ([facility] [address]     states she is \"pre-diabetic\", diet controlled    frequency     H/O blood clots     right side of body    Heart attack ([facility] [address] 1/2014    Hydradenitis 2/1/2012    Hypercholesterolemia     Hypertension     Ill-defined condition     edema of legs    Thromboembolus (HCC)         Current Facility-Administered Medications:     0.45% sodium chloride infusion, 100 mL/hr, IntraVENous, CONTINUOUS, Nas Mahmood MD, Last Rate: 100 mL/hr at 01/22/23 1214, 100 mL/hr at 01/22/23 1214    sodium bicarbonate tablet 650 mg, 650 mg, Oral, BID, Nas Mahmood MD    pantoprazole (PROTONIX) 40 mg in 0.9% sodium chloride 10 mL injection, 40 mg, IntraVENous, Q12H, Gregory Cardozo MD, 40 mg at 01/22/23 0813    acetaminophen (TYLENOL) tablet 650 mg, 650 mg, Oral, Q6H PRN, 650 mg at 01/21/23 1735 **OR** acetaminophen (TYLENOL) suppository 650 mg, 650 mg, Rectal, Q6H PRN, Fadi Velasquez MD    polyethylene glycol (MIRALAX) packet 17 g, 17 g, Oral, DAILY PRN, Fadi Velasquez MD    ondansetron (ZOFRAN ODT) tablet 4 mg, 4 mg, Oral, Q8H PRN **OR** ondansetron (ZOFRAN) injection 4 mg, 4 mg, IntraVENous, Q6H PRN, Fadi Velasquez MD    insulin lispro (HUMALOG) injection, , SubCUTAneous, AC&HS, Fadi Velasquez MD    glucose chewable tablet 16 g, 4 Tablet, Oral, PRN, Fadi Velasquez MD    glucagon (GLUCAGEN) injection 1 mg, 1 mg, IntraMUSCular, PRN, Fadi Velasquez MD    cefTRIAXone (ROCEPHIN) 1 g in sterile water (preservative free) 10 mL IV syringe, 1 g, IntraVENous, Q24H, Fadi Velasquez MD, 1 g at 01/22/23 1214    Objective:     Vitals:    01/21/23 0737 01/21/23 1738 01/21/23 2038 01/22/23 0747   BP: 111/64 106/65 109/66 (!) 105/54   Pulse: 74 80 73 77   Resp: 20 20 14 16   Temp: 97.5 °F (36.4 °C) 97.7 °F (36.5 °C) 97.5 °F (36.4 °C) 97.3 °F (36.3 °C)   SpO2: 99% 95% 94% 100%   Weight:       Height:            Intake and Output:  Current Shift: 01/22 0701 - 01/22 1900  In: -   Out: 650 [Urine:650]  Last three shifts: 01/20 1901 - 01/22 0700  In: -   Out: 1300 [Urine:1300]    Physical Exam:   Physical Exam  Constitutional:       Appearance: She is ill-appearing. HENT:      Head: Atraumatic. Cardiovascular:      Rate and Rhythm: Rhythm irregular. Heart sounds: Normal heart sounds. Pulmonary:      Breath sounds: Normal breath sounds. Abdominal:      Palpations: Abdomen is soft. Skin:     General: Skin is warm. Neurological:      Mental Status: Mental status is at baseline.    Psychiatric:         Mood and Affect: Mood normal.        Lab/Data Review:  Recent Results (from the past 24 hour(s))   PTH INTACT    Collection Time: 01/21/23  3:13 PM   Result Value Ref Range    Calcium 9.2 8.5 - 10.1 mg/dL    PTH, Intact 141.9 (H) 18.4 - 88.0 pg/mL   CBC W/O DIFF    Collection Time: 01/21/23  3:13 PM   Result Value Ref Range    WBC 11.5 (H) 3.6 - 11.0 K/uL    RBC 3.42 (L) 3.80 - 5.20 M/uL    HGB 8.9 (L) 11.5 - 16.0 g/dL    HCT 29.0 (L) 35.0 - 47.0 %    MCV 84.8 80.0 - 99.0 FL    MCH 26.0 26.0 - 34.0 PG    MCHC 30.7 30.0 - 36.5 g/dL    RDW 18.6 (H) 11.5 - 14.5 %    PLATELET 238 575 - 163 K/uL    MPV 10.1 8.9 - 12.9 FL    NRBC 0.0 0.0  WBC    ABSOLUTE NRBC 0.00 0.00 - 0.01 K/uL   CK    Collection Time: 01/21/23  3:13 PM   Result Value Ref Range    CK 82 26 - 138 U/L   METABOLIC PANEL, COMPREHENSIVE    Collection Time: 01/21/23  3:13 PM   Result Value Ref Range    Sodium 150 (H) 136 - 145 mmol/L    Potassium 4.4 3.5 - 5.1 mmol/L    Chloride 127 (H) 97 - 108 mmol/L    CO2 18 (L) 21 - 32 mmol/L    Anion gap 5 5 - 15 mmol/L    Glucose 123 (H) 65 - 100 mg/dL    BUN 94 (H) 6 - 20 mg/dL    Creatinine 2.35 (H) 0.55 - 1.02 mg/dL    BUN/Creatinine ratio 40 (H) 12 - 20      eGFR 21 (L) >60 ml/min/1.73m2    Calcium 9.5 8.5 - 10.1 mg/dL    Bilirubin, total 0.2 0.2 - 1.0 mg/dL    AST (SGOT) 31 15 - 37 U/L    ALT (SGPT) 22 12 - 78 U/L    Alk.  phosphatase 139 (H) 45 - 117 U/L    Protein, total 8.8 (H) 6.4 - 8.2 g/dL    Albumin 2.3 (L) 3.5 - 5.0 g/dL    Globulin 6.5 (H) 2.0 - 4.0 g/dL    A-G Ratio 0.4 (L) 1.1 - 2.2     GLUCOSE, POC    Collection Time: 01/21/23  5:06 PM   Result Value Ref Range    Glucose (POC) 104 (H) 65 - 100 mg/dL    Performed by Giuseppe Rascon    GLUCOSE, POC    Collection Time: 01/21/23  8:58 PM   Result Value Ref Range    Glucose (POC) 115 (H) 65 - 100 mg/dL    Performed by Jose Luis Philippe(PCT)    HGB & HCT    Collection Time: 01/21/23 11:49 PM   Result Value Ref Range    HGB 9.6 (L) 11.5 - 16.0 g/dL    HCT 34.1 (L) 35.0 - 47.0 %   GLUCOSE, POC    Collection Time: 01/22/23  6:06 AM   Result Value Ref Range    Glucose (POC) 94 65 - 100 mg/dL    Performed by Jose Luis Philippe(PCT)    GLUCOSE, POC    Collection Time: 01/22/23  7:38 AM   Result Value Ref Range    Glucose (POC) 94 65 - 100 mg/dL    Performed by [facility]    RENAL FUNCTION PANEL    Collection Time: 01/22/23  9:59 AM   Result Value Ref Range    Sodium 156 (H) 136 - 145 mmol/L    Potassium 4.5 3.5 - 5.1 mmol/L    Chloride 133 (H) 97 - 108 mmol/L    CO2 17 (L) 21 - 32 mmol/L    Anion gap 6 5 - 15 mmol/L    Glucose 162 (H) 65 - 100 mg/dL    BUN 65 (H) 6 - 20 mg/dL    Creatinine 1.79 (H) 0.55 - 1.02 mg/dL    BUN/Creatinine ratio 36 (H) 12 - 20      eGFR 29 (L) >60 ml/min/1.73m2    Calcium 9.1 8.5 - 10.1 mg/dL    Phosphorus 1.5 (L) 2.6 - 4.7 mg/dL    Albumin 2.1 (L) 3.5 - 5.0 g/dL   HGB & HCT    Collection Time: 01/22/23  9:59 AM   Result Value Ref Range    HGB 8.2 (L) 11.5 - 16.0 g/dL    HCT 27.6 (L) 35.0 - 47.0 %   GLUCOSE, POC    Collection Time: 01/22/23 11:02 AM   Result Value Ref Range    Glucose (POC) 128 (H) 65 - 100 mg/dL    Performed by Isabelle Garcia US RETROPERITONEUM COMP   Final Result   Medical renal disease. No hydronephrosis demonstrated. CT HEAD WO CONT   Final Result   No acute abnormality.                  Assessment:     Active Problems:    Blood in stool (1/19/2023)      Cystitis (1/19/2023)      GI bleed (1/19/2023)      TAL (acute kidney injury) ([facility] [address] (1/19/2023)  GI bleeding  UPPER vs lower  GI bleeding  Mild anemia, hemoglobin dropped further,    EGD, mild gastritis, no active bleeding    UTI, possible sepsis,  Plan:   Continue current monitor hemoglobin  Keep the patient on clean liquid diet  Clear liquid diet   bowel preparation morning,  Colonoscopy  in am. Indication/risk discussed          Signed By: Regine Liz MD     January 22, 2023        Thank you for allowing me to participate in this patients care  Cc Referring Physician   Constanza Morlaes MD

## 2023-01-22 NOTE — PROGRESS NOTES
Post-op day # 22 of RIGHT Transcervical Carotid Artery stenting (TCAR) on 9/25/19 with Dr Sue Briones  80 y/o M CAD, hypertension, HLD, AF, smoker, RIGHT high grade carotid artery stenosis  Patient presents for post-op follow-up       -He offers no complaints   -No new weakness  No HA's, dizziness, lightheadedness, visual changes, numnbess  No problems with incision or groin sties    -No problems with bleeding      -Incision healed; R groin - intact    Plan:    Patient is stable after TCAR  We discussed role of medical therapy and plan for routine follow up and surveillance  He is not interested in quitting smoking      -Advance activity as tolerated   -Smoking cessation discussed  -Continue with Eliquis 5 twice a day, Plavix 75 and Lipitor 20   -Check carotid doppler 6 weeks post-op (due 11/7/19) and per protocol  -Anticoagulation education provided  Recommend to avoid naproxen/NSAIDS while on both blood thinner and antiplatelet agents, but patient reports that he has chronic shoulder pain for which needs to continue NSAIDS  He takes 2 tablets daily   We discussed that he places himself at increased risk of serious bleeding      -Follow up with Dr Michael Maravilla in 3 months Problem: Falls - Risk of  Goal: *Absence of Falls  Description: Document German Wahl Fall Risk and appropriate interventions in the flowsheet. Outcome: Progressing Towards Goal  Note: Fall Risk Interventions:                                Problem: Pressure Injury - Risk of  Goal: *Prevention of pressure injury  Description: Document Spenser Scale and appropriate interventions in the flowsheet.   Outcome: Progressing Towards Goal  Note: Pressure Injury Interventions:  Sensory Interventions: Assess changes in LOC    Moisture Interventions: Absorbent underpads    Activity Interventions: PT/OT evaluation    Mobility Interventions: HOB 30 degrees or less    Nutrition Interventions: Document food/fluid/supplement intake    Friction and Shear Interventions: Lift sheet, HOB 30 degrees or less

## 2023-01-22 NOTE — PROGRESS NOTES
Renal Progress Note    Patient: Macho Child MRN: [id number]  SSN: [id number]    YOB: 1945  Age: 68 y.o. Sex: female      Admit Date: 1/19/2023    LOS: 3 days     Subjective:   Patient seen at bedside. Alert and awake, no acute distress. confused   No swelling in lower extremities. No complaints of shortness of breath. Improving renal functions with creatinine down to 1.79 today  Sodium increased to 156    Current Facility-Administered Medications   Medication Dose Route Frequency    0.45% sodium chloride infusion  100 mL/hr IntraVENous CONTINUOUS    pantoprazole (PROTONIX) 40 mg in 0.9% sodium chloride 10 mL injection  40 mg IntraVENous Q12H    acetaminophen (TYLENOL) tablet 650 mg  650 mg Oral Q6H PRN    Or    acetaminophen (TYLENOL) suppository 650 mg  650 mg Rectal Q6H PRN    polyethylene glycol (MIRALAX) packet 17 g  17 g Oral DAILY PRN    ondansetron (ZOFRAN ODT) tablet 4 mg  4 mg Oral Q8H PRN    Or    ondansetron (ZOFRAN) injection 4 mg  4 mg IntraVENous Q6H PRN    insulin lispro (HUMALOG) injection   SubCUTAneous AC&HS    glucose chewable tablet 16 g  4 Tablet Oral PRN    glucagon (GLUCAGEN) injection 1 mg  1 mg IntraMUSCular PRN    cefTRIAXone (ROCEPHIN) 1 g in sterile water (preservative free) 10 mL IV syringe  1 g IntraVENous Q24H        Vitals:    01/21/23 0737 01/21/23 1738 01/21/23 2038 01/22/23 0747   BP: 111/64 106/65 109/66 (!) 105/54   Pulse: 74 80 73 77   Resp: 20 20 14 16   Temp: 97.5 °F (36.4 °C) 97.7 °F (36.5 °C) 97.5 °F (36.4 °C) 97.3 °F (36.3 °C)   SpO2: 99% 95% 94% 100%   Weight:       Height:         Objective:   General: alert awake , confused, no acute distress. HEENT: EOMI, no Icterus, no Pallor, pupils reactive, mucosa dry  Neck: Neck is supple, No JVD,   Lungs: breathsounds normal, no respiratory distress on inspection, no rhonchi, no rales,  CVS: heart sounds normal, no murmurs, no rubs.   GI: soft, nontender, normal BS,   Extremeties:  no cyanosis, no edema,  Neuro: Alert, awake, answering simple questions but appears forgetful and confused, moving all extremeties   Skin: Poor skin turgor, dark discoloration of skin noted in lower extremities. Intake and Output:  Current Shift: 01/22 0701 - 01/22 1900  In: -   Out: 650 [Urine:650]  Last three shifts: 01/20 1901 - 01/22 0700  In: -   Out: 1300 [Urine:1300]      Lab/Data Review:  Recent Labs     01/22/23  0959 01/21/23  2349 01/21/23  1513 01/20/23  0346   WBC  --   --  11.5* 12.8*   HGB 8.2* 9.6* 8.9* 10.9*   HCT 27.6* 34.1* 29.0* 35.0   PLT  --   --  361 385     Recent Labs     01/22/23  0959 01/21/23  1513 01/20/23  0346   * 150* 141   K 4.5 4.4 Hemolyzed, recollect requested   * 127* 113*   CO2 17* 18* 19*   * 123* 110*   BUN 65* 94* 147*   CREA 1.79* 2.35* 4.51*   CA 9.1 9.2  9.5 9.7   PHOS 1.5*  --   --    ALB 2.1* 2.3* 2.7*   TBILI  --  0.2 0.3   ALT  --  22 32     No results for input(s): PH, PCO2, PO2, HCO3, FIO2 in the last 72 hours.   Recent Results (from the past 24 hour(s))   PTH INTACT    Collection Time: 01/21/23  3:13 PM   Result Value Ref Range    Calcium 9.2 8.5 - 10.1 mg/dL    PTH, Intact 141.9 (H) 18.4 - 88.0 pg/mL   CBC W/O DIFF    Collection Time: 01/21/23  3:13 PM   Result Value Ref Range    WBC 11.5 (H) 3.6 - 11.0 K/uL    RBC 3.42 (L) 3.80 - 5.20 M/uL    HGB 8.9 (L) 11.5 - 16.0 g/dL    HCT 29.0 (L) 35.0 - 47.0 %    MCV 84.8 80.0 - 99.0 FL    MCH 26.0 26.0 - 34.0 PG    MCHC 30.7 30.0 - 36.5 g/dL    RDW 18.6 (H) 11.5 - 14.5 %    PLATELET 949 088 - 373 K/uL    MPV 10.1 8.9 - 12.9 FL    NRBC 0.0 0.0  WBC    ABSOLUTE NRBC 0.00 0.00 - 0.01 K/uL   CK    Collection Time: 01/21/23  3:13 PM   Result Value Ref Range    CK 82 26 - 756 U/L   METABOLIC PANEL, COMPREHENSIVE    Collection Time: 01/21/23  3:13 PM   Result Value Ref Range    Sodium 150 (H) 136 - 145 mmol/L    Potassium 4.4 3.5 - 5.1 mmol/L    Chloride 127 (H) 97 - 108 mmol/L    CO2 18 (L) 21 - 32 mmol/L Anion gap 5 5 - 15 mmol/L    Glucose 123 (H) 65 - 100 mg/dL    BUN 94 (H) 6 - 20 mg/dL    Creatinine 2.35 (H) 0.55 - 1.02 mg/dL    BUN/Creatinine ratio 40 (H) 12 - 20      eGFR 21 (L) >60 ml/min/1.73m2    Calcium 9.5 8.5 - 10.1 mg/dL    Bilirubin, total 0.2 0.2 - 1.0 mg/dL    AST (SGOT) 31 15 - 37 U/L    ALT (SGPT) 22 12 - 78 U/L    Alk.  phosphatase 139 (H) 45 - 117 U/L    Protein, total 8.8 (H) 6.4 - 8.2 g/dL    Albumin 2.3 (L) 3.5 - 5.0 g/dL    Globulin 6.5 (H) 2.0 - 4.0 g/dL    A-G Ratio 0.4 (L) 1.1 - 2.2     GLUCOSE, POC    Collection Time: 01/21/23  5:06 PM   Result Value Ref Range    Glucose (POC) 104 (H) 65 - 100 mg/dL    Performed by Anthony Mcdaniel    GLUCOSE, POC    Collection Time: 01/21/23  8:58 PM   Result Value Ref Range    Glucose (POC) 115 (H) 65 - 100 mg/dL    Performed by Shraddha Philippe(PCT)    HGB & HCT    Collection Time: 01/21/23 11:49 PM   Result Value Ref Range    HGB 9.6 (L) 11.5 - 16.0 g/dL    HCT 34.1 (L) 35.0 - 47.0 %   GLUCOSE, POC    Collection Time: 01/22/23  6:06 AM   Result Value Ref Range    Glucose (POC) 94 65 - 100 mg/dL    Performed by Shraddha Philippe(PCT)    GLUCOSE, POC    Collection Time: 01/22/23  7:38 AM   Result Value Ref Range    Glucose (POC) 94 65 - 100 mg/dL    Performed by [facility]    RENAL FUNCTION PANEL    Collection Time: 01/22/23  9:59 AM   Result Value Ref Range    Sodium 156 (H) 136 - 145 mmol/L    Potassium 4.5 3.5 - 5.1 mmol/L    Chloride 133 (H) 97 - 108 mmol/L    CO2 17 (L) 21 - 32 mmol/L    Anion gap 6 5 - 15 mmol/L    Glucose 162 (H) 65 - 100 mg/dL    BUN 65 (H) 6 - 20 mg/dL    Creatinine 1.79 (H) 0.55 - 1.02 mg/dL    BUN/Creatinine ratio 36 (H) 12 - 20      eGFR 29 (L) >60 ml/min/1.73m2    Calcium 9.1 8.5 - 10.1 mg/dL    Phosphorus 1.5 (L) 2.6 - 4.7 mg/dL    Albumin 2.1 (L) 3.5 - 5.0 g/dL   HGB & HCT    Collection Time: 01/22/23  9:59 AM   Result Value Ref Range    HGB 8.2 (L) 11.5 - 16.0 g/dL    HCT 27.6 (L) 35.0 - 47.0 %   GLUCOSE, POC    Collection Time: 01/22/23 11:02 AM   Result Value Ref Range    Glucose (POC) 128 (H) 65 - 100 mg/dL    Performed by Shayan Aguilar and Plan:     Acute Kidney Injury : probably prerenal azotemia secondary to   Hypotension and poor p.o. intake    No evidence of obstruction on renal ultrasound  Improving renal functions noted, creatinine improved from 5.68 --4.51--2.35 --1.79 today  recommend to continue IV hydration with 1/2 normal saline at 100 mL/h  no rhabdomyolysis   will continue to monitor renal functions   No history of chronic kidney disease, review of old labs showed normal renal functions last month    2. Acute GI bleed : Probably related to anticoagulation  Stable hemoglobins, continue to monitor H&H  Currently off anticoagulation  GI following the patient and EGD was negative for any acute bleed     3. Hypotension: Probably related to dehydration and poor intake/GI bleed  Improved hemodynamic status   Continue IV fluids at 100 mL/h  Will continue to monitor hemodynamic status     4. History of DVT: Was on anticoagulation  On hold for acute GI bleed    5. Metabolic acidosis:Secondary to TAL  CO2 level is 19-18--17, will add sodium bicarbonate p.o. twice daily  Will continue to monitor Co2 levels     6. Diabetes mellitus: stable. Continue to monitor Accu-Cheks, hemoglobin A1c was 6.4    7.   Hypernatremia: Patient was on normal saline  Change IV fluids to half-normal saline and monitor sodium levels    Patient on chronic Espitia drainage: ? Etiology  Leukocytosis present, suspected UTI  Continue antibiotics    Signed By: Alfred Ramírez MD     January 22, 2023

## 2023-01-22 NOTE — PROGRESS NOTES
Progress Note    Patient: Jason Be MRN: [id number]  SSN: [id number]    YOB: 1945  Age: 68 y.o. Sex: female      Admit Date: 1/19/2023    LOS: 3 days     Subjective:     Patient being evaluated for GI bleed hemoglobin 10.9. Patient had bloody stool today  1/22/23  Multipledecubwounds    Objective:     Vitals:    01/21/23 0737 01/21/23 1738 01/21/23 2038 01/22/23 0747   BP: 111/64 106/65 109/66 (!) 105/54   Pulse: 74 80 73 77   Resp: 20 20 14 16   Temp: 97.5 °F (36.4 °C) 97.7 °F (36.5 °C) 97.5 °F (36.4 °C) 97.3 °F (36.3 °C)   SpO2: 99% 95% 94% 100%   Weight:       Height:            Intake and Output:  Current Shift: 01/22 0701 - 01/22 1900  In: -   Out: 650 [Urine:650]  Last three shifts: 01/20 1901 - 01/22 0700  In: -   Out: 1300 [Urine:1300]    Physical Exam:   General:  Alert, cooperative, no distress, appears stated age. Eyes:  Conjunctivae/corneas clear. PERRL, EOMs intact. Fundi benign   Ears:  Normal TMs and external ear canals both ears. Nose: Nares normal. Septum midline. Mucosa normal. No drainage or sinus tenderness. Mouth/Throat: Lips, mucosa, and tongue normal. Teeth and gums normal.   Neck: Supple, symmetrical, trachea midline, no adenopathy, thyroid: no enlargment/tenderness/nodules, no carotid bruit and no JVD. Back:   Symmetric, no curvature. ROM normal. No CVA tenderness. Lungs:   Clear to auscultation bilaterally. Heart:  Regular rate and rhythm, S1, S2 normal, no murmur, click, rub or gallop. Abdomen:   Soft, non-tender. Bowel sounds normal. No masses,  No organomegaly. Extremities: Extremities normal, atraumatic, no cyanosis or edema. Pulses: 2+ and symmetric all extremities. Skin: Skin color, texture, turgor normal. No rashes or lesions   Lymph nodes: Cervical, supraclavicular, and axillary nodes normal.   Neurologic: CNII-XII intact. Normal strength, sensation and reflexes throughout. Lab/Data Review:   All lab results for the last 24 hours reviewed. left  Recent Results (from the past 24 hour(s))   PTH INTACT    Collection Time: 01/21/23  3:13 PM   Result Value Ref Range    Calcium 9.2 8.5 - 10.1 mg/dL    PTH, Intact 141.9 (H) 18.4 - 88.0 pg/mL   CBC W/O DIFF    Collection Time: 01/21/23  3:13 PM   Result Value Ref Range    WBC 11.5 (H) 3.6 - 11.0 K/uL    RBC 3.42 (L) 3.80 - 5.20 M/uL    HGB 8.9 (L) 11.5 - 16.0 g/dL    HCT 29.0 (L) 35.0 - 47.0 %    MCV 84.8 80.0 - 99.0 FL    MCH 26.0 26.0 - 34.0 PG    MCHC 30.7 30.0 - 36.5 g/dL    RDW 18.6 (H) 11.5 - 14.5 %    PLATELET 151 670 - 447 K/uL    MPV 10.1 8.9 - 12.9 FL    NRBC 0.0 0.0  WBC    ABSOLUTE NRBC 0.00 0.00 - 0.01 K/uL   CK    Collection Time: 01/21/23  3:13 PM   Result Value Ref Range    CK 82 26 - 905 U/L   METABOLIC PANEL, COMPREHENSIVE    Collection Time: 01/21/23  3:13 PM   Result Value Ref Range    Sodium 150 (H) 136 - 145 mmol/L    Potassium 4.4 3.5 - 5.1 mmol/L    Chloride 127 (H) 97 - 108 mmol/L    CO2 18 (L) 21 - 32 mmol/L    Anion gap 5 5 - 15 mmol/L    Glucose 123 (H) 65 - 100 mg/dL    BUN 94 (H) 6 - 20 mg/dL    Creatinine 2.35 (H) 0.55 - 1.02 mg/dL    BUN/Creatinine ratio 40 (H) 12 - 20      eGFR 21 (L) >60 ml/min/1.73m2    Calcium 9.5 8.5 - 10.1 mg/dL    Bilirubin, total 0.2 0.2 - 1.0 mg/dL    AST (SGOT) 31 15 - 37 U/L    ALT (SGPT) 22 12 - 78 U/L    Alk.  phosphatase 139 (H) 45 - 117 U/L    Protein, total 8.8 (H) 6.4 - 8.2 g/dL    Albumin 2.3 (L) 3.5 - 5.0 g/dL    Globulin 6.5 (H) 2.0 - 4.0 g/dL    A-G Ratio 0.4 (L) 1.1 - 2.2     GLUCOSE, POC    Collection Time: 01/21/23  5:06 PM   Result Value Ref Range    Glucose (POC) 104 (H) 65 - 100 mg/dL    Performed by Jacek Geller, POC    Collection Time: 01/21/23  8:58 PM   Result Value Ref Range    Glucose (POC) 115 (H) 65 - 100 mg/dL    Performed by Lynette Philippe(PCT)    HGB & HCT    Collection Time: 01/21/23 11:49 PM   Result Value Ref Range    HGB 9.6 (L) 11.5 - 16.0 g/dL    HCT 34.1 (L) 35.0 - 47.0 %   GLUCOSE, POC Collection Time: 01/22/23  6:06 AM   Result Value Ref Range    Glucose (POC) 94 65 - 100 mg/dL    Performed by Shraddha Philippe(PCT)    GLUCOSE, POC    Collection Time: 01/22/23  7:38 AM   Result Value Ref Range    Glucose (POC) 94 65 - 100 mg/dL    Performed by Nuris Prado    RENAL FUNCTION PANEL    Collection Time: 01/22/23  9:59 AM   Result Value Ref Range    Sodium 156 (H) 136 - 145 mmol/L    Potassium 4.5 3.5 - 5.1 mmol/L    Chloride 133 (H) 97 - 108 mmol/L    CO2 17 (L) 21 - 32 mmol/L    Anion gap 6 5 - 15 mmol/L    Glucose 162 (H) 65 - 100 mg/dL    BUN 65 (H) 6 - 20 mg/dL    Creatinine 1.79 (H) 0.55 - 1.02 mg/dL    BUN/Creatinine ratio 36 (H) 12 - 20      eGFR 29 (L) >60 ml/min/1.73m2    Calcium 9.1 8.5 - 10.1 mg/dL    Phosphorus 1.5 (L) 2.6 - 4.7 mg/dL    Albumin 2.1 (L) 3.5 - 5.0 g/dL   HGB & HCT    Collection Time: 01/22/23  9:59 AM   Result Value Ref Range    HGB 8.2 (L) 11.5 - 16.0 g/dL    HCT 27.6 (L) 35.0 - 47.0 %   GLUCOSE, POC    Collection Time: 01/22/23 11:02 AM   Result Value Ref Range    Glucose (POC) 128 (H) 65 - 100 mg/dL    Performed by [facility]          Assessment:     Active Problems:    Blood in stool (1/19/2023)      Cystitis (1/19/2023)      GI bleed (1/19/2023)      TAL (acute kidney injury) ([facility] (1/19/2023)    Anemia secondary to GI bleed  Plan:     Hemoglobin  IV Protonix GI following  Wound nurse consult  PT/  Signed By: Merline Verdugo MD     January 22, 2023

## 2023-01-22 NOTE — PROGRESS NOTES
Progress Note    Patient: Bernadine Michelle MRN: [id number]  SSN: [id number]    YOB: 1945  Age: 68 y.o.   Sex: female      Admit Date: 1/19/2023    LOS: 2 days     Subjective:   Has had bleeding with bloody stool,  No pain, no nausea vomiting,  Past Medical History:   Diagnosis Date    Arthritis     Diabetes ([facility]     states she is \"pre-diabetic\", diet controlled    frequency     H/O blood clots     right side of body    Heart attack ([facility] 1/2014    Hydradenitis 2/1/2012    Hypercholesterolemia     Hypertension     Ill-defined condition     edema of legs    Thromboembolus ([facility])         Current Facility-Administered Medications:     pantoprazole (PROTONIX) 40 mg in 0.9% sodium chloride 10 mL injection, 40 mg, IntraVENous, Q12H, Gregory Cardozo MD    0.9% sodium chloride infusion, 100 mL/hr, IntraVENous, CONTINUOUS, Nas Mahmood MD, Last Rate: 100 mL/hr at 01/21/23 0828, 100 mL/hr at 01/21/23 9589    acetaminophen (TYLENOL) tablet 650 mg, 650 mg, Oral, Q6H PRN, 650 mg at 01/21/23 1735 **OR** acetaminophen (TYLENOL) suppository 650 mg, 650 mg, Rectal, Q6H PRN, Fadi Velasquez MD    polyethylene glycol (MIRALAX) packet 17 g, 17 g, Oral, DAILY PRN, Fadi Velasquez MD    ondansetron (ZOFRAN ODT) tablet 4 mg, 4 mg, Oral, Q8H PRN **OR** ondansetron (ZOFRAN) injection 4 mg, 4 mg, IntraVENous, Q6H PRN, Fadi Velasquez MD    insulin lispro (HUMALOG) injection, , SubCUTAneous, AC&HS, Fadi Velasquez MD    glucose chewable tablet 16 g, 4 Tablet, Oral, PRN, Fadi Velasquez MD    glucagon (GLUCAGEN) injection 1 mg, 1 mg, IntraMUSCular, PRN, Fadi Velasquez MD    cefTRIAXone (ROCEPHIN) 1 g in sterile water (preservative free) 10 mL IV syringe, 1 g, IntraVENous, Q24H, Fadi Velasquez MD, 1 g at 01/21/23 1245    Objective:     Vitals:    01/20/23 1648 01/20/23 2007 01/21/23 0737 01/21/23 1738   BP:  119/75 111/64 106/65   Pulse:  93 74 80   Resp:  16 20 20   Temp:  97.7 °F (36.5 °C) 97.5 °F (36.4 °C) 97.7 °F (36.5 °C)   SpO2: 100% 98% 99% 95%   Weight:       Height:            Intake and Output:  Current Shift: No intake/output data recorded. Last three shifts: 01/20 0701 - 01/21 1900  In: -   Out: 1300 [Urine:1300]    Physical Exam:   Physical Exam  Constitutional:       Appearance: She is ill-appearing. HENT:      Head: Atraumatic. Cardiovascular:      Rate and Rhythm: Rhythm irregular. Heart sounds: Normal heart sounds. Pulmonary:      Breath sounds: Normal breath sounds. Abdominal:      Palpations: Abdomen is soft. Skin:     General: Skin is warm. Neurological:      Mental Status: Mental status is at baseline.    Psychiatric:         Mood and Affect: Mood normal.        Lab/Data Review:  Recent Results (from the past 24 hour(s))   GLUCOSE, POC    Collection Time: 01/20/23  9:39 PM   Result Value Ref Range    Glucose (POC) 124 (H) 65 - 100 mg/dL    Performed by [place]    GLUCOSE, POC    Collection Time: 01/21/23  7:40 AM   Result Value Ref Range    Glucose (POC) 90 65 - 100 mg/dL    Performed by Luis Almanzar    GLUCOSE, POC    Collection Time: 01/21/23 11:30 AM   Result Value Ref Range    Glucose (POC) 103 (H) 65 - 100 mg/dL    Performed by Luis Almanzar    CBC W/O DIFF    Collection Time: 01/21/23  3:13 PM   Result Value Ref Range    WBC 11.5 (H) 3.6 - 11.0 K/uL    RBC 3.42 (L) 3.80 - 5.20 M/uL    HGB 8.9 (L) 11.5 - 16.0 g/dL    HCT 29.0 (L) 35.0 - 47.0 %    MCV 84.8 80.0 - 99.0 FL    MCH 26.0 26.0 - 34.0 PG    MCHC 30.7 30.0 - 36.5 g/dL    RDW 18.6 (H) 11.5 - 14.5 %    PLATELET 985 233 - 048 K/uL    MPV 10.1 8.9 - 12.9 FL    NRBC 0.0 0.0  WBC    ABSOLUTE NRBC 0.00 0.00 - 0.01 K/uL   CK    Collection Time: 01/21/23  3:13 PM   Result Value Ref Range    CK 82 26 - 462 U/L   METABOLIC PANEL, COMPREHENSIVE    Collection Time: 01/21/23  3:13 PM   Result Value Ref Range    Sodium 150 (H) 136 - 145 mmol/L    Potassium 4.4 3.5 - 5.1 mmol/L    Chloride 127 (H) 97 - 108 mmol/L    CO2 18 (L) 21 - 32 mmol/L    Anion gap 5 5 - 15 mmol/L    Glucose 123 (H) 65 - 100 mg/dL    BUN 94 (H) 6 - 20 mg/dL    Creatinine 2.35 (H) 0.55 - 1.02 mg/dL    BUN/Creatinine ratio 40 (H) 12 - 20      eGFR 21 (L) >60 ml/min/1.73m2    Calcium 9.5 8.5 - 10.1 mg/dL    Bilirubin, total 0.2 0.2 - 1.0 mg/dL    AST (SGOT) 31 15 - 37 U/L    ALT (SGPT) 22 12 - 78 U/L    Alk. phosphatase 139 (H) 45 - 117 U/L    Protein, total 8.8 (H) 6.4 - 8.2 g/dL    Albumin 2.3 (L) 3.5 - 5.0 g/dL    Globulin 6.5 (H) 2.0 - 4.0 g/dL    A-G Ratio 0.4 (L) 1.1 - 2.2     GLUCOSE, POC    Collection Time: 01/21/23  5:06 PM   Result Value Ref Range    Glucose (POC) 104 (H) 65 - 100 mg/dL    Performed by Melissa Mendez         US RETROPERITONEUM COMP   Final Result   Medical renal disease. No hydronephrosis demonstrated. CT HEAD WO CONT   Final Result   No acute abnormality.                  Assessment:     Active Problems:    Blood in stool (1/19/2023)      Cystitis (1/19/2023)      GI bleed (1/19/2023)      TAL (acute kidney injury) ([facility] (1/19/2023)    GI bleeding  UPPER vs lower  GI bleeding  Mild anemia, hemoglobin dropped further,    EGD, mild gastritis, no active bleeding    UTI, possible sepsis,  Plan:   Continue current monitor hemoglobin  Keep the patient on clean liquid diet  Clear liquid diet start morning, bowel preparation morning,  Colonoscopy Monday morning           Signed By: Shaheen Luz MD     January 21, 2023        Thank you for allowing me to participate in this patients care  Cc Referring Physician   Komal Mclaughlin MD

## 2023-01-23 ENCOUNTER — ANESTHESIA (OUTPATIENT)
Dept: ENDOSCOPY | Age: 78
DRG: 699 | End: 2023-01-23
Payer: MEDICARE

## 2023-01-23 ENCOUNTER — ANESTHESIA EVENT (OUTPATIENT)
Dept: ENDOSCOPY | Age: 78
DRG: 699 | End: 2023-01-23
Payer: MEDICARE

## 2023-01-23 LAB
ALBUMIN SERPL-MCNC: 1.9 G/DL (ref 3.5–5)
ANION GAP SERPL CALC-SCNC: 6 MMOL/L (ref 5–15)
BUN SERPL-MCNC: 34 MG/DL (ref 6–20)
BUN/CREAT SERPL: 29 (ref 12–20)
CA-I BLD-MCNC: 8.8 MG/DL (ref 8.5–10.1)
CHLORIDE SERPL-SCNC: 127 MMOL/L (ref 97–108)
CO2 SERPL-SCNC: 19 MMOL/L (ref 21–32)
CREAT SERPL-MCNC: 1.16 MG/DL (ref 0.55–1.02)
GLUCOSE BLD STRIP.AUTO-MCNC: 160 MG/DL (ref 65–100)
GLUCOSE BLD STRIP.AUTO-MCNC: 86 MG/DL (ref 65–100)
GLUCOSE BLD STRIP.AUTO-MCNC: 93 MG/DL (ref 65–100)
GLUCOSE BLD STRIP.AUTO-MCNC: 94 MG/DL (ref 65–100)
GLUCOSE SERPL-MCNC: 108 MG/DL (ref 65–100)
HCT VFR BLD AUTO: 27.8 % (ref 35–47)
HGB BLD-MCNC: 8.3 G/DL (ref 11.5–16)
PERFORMED BY, TECHID: ABNORMAL
PERFORMED BY, TECHID: NORMAL
PHOSPHATE SERPL-MCNC: 1.4 MG/DL (ref 2.6–4.7)
POTASSIUM SERPL-SCNC: 3.9 MMOL/L (ref 3.5–5.1)
SODIUM SERPL-SCNC: 152 MMOL/L (ref 136–145)

## 2023-01-23 PROCEDURE — 80069 RENAL FUNCTION PANEL: CPT

## 2023-01-23 PROCEDURE — 74011000250 HC RX REV CODE- 250: Performed by: FAMILY MEDICINE

## 2023-01-23 PROCEDURE — 97530 THERAPEUTIC ACTIVITIES: CPT

## 2023-01-23 PROCEDURE — 74011000250 HC RX REV CODE- 250: Performed by: INTERNAL MEDICINE

## 2023-01-23 PROCEDURE — 85018 HEMOGLOBIN: CPT

## 2023-01-23 PROCEDURE — 82962 GLUCOSE BLOOD TEST: CPT

## 2023-01-23 PROCEDURE — 65270000029 HC RM PRIVATE

## 2023-01-23 PROCEDURE — 36415 COLL VENOUS BLD VENIPUNCTURE: CPT

## 2023-01-23 PROCEDURE — 97162 PT EVAL MOD COMPLEX 30 MIN: CPT

## 2023-01-23 PROCEDURE — 74011250636 HC RX REV CODE- 250/636: Performed by: INTERNAL MEDICINE

## 2023-01-23 PROCEDURE — 74011250636 HC RX REV CODE- 250/636: Performed by: FAMILY MEDICINE

## 2023-01-23 PROCEDURE — 74011250637 HC RX REV CODE- 250/637: Performed by: INTERNAL MEDICINE

## 2023-01-23 PROCEDURE — C9113 INJ PANTOPRAZOLE SODIUM, VIA: HCPCS | Performed by: INTERNAL MEDICINE

## 2023-01-23 PROCEDURE — 97161 PT EVAL LOW COMPLEX 20 MIN: CPT

## 2023-01-23 PROCEDURE — 97165 OT EVAL LOW COMPLEX 30 MIN: CPT

## 2023-01-23 PROCEDURE — 74011250637 HC RX REV CODE- 250/637: Performed by: FAMILY MEDICINE

## 2023-01-23 RX ORDER — NYSTATIN 100000 U/G
CREAM TOPICAL 2 TIMES DAILY
Status: DISCONTINUED | OUTPATIENT
Start: 2023-01-23 | End: 2023-01-23

## 2023-01-23 RX ORDER — SODIUM BICARBONATE 650 MG/1
1300 TABLET ORAL 2 TIMES DAILY
Status: DISCONTINUED | OUTPATIENT
Start: 2023-01-23 | End: 2023-01-24

## 2023-01-23 RX ORDER — NYSTATIN 100000 [USP'U]/ML
500000 SUSPENSION ORAL 2 TIMES DAILY
Status: DISCONTINUED | OUTPATIENT
Start: 2023-01-23 | End: 2023-01-26 | Stop reason: HOSPADM

## 2023-01-23 RX ADMIN — SODIUM BICARBONATE 1300 MG: 650 TABLET ORAL at 23:57

## 2023-01-23 RX ADMIN — CEFTRIAXONE SODIUM 1 G: 1 INJECTION, POWDER, FOR SOLUTION INTRAMUSCULAR; INTRAVENOUS at 13:03

## 2023-01-23 RX ADMIN — SODIUM CHLORIDE 100 ML/HR: 4.5 INJECTION, SOLUTION INTRAVENOUS at 08:08

## 2023-01-23 RX ADMIN — SODIUM CHLORIDE, PRESERVATIVE FREE 40 MG: 5 INJECTION INTRAVENOUS at 09:36

## 2023-01-23 RX ADMIN — SODIUM BICARBONATE 650 MG: 650 TABLET ORAL at 09:36

## 2023-01-23 RX ADMIN — NYSTATIN 500000 UNITS: 100000 SUSPENSION ORAL at 13:03

## 2023-01-23 RX ADMIN — NYSTATIN 500000 UNITS: 100000 SUSPENSION ORAL at 23:57

## 2023-01-23 RX ADMIN — VASOPRESSIN: 20 INJECTION, SOLUTION INTRAVENOUS at 13:04

## 2023-01-23 NOTE — PROGRESS NOTES
Renal Progress Note    Patient: Sherry Eduardo MRN: [id number]  SSN: [id number]    YOB: 1945  Age: 68 y.o. Sex: female      Admit Date: 1/19/2023    LOS: 4 days     Subjective:   Patient seen at bedside. Alert and awake, no acute distress. confused . At bedside who provides most of the history. Drinking GoLytely for planned colonoscopy in a.m. No swelling in lower extremities. No complaints of shortness of breath. Improving renal functions with creatinine down to 1.79   Sodium increased to 156. Labs today are pending    Current Facility-Administered Medications   Medication Dose Route Frequency    nystatin (MYCOSTATIN) 100,000 unit/mL oral suspension 500,000 Units  500,000 Units Swish and Swallow BID    0.45% sodium chloride infusion  100 mL/hr IntraVENous CONTINUOUS    sodium bicarbonate tablet 650 mg  650 mg Oral BID    pantoprazole (PROTONIX) 40 mg in 0.9% sodium chloride 10 mL injection  40 mg IntraVENous Q12H    acetaminophen (TYLENOL) tablet 650 mg  650 mg Oral Q6H PRN    Or    acetaminophen (TYLENOL) suppository 650 mg  650 mg Rectal Q6H PRN    polyethylene glycol (MIRALAX) packet 17 g  17 g Oral DAILY PRN    ondansetron (ZOFRAN ODT) tablet 4 mg  4 mg Oral Q8H PRN    Or    ondansetron (ZOFRAN) injection 4 mg  4 mg IntraVENous Q6H PRN    insulin lispro (HUMALOG) injection   SubCUTAneous AC&HS    glucose chewable tablet 16 g  4 Tablet Oral PRN    glucagon (GLUCAGEN) injection 1 mg  1 mg IntraMUSCular PRN    cefTRIAXone (ROCEPHIN) 1 g in sterile water (preservative free) 10 mL IV syringe  1 g IntraVENous Q24H        Vitals:    01/22/23 1654 01/22/23 1943 01/23/23 0110 01/23/23 0811   BP: 130/70 106/60 119/74 105/68   Pulse: 87 91 89 90   Resp: 18 19 19 19   Temp: 97.7 °F (36.5 °C) 97.8 °F (36.6 °C) 97.8 °F (36.6 °C) 98.1 °F (36.7 °C)   SpO2: 96% 97% 98% 98%   Weight:       Height:         Objective:   General: alert awake , confused, no acute distress.   HEENT: EOMI, no Icterus, no Pallor, pupils reactive, mucosa dry  Neck: Neck is supple, No JVD,   Lungs: breathsounds normal, no respiratory distress on inspection, no rhonchi, no rales,  CVS: heart sounds normal, no murmurs, no rubs. GI: soft, nontender, normal BS,   Extremeties:  no cyanosis, no edema,  Neuro: Alert, awake, answering simple questions but appears forgetful and confused, moving all extremeties   Skin: Poor skin turgor, dark discoloration of skin noted in lower extremities. Intake and Output:  Current Shift: No intake/output data recorded. Last three shifts: 01/21 1901 - 01/23 0700  In: -   Out: 650 [Urine:650]      Lab/Data Review:  Recent Labs     01/22/23  1942 01/22/23  0959 01/21/23  2349 01/21/23  1513   WBC  --   --   --  11.5*   HGB 10.4* 8.2* 9.6* 8.9*   HCT 38.3 27.6* 34.1* 29.0*   PLT  --   --   --  361       Recent Labs     01/22/23  0959 01/21/23  1513   * 150*   K 4.5 4.4   * 127*   CO2 17* 18*   * 123*   BUN 65* 94*   CREA 1.79* 2.35*   CA 9.1 9.2  9.5   PHOS 1.5*  --    ALB 2.1* 2.3*   TBILI  --  0.2   ALT  --  22       No results for input(s): PH, PCO2, PO2, HCO3, FIO2 in the last 72 hours.   Recent Results (from the past 24 hour(s))   GLUCOSE, POC    Collection Time: 01/22/23  4:48 PM   Result Value Ref Range    Glucose (POC) 82 65 - 100 mg/dL    Performed by Yoni Guzman    HGB & HCT    Collection Time: 01/22/23  7:42 PM   Result Value Ref Range    HGB 10.4 (L) 11.5 - 16.0 g/dL    HCT 38.3 35.0 - 47.0 %   GLUCOSE, POC    Collection Time: 01/22/23  7:47 PM   Result Value Ref Range    Glucose (POC) 135 (H) 65 - 100 mg/dL    Performed by [address] [place] [address], POC    Collection Time: 01/23/23  7:42 AM   Result Value Ref Range    Glucose (POC) 86 65 - 100 mg/dL    Performed by Pablo Calles, POC    Collection Time: 01/23/23 11:28 AM   Result Value Ref Range    Glucose (POC) 160 (H) 65 - 100 mg/dL    Performed by Anam Faith and Plan:     Acute Kidney Injury : probably prerenal azotemia secondary to   Hypotension and poor p.o. intake    No evidence of obstruction on renal ultrasound  Improving renal functions noted, creatinine improved from 5.68 --4.51--2.35 --1.79 . Abs today are pending  On 1/2 normal saline at 100 mL/h I will change to 1/4 NS 2/2 hypernatremia  no rhabdomyolysis   will continue to monitor renal functions   No history of chronic kidney disease, review of old labs showed normal renal functions last month    2. Acute GI bleed : Probably related to anticoagulation  Stable hemoglobins, continue to monitor H&H  Currently off anticoagulation  GI following the patient and EGD was negative for any acute bleed     3. Hypotension: Probably related to dehydration and poor intake/GI bleed  Improved hemodynamic status   Continue IV fluids at 100 mL/h  Will continue to monitor hemodynamic status     4. History of DVT: Was on anticoagulation  On hold for acute GI bleed    5. Metabolic acidosis:Secondary to TAL  CO2 level is 19-18--17, will add sodium bicarbonate p.o. twice daily  Will continue to monitor Co2 levels     6. Diabetes mellitus: stable. Continue to monitor Accu-Cheks, hemoglobin A1c was 6.4    7.   Hypernatremia:   Patient was on normal saline  Change IV fluids to 1/4 NS and monitor sodium levels    Patient on chronic Espitia drainage: ? Etiology  Leukocytosis present, suspected UTI  Continue antibiotics    Signed By: Curtis Oneill MD     January 23, 2023

## 2023-01-23 NOTE — PROGRESS NOTES
3087: Chart reviewed. Per notes, PT eval and wound consult ordered. [facility] and [facility] have accepted patient when medically stable. CM will discuss with patient/daughters. CM will continue to follow patient and recs of medical team.    [phone]: CM telephoned [facility] to discuss accepting SNF facilities. Per conversation, [facility] and patient will likely choose [facility], but will make final decision closer to discharge.

## 2023-01-23 NOTE — PROGRESS NOTES
General Daily Progress Note          Patient Name:   Sherry Eduardo       YOB: 1945       Age:  68 y.o. Admit Date: 1/19/2023      Subjective:     Patient is a 68y.o. year old female with signal past medical history of diabetes diet-controlled history of DVT on Eliquis hypertension hyperlipidemia came to emergency room because of lethargic patient not eating drinking well weak tired patient have large bloody bowel movement today came to emergency room seen by the ER physician work-up done in the [facility] work shows WBC is 12.6  UA came back positive for UTI patient have a chronic Espitia  Renal function shows BUN of 170 creatinine 5.66     Patient was admitted with acute kidney injury acute GI bleed UTI    1/20  Patient resting comfortably in bed, but reports she is still experiencing generalized abdominal pain. Renal US complete: Medical renal disease. No hydronephrosis demonstrated. GI and nephrology consult pending     1/23  Patient resting comfortably in bed without any complaints at this time. Patient is still experiencing bloody bowel movements. GI has patient scheduled for a colonoscopy this morning in attempt to locate the source of the bleed.      Sodium 156  Renal function improved      Objective:     Visit Vitals  /68 (BP 1 Location: Left upper arm, BP Patient Position: At rest)   Pulse 90   Temp 98.1 °F (36.7 °C)   Resp 19   Ht 5' 7\" (1.702 m)   Wt 63.5 kg (140 lb)   SpO2 98%   BMI 21.93 kg/m²        Recent Results (from the past 24 hour(s))   GLUCOSE, POC    Collection Time: 01/22/23  4:48 PM   Result Value Ref Range    Glucose (POC) 82 65 - 100 mg/dL    Performed by Jennifer Acuna    HGB & HCT    Collection Time: 01/22/23  7:42 PM   Result Value Ref Range    HGB 10.4 (L) 11.5 - 16.0 g/dL    HCT 38.3 35.0 - 47.0 %   GLUCOSE, POC    Collection Time: 01/22/23  7:47 PM   Result Value Ref Range    Glucose (POC) 135 (H) 65 - 100 mg/dL    Performed by Lesia Otero GLUCOSE, POC    Collection Time: 01/23/23  7:42 AM   Result Value Ref Range    Glucose (POC) 86 65 - 100 mg/dL    Performed by Margaret Limon, POC    Collection Time: 01/23/23 11:28 AM   Result Value Ref Range    Glucose (POC) 160 (H) 65 - 100 mg/dL    Performed by Benito Baltazar      [unfilled]      Review of Systems    Constitutional: Negative for chills and fever. HENT: Negative. Eyes: Negative. Respiratory: Negative. Cardiovascular: Negative. Gastrointestinal: Positive for abdominal pain  Skin: Negative. Neurological: Negative. Physical Exam:      Constitutional: pt is oriented to person, place, and time. HENT:   Head: Normocephalic and atraumatic. Eyes: Pupils are equal, round, and reactive to light. EOM are normal.   Cardiovascular: Normal rate, regular rhythm and normal heart sounds. Pulmonary/Chest: Breath sounds normal. No wheezes. No rales. Exhibits no tenderness. Abdominal: Soft. Bowel sounds are normal. Generalized abdominal tenderness. There is no rebound and no guarding. Musculoskeletal: Normal range of motion. : sorenson placed   Neurological: pt is alert and oriented to person, place, and time. Alert. Normal strength. No cranial nerve deficit or sensory deficit. US RETROPERITONEUM COMP   Final Result   Medical renal disease. No hydronephrosis demonstrated. CT HEAD WO CONT   Final Result   No acute abnormality.                  Recent Results (from the past 24 hour(s))   GLUCOSE, POC    Collection Time: 01/22/23  4:48 PM   Result Value Ref Range    Glucose (POC) 82 65 - 100 mg/dL    Performed by Dafne Anaya    HGB & HCT    Collection Time: 01/22/23  7:42 PM   Result Value Ref Range    HGB 10.4 (L) 11.5 - 16.0 g/dL    HCT 38.3 35.0 - 47.0 %   GLUCOSE, POC    Collection Time: 01/22/23  7:47 PM   Result Value Ref Range    Glucose (POC) 135 (H) 65 - 100 mg/dL    Performed by [address] [place] [address], POC    Collection Time: 01/23/23  7:42 AM Result Value Ref Range    Glucose (POC) 86 65 - 100 mg/dL    Performed by Viki Gray, POC    Collection Time: 01/23/23 11:28 AM   Result Value Ref Range    Glucose (POC) 160 (H) 65 - 100 mg/dL    Performed by Sourav Limon        Results       Procedure Component Value Units Date/Time    CULTURE, URINE [[id number]] Collected: 01/19/23 2030    Order Status: Canceled Specimen: Urine     CULTURE, BLOOD, PAIRED [[id number]] Collected: 01/19/23 1023    Order Status: Completed Specimen: Blood Updated: 01/23/23 0659     Special Requests: No Special Requests        Culture result: No growth 4 days                Labs:     Recent Labs     01/22/23  1942 01/22/23  0959 01/21/23  2349 01/21/23  1513   WBC  --   --   --  11.5*   HGB 10.4* 8.2*   < > 8.9*   HCT 38.3 27.6*   < > 29.0*   PLT  --   --   --  361    < > = values in this interval not displayed. Recent Labs     01/22/23  0959 01/21/23  1513   * 150*   K 4.5 4.4   * 127*   CO2 17* 18*   BUN 65* 94*   CREA 1.79* 2.35*   * 123*   CA 9.1 9.2  9.5   PHOS 1.5*  --        Recent Labs     01/22/23  0959 01/21/23  1513   ALT  --  22   AP  --  139*   TBILI  --  0.2   TP  --  8.8*   ALB 2.1* 2.3*   GLOB  --  6.5*       No results for input(s): INR, PTP, APTT, INREXT, INREXT in the last 72 hours. Recent Labs     01/22/23  0959   TIBC 168*   PSAT 26   FERR 491*          Lab Results   Component Value Date/Time    Folate 4.9 (L) 01/19/2023 10:23 AM      No results for input(s): PH, PCO2, PO2 in the last 72 hours.   Recent Labs     01/21/23  1513   CPK 82       Lab Results   Component Value Date/Time    Cholesterol, total 173 03/12/2021 11:35 AM    HDL Cholesterol 54 03/12/2021 11:35 AM    LDL, calculated 97.8 03/12/2021 11:35 AM    Triglyceride 106 03/12/2021 11:35 AM    CHOL/HDL Ratio 3.2 03/12/2021 11:35 AM     Lab Results   Component Value Date/Time    Glucose (POC) 160 (H) 01/23/2023 11:28 AM    Glucose (POC) 86 01/23/2023 07:42 AM Glucose (POC) 135 (H) 01/22/2023 07:47 PM    Glucose (POC) 82 01/22/2023 04:48 PM    Glucose (POC) 128 (H) 01/22/2023 11:02 AM     Lab Results   Component Value Date/Time    Color Dark Yellow 01/19/2023 10:23 AM    Appearance Turbid (A) 01/19/2023 10:23 AM    Specific gravity 1.020 01/19/2023 10:23 AM    pH (UA) 5.0 01/19/2023 10:23 AM    Protein 100 (A) 01/19/2023 10:23 AM    Glucose Negative 01/19/2023 10:23 AM    Ketone Negative 01/19/2023 10:23 AM    Bilirubin Negative 01/19/2023 10:23 AM    Urobilinogen 0.1 (L) 01/19/2023 10:23 AM    Nitrites Negative 01/19/2023 10:23 AM    Leukocyte Esterase Large (A) 01/19/2023 10:23 AM    Epithelial cells Many (A) 01/19/2023 10:23 AM    Bacteria Negative 01/19/2023 10:23 AM    WBC >100 (H) 01/19/2023 10:23 AM    RBC >100 (H) 01/19/2023 10:23 AM         Assessment/Plan:   Acute kidney injury (improving)  Acute GI bleed  History of DVT  History of diabetes diet-controlled  Hypertension  Hyperlipidemia  UTI with chronic Espitia  Leukocytosis (improving  Oral thrush  Hypernatremia     Renal ultrasound for acute kidney injury complete:Medical renal disease. No hydronephrosis demonstrated.   Sliding-scale insulin    Colonoscopy scheduled for this morning  Continue to monitor H&H  Blood cultures; no growth after 4 days  Last dose of IV Rocephin today   Continue Protonix 40 IV twice daily  IV fluids  Nystatin swish and swallow    Discussed with the patient family at the bedside        Current Facility-Administered Medications:     0.45% sodium chloride infusion, 100 mL/hr, IntraVENous, CONTINUOUS, Nas Mahmood MD, Last Rate: 100 mL/hr at 01/23/23 0808, 100 mL/hr at 01/23/23 0808    sodium bicarbonate tablet 650 mg, 650 mg, Oral, BID, Nas Mahmood MD, 650 mg at 01/23/23 0936    pantoprazole (PROTONIX) 40 mg in 0.9% sodium chloride 10 mL injection, 40 mg, IntraVENous, Q12H, Gregory Cardozo MD, 40 mg at 01/23/23 0936    acetaminophen (TYLENOL) tablet 650 mg, 650 mg, Oral, Q6H PRN, 650 mg at 01/21/23 1735 **OR** acetaminophen (TYLENOL) suppository 650 mg, 650 mg, Rectal, Q6H PRN, Fadi Velasquez MD    polyethylene glycol (MIRALAX) packet 17 g, 17 g, Oral, DAILY PRN, Fadi Velasquez MD    ondansetron (ZOFRAN ODT) tablet 4 mg, 4 mg, Oral, Q8H PRN **OR** ondansetron (ZOFRAN) injection 4 mg, 4 mg, IntraVENous, Q6H PRN, Fadi Velasquez MD    insulin lispro (HUMALOG) injection, , SubCUTAneous, AC&HS, Fadi Velasquez MD    glucose chewable tablet 16 g, 4 Tablet, Oral, PRN, Fadi Velasquez MD    glucagon (GLUCAGEN) injection 1 mg, 1 mg, IntraMUSCular, PRN, Fadi Velasquez MD    cefTRIAXone (ROCEPHIN) 1 g in sterile water (preservative free) 10 mL IV syringe, 1 g, IntraVENous, Q24H, Fadi Velasquez MD, 1 g at 01/22/23 1214

## 2023-01-23 NOTE — PROGRESS NOTES
Problem: Mobility Impaired (Adult and Pediatric)  Goal: *Acute Goals and Plan of Care (Insert Text)  Description: FUNCTIONAL STATUS PRIOR TO ADMISSION: The patient was functional at the wheelchair level and required assist for transfers to the chair. Per daughter report, pt was ambulatory prior to December. HOME SUPPORT PRIOR TO ADMISSION: Pt is a resident of SNF. Physical Therapy Goals  Initiated 1/23/2023  Patient/family stated goal: To get stronger  1. Patient will move from supine to sit and sit to supine  in bed with moderate assistance  within 7 day(s). 2.  Patient will transfer from bed to chair and chair to bed with moderate assistance  using the least restrictive device within 7 day(s). 3.  Patient will perform sit to stand with moderate assistance  within 7 day(s). 4.  Patient will participate in lower extremity therapeutic exercise with minimal assistance/contact guard assist within 7 day(s). Outcome: Not Met      PHYSICAL THERAPY EVALUATION  Patient: Linnette Junior (68 y.o. female)  Date: 1/23/2023  Primary Diagnosis: Cystitis [N30.90]  Blood in stool [K92.1]  TAL (acute kidney injury) ([facility] [address] [N17.9]  GI bleed [K92.2]  Procedure(s) (LRB):  ESOPHAGOGASTRODUODENOSCOPY (EGD) (N/A) 3 Days Post-Op   Precautions: falls      ASSESSMENT  Pt is a 68 y.o. female with PMH of  diabetes, DVT,  hypertension, hyperlipidemia came from SNF with c/o feeling lethargic, poor appetite and large bloody bowel movement admitted on 1/19/23 for cystitis, blood in stool, TAL, and GI bleed. Pt with minimal interaction, acknowledges her name,  daughter in the room, pt received semi-supine in bed, agreeable for PT/OT eval/tx.  Based on current observations, pt  presents with confusion, decreased alertness, needs max cues to keep her eyes open, decreased command following, decreased safety awareness, decreased insight into deficits, deficits in generalized strength/AROM, static/dynamic sitting balance, functional activity tolerance, coordination, and cognition/confusion impacting overall performance of functional transfers/mobility. Pt needs maxA x2 for bed mobility, poor  static sitting balance, kyphotic posture, unable to hold her head up and keep neck in neutral position, heavy anterior lean in sitting requiring mod/maxAx2 for sitting balance. Sit <>stand attempted, unable to clear bed, total Ax2 , and demonstrates poor postural stability. Pt found with soiled chux pad, provided with posterior pericare. Overall, pt tolerates session poor today and would benefit from  skilled PT services to address noted deficits and maximize IND/safety with functional transfers/mobility. Recommend d/c to SNF when medically appropriate . Current Level of Function Impacting Discharge (mobility/balance): Pt requires maxAx2 for bed mobility    Other factors to consider for discharge: severity of deficits, decline from baseline     PLAN :  Recommendations and Planned Interventions: bed mobility training, transfer training, therapeutic exercises, neuromuscular re-education, patient and family training/education, and therapeutic activities      Recommend for staff: Encourage HEP in prep for ADLs/mobility, Use of bed/chair alarm for safety, and LE elevation for management of edema    Frequency/Duration: Patient will be followed by physical therapy:  2-3x/week to address goals.     Recommendation for discharge: (in order for the patient to meet his/her long term goals)  Derek Lin    This discharge recommendation:  Has been made in collaboration with the attending provider and/or case management    IF patient discharges home will need the following DME: to be determined (TBD)         SUBJECTIVE:   Patient stated Cammie Zamorano when asked how is she doing    OBJECTIVE DATA SUMMARY:   HISTORY:    Past Medical History:   Diagnosis Date    Arthritis     Diabetes ([facility] [address]     states she is \"pre-diabetic\", diet controlled    frequency H/O blood clots     right side of body    Heart attack ([facility] [address] 1/2014    Hydradenitis 2/1/2012    Hypercholesterolemia     Hypertension     Ill-defined condition     edema of legs    Thromboembolus (HCC)    No past surgical history on file. Home Situation  Home Environment: Skilled nursing facility  One/Two Story Residence: One story  Living Alone: No  Support Systems: [place]  Patient Expects to be Discharged to[de-identified] Skilled nursing facility  Current DME Used/Available at Home: None    EXAMINATION/PRESENTATION/DECISION MAKING:   Critical Behavior:  Neurologic State: Lethargic  Orientation Level: Oriented to person, Disoriented to place, Disoriented to situation, Disoriented to time (acknowledges her name)  Cognition: Decreased command following  Safety/Judgement: Decreased awareness of environment, Decreased awareness of need for assistance, Decreased awareness of need for safety, Decreased insight into deficits  Hearing: Auditory  Auditory Impairment: None  Skin:  intact where exposed    Range Of Motion:  AROM: Generally decreased, functional     Strength:    Strength: Generally decreased, functional    Tone & Sensation:   Tone: Normal    Coordination:  Coordination: Generally decreased, functional    Functional Mobility:  Bed Mobility:  Rolling: Maximum assistance  Supine to Sit: Maximum assistance;Assist x2  Sit to Supine: Maximum assistance;Assist x2  Scooting: Maximum assistance  Transfers:  Sit to Stand: Total assistance;Assist x2 (attempted,unable to clear bed)  Stand to Sit: Total assistance;Assist x2    Balance:   Sitting: Impaired; With support  Sitting - Static: Poor (constant support)  Sitting - Dynamic: Poor (constant support)  Standing: Impaired; With support;Pull to stand  Standing - Static: Poor;Constant support  Standing - Dynamic : Poor;Constant support       Functional Measure:  [address] [place] [address] Mobility Inpatient Short Form  How much difficulty does the patient currently have. .. Unable A Lot A Little None   1. Turning over in bed (including adjusting bedclothes, sheets and blankets)? [] 1   [x] 2   [] 3   [] 4   2. Sitting down on and standing up from a chair with arms ( e.g., wheelchair, bedside commode, etc.)   [x] 1   [] 2   [] 3   [] 4   3. Moving from lying on back to sitting on the side of the bed? [] 1   [x] 2   [] 3   [] 4          How much help from another person does the patient currently need. .. Total A Lot A Little None   4. Moving to and from a bed to a chair (including a wheelchair)? [x] 1   [] 2   [] 3   [] 4   5. Need to walk in hospital room? [x] 1   [] 2   [] 3   [] 4   6. Climbing 3-5 steps with a railing? [x] 1   [] 2   [] 3   [] 4   © , Trustees of [address] [place] [address], under license to [employer]. All rights reserved     Score:  Initial: 8 Most Recent: X (Date: 23 )   Interpretation of Tool:  Represents activities that are increasingly more difficult (i.e. Bed mobility, Transfers, Gait).   Score 24 23 22-20 19-15 14-10 9-7 6   Modifier CH CI CJ CK CL CM CN         Physical Therapy Evaluation Charge Determination   History Examination Presentation Decision-Making   MEDIUM  Complexity : 1-2 comorbidities / personal factors will impact the outcome/ POC  MEDIUM Complexity : 3 Standardized tests and measures addressing body structure, function, activity limitation and / or participation in recreation  MEDIUM Complexity : Evolving with changing characteristics  Other outcome measures [facility] 6  high complexity      Based on the above components, the patient evaluation is determined to be of the following complexity level: MEDIUM    Pain Ratin/10, no facial grimacing observed     Activity Tolerance:   Poor    After treatment patient left in no apparent distress:   Bed locked and in lowest position Supine in bed, Heels elevated for pressure relief, Call bell within reach, Bed / chair alarm activated, Caregiver / family present, and Side rails x 3 and nsg updated. COMMUNICATION/EDUCATION:   The patients plan of care was discussed with: Occupational therapist and Registered nurse. Fall prevention education was provided and the patient/caregiver indicated understanding. and Patient/family agree to work toward stated goals and plan of care. PT/OT session occurred together for increased pt's safety and maximum functional outcome.      Thank you for this referral.  Miroslava Abdi, PT   Time Calculation: 44 mins

## 2023-01-23 NOTE — PROGRESS NOTES
OCCUPATIONAL THERAPY EVALUATION  Patient: Cullen Reyna (89 y.o. female)  Date: 1/23/2023  Primary Diagnosis: Cystitis [N30.90]  Blood in stool [K92.1]  TAL (acute kidney injury) ([facility] [address] [N17.9]  GI bleed [K92.2]  Procedure(s) (LRB):  ESOPHAGOGASTRODUODENOSCOPY (EGD) (N/A) 3 Days Post-Op   Precautions: fall risk       ASSESSMENT  Pt is a 68 y.o. female presenting to [facility] with c/o lethargy and not drinking well, admitted 1/19 and currently being treated for TAL, acute GI bleed, and UTI. Renal US complete shows renal disease but no hydronephrosis. Colonoscopy scheduled for morning. Pt received semi-supine in bed upon arrival, acknowledged name (difficultly following commands and decreased attention), and agreeable to OT/PT evaluations. Based on current observations, pt presents with deficits in generalized strength/AROM, bed mobility, static/dynamic sitting balance, static/dynamic standing balance (see PT note for gait details), and functional activity tolerance currently impacting overall performance of ADLs and functional transfers/mobility (see below for objective details and assist levels). Overall, pt tolerates session fair with decreased attention/command following; req'd increased cues for command following. Pt req'd max A x2 for sup>sit transfer and req'd max A for sitting balance 2' forward lean; presented w/ kyphotic posture and difficultly w/ maintaining neck in neutral position. Attempted STS w/ total A x2, however, unable to clear bottom and unsafe to continue attempting. Returned to supine and req'd total A for posterior and max A for rolling. Pt would benefit from continued skilled OT services to address current impairments and improve IND and safety with self cares and functional transfers/mobility. Current OT d/c recommendation return to SNF once medically appropriate.     Other factors to consider for discharge: family/social support, DME, time since onset, severity of deficits, functional baseline     Patient will benefit from skilled therapy intervention to address the above noted impairments. PLAN :  Recommendations and Planned Interventions: self care training, functional mobility training, therapeutic exercise, balance training, therapeutic activities, endurance activities, and patient education    Recommend with staff: bed pan and frequent positioning    Recommend next session: LB dressing , LB bathing, and Seated grooming    Frequency/Duration: Patient will be followed by occupational therapy:  3-5x/week to address goals. Recommendation for discharge: (in order for the patient to meet his/her long term goals)  Derek Lin    This discharge recommendation:  Has been made in collaboration with the attending provider and/or case management    IF patient discharges home will need the following DME: TBD at placement        SUBJECTIVE:   Patient acknowledged name    OBJECTIVE DATA SUMMARY:   HISTORY:   Past Medical History:   Diagnosis Date    Arthritis     Diabetes ([facility] [address]     states she is \"pre-diabetic\", diet controlled    frequency     H/O blood clots     right side of body    Heart attack ([facility] [address] 1/2014    Hydradenitis 2/1/2012    Hypercholesterolemia     Hypertension     Ill-defined condition     edema of legs    Thromboembolus ([facility] [address]      No past surgical history on file. Per pt:   Home Situation  Home Environment: Skilled nursing facility  One/Two Story Residence: One story  Living Alone: No  Support Systems: [place]  Patient Expects to be Discharged to[de-identified] Skilled nursing facility  Current DME Used/Available at Home: None      EXAMINATION OF PERFORMANCE DEFICITS:  Cognitive/Behavioral Status:  Neurologic State: Lethargic  Orientation Level: Oriented to person;Disoriented to place; Disoriented to situation;Disoriented to time (acknowledges her name)  Cognition: Decreased attention/concentration;Decreased command following        Safety/Judgement: Decreased awareness of environment;Decreased awareness of need for assistance;Decreased awareness of need for safety;Decreased insight into deficits      Hearing: Auditory  Auditory Impairment: None      Range of Motion:  AROM: Generally decreased, functional                         Strength:  Strength: Generally decreased, functional                Coordination:  Coordination: Generally decreased, functional  Fine Motor Skills-Upper: Right Intact; Left Intact    Gross Motor Skills-Upper: Left Impaired;Right Impaired    Tone & Sensation:  Tone: Normal                         Balance:  Sitting: Impaired; With support  Sitting - Static: Poor (constant support)  Sitting - Dynamic: Poor (constant support)  Standing: Impaired; With support;Pull to stand  Standing - Static: Poor;Constant support  Standing - Dynamic : Poor;Constant support    Functional Mobility and Transfers for ADLs:  Bed Mobility:  Rolling: Maximum assistance  Supine to Sit: Maximum assistance;Assist x2  Sit to Supine: Maximum assistance;Assist x2  Scooting: Maximum assistance    Transfers:  Sit to Stand: Total assistance;Assist x2 (attempted,unable to clear bed)  Stand to Sit: Total assistance;Assist x2      ADL Intervention and task modifications: Toileting  Bowel Hygiene: Total assistance (dependent)    Cognitive Retraining  Safety/Judgement: Decreased awareness of environment;Decreased awareness of need for assistance;Decreased awareness of need for safety;Decreased insight into deficits    Therapeutic Exercise:  Pt will benefit from BUE HEP to improve participation in ADLs and mobility. Plan will be initiated at next session. Functional Measure:    [facility] AM-PACTM \"6 Clicks\"                                                       Daily Activity Inpatient Short Form  How much help from another person does the patient currently need. .. Total; A Lot A Little None   1.   Putting on and taking off regular lower body clothing? [x]  1 []  2 []  3 []  4   2. Bathing (including washing, rinsing, drying)? [x]  1 []  2 []  3 []  4   3. Toileting, which includes using toilet, bedpan or urinal? [x] 1 []  2 []  3 []  4   4. Putting on and taking off regular upper body clothing? [x]  1 []  2 []  3 []  4   5. Taking care of personal grooming such as brushing teeth? [x]  1 []  2 []  3 []  4   6. Eating meals? [x]  1 []  2 []  3 []  4   © 2007, Trustees of [employer], under license to [employer]. All rights reserved     Score: 6/24     Interpretation of Tool:  Represents clinically-significant functional categories (i.e. Activities of daily living). Percentage of Impairment CH    0%   CI    1-19% CJ    20-39% CK    40-59% CL    60-79% CM    80-99% CN     100%   [facility]  Score 6-24 24 23 20-22 15-19 10-14 7-9 6     Occupational Therapy Evaluation Charge Determination   History Examination Decision-Making   LOW Complexity : Brief history review  LOW Complexity : 1-3 performance deficits relating to physical, cognitive , or psychosocial skils that result in activity limitations and / or participation restrictions  HIGH Complexity : Patient presents with comorbidities that affect occupational performance. Signifigant modification of tasks or assistance (eg, physical or verbal) with assessment (s) is necessary to enable patient to complete evaluation       Based on the above components, the patient evaluation is determined to be of the following complexity level: LOW   Pain Rating:  Did not appear in pain    Activity Tolerance:   Fair and requires rest breaks    After treatment patient left in no apparent distress:    Supine in bed, Call bell within reach, Bed / chair alarm activated, Caregiver / family present, and Side rails x 3, bed locked and in lowest position    COMMUNICATION/EDUCATION:   The patients plan of care was discussed with: Physical therapist and Registered nurse.      Patient is unable to participate in goal setting and plan of care. This patients plan of care is appropriate for delegation to AL. PT/OT sessions occurred together for increased safety of pt and clinician. Thank you for this referral.  Paris Gonzalez OT  Time Calculation: 42 mins   Problem: Self Care Deficits Care Plan (Adult)  Goal: *Acute Goals and Plan of Care (Insert Text)  Description: FUNCTIONAL STATUS PRIOR TO ADMISSION: per family, pt has been at nursing home since December. Family reports she was going to bathroom at facility; unsure timeframe and method. Prior to December, family reports she was ambulating w/ RW to bathroom. HOME SUPPORT: [facility] has [facility] at [facility]. Occupational Therapy Goals  Initiated 1/23/2023    Pt stated goal not appropriate at this time 2' lethargy and confusion.   Pt will be min A sup <> sit in prep for EOB ADLs  Pt will be min A grooming sitting at EOB LRAD  Pt will be min A UB dressing sitting EOB/long sit   Pt will be min A LE dressing sitting EOB/long sit  Pt will be mod A sit <>  prep for toileting LRAD  Pt will be mod A toileting/toilet transfer/cloth mgmt LRAD  Pt will be IND following UE HEP in prep for self care tasks   Outcome: Not Met

## 2023-01-23 NOTE — PERIOP NOTES
Dr. Kelley Damon made aware patient did not complete bowel prep and stools are not clear. Per Dr. Kelley Damon order patient is to finish drinking bowel prep today and can stay on clear liquids until midnight. NPO after midnight and colonoscopy rescheduled until tomorrow. Primary nurse made aware. Pt seen for RUQ pain , started tonigt, no N/V/D/fever  Tender in RUQ and epigastric region.   US Liver/GB was negative yesterday. EGD showed portal hypertensive gastropathy  Will check XR Kub, LFTs, lipase and U/A  If negative,, will check CT Abd

## 2023-01-23 NOTE — PROGRESS NOTES
Problem: Pressure Injury - Risk of  Goal: *Prevention of pressure injury  Description: Document Spenser Scale and appropriate interventions in the flowsheet. Outcome: Progressing Towards Goal  Note: Pressure Injury Interventions:  Sensory Interventions: Assess changes in LOC    Moisture Interventions: Absorbent underpads    Activity Interventions: PT/OT evaluation    Mobility Interventions: HOB 30 degrees or less    Nutrition Interventions: Document food/fluid/supplement intake    Friction and Shear Interventions: HOB 30 degrees or less                Problem: Falls - Risk of  Goal: *Absence of Falls  Description: Document Nba Fall Risk and appropriate interventions in the flowsheet.   Note: Fall Risk Interventions:

## 2023-01-23 NOTE — PROGRESS NOTES
@ [phone] Patient has had several bowel movement with the golytely but the progress of bowels is slow and patient is not drinking the medcation as progressively as it should be. Patient bowels are still black but getting thin and watery and the solution is half full. Patient daughter and primary nurse is helping patient to drink but patient is going at her own pace.

## 2023-01-23 NOTE — ANESTHESIA PREPROCEDURE EVALUATION
Relevant Problems   NEUROLOGY   (+) Depression   (+) Recurrent depression (HCC)      CARDIOVASCULAR   (+) HTN (hypertension)      RENAL FAILURE   (+) TAL (acute kidney injury) ([address]      ENDOCRINE   (+) Controlled type 2 diabetes mellitus without complication, without long-term current use of insulin (HCC)     EGD last week with 40mg propofol (req. 200mcg phenylephrine)  Also required pulse ox sticker around ear lobe to detect waveform    Anesthetic History   No history of anesthetic complications            Review of Systems / Medical History  Patient summary reviewed, nursing notes reviewed and pertinent labs reviewed    Pulmonary  Within defined limits                 Neuro/Psych   Within defined limits           Cardiovascular    Hypertension          Past MI, CAD and hyperlipidemia         GI/Hepatic/Renal         Renal disease (Improving TAL)      Comments: Lower GI bleeding Endo/Other    Diabetes: type 2    Arthritis    Comments: Hypernatremia with last serum  on 1/23/23  Hyperchloremia with last serum  on 1/23/23  Bun/Creatinine 34/1.16    Repeat BMP ordered and now awaiting results Other Findings   Comments: Procedure Information    Case: [id number] Date/Time: 01/24/23 1330  Procedure: COLONOSCOPY  Anesthesia type: MAC, total IV anesthesia  Diagnosis: Gastrointestinal hemorrhage, unspecified gastrointestinal hemorrhage type (K92.2)  Pre-op diagnosis: Gastrointestinal hemorrhage, unspecified gastrointestinal hemorrhage type (K92.2)  Location: [facility] ENDO 02 / [facility] ENDOSCOPY  Providers: Nikko Lamar MD      Medical History  Hypercholesterolemia  Hypertension  frequency  Hydradenitis  Ill-defined condition  Arthritis  Diabetes ([address]  Heart attack ([address]  H/O blood clots  Thromboembolus ([address]       Past Medical History:   Diagnosis Date    Arthritis     Diabetes ([address]     states she is \"pre-diabetic\", diet controlled    frequency     H/O blood clots     right side of body    Heart attack ([address] 1/2014  Hydradenitis 2/1/2012    Hypercholesterolemia     Hypertension     Ill-defined condition     edema of legs    Thromboembolus (HCC)        No past surgical history on file. Current Outpatient Medications   Medication Instructions    apixaban (ELIQUIS) 5 mg, Oral, EVERY 12 HOURS    atorvastatin (LIPITOR) 80 mg, Oral, DAILY    bumetanide (BUMEX) 2 mg, Oral, DAILY    cephALEXin (KEFLEX) 500 mg, Oral, 4 TIMES DAILY    clindamycin (CLEOCIN T) 1 % lotion APPLY TO AXILLA TWICE DAILY    diclofenac (VOLTAREN) 4 g, Topical, 4 TIMES DAILY, back    gabapentin (NEURONTIN) 100 mg, Oral, 3 TIMES DAILY AS NEEDED    miscellaneous medical supply (Anti-Embolism Stockings) misc Please measure and fit for one pair of below the knee anti-embolism stockings    nut.tx.gluc intol,lf,soy-fiber (Glucerna 1 Jose C) 0.04-1 gram-kcal/mL liqd 1 Bottle, Oral, DAILY    OTHER Shower Chair- Use As Directed    OTHER Pull Ups    OTHER rollator    oxybutynin (DITROPAN) 5 mg, Oral, 3 TIMES DAILY    zinc oxide 20 % ointment Topical, AS NEEDED       No current facility-administered medications for this visit. Current Outpatient Medications   Medication Sig    cephALEXin (Keflex) 500 mg capsule Take 1 Capsule by mouth four (4) times daily for 7 days.      Facility-Administered Medications Ordered in Other Visits   Medication Dose Route Frequency    0.45% sodium chloride infusion  100 mL/hr IntraVENous CONTINUOUS    sodium bicarbonate tablet 650 mg  650 mg Oral BID    pantoprazole (PROTONIX) 40 mg in 0.9% sodium chloride 10 mL injection  40 mg IntraVENous Q12H    acetaminophen (TYLENOL) tablet 650 mg  650 mg Oral Q6H PRN    Or    acetaminophen (TYLENOL) suppository 650 mg  650 mg Rectal Q6H PRN    polyethylene glycol (MIRALAX) packet 17 g  17 g Oral DAILY PRN    ondansetron (ZOFRAN ODT) tablet 4 mg  4 mg Oral Q8H PRN    Or    ondansetron (ZOFRAN) injection 4 mg  4 mg IntraVENous Q6H PRN    insulin lispro (HUMALOG) injection SubCUTAneous AC&HS    glucose chewable tablet 16 g  4 Tablet Oral PRN    glucagon (GLUCAGEN) injection 1 mg  1 mg IntraMUSCular PRN    cefTRIAXone (ROCEPHIN) 1 g in sterile water (preservative free) 10 mL IV syringe  1 g IntraVENous Q24H       No data found.     Lab Results   Component Value Date/Time    WBC 11.5 (H) 01/21/2023 03:13 PM    HGB 10.4 (L) 01/22/2023 07:42 PM    HCT 38.3 01/22/2023 07:42 PM    PLATELET 124 97/55/6510 03:13 PM    MCV 84.8 01/21/2023 03:13 PM     Lab Results   Component Value Date/Time    Sodium 156 (H) 01/22/2023 09:59 AM    Potassium 4.5 01/22/2023 09:59 AM    Chloride 133 (H) 01/22/2023 09:59 AM    CO2 17 (L) 01/22/2023 09:59 AM    Anion gap 6 01/22/2023 09:59 AM    Glucose 162 (H) 01/22/2023 09:59 AM    BUN 65 (H) 01/22/2023 09:59 AM    Creatinine 1.79 (H) 01/22/2023 09:59 AM    BUN/Creatinine ratio 36 (H) 01/22/2023 09:59 AM    GFR est AA >60 09/14/2022 03:25 PM    GFR est non-AA >60 09/14/2022 03:25 PM    Calcium 9.1 01/22/2023 09:59 AM     No results found for: APTT, PTP, INR, INREXT, INREXT  Lab Results   Component Value Date/Time    Glucose 162 (H) 01/22/2023 09:59 AM    Glucose (POC) 86 01/23/2023 07:42 AM         Physical Exam    Airway  Mallampati: II  TM Distance: 4 - 6 cm  Neck ROM: normal range of motion   Mouth opening: Normal     Cardiovascular    Rhythm: regular  Rate: normal         Dental    Dentition: Poor dentition     Pulmonary  Breath sounds clear to auscultation               Abdominal  GI exam deferred       Other Findings            Anesthetic Plan    ASA: 3  Anesthesia type: total IV anesthesia and MAC    Monitoring Plan: Continuous noninvasive hemodynamic monitoring      Induction: Intravenous  Anesthetic plan and risks discussed with: Patient

## 2023-01-23 NOTE — PROGRESS NOTES
Progress Note    Patient: Kaitlin Claudio MRN: [id number]  SSN: [id number]    YOB: 1945  Age: 68 y.o.   Sex: female      Admit Date: 1/19/2023    LOS: 4 days     Subjective:   Still has  liquid bloody stool,  No pain, no nausea vomiting,  Past Medical History:   Diagnosis Date    Arthritis     Diabetes ([facility] [address]     states she is \"pre-diabetic\", diet controlled    frequency     H/O blood clots     right side of body    Heart attack ([facility] [address] 1/2014    Hydradenitis 2/1/2012    Hypercholesterolemia     Hypertension     Ill-defined condition     edema of legs    Thromboembolus (HCC)         Current Facility-Administered Medications:     nystatin (MYCOSTATIN) 100,000 unit/mL oral suspension 500,000 Units, 500,000 Units, Swish and Swallow, BID, Fadi Velasquez MD    sodium bicarbonate tablet 1,300 mg, 1,300 mg, Oral, BID, Yobany, Brice Ayala MD    sodium chloride (23.4%) 0.225 % in sterile water 1,000 mL infusion, , IntraVENous, CONTINUOUS, Todd Garner MD    pantoprazole (PROTONIX) 40 mg in 0.9% sodium chloride 10 mL injection, 40 mg, IntraVENous, Q12H, Gregory Cardozo MD, 40 mg at 01/23/23 0936    acetaminophen (TYLENOL) tablet 650 mg, 650 mg, Oral, Q6H PRN, 650 mg at 01/21/23 1735 **OR** acetaminophen (TYLENOL) suppository 650 mg, 650 mg, Rectal, Q6H PRN, Fadi Velasquez MD    polyethylene glycol (MIRALAX) packet 17 g, 17 g, Oral, DAILY PRN, Fadi Velasquez MD    ondansetron (ZOFRAN ODT) tablet 4 mg, 4 mg, Oral, Q8H PRN **OR** ondansetron (ZOFRAN) injection 4 mg, 4 mg, IntraVENous, Q6H PRN, Fadi Velasquez MD    insulin lispro (HUMALOG) injection, , SubCUTAneous, AC&HS, Fadi Velasquez MD    glucose chewable tablet 16 g, 4 Tablet, Oral, PRN, Fadi Velasquez MD    glucagon (GLUCAGEN) injection 1 mg, 1 mg, IntraMUSCular, PRN, Fadi Velasquez MD    cefTRIAXone (ROCEPHIN) 1 g in sterile water (preservative free) 10 mL IV syringe, 1 g, IntraVENous, Q24H, Fadi Velasquez MD, 1 g at 01/22/23 1214    Objective:     Vitals:    01/22/23 1654 01/22/23 1943 01/23/23 0110 01/23/23 0811   BP: 130/70 106/60 119/74 105/68   Pulse: 87 91 89 90   Resp: 18 19 19 19   Temp: 97.7 °F (36.5 °C) 97.8 °F (36.6 °C) 97.8 °F (36.6 °C) 98.1 °F (36.7 °C)   SpO2: 96% 97% 98% 98%   Weight:       Height:            Intake and Output:  Current Shift: No intake/output data recorded. Last three shifts: 01/21 1901 - 01/23 0700  In: -   Out: 650 [Urine:650]    Physical Exam:   Physical Exam  Constitutional:       Appearance: She is ill-appearing. HENT:      Head: Atraumatic. Cardiovascular:      Rate and Rhythm: Rhythm irregular. Heart sounds: Normal heart sounds. Pulmonary:      Breath sounds: Normal breath sounds. Abdominal:      Palpations: Abdomen is soft. Skin:     General: Skin is warm. Neurological:      Mental Status: Mental status is at baseline. Psychiatric:         Mood and Affect: Mood normal.        Lab/Data Review:  Recent Results (from the past 24 hour(s))   GLUCOSE, POC    Collection Time: 01/22/23  4:48 PM   Result Value Ref Range    Glucose (POC) 82 65 - 100 mg/dL    Performed by Wai Schilling    HGB & HCT    Collection Time: 01/22/23  7:42 PM   Result Value Ref Range    HGB 10.4 (L) 11.5 - 16.0 g/dL    HCT 38.3 35.0 - 47.0 %   GLUCOSE, POC    Collection Time: 01/22/23  7:47 PM   Result Value Ref Range    Glucose (POC) 135 (H) 65 - 100 mg/dL    Performed by [address] [place] [address], POC    Collection Time: 01/23/23  7:42 AM   Result Value Ref Range    Glucose (POC) 86 65 - 100 mg/dL    Performed by Kristen Noriega    GLUCOSE, POC    Collection Time: 01/23/23 11:28 AM   Result Value Ref Range    Glucose (POC) 160 (H) 65 - 100 mg/dL    Performed by Kristen Noriega          RETROPERITONEUM COMP   Final Result   Medical renal disease. No hydronephrosis demonstrated. CT HEAD WO CONT   Final Result   No acute abnormality.                  Assessment:     Active Problems:    Blood in stool (1/19/2023)      Cystitis (1/19/2023)      GI bleed (1/19/2023)      TAL (acute kidney injury) ([facility] [address] (1/19/2023)  GI bleeding  UPPER vs lower  GI bleeding  Mild anemia, hemoglobin dropped further,    EGD, mild gastritis, no active bleeding    UTI, possible sepsis,  Plan:   Continue current monitor hemoglobin  Keep the patient on clean liquid diet  Clear liquid diet   bowel preparation will be continue   Reschedule Colonoscopy  in am. Indication/risk discussed          Signed By: Keren Byrd MD     January 23, 2023        Thank you for allowing me to participate in this patients care  Cc Referring Physician   Letitia Jacobson MD

## 2023-01-23 NOTE — WOUND CARE
IP WOUND CONSULT    Arcelia Hong  MEDICAL RECORD NUMBER:  [id number]  AGE: 68 y.o. GENDER: female  : 1945  TODAY'S DATE:  2023  Places; hospital units: 407    GENERAL     [] Follow-up   [x] New Consult          PAST MEDICAL HISTORY    Past Medical History:   Diagnosis Date    Arthritis     Diabetes ([facility] [address]     states she is \"pre-diabetic\", diet controlled    frequency     H/O blood clots     right side of body    Heart attack ([facility] [address] 2014    Hydradenitis 2012    Hypercholesterolemia     Hypertension     Ill-defined condition     edema of legs    Thromboembolus ([facility])         ALLERGIES    Allergies   Allergen Reactions    Ciprofloxacin Other (comments)     Vomiting    Doxycycline Other (comments)     Facial swelling. Pcn [Penicillins] Rash          Orientation: Non-verbal, family at bedside     Continent: incontinent  barrier: Espitia    Spenser 14      Contributing Factors: anticoagulation therapy, edema, chronic pressure, decreased mobility, shear force, incontinence of stool, incontinence of urine, and Hidradenitis    Assessment     Patient resting in bed with family at bedside. Family states that patient has a history of Hidradenitis and is followed by Dermatology but patient missed appointment due to being admitted. Patient previously seen at wound care center about 6 months ago. All areas of hidradenitis are stable at this time with minimal drainage and no erythema present. Hidradenitis noted to bilateral axilla, wound beds are pink/red with scant drainage. Area to be cleansed with NS, apply opticel ag to open areas and cover with ABD Pad. Dressing to be changed daily and PRN for soiling. Hidradenitis to servando-anal area, wound bed pink/red with minimal drainage. Servando-wound is hypopigmented, possibly from previous pressure injury. Cleanse with NS, apply opticel ag to open area and cover with sacral foam dressing. Dressing to be changed daily and PRN for soiling.      Wound Axilla Right #2 (Active)   Wound Image   01/23/23 0928   Wound Etiology Other (Comment) 01/23/23 0928   Dressing Status New dressing applied 01/23/23 0928   Cleansed Cleansed with saline 01/23/23 0928   Dressing/Treatment Zinc paste;ABD pad 01/23/23 0928   Wound Assessment Pink/red 01/23/23 0928   Drainage Amount Scant 01/23/23 0928   Drainage Description Serosanguinous 01/23/23 0928   Wound Odor None 01/23/23 0928   Renée-Wound/Incision Assessment Edematous 01/23/23 0928   Edges Defined edges 01/23/23 0928   Number of days: 312       Wound Axilla Left #3 (Active)   Wound Image   01/23/23 0929   Wound Etiology Other (Comment) 01/23/23 0929   Dressing Status New dressing applied 01/23/23 0929   Cleansed Other (Comment) 01/23/23 0929   Dressing/Treatment Zinc paste;ABD pad 01/23/23 0929   Wound Assessment Pink/red 01/23/23 0929   Drainage Amount Scant 01/23/23 0929   Drainage Description Serosanguinous 01/23/23 0929   Wound Odor Mild 01/23/23 0929   Renée-Wound/Incision Assessment Edematous 01/23/23 0929   Edges Defined edges 01/23/23 0929   Number of days: 312       Wound #6 Renée-anal (Active)   Wound Image   01/23/23 0931   Wound Etiology Other (Comment) 01/23/23 0931   Dressing Status New dressing applied 01/23/23 0931   Cleansed Cleansed with saline 01/23/23 0931   Dressing/Treatment Foam;Alginate with Ag 01/23/23 0931   Wound Length (cm) 1 cm 01/23/23 0931   Wound Width (cm) 0.5 cm 01/23/23 0931   Wound Depth (cm) 0.6 cm 01/23/23 0931   Wound Surface Area (cm^2) 0.5 cm^2 01/23/23 0931   Change in Wound Size % 83.55 01/23/23 0931   Wound Volume (cm^3) 0.3 cm^3 01/23/23 0931   Wound Healing % 1 01/23/23 0931   Wound Assessment Pink/red 01/23/23 0931   Drainage Amount Scant 01/23/23 0931   Drainage Description Serosanguinous 01/23/23 0931   Wound Odor None 01/23/23 0931   Renée-Wound/Incision Assessment Edematous 01/23/23 0931   Edges Defined edges 01/23/23 0931   Number of days: 235        Skin Care & Pressure Relief Recommendations  Minimize layers of linen  Pads under patient to optimize support surface  Turn/reposition approximately every 2 hours  Pillow wedges  Manage incontinence   Promote continence; Skin Protective lotion/cream to buttocks and sacrum daily and as needed with incontinence care  Offload heels pillows  Offloading boots      WCN to continue to follow while admitted, Please re-consult WCN for any changes in skin condition.       Teaching completed with:   [] Patient           [] Family member       [] Caregiver          [] Nursing  [] Other    Patient/Caregiver Teaching:  Level of patient/caregiver understanding able to:   [] Indicates understanding       [] Needs reinforcement  [] Unsuccessful      [] Verbal Understanding  [] Demonstrated understanding       [] No evidence of learning  [] Refused teaching         [] N/A       Mahin Roach, RN, BSN  [facility] [place]   01/23/23  9:34 AM

## 2023-01-24 ENCOUNTER — APPOINTMENT (OUTPATIENT)
Dept: ENDOSCOPY | Age: 78
DRG: 699 | End: 2023-01-24
Attending: INTERNAL MEDICINE
Payer: MEDICARE

## 2023-01-24 LAB
ALBUMIN SERPL-MCNC: 1.9 G/DL (ref 3.5–5)
ALBUMIN/GLOB SERPL: 0.3 (ref 1.1–2.2)
ALP SERPL-CCNC: 126 U/L (ref 45–117)
ALT SERPL-CCNC: 20 U/L (ref 12–78)
ANION GAP SERPL CALC-SCNC: 4 MMOL/L (ref 5–15)
AST SERPL W P-5'-P-CCNC: 38 U/L (ref 15–37)
BILIRUB SERPL-MCNC: 0.4 MG/DL (ref 0.2–1)
BUN SERPL-MCNC: 24 MG/DL (ref 6–20)
BUN/CREAT SERPL: 21 (ref 12–20)
CA-I BLD-MCNC: 9 MG/DL (ref 8.5–10.1)
CHLORIDE SERPL-SCNC: 129 MMOL/L (ref 97–108)
CO2 SERPL-SCNC: 18 MMOL/L (ref 21–32)
CREAT SERPL-MCNC: 1.15 MG/DL (ref 0.55–1.02)
ERYTHROCYTE [DISTWIDTH] IN BLOOD BY AUTOMATED COUNT: 21.5 % (ref 11.5–14.5)
GLOBULIN SER CALC-MCNC: 6.2 G/DL (ref 2–4)
GLUCOSE BLD STRIP.AUTO-MCNC: 102 MG/DL (ref 65–100)
GLUCOSE BLD STRIP.AUTO-MCNC: 88 MG/DL (ref 65–100)
GLUCOSE BLD STRIP.AUTO-MCNC: 90 MG/DL (ref 65–100)
GLUCOSE SERPL-MCNC: 94 MG/DL (ref 65–100)
HCT VFR BLD AUTO: 30.7 % (ref 35–47)
HGB BLD-MCNC: 9 G/DL (ref 11.5–16)
MCH RBC QN AUTO: 26.5 PG (ref 26–34)
MCHC RBC AUTO-ENTMCNC: 29.3 G/DL (ref 30–36.5)
MCV RBC AUTO: 90.3 FL (ref 80–99)
NRBC # BLD: 0 K/UL (ref 0–0.01)
NRBC BLD-RTO: 0 PER 100 WBC
PERFORMED BY, TECHID: ABNORMAL
PERFORMED BY, TECHID: NORMAL
PERFORMED BY, TECHID: NORMAL
PLATELET # BLD AUTO: 279 K/UL (ref 150–400)
PMV BLD AUTO: 10.4 FL (ref 8.9–12.9)
POTASSIUM SERPL-SCNC: 5.3 MMOL/L (ref 3.5–5.1)
PROT SERPL-MCNC: 8.1 G/DL (ref 6.4–8.2)
RBC # BLD AUTO: 3.4 M/UL (ref 3.8–5.2)
SODIUM SERPL-SCNC: 151 MMOL/L (ref 136–145)
WBC # BLD AUTO: 12.8 K/UL (ref 3.6–11)

## 2023-01-24 PROCEDURE — 74011250636 HC RX REV CODE- 250/636: Performed by: INTERNAL MEDICINE

## 2023-01-24 PROCEDURE — 76060000032 HC ANESTHESIA 0.5 TO 1 HR: Performed by: INTERNAL MEDICINE

## 2023-01-24 PROCEDURE — C9113 INJ PANTOPRAZOLE SODIUM, VIA: HCPCS | Performed by: INTERNAL MEDICINE

## 2023-01-24 PROCEDURE — 74011250636 HC RX REV CODE- 250/636: Performed by: NURSE ANESTHETIST, CERTIFIED REGISTERED

## 2023-01-24 PROCEDURE — 36415 COLL VENOUS BLD VENIPUNCTURE: CPT

## 2023-01-24 PROCEDURE — 82962 GLUCOSE BLOOD TEST: CPT

## 2023-01-24 PROCEDURE — 76040000007: Performed by: INTERNAL MEDICINE

## 2023-01-24 PROCEDURE — 74011000250 HC RX REV CODE- 250: Performed by: INTERNAL MEDICINE

## 2023-01-24 PROCEDURE — 2709999900 HC NON-CHARGEABLE SUPPLY: Performed by: INTERNAL MEDICINE

## 2023-01-24 PROCEDURE — 80053 COMPREHEN METABOLIC PANEL: CPT

## 2023-01-24 PROCEDURE — 85027 COMPLETE CBC AUTOMATED: CPT

## 2023-01-24 PROCEDURE — 74011250637 HC RX REV CODE- 250/637: Performed by: INTERNAL MEDICINE

## 2023-01-24 PROCEDURE — 74011000258 HC RX REV CODE- 258: Performed by: INTERNAL MEDICINE

## 2023-01-24 PROCEDURE — 65270000029 HC RM PRIVATE

## 2023-01-24 PROCEDURE — 74011250637 HC RX REV CODE- 250/637: Performed by: FAMILY MEDICINE

## 2023-01-24 RX ORDER — SODIUM BICARBONATE 650 MG/1
1300 TABLET ORAL 3 TIMES DAILY
Status: DISCONTINUED | OUTPATIENT
Start: 2023-01-24 | End: 2023-01-26 | Stop reason: HOSPADM

## 2023-01-24 RX ORDER — SODIUM CHLORIDE 9 MG/ML
INJECTION, SOLUTION INTRAVENOUS
Status: DISCONTINUED | OUTPATIENT
Start: 2023-01-24 | End: 2023-01-24 | Stop reason: HOSPADM

## 2023-01-24 RX ORDER — DEXTROSE MONOHYDRATE 50 MG/ML
125 INJECTION, SOLUTION INTRAVENOUS CONTINUOUS
Status: DISCONTINUED | OUTPATIENT
Start: 2023-01-24 | End: 2023-01-26

## 2023-01-24 RX ADMIN — SODIUM PHOSPHATE 1 ENEMA: 7; 19 ENEMA RECTAL at 11:44

## 2023-01-24 RX ADMIN — DEXTROSE MONOHYDRATE 100 ML/HR: 50 INJECTION, SOLUTION INTRAVENOUS at 16:28

## 2023-01-24 RX ADMIN — SODIUM PHOSPHATE, MONOBASIC, MONOHYDRATE AND SODIUM PHOSPHATE, DIBASIC, ANHYDROUS: 276; 142 INJECTION, SOLUTION INTRAVENOUS at 16:28

## 2023-01-24 RX ADMIN — NYSTATIN 500000 UNITS: 100000 SUSPENSION ORAL at 16:26

## 2023-01-24 RX ADMIN — SODIUM PHOSPHATE 1 ENEMA: 7; 19 ENEMA RECTAL at 12:50

## 2023-01-24 RX ADMIN — SODIUM CHLORIDE, PRESERVATIVE FREE 40 MG: 5 INJECTION INTRAVENOUS at 16:26

## 2023-01-24 RX ADMIN — SODIUM PHOSPHATE, DIBASIC AND SODIUM PHOSPHATE, MONOBASIC 1 ENEMA: 7; 19 ENEMA RECTAL at 18:49

## 2023-01-24 RX ADMIN — SODIUM BICARBONATE 1300 MG: 650 TABLET ORAL at 21:11

## 2023-01-24 RX ADMIN — NYSTATIN 500000 UNITS: 100000 SUSPENSION ORAL at 21:06

## 2023-01-24 RX ADMIN — SODIUM CHLORIDE, PRESERVATIVE FREE 40 MG: 5 INJECTION INTRAVENOUS at 21:05

## 2023-01-24 RX ADMIN — SODIUM BICARBONATE 1300 MG: 650 TABLET ORAL at 16:26

## 2023-01-24 RX ADMIN — SODIUM CHLORIDE: 9 INJECTION, SOLUTION INTRAVENOUS at 13:58

## 2023-01-24 NOTE — PROGRESS NOTES
Problem: Falls - Risk of  Goal: *Absence of Falls  Description: Document Trina Orellana Fall Risk and appropriate interventions in the flowsheet. Outcome: Progressing Towards Goal  Note: Fall Risk Interventions:                                Problem: Patient Education: Go to Patient Education Activity  Goal: Patient/Family Education  Outcome: Progressing Towards Goal     Problem: Pressure Injury - Risk of  Goal: *Prevention of pressure injury  Description: Document Spenser Scale and appropriate interventions in the flowsheet.   Outcome: Progressing Towards Goal  Note: Pressure Injury Interventions:  Sensory Interventions: Avoid rigorous massage over bony prominences, Keep linens dry and wrinkle-free    Moisture Interventions: Absorbent underpads, Check for incontinence Q2 hours and as needed    Activity Interventions: PT/OT evaluation    Mobility Interventions: Float heels, HOB 30 degrees or less    Nutrition Interventions: Offer support with meals,snacks and hydration    Friction and Shear Interventions: HOB 30 degrees or less, Minimize layers, Feet elevated on foot rest                Problem: Patient Education: Go to Patient Education Activity  Goal: Patient/Family Education  Outcome: Progressing Towards Goal     Problem: Patient Education: Go to Patient Education Activity  Goal: Patient/Family Education  Outcome: Progressing Towards Goal     Problem: Patient Education: Go to Patient Education Activity  Goal: Patient/Family Education  Outcome: Progressing Towards Goal

## 2023-01-24 NOTE — PROGRESS NOTES
Renal Progress Note    Patient: Kelin Dean MRN: [id number]  SSN: [id number]    YOB: 1945  Age: 68 y.o. Sex: female      Admit Date: 1/19/2023    LOS: 5 days     Subjective:   Patient seen at bedside. Alert and awake, no acute distress. confused . Could not finish all the GoLytely   no swelling in lower extremities. No complaints of shortness of breath. Improving renal functions with creatinine down to 1.1   Sodium proved to 151 today. However potassium 5.3    Current Facility-Administered Medications   Medication Dose Route Frequency    dextrose 5% infusion  100 mL/hr IntraVENous CONTINUOUS    sodium bicarbonate tablet 1,300 mg  1,300 mg Oral TID    nystatin (MYCOSTATIN) 100,000 unit/mL oral suspension 500,000 Units  500,000 Units Swish and Swallow BID    pantoprazole (PROTONIX) 40 mg in 0.9% sodium chloride 10 mL injection  40 mg IntraVENous Q12H    acetaminophen (TYLENOL) tablet 650 mg  650 mg Oral Q6H PRN    Or    acetaminophen (TYLENOL) suppository 650 mg  650 mg Rectal Q6H PRN    polyethylene glycol (MIRALAX) packet 17 g  17 g Oral DAILY PRN    ondansetron (ZOFRAN ODT) tablet 4 mg  4 mg Oral Q8H PRN    Or    ondansetron (ZOFRAN) injection 4 mg  4 mg IntraVENous Q6H PRN    insulin lispro (HUMALOG) injection   SubCUTAneous AC&HS    glucose chewable tablet 16 g  4 Tablet Oral PRN    glucagon (GLUCAGEN) injection 1 mg  1 mg IntraMUSCular PRN        Vitals:    01/23/23 0811 01/23/23 1724 01/23/23 1944 01/24/23 0843   BP: 105/68 104/60 114/62 (!) 88/55   Pulse: 90 90 73 70   Resp: 19 19 16 16   Temp: 98.1 °F (36.7 °C) 97.6 °F (36.4 °C) 97.6 °F (36.4 °C) 97.4 °F (36.3 °C)   SpO2: 98% 100% 93% 92%   Weight:       Height:         Objective:   General: alert awake , confused, no acute distress.   HEENT: EOMI, no Icterus, no Pallor, pupils reactive, mucosa dry  Neck: Neck is supple, No JVD,   Lungs: breathsounds normal, no respiratory distress on inspection, no rhonchi, no rales,  CVS: heart sounds normal, no murmurs, no rubs. GI: soft, nontender, normal BS,   Extremeties:  no cyanosis, no edema,  Neuro: Alert, awake, answering simple questions but appears forgetful and confused, moving all extremeties   Skin: Poor skin turgor, dark discoloration of skin noted in lower extremities. Intake and Output:  Current Shift: No intake/output data recorded. Last three shifts: 01/22 1901 - 01/24 0700  In: -   Out: 950 [Urine:950]      Lab/Data Review:  Recent Labs     01/24/23  1213 01/23/23  1427 01/22/23  1942 01/21/23  2349 01/21/23  1513   WBC 12.8*  --   --   --  11.5*   HGB 9.0* 8.3* 10.4*   < > 8.9*   HCT 30.7* 27.8* 38.3   < > 29.0*     --   --   --  361    < > = values in this interval not displayed. Recent Labs     01/24/23  1213 01/23/23  1427 01/22/23  0959 01/21/23  1513   * 152* 156* 150*   K 5.3* 3.9 4.5 4.4   * 127* 133* 127*   CO2 18* 19* 17* 18*   GLU 94 108* 162* 123*   BUN 24* 34* 65* 94*   CREA 1.15* 1.16* 1.79* 2.35*   CA 9.0 8.8 9.1 9.2  9.5   PHOS  --  1.4* 1.5*  --    ALB 1.9* 1.9* 2.1* 2.3*   TBILI 0.4  --   --  0.2   ALT 20  --   --  22       No results for input(s): PH, PCO2, PO2, HCO3, FIO2 in the last 72 hours.   Recent Results (from the past 24 hour(s))   RENAL FUNCTION PANEL    Collection Time: 01/23/23  2:27 PM   Result Value Ref Range    Sodium 152 (H) 136 - 145 mmol/L    Potassium 3.9 3.5 - 5.1 mmol/L    Chloride 127 (H) 97 - 108 mmol/L    CO2 19 (L) 21 - 32 mmol/L    Anion gap 6 5 - 15 mmol/L    Glucose 108 (H) 65 - 100 mg/dL    BUN 34 (H) 6 - 20 mg/dL    Creatinine 1.16 (H) 0.55 - 1.02 mg/dL    BUN/Creatinine ratio 29 (H) 12 - 20      eGFR 49 (L) >60 ml/min/1.73m2    Calcium 8.8 8.5 - 10.1 mg/dL    Phosphorus 1.4 (L) 2.6 - 4.7 mg/dL    Albumin 1.9 (L) 3.5 - 5.0 g/dL   HGB & HCT    Collection Time: 01/23/23  2:27 PM   Result Value Ref Range    HGB 8.3 (L) 11.5 - 16.0 g/dL    HCT 27.8 (L) 35.0 - 47.0 %   GLUCOSE, POC    Collection Time: 01/23/23 4:24 PM   Result Value Ref Range    Glucose (POC) 93 65 - 100 mg/dL    Performed by Lisa QUIÑONES, POC    Collection Time: 01/23/23 11:16 PM   Result Value Ref Range    Glucose (POC) 94 65 - 100 mg/dL    Performed by [address] [place], [place] [address]nis [place], POC    Collection Time: 01/24/23  7:54 AM   Result Value Ref Range    Glucose (POC) 88 65 - 100 mg/dL    Performed by Anna Gonzales    METABOLIC PANEL, COMPREHENSIVE    Collection Time: 01/24/23 12:13 PM   Result Value Ref Range    Sodium 151 (H) 136 - 145 mmol/L    Potassium 5.3 (H) 3.5 - 5.1 mmol/L    Chloride 129 (H) 97 - 108 mmol/L    CO2 18 (L) 21 - 32 mmol/L    Anion gap 4 (L) 5 - 15 mmol/L    Glucose 94 65 - 100 mg/dL    BUN 24 (H) 6 - 20 mg/dL    Creatinine 1.15 (H) 0.55 - 1.02 mg/dL    BUN/Creatinine ratio 21 (H) 12 - 20      eGFR 49 (L) >60 ml/min/1.73m2    Calcium 9.0 8.5 - 10.1 mg/dL    Bilirubin, total 0.4 0.2 - 1.0 mg/dL    AST (SGOT) 38 (H) 15 - 37 U/L    ALT (SGPT) 20 12 - 78 U/L    Alk. phosphatase 126 (H) 45 - 117 U/L    Protein, total 8.1 6.4 - 8.2 g/dL    Albumin 1.9 (L) 3.5 - 5.0 g/dL    Globulin 6.2 (H) 2.0 - 4.0 g/dL    A-G Ratio 0.3 (L) 1.1 - 2.2     CBC W/O DIFF    Collection Time: 01/24/23 12:13 PM   Result Value Ref Range    WBC 12.8 (H) 3.6 - 11.0 K/uL    RBC 3.40 (L) 3.80 - 5.20 M/uL    HGB 9.0 (L) 11.5 - 16.0 g/dL    HCT 30.7 (L) 35.0 - 47.0 %    MCV 90.3 80.0 - 99.0 FL    MCH 26.5 26.0 - 34.0 PG    MCHC 29.3 (L) 30.0 - 36.5 g/dL    RDW 21.5 (H) 11.5 - 14.5 %    PLATELET 813 233 - 422 K/uL    MPV 10.4 8.9 - 12.9 FL    NRBC 0.0 0.0  WBC    ABSOLUTE NRBC 0.00 0.00 - 0.01 K/uL          Assessment and Plan:     Acute Kidney Injury :   -probably prerenal azotemia secondary to Hypotension and poor p.o. intake   -No evidence of obstruction on renal ultrasound  -Improving renal functions noted, creatinine improved from 5.68 --4.51--2.35 --1.79--1.1 today  -On 11/4 NS 2/2 hypernatremia. Sodium today is still 151.   I will change fluids to D5W  -no rhabdomyolysis   -will continue to monitor renal functions   -No history of chronic kidney disease, review of old labs showed normal renal functions last month    2. Acute GI bleed :   -Probably related to anticoagulation  -Stable hemoglobins, continue to monitor H&H  -Currently off anticoagulation  -GI following the patient and EGD was negative for any acute bleed     3. Hypotension:   -Probably related to dehydration and poor intake/GI bleed  -Improved hemodynamic status   -Continue IV fluids at 100 mL/h  -Will continue to monitor hemodynamic status     4. History of DVT: Was on anticoagulation  On hold for acute GI bleed    5. Metabolic acidosis:Secondary to TAL  CO2 level is 19-18--17, will add sodium bicarbonate p.o. twice daily  Will continue to monitor Co2 levels     6. Diabetes mellitus: stable. Continue to monitor Accu-Cheks, hemoglobin A1c was 6.4    7.   Hypernatremia:   -Patient was on normal saline  -Change IV fluids to D5W and monitor sodium levels    Patient on chronic Espitia drainage: ? Etiology  Leukocytosis present, suspected UTI  Continue antibiotics    Signed By: Jason Perera MD     January 24, 2023

## 2023-01-24 NOTE — PROGRESS NOTES
Problem: Falls - Risk of  Goal: *Absence of Falls  Description: Document Rachell Figueroa Fall Risk and appropriate interventions in the flowsheet. Outcome: Progressing Towards Goal  Note: Fall Risk Interventions:     Problem: Patient Education: Go to Patient Education Activity  Goal: Patient/Family Education  Outcome: Progressing Towards Goal     Problem: Pressure Injury - Risk of  Goal: *Prevention of pressure injury  Description: Document Spenser Scale and appropriate interventions in the flowsheet.   Outcome: Progressing Towards Goal  Note: Pressure Injury Interventions:  Sensory Interventions: Avoid rigorous massage over bony prominences, Keep linens dry and wrinkle-free    Moisture Interventions: Absorbent underpads, Check for incontinence Q2 hours and as needed    Activity Interventions: PT/OT evaluation    Mobility Interventions: Float heels, HOB 30 degrees or less    Nutrition Interventions: Offer support with meals,snacks and hydration    Friction and Shear Interventions: HOB 30 degrees or less, Minimize layers, Feet elevated on foot rest

## 2023-01-24 NOTE — PROGRESS NOTES
Patient is resting comfortably in bed with family at bedside, patient was encouraged to drink Golytely but she is refusing to drink anymore, she had 2 episodes of large dark loose stool. Phone call placed to Dr. Kimberley Serrano regarding patient status but was unable to reach physician. Patient has no IV access at change of shift, writer was unable to insert new IV access as patient is a difficult stick, nursing supervisor was called but she was unable to insert new IV line. Patient is turned and repositioned, incontinent care given, isolation precautions maintained, safety maintained.

## 2023-01-24 NOTE — PERIOP NOTES
Colonoscopy cancelled by Dr. Kevin Pearl due to poor prep. Pt transferred by transport back to room 407. Primary nurse made aware. Pt is to remain on clear liquids and 2 fleets enemas ordered. Colonoscopy rescheduled for tomorrow. normal...

## 2023-01-24 NOTE — ANESTHESIA POSTPROCEDURE EVALUATION
Procedure(s):  COLONOSCOPY.    total IV anesthesia, MAC    Anesthesia Post Evaluation      Multimodal analgesia: multimodal analgesia not used between 6 hours prior to anesthesia start to PACU discharge  Patient location during evaluation: bedside (Endoscopy suite)  Patient participation: complete - patient participated  Level of consciousness: sleepy but conscious and awake and alert  Pain score: 0  Pain management: adequate  Airway patency: patent  Anesthetic complications: no  Cardiovascular status: acceptable  Respiratory status: acceptable and nasal cannula  Hydration status: acceptable  Comments: This patient remained on the stretcher. The patient was handed off to the endoscopy nursing team.  All questions regarding pre-, intra-, and postoperative care were answered. Post anesthesia nausea and vomiting:  none      INITIAL Post-op Vital signs: No vitals data found for the desired time range.

## 2023-01-24 NOTE — PROGRESS NOTES
General Daily Progress Note          Patient Name:   Nadja Luong       YOB: 1945       Age:  68 y.o. Admit Date: 1/19/2023      Subjective:     Patient is a 68y.o. year old female with signal past medical history of diabetes diet-controlled history of DVT on Eliquis hypertension hyperlipidemia came to emergency room because of lethargic patient not eating drinking well weak tired patient have large bloody bowel movement today came to emergency room seen by the ER physician work-up done in the [facility] work shows WBC is 12.6  UA came back positive for UTI patient have a chronic Espitia  Renal function shows BUN of 170 creatinine 5.66     Patient was admitted with acute kidney injury acute GI bleed UTI    1/20  Patient resting comfortably in bed, but reports she is still experiencing generalized abdominal pain. Renal US complete: Medical renal disease. No hydronephrosis demonstrated. GI and nephrology consult pending     1/23  Patient resting comfortably in bed without any complaints at this time. Patient is still experiencing bloody bowel movements. GI has patient scheduled for a colonoscopy this morning in attempt to locate the source of the bleed. Sodium 156  Renal function improved    1/24  Patient resting comfortably in bed without any complaints at this time. She consumed some of the Golytely, but did not complete. Patient had 2 episodes of large dark loose stool overnight.   Patient is scheduled for colonoscopy this morning   IVF changed to 1/4 NS due to the hypernatremia   Complete IV rocephin yesterday       Objective:     Visit Vitals  BP (!) 88/55 (BP 1 Location: Left upper arm, BP Patient Position: At rest)   Pulse 70   Temp 97.4 °F (36.3 °C)   Resp 16   Ht 5' 7\" (1.702 m)   Wt 63.5 kg (140 lb)   SpO2 92%   BMI 21.93 kg/m²        Recent Results (from the past 24 hour(s))   RENAL FUNCTION PANEL    Collection Time: 01/23/23  2:27 PM   Result Value Ref Range    Sodium 152 (H) 136 - 145 mmol/L    Potassium 3.9 3.5 - 5.1 mmol/L    Chloride 127 (H) 97 - 108 mmol/L    CO2 19 (L) 21 - 32 mmol/L    Anion gap 6 5 - 15 mmol/L    Glucose 108 (H) 65 - 100 mg/dL    BUN 34 (H) 6 - 20 mg/dL    Creatinine 1.16 (H) 0.55 - 1.02 mg/dL    BUN/Creatinine ratio 29 (H) 12 - 20      eGFR 49 (L) >60 ml/min/1.73m2    Calcium 8.8 8.5 - 10.1 mg/dL    Phosphorus 1.4 (L) 2.6 - 4.7 mg/dL    Albumin 1.9 (L) 3.5 - 5.0 g/dL   HGB & HCT    Collection Time: 01/23/23  2:27 PM   Result Value Ref Range    HGB 8.3 (L) 11.5 - 16.0 g/dL    HCT 27.8 (L) 35.0 - 47.0 %   GLUCOSE, POC    Collection Time: 01/23/23  4:24 PM   Result Value Ref Range    Glucose (POC) 93 65 - 100 mg/dL    Performed by [facility]    GLUCOSE, POC    Collection Time: 01/23/23 11:16 PM   Result Value Ref Range    Glucose (POC) 94 65 - 100 mg/dL    Performed by [address] [place] [address] Street, POC    Collection Time: 01/24/23  7:54 AM   Result Value Ref Range    Glucose (POC) 88 65 - 100 mg/dL    Performed by Fidel Napier    CBC W/O DIFF    Collection Time: 01/24/23 12:13 PM   Result Value Ref Range    WBC 12.8 (H) 3.6 - 11.0 K/uL    RBC 3.40 (L) 3.80 - 5.20 M/uL    HGB 9.0 (L) 11.5 - 16.0 g/dL    HCT 30.7 (L) 35.0 - 47.0 %    MCV 90.3 80.0 - 99.0 FL    MCH 26.5 26.0 - 34.0 PG    MCHC 29.3 (L) 30.0 - 36.5 g/dL    RDW 21.5 (H) 11.5 - 14.5 %    PLATELET 018 627 - 860 K/uL    MPV 10.4 8.9 - 12.9 FL    NRBC 0.0 0.0  WBC    ABSOLUTE NRBC 0.00 0.00 - 0.01 K/uL     [unfilled]      Review of Systems    Constitutional: Negative for chills and fever. HENT: Negative. Eyes: Negative. Respiratory: Negative. Cardiovascular: Negative. Gastrointestinal: Positive for abdominal pain  Skin: Negative. Neurological: Negative. Physical Exam:      Constitutional: pt is oriented to person, place, and time. HENT:   Head: Normocephalic and atraumatic. Eyes: Pupils are equal, round, and reactive to light.  EOM are normal.   Cardiovascular: Normal rate, regular rhythm and normal heart sounds. Pulmonary/Chest: Breath sounds normal. No wheezes. No rales. Exhibits no tenderness. Abdominal: Soft. Bowel sounds are normal. Generalized abdominal tenderness. There is no rebound and no guarding. Musculoskeletal: Normal range of motion. : sorenson placed   Neurological: pt is alert and oriented to person, place, and time. Alert. Normal strength. No cranial nerve deficit or sensory deficit. US RETROPERITONEUM COMP   Final Result   Medical renal disease. No hydronephrosis demonstrated. CT HEAD WO CONT   Final Result   No acute abnormality.                  Recent Results (from the past 24 hour(s))   RENAL FUNCTION PANEL    Collection Time: 01/23/23  2:27 PM   Result Value Ref Range    Sodium 152 (H) 136 - 145 mmol/L    Potassium 3.9 3.5 - 5.1 mmol/L    Chloride 127 (H) 97 - 108 mmol/L    CO2 19 (L) 21 - 32 mmol/L    Anion gap 6 5 - 15 mmol/L    Glucose 108 (H) 65 - 100 mg/dL    BUN 34 (H) 6 - 20 mg/dL    Creatinine 1.16 (H) 0.55 - 1.02 mg/dL    BUN/Creatinine ratio 29 (H) 12 - 20      eGFR 49 (L) >60 ml/min/1.73m2    Calcium 8.8 8.5 - 10.1 mg/dL    Phosphorus 1.4 (L) 2.6 - 4.7 mg/dL    Albumin 1.9 (L) 3.5 - 5.0 g/dL   HGB & HCT    Collection Time: 01/23/23  2:27 PM   Result Value Ref Range    HGB 8.3 (L) 11.5 - 16.0 g/dL    HCT 27.8 (L) 35.0 - 47.0 %   GLUCOSE, POC    Collection Time: 01/23/23  4:24 PM   Result Value Ref Range    Glucose (POC) 93 65 - 100 mg/dL    Performed by [address] [place] [address], POC    Collection Time: 01/23/23 11:16 PM   Result Value Ref Range    Glucose (POC) 94 65 - 100 mg/dL    Performed by [address] [place] [address], POC    Collection Time: 01/24/23  7:54 AM   Result Value Ref Range    Glucose (POC) 88 65 - 100 mg/dL    Performed by Cecilia Linares    CBC W/O DIFF    Collection Time: 01/24/23 12:13 PM   Result Value Ref Range    WBC 12.8 (H) 3.6 - 11.0 K/uL    RBC 3.40 (L) 3.80 - 5.20 M/uL    HGB 9.0 (L) 11.5 - 16.0 g/dL    HCT 30.7 (L) 35.0 - 47.0 %    MCV 90.3 80.0 - 99.0 FL    MCH 26.5 26.0 - 34.0 PG    MCHC 29.3 (L) 30.0 - 36.5 g/dL    RDW 21.5 (H) 11.5 - 14.5 %    PLATELET 490 618 - 304 K/uL    MPV 10.4 8.9 - 12.9 FL    NRBC 0.0 0.0  WBC    ABSOLUTE NRBC 0.00 0.00 - 0.01 K/uL       Results       Procedure Component Value Units Date/Time    CULTURE, URINE [[id number]] Collected: 01/19/23 2030    Order Status: Canceled Specimen: Urine     CULTURE, BLOOD, PAIRED [[id number]] Collected: 01/19/23 1023    Order Status: Completed Specimen: Blood Updated: 01/24/23 0939     Special Requests: No Special Requests        Culture result: No growth 5 days                Labs:     Recent Labs     01/24/23  1213 01/23/23  1427 01/21/23  2349 01/21/23  1513   WBC 12.8*  --   --  11.5*   HGB 9.0* 8.3*   < > 8.9*   HCT 30.7* 27.8*   < > 29.0*     --   --  361    < > = values in this interval not displayed. Recent Labs     01/23/23  1427 01/22/23  0959 01/21/23  1513   * 156* 150*   K 3.9 4.5 4.4   * 133* 127*   CO2 19* 17* 18*   BUN 34* 65* 94*   CREA 1.16* 1.79* 2.35*   * 162* 123*   CA 8.8 9.1 9.2  9.5   PHOS 1.4* 1.5*  --        Recent Labs     01/23/23  1427 01/22/23  0959 01/21/23  1513   ALT  --   --  22   AP  --   --  139*   TBILI  --   --  0.2   TP  --   --  8.8*   ALB 1.9* 2.1* 2.3*   GLOB  --   --  6.5*       No results for input(s): INR, PTP, APTT, INREXT, INREXT in the last 72 hours. Recent Labs     01/22/23  0959   TIBC 168*   PSAT 26   FERR 491*          Lab Results   Component Value Date/Time    Folate 4.9 (L) 01/19/2023 10:23 AM      No results for input(s): PH, PCO2, PO2 in the last 72 hours.   Recent Labs     01/21/23  1513   CPK 82       Lab Results   Component Value Date/Time    Cholesterol, total 173 03/12/2021 11:35 AM    HDL Cholesterol 54 03/12/2021 11:35 AM    LDL, calculated 97.8 03/12/2021 11:35 AM    Triglyceride 106 03/12/2021 11:35 AM    CHOL/HDL Ratio 3.2 03/12/2021 11:35 AM     Lab Results   Component Value Date/Time    Glucose (POC) 88 01/24/2023 07:54 AM    Glucose (POC) 94 01/23/2023 11:16 PM    Glucose (POC) 93 01/23/2023 04:24 PM    Glucose (POC) 160 (H) 01/23/2023 11:28 AM    Glucose (POC) 86 01/23/2023 07:42 AM     Lab Results   Component Value Date/Time    Color Dark Yellow 01/19/2023 10:23 AM    Appearance Turbid (A) 01/19/2023 10:23 AM    Specific gravity 1.020 01/19/2023 10:23 AM    pH (UA) 5.0 01/19/2023 10:23 AM    Protein 100 (A) 01/19/2023 10:23 AM    Glucose Negative 01/19/2023 10:23 AM    Ketone Negative 01/19/2023 10:23 AM    Bilirubin Negative 01/19/2023 10:23 AM    Urobilinogen 0.1 (L) 01/19/2023 10:23 AM    Nitrites Negative 01/19/2023 10:23 AM    Leukocyte Esterase Large (A) 01/19/2023 10:23 AM    Epithelial cells Many (A) 01/19/2023 10:23 AM    Bacteria Negative 01/19/2023 10:23 AM    WBC >100 (H) 01/19/2023 10:23 AM    RBC >100 (H) 01/19/2023 10:23 AM         Assessment/Plan:   Acute kidney injury (improving)  Acute GI bleed  History of DVT  History of diabetes diet-controlled  Hypertension  Hyperlipidemia  UTI with chronic Espitia  Leukocytosis (improving)  Oral thrush  Hypernatremia     Renal ultrasound for acute kidney injury complete:Medical renal disease. No hydronephrosis demonstrated. Sliding-scale insulin    Patient  consumed some of the Golytely, but did not complete. Patient had 2 episodes of large dark loose stool overnight. Scheduled for colonoscopy this morning   IVF changed to 1/4 NS due to the hypernatremia   Continue to monitor H&H  Blood cultures; no growth after 4 days  Completed IV Rocephin yesterday    Continue nystatin swish and swallow BID   IV protonix 40 mg BID    Discussed with the patient family at the bedside    Possible discharge in next 24 to 48 hours        Current Facility-Administered Medications:     nystatin (MYCOSTATIN) 100,000 unit/mL oral suspension 500,000 Units, 500,000 Units, Swish and Swallow, BID, Krystle Velasquez MD, 500,000 Units at 01/23/23 2357    sodium bicarbonate tablet 1,300 mg, 1,300 mg, Oral, BID, Trino Wylie MD, 1,300 mg at 01/23/23 2357    sodium chloride (23.4%) 0.225 % in sterile water 1,000 mL infusion, , IntraVENous, CONTINUOUS, Trino Wylie MD, New Bag at 01/23/23 1304    pantoprazole (PROTONIX) 40 mg in 0.9% sodium chloride 10 mL injection, 40 mg, IntraVENous, Q12H, Gregory Cardozo MD, 40 mg at 01/23/23 0936    acetaminophen (TYLENOL) tablet 650 mg, 650 mg, Oral, Q6H PRN, 650 mg at 01/21/23 1735 **OR** acetaminophen (TYLENOL) suppository 650 mg, 650 mg, Rectal, Q6H PRN, Fadi Velasquez MD    polyethylene glycol (MIRALAX) packet 17 g, 17 g, Oral, DAILY PRN, Fadi Velasquez MD    ondansetron (ZOFRAN ODT) tablet 4 mg, 4 mg, Oral, Q8H PRN **OR** ondansetron (ZOFRAN) injection 4 mg, 4 mg, IntraVENous, Q6H PRN, Fadi Velasquez MD    insulin lispro (HUMALOG) injection, , SubCUTAneous, AC&HS, Fadi Velasquez MD    glucose chewable tablet 16 g, 4 Tablet, Oral, PRN, Fadi Velasquez MD    glucagon (GLUCAGEN) injection 1 mg, 1 mg, IntraMUSCular, PRN, Adeola Doty MD

## 2023-01-25 ENCOUNTER — ANESTHESIA EVENT (OUTPATIENT)
Dept: ENDOSCOPY | Age: 78
DRG: 699 | End: 2023-01-25
Payer: MEDICARE

## 2023-01-25 ENCOUNTER — ANESTHESIA (OUTPATIENT)
Dept: ENDOSCOPY | Age: 78
DRG: 699 | End: 2023-01-25
Payer: MEDICARE

## 2023-01-25 ENCOUNTER — APPOINTMENT (OUTPATIENT)
Dept: ENDOSCOPY | Age: 78
DRG: 699 | End: 2023-01-25
Attending: INTERNAL MEDICINE
Payer: MEDICARE

## 2023-01-25 LAB
ALBUMIN SERPL-MCNC: 1.8 G/DL (ref 3.5–5)
ALBUMIN/GLOB SERPL: 0.3 (ref 1.1–2.2)
ALP SERPL-CCNC: 117 U/L (ref 45–117)
ALT SERPL-CCNC: 18 U/L (ref 12–78)
ANION GAP SERPL CALC-SCNC: 8 MMOL/L (ref 5–15)
AST SERPL W P-5'-P-CCNC: 28 U/L (ref 15–37)
BILIRUB SERPL-MCNC: 0.3 MG/DL (ref 0.2–1)
BUN SERPL-MCNC: 16 MG/DL (ref 6–20)
BUN/CREAT SERPL: 17 (ref 12–20)
CA-I BLD-MCNC: 8.6 MG/DL (ref 8.5–10.1)
CHLORIDE SERPL-SCNC: 123 MMOL/L (ref 97–108)
CO2 SERPL-SCNC: 19 MMOL/L (ref 21–32)
CREAT SERPL-MCNC: 0.93 MG/DL (ref 0.55–1.02)
ERYTHROCYTE [DISTWIDTH] IN BLOOD BY AUTOMATED COUNT: 19.9 % (ref 11.5–14.5)
GLOBULIN SER CALC-MCNC: 5.8 G/DL (ref 2–4)
GLUCOSE BLD STRIP.AUTO-MCNC: 105 MG/DL (ref 65–100)
GLUCOSE BLD STRIP.AUTO-MCNC: 111 MG/DL (ref 65–100)
GLUCOSE BLD STRIP.AUTO-MCNC: 149 MG/DL (ref 65–100)
GLUCOSE BLD STRIP.AUTO-MCNC: 70 MG/DL (ref 65–100)
GLUCOSE BLD STRIP.AUTO-MCNC: 93 MG/DL (ref 65–100)
GLUCOSE SERPL-MCNC: 130 MG/DL (ref 65–100)
HCT VFR BLD AUTO: 27 % (ref 35–47)
HGB BLD-MCNC: 8.2 G/DL (ref 11.5–16)
MCH RBC QN AUTO: 26.5 PG (ref 26–34)
MCHC RBC AUTO-ENTMCNC: 30.4 G/DL (ref 30–36.5)
MCV RBC AUTO: 87.1 FL (ref 80–99)
NRBC # BLD: 0 K/UL (ref 0–0.01)
NRBC BLD-RTO: 0 PER 100 WBC
PERFORMED BY, TECHID: ABNORMAL
PERFORMED BY, TECHID: NORMAL
PERFORMED BY, TECHID: NORMAL
PLATELET # BLD AUTO: 275 K/UL (ref 150–400)
PMV BLD AUTO: 10.8 FL (ref 8.9–12.9)
POTASSIUM SERPL-SCNC: 3.2 MMOL/L (ref 3.5–5.1)
PROT SERPL-MCNC: 7.6 G/DL (ref 6.4–8.2)
RBC # BLD AUTO: 3.1 M/UL (ref 3.8–5.2)
SODIUM SERPL-SCNC: 150 MMOL/L (ref 136–145)
WBC # BLD AUTO: 10.3 K/UL (ref 3.6–11)

## 2023-01-25 PROCEDURE — 80053 COMPREHEN METABOLIC PANEL: CPT

## 2023-01-25 PROCEDURE — 76040000007: Performed by: INTERNAL MEDICINE

## 2023-01-25 PROCEDURE — 2709999900 HC NON-CHARGEABLE SUPPLY: Performed by: INTERNAL MEDICINE

## 2023-01-25 PROCEDURE — 77030019988 HC FCPS ENDOSC DISP BSC -B: Performed by: INTERNAL MEDICINE

## 2023-01-25 PROCEDURE — 88305 TISSUE EXAM BY PATHOLOGIST: CPT

## 2023-01-25 PROCEDURE — 76060000032 HC ANESTHESIA 0.5 TO 1 HR: Performed by: INTERNAL MEDICINE

## 2023-01-25 PROCEDURE — 74011000250 HC RX REV CODE- 250: Performed by: NURSE ANESTHETIST, CERTIFIED REGISTERED

## 2023-01-25 PROCEDURE — 65270000029 HC RM PRIVATE

## 2023-01-25 PROCEDURE — 74011250637 HC RX REV CODE- 250/637: Performed by: INTERNAL MEDICINE

## 2023-01-25 PROCEDURE — 74011250637 HC RX REV CODE- 250/637: Performed by: FAMILY MEDICINE

## 2023-01-25 PROCEDURE — 82962 GLUCOSE BLOOD TEST: CPT

## 2023-01-25 PROCEDURE — 74011250636 HC RX REV CODE- 250/636: Performed by: NURSE ANESTHETIST, CERTIFIED REGISTERED

## 2023-01-25 PROCEDURE — 0DBL8ZX EXCISION OF TRANSVERSE COLON, VIA NATURAL OR ARTIFICIAL OPENING ENDOSCOPIC, DIAGNOSTIC: ICD-10-PCS | Performed by: INTERNAL MEDICINE

## 2023-01-25 PROCEDURE — 85027 COMPLETE CBC AUTOMATED: CPT

## 2023-01-25 PROCEDURE — 36415 COLL VENOUS BLD VENIPUNCTURE: CPT

## 2023-01-25 RX ORDER — LIDOCAINE HYDROCHLORIDE 20 MG/ML
INJECTION, SOLUTION EPIDURAL; INFILTRATION; INTRACAUDAL; PERINEURAL AS NEEDED
Status: DISCONTINUED | OUTPATIENT
Start: 2023-01-25 | End: 2023-01-25 | Stop reason: HOSPADM

## 2023-01-25 RX ORDER — SODIUM CHLORIDE, SODIUM LACTATE, POTASSIUM CHLORIDE, CALCIUM CHLORIDE 600; 310; 30; 20 MG/100ML; MG/100ML; MG/100ML; MG/100ML
INJECTION, SOLUTION INTRAVENOUS
Status: DISCONTINUED | OUTPATIENT
Start: 2023-01-25 | End: 2023-01-25 | Stop reason: HOSPADM

## 2023-01-25 RX ORDER — PROPOFOL 10 MG/ML
INJECTION, EMULSION INTRAVENOUS AS NEEDED
Status: DISCONTINUED | OUTPATIENT
Start: 2023-01-25 | End: 2023-01-25 | Stop reason: HOSPADM

## 2023-01-25 RX ORDER — POTASSIUM CHLORIDE 750 MG/1
40 TABLET, FILM COATED, EXTENDED RELEASE ORAL
Status: COMPLETED | OUTPATIENT
Start: 2023-01-25 | End: 2023-01-25

## 2023-01-25 RX ADMIN — PROPOFOL 30 MG: 10 INJECTION, EMULSION INTRAVENOUS at 14:14

## 2023-01-25 RX ADMIN — NYSTATIN 500000 UNITS: 100000 SUSPENSION ORAL at 21:15

## 2023-01-25 RX ADMIN — SODIUM PHOSPHATE 1 ENEMA: 7; 19 ENEMA RECTAL at 12:00

## 2023-01-25 RX ADMIN — SODIUM BICARBONATE 1300 MG: 650 TABLET ORAL at 15:35

## 2023-01-25 RX ADMIN — SODIUM CHLORIDE, POTASSIUM CHLORIDE, SODIUM LACTATE AND CALCIUM CHLORIDE: 600; 310; 30; 20 INJECTION, SOLUTION INTRAVENOUS at 13:57

## 2023-01-25 RX ADMIN — SODIUM BICARBONATE 1300 MG: 650 TABLET ORAL at 21:15

## 2023-01-25 RX ADMIN — PHENYLEPHRINE HYDROCHLORIDE 100 MCG: 10 INJECTION INTRAVENOUS at 14:21

## 2023-01-25 RX ADMIN — LIDOCAINE HYDROCHLORIDE 5 ML: 20 INJECTION, SOLUTION EPIDURAL; INFILTRATION; INTRACAUDAL; PERINEURAL at 14:14

## 2023-01-25 RX ADMIN — NYSTATIN 500000 UNITS: 100000 SUSPENSION ORAL at 15:35

## 2023-01-25 RX ADMIN — POTASSIUM CHLORIDE 40 MEQ: 750 TABLET, FILM COATED, EXTENDED RELEASE ORAL at 15:35

## 2023-01-25 RX ADMIN — PROPOFOL 30 MG: 10 INJECTION, EMULSION INTRAVENOUS at 14:19

## 2023-01-25 NOTE — ANESTHESIA PREPROCEDURE EVALUATION
Relevant Problems   NEUROLOGY   (+) Depression   (+) Recurrent depression (HCC)      CARDIOVASCULAR   (+) HTN (hypertension)      RENAL FAILURE   (+) TAL (acute kidney injury) ([address]      ENDOCRINE   (+) Controlled type 2 diabetes mellitus without complication, without long-term current use of insulin (HCC)     EGD last week with 40mg propofol (req. 200mcg phenylephrine)  Also required pulse ox sticker around ear lobe to detect waveform    Anesthetic History   No history of anesthetic complications            Review of Systems / Medical History  Patient summary reviewed, nursing notes reviewed and pertinent labs reviewed    Pulmonary  Within defined limits                 Neuro/Psych   Within defined limits           Cardiovascular    Hypertension          Past MI, CAD and hyperlipidemia         GI/Hepatic/Renal         Renal disease (Improving TAL)      Comments: Lower GI bleeding Endo/Other    Diabetes: type 2    Arthritis    Comments: Hypernatremia with last serum  on 1/23/23  Hyperchloremia with last serum  on 1/23/23  Bun/Creatinine 34/1.16    Repeat BMP ordered and now awaiting results Other Findings   Comments: Procedure Information    Case: [id number] Date/Time: 01/25/23 1430  Procedure: COLONOSCOPY  Anesthesia type: MAC  Diagnosis: Gastrointestinal hemorrhage, unspecified gastrointestinal hemorrhage type (K92.2)  Pre-op diagnosis: Gastrointestinal hemorrhage, unspecified gastrointestinal hemorrhage type (K92.2)  Location: [facility] ENDO 01 / [facility] ENDOSCOPY  Providers: Taylor Arechiga MD      Medical History  Hypercholesterolemia  Hypertension  frequency  Hydradenitis  Ill-defined condition  Arthritis  Diabetes ([address]  Heart attack ([address]  H/O blood clots  Thromboembolus ([address]         Past Medical History:   Diagnosis Date    Arthritis     Diabetes ([address]     states she is \"pre-diabetic\", diet controlled    frequency     H/O blood clots     right side of body    Heart attack ([address] 1/2014    Hydradenitis 2/1/2012    Hypercholesterolemia     Hypertension     Ill-defined condition     edema of legs    Thromboembolus (HCC)        No past surgical history on file. Current Outpatient Medications   Medication Instructions    apixaban (ELIQUIS) 5 mg, Oral, EVERY 12 HOURS    atorvastatin (LIPITOR) 80 mg, Oral, DAILY    bumetanide (BUMEX) 2 mg, Oral, DAILY    cephALEXin (KEFLEX) 500 mg, Oral, 4 TIMES DAILY    clindamycin (CLEOCIN T) 1 % lotion APPLY TO AXILLA TWICE DAILY    diclofenac (VOLTAREN) 4 g, Topical, 4 TIMES DAILY, back    gabapentin (NEURONTIN) 100 mg, Oral, 3 TIMES DAILY AS NEEDED    miscellaneous medical supply (Anti-Embolism Stockings) misc Please measure and fit for one pair of below the knee anti-embolism stockings    nut.tx.gluc intol,lf,soy-fiber (Glucerna 1 Jose C) 0.04-1 gram-kcal/mL liqd 1 Bottle, Oral, DAILY    OTHER Shower Chair- Use As Directed    OTHER Pull Ups    OTHER rollator    oxybutynin (DITROPAN) 5 mg, Oral, 3 TIMES DAILY    zinc oxide 20 % ointment Topical, AS NEEDED       No current facility-administered medications for this visit. Current Outpatient Medications   Medication Sig    cephALEXin (Keflex) 500 mg capsule Take 1 Capsule by mouth four (4) times daily for 7 days.      Facility-Administered Medications Ordered in Other Visits   Medication Dose Route Frequency    potassium chloride SR (KLOR-CON 10) tablet 40 mEq  40 mEq Oral NOW    dextrose 5% infusion  100 mL/hr IntraVENous CONTINUOUS    sodium bicarbonate tablet 1,300 mg  1,300 mg Oral TID    nystatin (MYCOSTATIN) 100,000 unit/mL oral suspension 500,000 Units  500,000 Units Swish and Swallow BID    pantoprazole (PROTONIX) 40 mg in 0.9% sodium chloride 10 mL injection  40 mg IntraVENous Q12H    acetaminophen (TYLENOL) tablet 650 mg  650 mg Oral Q6H PRN    Or    acetaminophen (TYLENOL) suppository 650 mg  650 mg Rectal Q6H PRN    polyethylene glycol (MIRALAX) packet 17 g  17 g Oral DAILY PRN    ondansetron (ZOFRAN ODT) tablet 4 mg  4 mg Oral Q8H PRN    Or    ondansetron (ZOFRAN) injection 4 mg  4 mg IntraVENous Q6H PRN    insulin lispro (HUMALOG) injection   SubCUTAneous AC&HS    glucose chewable tablet 16 g  4 Tablet Oral PRN    glucagon (GLUCAGEN) injection 1 mg  1 mg IntraMUSCular PRN       No data found.     Lab Results   Component Value Date/Time    WBC 10.3 01/25/2023 06:38 AM    HGB 8.2 (L) 01/25/2023 06:38 AM    HCT 27.0 (L) 01/25/2023 06:38 AM    PLATELET 949 95/74/3961 06:38 AM    MCV 87.1 01/25/2023 06:38 AM     Lab Results   Component Value Date/Time    Sodium 150 (H) 01/25/2023 06:39 AM    Potassium 3.2 (L) 01/25/2023 06:39 AM    Chloride 123 (H) 01/25/2023 06:39 AM    CO2 19 (L) 01/25/2023 06:39 AM    Anion gap 8 01/25/2023 06:39 AM    Glucose 130 (H) 01/25/2023 06:39 AM    BUN 16 01/25/2023 06:39 AM    Creatinine 0.93 01/25/2023 06:39 AM    BUN/Creatinine ratio 17 01/25/2023 06:39 AM    GFR est AA >60 09/14/2022 03:25 PM    GFR est non-AA >60 09/14/2022 03:25 PM    Calcium 8.6 01/25/2023 06:39 AM     No results found for: APTT, PTP, INR, INREXT, INREXT  Lab Results   Component Value Date/Time    Glucose 130 (H) 01/25/2023 06:39 AM    Glucose (POC) 105 (H) 01/25/2023 11:27 AM         Physical Exam    Airway  Mallampati: II  TM Distance: 4 - 6 cm  Neck ROM: normal range of motion   Mouth opening: Normal     Cardiovascular    Rhythm: regular  Rate: normal         Dental    Dentition: Poor dentition     Pulmonary  Breath sounds clear to auscultation               Abdominal  GI exam deferred       Other Findings            Anesthetic Plan    ASA: 3  Anesthesia type: total IV anesthesia and MAC    Monitoring Plan: Continuous noninvasive hemodynamic monitoring      Induction: Intravenous  Anesthetic plan and risks discussed with: Patient

## 2023-01-25 NOTE — PROGRESS NOTES
Problem: Upper and Lower GI Bleed:  Discharge Outcomes  Goal: *Hemodynamically stable  Outcome: Progressing Towards Goal  Note: Patient's vitals are stable. Vitals will continue to be taken and assessed as per policy. Bedside shift change report given to Danielle Serna LPN (oncoming nurse) by Kingston Gómez RN (offgoing nurse). Report included the following information SBAR, Kardex, ED Summary, Intake/Output, MAR, Accordion, Med Rec Status, and Cardiac Rhythm NSR .

## 2023-01-25 NOTE — PROGRESS NOTES
Problem: Falls - Risk of  Goal: *Absence of Falls  Description: Document Gera Troy Fall Risk and appropriate interventions in the flowsheet. Outcome: Progressing Towards Goal  Note: Fall Risk Interventions:                                Problem: Patient Education: Go to Patient Education Activity  Goal: Patient/Family Education  Outcome: Progressing Towards Goal     Problem: Pressure Injury - Risk of  Goal: *Prevention of pressure injury  Description: Document Spenser Scale and appropriate interventions in the flowsheet. Outcome: Progressing Towards Goal  Note: Pressure Injury Interventions:  Sensory Interventions: Assess changes in LOC, Assess need for specialty bed, Check visual cues for pain, Keep linens dry and wrinkle-free, Maintain/enhance activity level, Minimize linen layers, Pressure redistribution bed/mattress (bed type), Turn and reposition approx. every two hours (pillows and wedges if needed)    Moisture Interventions: Absorbent underpads, Apply protective barrier, creams and emollients, Check for incontinence Q2 hours and as needed, Internal/External urinary devices, Limit adult briefs, Maintain skin hydration (lotion/cream), Minimize layers, Moisture barrier    Activity Interventions: Assess need for specialty bed, Increase time out of bed, Pressure redistribution bed/mattress(bed type), PT/OT evaluation    Mobility Interventions: Assess need for specialty bed, HOB 30 degrees or less, Pressure redistribution bed/mattress (bed type), PT/OT evaluation, Turn and reposition approx.  every two hours(pillow and wedges)    Nutrition Interventions: Discuss nutritional consult with provider    Friction and Shear Interventions: Apply protective barrier, creams and emollients, Foam dressings/transparent film/skin sealants, HOB 30 degrees or less, Lift sheet, Minimize layers                Problem: Patient Education: Go to Patient Education Activity  Goal: Patient/Family Education  Outcome: Progressing Towards Goal     Problem: Patient Education: Go to Patient Education Activity  Goal: Patient/Family Education  Outcome: Progressing Towards Goal     Problem: Patient Education: Go to Patient Education Activity  Goal: Patient/Family Education  Outcome: Progressing Towards Goal     Problem: Upper and Lower GI Bleed:  Discharge Outcomes  Goal: *Hemodynamically stable  Outcome: Progressing Towards Goal  Goal: *Lungs clear or at baseline  Outcome: Progressing Towards Goal  Goal: *Demonstrates independent activity or return to baseline  Outcome: Progressing Towards Goal  Goal: *Pain is controlled to three or less  Outcome: Progressing Towards Goal  Goal: *Verbalizes understanding and describes prescribed diet  Outcome: Progressing Towards Goal  Goal: *Tolerating diet  Outcome: Progressing Towards Goal  Goal: *Verbalizes name, dosage, time, side effects, and number of days to continue medications  Outcome: Progressing Towards Goal  Goal: *Anxiety reduced or absent  Outcome: Progressing Towards Goal  Goal: *Understands and describes signs and symptoms to report to providers(Stroke Metric)  Outcome: Progressing Towards Goal  Goal: *Describes follow-up/return visits to physicians  Outcome: Progressing Towards Goal  Goal: *Describes available resources and support systems  Outcome: Progressing Towards Goal

## 2023-01-25 NOTE — ANESTHESIA POSTPROCEDURE EVALUATION
Procedure(s):  COLONOSCOPY.    total IV anesthesia, MAC    Anesthesia Post Evaluation      Multimodal analgesia: multimodal analgesia not used between 6 hours prior to anesthesia start to PACU discharge  Patient location during evaluation: bedside (Endoscopy suite)  Patient participation: complete - patient cannot participate  Level of consciousness: sleepy but conscious  Pain score: 0  Pain management: adequate  Airway patency: patent  Anesthetic complications: no  Cardiovascular status: acceptable  Respiratory status: acceptable and nasal cannula  Hydration status: acceptable  Comments: This patient remained on the stretcher. The patient was handed off to the endoscopy nursing team.  All questions regarding pre-, intra-, and postoperative care were answered.   Post anesthesia nausea and vomiting:  none      INITIAL Post-op Vital signs:   Vitals Value Taken Time   /72 01/25/23 1432   Temp 37 °C (98.6 °F) 01/25/23 1432   Pulse 75 01/25/23 1432   Resp 16 01/25/23 1432   SpO2 99 % 01/25/23 1432

## 2023-01-25 NOTE — PROGRESS NOTES
Bedside and Verbal shift change report given to [facility] Chaya Reed (oncoming nurse) by Gale Swift  (offgoing nurse). Report included the following information SBAR, Kardex, Procedure Summary, Intake/Output, and MAR.

## 2023-01-25 NOTE — ANESTHESIA PREPROCEDURE EVALUATION
Relevant Problems   NEUROLOGY   (+) Depression   (+) Recurrent depression (HCC)      CARDIOVASCULAR   (+) HTN (hypertension)      RENAL FAILURE   (+) TAL (acute kidney injury) ([address]      ENDOCRINE   (+) Controlled type 2 diabetes mellitus without complication, without long-term current use of insulin (HCC)     EGD last week with 40mg propofol (req. 200mcg phenylephrine)  Also required pulse ox sticker around ear lobe to detect waveform    Anesthetic History   No history of anesthetic complications            Review of Systems / Medical History  Patient summary reviewed, nursing notes reviewed and pertinent labs reviewed    Pulmonary  Within defined limits                 Neuro/Psych   Within defined limits           Cardiovascular    Hypertension          Past MI, CAD and hyperlipidemia         GI/Hepatic/Renal         Renal disease (Improving TAL)      Comments: Lower GI bleeding Endo/Other    Diabetes: type 2    Arthritis    Comments: Hypernatremia with last serum  on 1/23/23  Hyperchloremia with last serum  on 1/23/23  Bun/Creatinine 34/1.16    Repeat BMP ordered and now awaiting results Other Findings   Comments: Procedure Information    Case: [id number] Date/Time: 01/25/23 1430  Procedure: COLONOSCOPY  Anesthesia type: MAC  Diagnosis: Gastrointestinal hemorrhage, unspecified gastrointestinal hemorrhage type (K92.2)  Pre-op diagnosis: Gastrointestinal hemorrhage, unspecified gastrointestinal hemorrhage type (K92.2)  Location: [facility] ENDO 01 / [facility] ENDOSCOPY  Providers: Geovanna Haile MD      Medical History  Hypercholesterolemia  Hypertension  frequency  Hydradenitis  Ill-defined condition  Arthritis  Diabetes ([address]  Heart attack ([address]  H/O blood clots  Thromboembolus ([address]         Past Medical History:   Diagnosis Date    Arthritis     Diabetes ([address]     states she is \"pre-diabetic\", diet controlled    frequency     H/O blood clots     right side of body    Heart attack ([address] 1/2014    Hydradenitis 2/1/2012    Hypercholesterolemia     Hypertension     Ill-defined condition     edema of legs    Thromboembolus (HCC)        No past surgical history on file. Current Outpatient Medications   Medication Instructions    apixaban (ELIQUIS) 5 mg, Oral, EVERY 12 HOURS    atorvastatin (LIPITOR) 80 mg, Oral, DAILY    bumetanide (BUMEX) 2 mg, Oral, DAILY    cephALEXin (KEFLEX) 500 mg, Oral, 4 TIMES DAILY    clindamycin (CLEOCIN T) 1 % lotion APPLY TO AXILLA TWICE DAILY    diclofenac (VOLTAREN) 4 g, Topical, 4 TIMES DAILY, back    gabapentin (NEURONTIN) 100 mg, Oral, 3 TIMES DAILY AS NEEDED    miscellaneous medical supply (Anti-Embolism Stockings) misc Please measure and fit for one pair of below the knee anti-embolism stockings    nut.tx.gluc intol,lf,soy-fiber (Glucerna 1 Jose C) 0.04-1 gram-kcal/mL liqd 1 Bottle, Oral, DAILY    OTHER Shower Chair- Use As Directed    OTHER Pull Ups    OTHER rollator    oxybutynin (DITROPAN) 5 mg, Oral, 3 TIMES DAILY    zinc oxide 20 % ointment Topical, AS NEEDED       No current facility-administered medications for this visit. Current Outpatient Medications   Medication Sig    cephALEXin (Keflex) 500 mg capsule Take 1 Capsule by mouth four (4) times daily for 7 days.      Facility-Administered Medications Ordered in Other Visits   Medication Dose Route Frequency    potassium chloride SR (KLOR-CON 10) tablet 40 mEq  40 mEq Oral NOW    dextrose 5% infusion  100 mL/hr IntraVENous CONTINUOUS    sodium bicarbonate tablet 1,300 mg  1,300 mg Oral TID    nystatin (MYCOSTATIN) 100,000 unit/mL oral suspension 500,000 Units  500,000 Units Swish and Swallow BID    pantoprazole (PROTONIX) 40 mg in 0.9% sodium chloride 10 mL injection  40 mg IntraVENous Q12H    acetaminophen (TYLENOL) tablet 650 mg  650 mg Oral Q6H PRN    Or    acetaminophen (TYLENOL) suppository 650 mg  650 mg Rectal Q6H PRN    polyethylene glycol (MIRALAX) packet 17 g  17 g Oral DAILY PRN    ondansetron (ZOFRAN ODT) tablet 4 mg  4 mg Oral Q8H PRN    Or    ondansetron (ZOFRAN) injection 4 mg  4 mg IntraVENous Q6H PRN    insulin lispro (HUMALOG) injection   SubCUTAneous AC&HS    glucose chewable tablet 16 g  4 Tablet Oral PRN    glucagon (GLUCAGEN) injection 1 mg  1 mg IntraMUSCular PRN       No data found.     Lab Results   Component Value Date/Time    WBC 10.3 01/25/2023 06:38 AM    HGB 8.2 (L) 01/25/2023 06:38 AM    HCT 27.0 (L) 01/25/2023 06:38 AM    PLATELET 142 19/15/1857 06:38 AM    MCV 87.1 01/25/2023 06:38 AM     Lab Results   Component Value Date/Time    Sodium 150 (H) 01/25/2023 06:39 AM    Potassium 3.2 (L) 01/25/2023 06:39 AM    Chloride 123 (H) 01/25/2023 06:39 AM    CO2 19 (L) 01/25/2023 06:39 AM    Anion gap 8 01/25/2023 06:39 AM    Glucose 130 (H) 01/25/2023 06:39 AM    BUN 16 01/25/2023 06:39 AM    Creatinine 0.93 01/25/2023 06:39 AM    BUN/Creatinine ratio 17 01/25/2023 06:39 AM    GFR est AA >60 09/14/2022 03:25 PM    GFR est non-AA >60 09/14/2022 03:25 PM    Calcium 8.6 01/25/2023 06:39 AM     No results found for: APTT, PTP, INR, INREXT, INREXT  Lab Results   Component Value Date/Time    Glucose 130 (H) 01/25/2023 06:39 AM    Glucose (POC) 105 (H) 01/25/2023 11:27 AM         Physical Exam    Airway  Mallampati: II  TM Distance: 4 - 6 cm  Neck ROM: normal range of motion   Mouth opening: Normal     Cardiovascular    Rhythm: regular  Rate: normal         Dental    Dentition: Poor dentition     Pulmonary  Breath sounds clear to auscultation               Abdominal  GI exam deferred       Other Findings            Anesthetic Plan    ASA: 3  Anesthesia type: total IV anesthesia and MAC    Monitoring Plan: Continuous noninvasive hemodynamic monitoring      Induction: Intravenous  Anesthetic plan and risks discussed with: Patient

## 2023-01-25 NOTE — PROGRESS NOTES
General Daily Progress Note          Patient Name:   Najma Ludwig       YOB: 1945       Age:  68 y.o. Admit Date: 1/19/2023      Subjective:     Patient is a 68y.o. year old female with signal past medical history of diabetes diet-controlled history of DVT on Eliquis hypertension hyperlipidemia came to emergency room because of lethargic patient not eating drinking well weak tired patient have large bloody bowel movement today came to emergency room seen by the ER physician work-up done in the [facility] work shows WBC is 12.6  UA came back positive for UTI patient have a chronic Espitia  Renal function shows BUN of 170 creatinine 5.66     Patient was admitted with acute kidney injury acute GI bleed UTI    1/20  Patient resting comfortably in bed, but reports she is still experiencing generalized abdominal pain. Renal US complete: Medical renal disease. No hydronephrosis demonstrated. GI and nephrology consult pending     1/23  Patient resting comfortably in bed without any complaints at this time. Patient is still experiencing bloody bowel movements. GI has patient scheduled for a colonoscopy this morning in attempt to locate the source of the bleed. Sodium 156  Renal function improved    1/24  Patient resting comfortably in bed without any complaints at this time. She consumed some of the Golytely, but did not complete. Patient had 2 episodes of large dark loose stool overnight. Patient is scheduled for colonoscopy this morning   IVF changed to 1/4 NS due to the hypernatremia   Complete IV rocephin yesterday     1/25  Patient resting comfortably in bed this morning and denies any complaints at this time. IVF changed to D5W  added sodium bicarbonate p.o. twice daily  Bowel prep was not clean yesterday and she still was having liquid dark stool. Patient continued bowel prep last night.   Colonoscopy scheduled for this morning      Objective:     Visit Vitals  /64 (BP 1 Location: Left upper arm, BP Patient Position: At rest;Lying)   Pulse 76   Temp 98.4 °F (36.9 °C)   Resp 20   Ht 5' 7\" (1.702 m)   Wt 63.5 kg (140 lb)   SpO2 100%   BMI 21.93 kg/m²        Recent Results (from the past 24 hour(s))   METABOLIC PANEL, COMPREHENSIVE    Collection Time: 01/24/23 12:13 PM   Result Value Ref Range    Sodium 151 (H) 136 - 145 mmol/L    Potassium 5.3 (H) 3.5 - 5.1 mmol/L    Chloride 129 (H) 97 - 108 mmol/L    CO2 18 (L) 21 - 32 mmol/L    Anion gap 4 (L) 5 - 15 mmol/L    Glucose 94 65 - 100 mg/dL    BUN 24 (H) 6 - 20 mg/dL    Creatinine 1.15 (H) 0.55 - 1.02 mg/dL    BUN/Creatinine ratio 21 (H) 12 - 20      eGFR 49 (L) >60 ml/min/1.73m2    Calcium 9.0 8.5 - 10.1 mg/dL    Bilirubin, total 0.4 0.2 - 1.0 mg/dL    AST (SGOT) 38 (H) 15 - 37 U/L    ALT (SGPT) 20 12 - 78 U/L    Alk.  phosphatase 126 (H) 45 - 117 U/L    Protein, total 8.1 6.4 - 8.2 g/dL    Albumin 1.9 (L) 3.5 - 5.0 g/dL    Globulin 6.2 (H) 2.0 - 4.0 g/dL    A-G Ratio 0.3 (L) 1.1 - 2.2     CBC W/O DIFF    Collection Time: 01/24/23 12:13 PM   Result Value Ref Range    WBC 12.8 (H) 3.6 - 11.0 K/uL    RBC 3.40 (L) 3.80 - 5.20 M/uL    HGB 9.0 (L) 11.5 - 16.0 g/dL    HCT 30.7 (L) 35.0 - 47.0 %    MCV 90.3 80.0 - 99.0 FL    MCH 26.5 26.0 - 34.0 PG    MCHC 29.3 (L) 30.0 - 36.5 g/dL    RDW 21.5 (H) 11.5 - 14.5 %    PLATELET 753 402 - 064 K/uL    MPV 10.4 8.9 - 12.9 FL    NRBC 0.0 0.0  WBC    ABSOLUTE NRBC 0.00 0.00 - 0.01 K/uL   GLUCOSE, POC    Collection Time: 01/24/23  3:35 PM   Result Value Ref Range    Glucose (POC) 90 65 - 100 mg/dL    Performed by Tania Lott    GLUCOSE, POC    Collection Time: 01/24/23  9:03 PM   Result Value Ref Range    Glucose (POC) 102 (H) 65 - 100 mg/dL    Performed by Lopez Salmon    CBC W/O DIFF    Collection Time: 01/25/23  6:38 AM   Result Value Ref Range    WBC 10.3 3.6 - 11.0 K/uL    RBC 3.10 (L) 3.80 - 5.20 M/uL    HGB 8.2 (L) 11.5 - 16.0 g/dL    HCT 27.0 (L) 35.0 - 47.0 %    MCV 87.1 80.0 - 99.0 FL    MCH 26.5 26.0 - 34.0 PG    MCHC 30.4 30.0 - 36.5 g/dL    RDW 19.9 (H) 11.5 - 14.5 %    PLATELET 659 530 - 203 K/uL    MPV 10.8 8.9 - 12.9 FL    NRBC 0.0 0.0  WBC    ABSOLUTE NRBC 0.00 0.00 - 3.86 K/uL   METABOLIC PANEL, COMPREHENSIVE    Collection Time: 01/25/23  6:39 AM   Result Value Ref Range    Sodium 150 (H) 136 - 145 mmol/L    Potassium 3.2 (L) 3.5 - 5.1 mmol/L    Chloride 123 (H) 97 - 108 mmol/L    CO2 19 (L) 21 - 32 mmol/L    Anion gap 8 5 - 15 mmol/L    Glucose 130 (H) 65 - 100 mg/dL    BUN 16 6 - 20 mg/dL    Creatinine 0.93 0.55 - 1.02 mg/dL    BUN/Creatinine ratio 17 12 - 20      eGFR >60 >60 ml/min/1.73m2    Calcium 8.6 8.5 - 10.1 mg/dL    Bilirubin, total 0.3 0.2 - 1.0 mg/dL    AST (SGOT) 28 15 - 37 U/L    ALT (SGPT) 18 12 - 78 U/L    Alk. phosphatase 117 45 - 117 U/L    Protein, total 7.6 6.4 - 8.2 g/dL    Albumin 1.8 (L) 3.5 - 5.0 g/dL    Globulin 5.8 (H) 2.0 - 4.0 g/dL    A-G Ratio 0.3 (L) 1.1 - 2.2     GLUCOSE, POC    Collection Time: 01/25/23  7:34 AM   Result Value Ref Range    Glucose (POC) 111 (H) 65 - 100 mg/dL    Performed by [employer]    GLUCOSE, POC    Collection Time: 01/25/23 11:27 AM   Result Value Ref Range    Glucose (POC) 105 (H) 65 - 100 mg/dL    Performed by [employer]      [unfilled]      Review of Systems    Constitutional: Negative for chills and fever. HENT: Negative. Eyes: Negative. Respiratory: Negative. Cardiovascular: Negative. Gastrointestinal: Positive for abdominal pain  Skin: Negative. Neurological: Negative. Physical Exam:      Constitutional: pt is oriented to person, place, and time. HENT:   Head: Normocephalic and atraumatic. Eyes: Pupils are equal, round, and reactive to light. EOM are normal.   Cardiovascular: Normal rate, regular rhythm and normal heart sounds. Pulmonary/Chest: Breath sounds normal. No wheezes. No rales. Exhibits no tenderness. Abdominal: Soft.  Bowel sounds are normal. Generalized abdominal tenderness. There is no rebound and no guarding. Musculoskeletal: Normal range of motion. : sorenson placed   Neurological: pt is alert and oriented to person, place, and time. Alert. Normal strength. No cranial nerve deficit or sensory deficit. US RETROPERITONEUM COMP   Final Result   Medical renal disease. No hydronephrosis demonstrated. CT HEAD WO CONT   Final Result   No acute abnormality. Recent Results (from the past 24 hour(s))   METABOLIC PANEL, COMPREHENSIVE    Collection Time: 01/24/23 12:13 PM   Result Value Ref Range    Sodium 151 (H) 136 - 145 mmol/L    Potassium 5.3 (H) 3.5 - 5.1 mmol/L    Chloride 129 (H) 97 - 108 mmol/L    CO2 18 (L) 21 - 32 mmol/L    Anion gap 4 (L) 5 - 15 mmol/L    Glucose 94 65 - 100 mg/dL    BUN 24 (H) 6 - 20 mg/dL    Creatinine 1.15 (H) 0.55 - 1.02 mg/dL    BUN/Creatinine ratio 21 (H) 12 - 20      eGFR 49 (L) >60 ml/min/1.73m2    Calcium 9.0 8.5 - 10.1 mg/dL    Bilirubin, total 0.4 0.2 - 1.0 mg/dL    AST (SGOT) 38 (H) 15 - 37 U/L    ALT (SGPT) 20 12 - 78 U/L    Alk.  phosphatase 126 (H) 45 - 117 U/L    Protein, total 8.1 6.4 - 8.2 g/dL    Albumin 1.9 (L) 3.5 - 5.0 g/dL    Globulin 6.2 (H) 2.0 - 4.0 g/dL    A-G Ratio 0.3 (L) 1.1 - 2.2     CBC W/O DIFF    Collection Time: 01/24/23 12:13 PM   Result Value Ref Range    WBC 12.8 (H) 3.6 - 11.0 K/uL    RBC 3.40 (L) 3.80 - 5.20 M/uL    HGB 9.0 (L) 11.5 - 16.0 g/dL    HCT 30.7 (L) 35.0 - 47.0 %    MCV 90.3 80.0 - 99.0 FL    MCH 26.5 26.0 - 34.0 PG    MCHC 29.3 (L) 30.0 - 36.5 g/dL    RDW 21.5 (H) 11.5 - 14.5 %    PLATELET 166 586 - 320 K/uL    MPV 10.4 8.9 - 12.9 FL    NRBC 0.0 0.0  WBC    ABSOLUTE NRBC 0.00 0.00 - 0.01 K/uL   GLUCOSE, POC    Collection Time: 01/24/23  3:35 PM   Result Value Ref Range    Glucose (POC) 90 65 - 100 mg/dL    Performed by Estephania Dunn    GLUCOSE, POC    Collection Time: 01/24/23  9:03 PM   Result Value Ref Range    Glucose (POC) 102 (H) 65 - 100 mg/dL    Performed by Khushbu Carrera    CBC W/O DIFF    Collection Time: 01/25/23  6:38 AM   Result Value Ref Range    WBC 10.3 3.6 - 11.0 K/uL    RBC 3.10 (L) 3.80 - 5.20 M/uL    HGB 8.2 (L) 11.5 - 16.0 g/dL    HCT 27.0 (L) 35.0 - 47.0 %    MCV 87.1 80.0 - 99.0 FL    MCH 26.5 26.0 - 34.0 PG    MCHC 30.4 30.0 - 36.5 g/dL    RDW 19.9 (H) 11.5 - 14.5 %    PLATELET 318 278 - 984 K/uL    MPV 10.8 8.9 - 12.9 FL    NRBC 0.0 0.0  WBC    ABSOLUTE NRBC 0.00 0.00 - 6.68 K/uL   METABOLIC PANEL, COMPREHENSIVE    Collection Time: 01/25/23  6:39 AM   Result Value Ref Range    Sodium 150 (H) 136 - 145 mmol/L    Potassium 3.2 (L) 3.5 - 5.1 mmol/L    Chloride 123 (H) 97 - 108 mmol/L    CO2 19 (L) 21 - 32 mmol/L    Anion gap 8 5 - 15 mmol/L    Glucose 130 (H) 65 - 100 mg/dL    BUN 16 6 - 20 mg/dL    Creatinine 0.93 0.55 - 1.02 mg/dL    BUN/Creatinine ratio 17 12 - 20      eGFR >60 >60 ml/min/1.73m2    Calcium 8.6 8.5 - 10.1 mg/dL    Bilirubin, total 0.3 0.2 - 1.0 mg/dL    AST (SGOT) 28 15 - 37 U/L    ALT (SGPT) 18 12 - 78 U/L    Alk.  phosphatase 117 45 - 117 U/L    Protein, total 7.6 6.4 - 8.2 g/dL    Albumin 1.8 (L) 3.5 - 5.0 g/dL    Globulin 5.8 (H) 2.0 - 4.0 g/dL    A-G Ratio 0.3 (L) 1.1 - 2.2     GLUCOSE, POC    Collection Time: 01/25/23  7:34 AM   Result Value Ref Range    Glucose (POC) 111 (H) 65 - 100 mg/dL    Performed by Chavez Alcantara, POC    Collection Time: 01/25/23 11:27 AM   Result Value Ref Range    Glucose (POC) 105 (H) 65 - 100 mg/dL    Performed by Kaycee Koehler        Results       Procedure Component Value Units Date/Time    CULTURE, URINE [[id number]] Collected: 01/19/23 2030    Order Status: Canceled Specimen: Urine     CULTURE, BLOOD, PAIRED [[id number]] Collected: 01/19/23 1023    Order Status: Completed Specimen: Blood Updated: 01/24/23 0939     Special Requests: No Special Requests        Culture result: No growth 5 days                Labs:     Recent Labs     01/25/23  0638 01/24/23  1213   WBC 10.3 12.8*   HGB 8. 2* 9.0*   HCT 27.0* 30.7*    279       Recent Labs     01/25/23  0639 01/24/23  1213 01/23/23  1427   * 151* 152*   K 3.2* 5.3* 3.9   * 129* 127*   CO2 19* 18* 19*   BUN 16 24* 34*   CREA 0.93 1.15* 1.16*   * 94 108*   CA 8.6 9.0 8.8   PHOS  --   --  1.4*       Recent Labs     01/25/23  0639 01/24/23  1213 01/23/23  1427   ALT 18 20  --     126*  --    TBILI 0.3 0.4  --    TP 7.6 8.1  --    ALB 1.8* 1.9* 1.9*   GLOB 5.8* 6.2*  --        No results for input(s): INR, PTP, APTT, INREXT, INREXT in the last 72 hours. No results for input(s): FE, TIBC, PSAT, FERR in the last 72 hours. Lab Results   Component Value Date/Time    Folate 4.9 (L) 01/19/2023 10:23 AM      No results for input(s): PH, PCO2, PO2 in the last 72 hours. No results for input(s): CPK, CKNDX, TROIQ in the last 72 hours.     No lab exists for component: CPKMB    Lab Results   Component Value Date/Time    Cholesterol, total 173 03/12/2021 11:35 AM    HDL Cholesterol 54 03/12/2021 11:35 AM    LDL, calculated 97.8 03/12/2021 11:35 AM    Triglyceride 106 03/12/2021 11:35 AM    CHOL/HDL Ratio 3.2 03/12/2021 11:35 AM     Lab Results   Component Value Date/Time    Glucose (POC) 105 (H) 01/25/2023 11:27 AM    Glucose (POC) 111 (H) 01/25/2023 07:34 AM    Glucose (POC) 102 (H) 01/24/2023 09:03 PM    Glucose (POC) 90 01/24/2023 03:35 PM    Glucose (POC) 88 01/24/2023 07:54 AM     Lab Results   Component Value Date/Time    Color Dark Yellow 01/19/2023 10:23 AM    Appearance Turbid (A) 01/19/2023 10:23 AM    Specific gravity 1.020 01/19/2023 10:23 AM    pH (UA) 5.0 01/19/2023 10:23 AM    Protein 100 (A) 01/19/2023 10:23 AM    Glucose Negative 01/19/2023 10:23 AM    Ketone Negative 01/19/2023 10:23 AM    Bilirubin Negative 01/19/2023 10:23 AM    Urobilinogen 0.1 (L) 01/19/2023 10:23 AM    Nitrites Negative 01/19/2023 10:23 AM    Leukocyte Esterase Large (A) 01/19/2023 10:23 AM    Epithelial cells Many (A) 01/19/2023 10:23 AM Bacteria Negative 01/19/2023 10:23 AM    WBC >100 (H) 01/19/2023 10:23 AM    RBC >100 (H) 01/19/2023 10:23 AM         Assessment/Plan:   Acute kidney injury (improving)  Acute GI bleed  History of DVT  History of diabetes diet-controlled  Hypertension  Hyperlipidemia  UTI with chronic Espitia  Leukocytosis (improving)  Oral thrush  Hypernatremia (improving)     Renal ultrasound for acute kidney injury complete:Medical renal disease. No hydronephrosis demonstrated. Sliding-scale insulin    IVF changed to D5W  added sodium bicarbonate p.o. twice daily  Continue to monitor H&H  Blood cultures; no growth after 5 days  Completed IV Rocephin     Continue nystatin swish and swallow BID   IV protonix 40 mg BID    Bowel prep was not clean yesterday and she still was having liquid dark stool. Patient continued bowel prep last night.   Colonoscopy scheduled for this morning    Discussed with the patient family at the bedside    Possible discharge in next 24 to 48 hours        Current Facility-Administered Medications:     dextrose 5% infusion, 100 mL/hr, IntraVENous, CONTINUOUS, Nicole Kee MD, Last Rate: 100 mL/hr at 01/24/23 1628, 100 mL/hr at 01/24/23 1628    sodium bicarbonate tablet 1,300 mg, 1,300 mg, Oral, TID, Nicole Kee MD, 1,300 mg at 01/24/23 2111    sodium phosphate (FLEET'S) enema 1 Enema, 1 Enema, Rectal, NOW, Beatriz Goldmann, MD    nystatin (MYCOSTATIN) 100,000 unit/mL oral suspension 500,000 Units, 500,000 Units, Swish and Swallow, BID, Fadi Velasquez MD, 500,000 Units at 01/24/23 2106    pantoprazole (PROTONIX) 40 mg in 0.9% sodium chloride 10 mL injection, 40 mg, IntraVENous, Q12H, Gregory Cardozo MD, 40 mg at 01/24/23 2105    acetaminophen (TYLENOL) tablet 650 mg, 650 mg, Oral, Q6H PRN, 650 mg at 01/21/23 1735 **OR** acetaminophen (TYLENOL) suppository 650 mg, 650 mg, Rectal, Q6H PRN, Fadi Velasquez MD    polyethylene glycol (MIRALAX) packet 17 g, 17 g, Oral, DAILY PRN, Sherry Schwartz MD ondansetron (ZOFRAN ODT) tablet 4 mg, 4 mg, Oral, Q8H PRN **OR** ondansetron (ZOFRAN) injection 4 mg, 4 mg, IntraVENous, Q6H PRN, Fadi Velasquez MD    insulin lispro (HUMALOG) injection, , SubCUTAneous, AC&HS, Fadi Velasquez MD    glucose chewable tablet 16 g, 4 Tablet, Oral, PRN, Fadi Velasquez MD    glucagon (GLUCAGEN) injection 1 mg, 1 mg, IntraMUSCular, PRN, Gen Blackburn MD

## 2023-01-25 NOTE — PROGRESS NOTES
Renal Progress Note    Patient: Veronica Ramirez MRN: [id number]  SSN: [id number]    YOB: 1945  Age: 68 y.o. Sex: female      Admit Date: 1/19/2023    LOS: 6 days     Subjective:   Patient seen at bedside. Alert and awake, no acute distress. confused . Could not finish all the GoLytely   Scheduled for coloscopy today  no swelling in lower extremities. No complaints of shortness of breath. Improving renal functions with creatinine down to 0.9  Sodium improved to 150 today. Current Facility-Administered Medications   Medication Dose Route Frequency    potassium chloride SR (KLOR-CON 10) tablet 40 mEq  40 mEq Oral NOW    dextrose 5% infusion  125 mL/hr IntraVENous CONTINUOUS    sodium bicarbonate tablet 1,300 mg  1,300 mg Oral TID    nystatin (MYCOSTATIN) 100,000 unit/mL oral suspension 500,000 Units  500,000 Units Swish and Swallow BID    pantoprazole (PROTONIX) 40 mg in 0.9% sodium chloride 10 mL injection  40 mg IntraVENous Q12H    acetaminophen (TYLENOL) tablet 650 mg  650 mg Oral Q6H PRN    Or    acetaminophen (TYLENOL) suppository 650 mg  650 mg Rectal Q6H PRN    polyethylene glycol (MIRALAX) packet 17 g  17 g Oral DAILY PRN    ondansetron (ZOFRAN ODT) tablet 4 mg  4 mg Oral Q8H PRN    Or    ondansetron (ZOFRAN) injection 4 mg  4 mg IntraVENous Q6H PRN    insulin lispro (HUMALOG) injection   SubCUTAneous AC&HS    glucose chewable tablet 16 g  4 Tablet Oral PRN    glucagon (GLUCAGEN) injection 1 mg  1 mg IntraMUSCular PRN        Vitals:    01/24/23 1948 01/25/23 0141 01/25/23 0344 01/25/23 0734   BP: 119/69  125/74 114/64   Pulse: 68 71 79 76   Resp: 16  18 20   Temp: 98 °F (36.7 °C)  97.7 °F (36.5 °C) 98.4 °F (36.9 °C)   SpO2: 94%  100% 100%   Weight:       Height:         Objective:   General: alert awake , confused, no acute distress.   HEENT: EOMI, no Icterus, no Pallor, pupils reactive, mucosa dry  Neck: Neck is supple, No JVD,   Lungs: breathsounds normal, no respiratory distress on inspection, no rhonchi, no rales,  CVS: heart sounds normal, no murmurs, no rubs. GI: soft, nontender, normal BS,   Extremeties:  no cyanosis, no edema,  Neuro: Alert, awake, answering simple questions but appears forgetful and confused, moving all extremeties   Skin: Poor skin turgor, dark discoloration of skin noted in lower extremities. Intake and Output:  Current Shift: No intake/output data recorded. Last three shifts: 01/23 1901 - 01/25 0700  In: 1739.3 [I.V.:1339.3]  Out: 1250 [Urine:1250]      Lab/Data Review:  Recent Labs     01/25/23  0638 01/24/23  1213 01/23/23  1427   WBC 10.3 12.8*  --    HGB 8.2* 9.0* 8.3*   HCT 27.0* 30.7* 27.8*    279  --        Recent Labs     01/25/23  0639 01/24/23  1213 01/23/23  1427   * 151* 152*   K 3.2* 5.3* 3.9   * 129* 127*   CO2 19* 18* 19*   * 94 108*   BUN 16 24* 34*   CREA 0.93 1.15* 1.16*   CA 8.6 9.0 8.8   PHOS  --   --  1.4*   ALB 1.8* 1.9* 1.9*   TBILI 0.3 0.4  --    ALT 18 20  --        No results for input(s): PH, PCO2, PO2, HCO3, FIO2 in the last 72 hours.   Recent Results (from the past 24 hour(s))   GLUCOSE, POC    Collection Time: 01/24/23  3:35 PM   Result Value Ref Range    Glucose (POC) 90 65 - 100 mg/dL    Performed by Live Huerta    GLUCOSE, POC    Collection Time: 01/24/23  9:03 PM   Result Value Ref Range    Glucose (POC) 102 (H) 65 - 100 mg/dL    Performed by Cora Webb    CBC W/O DIFF    Collection Time: 01/25/23  6:38 AM   Result Value Ref Range    WBC 10.3 3.6 - 11.0 K/uL    RBC 3.10 (L) 3.80 - 5.20 M/uL    HGB 8.2 (L) 11.5 - 16.0 g/dL    HCT 27.0 (L) 35.0 - 47.0 %    MCV 87.1 80.0 - 99.0 FL    MCH 26.5 26.0 - 34.0 PG    MCHC 30.4 30.0 - 36.5 g/dL    RDW 19.9 (H) 11.5 - 14.5 %    PLATELET 037 922 - 895 K/uL    MPV 10.8 8.9 - 12.9 FL    NRBC 0.0 0.0  WBC    ABSOLUTE NRBC 0.00 0.00 - 7.90 K/uL   METABOLIC PANEL, COMPREHENSIVE    Collection Time: 01/25/23  6:39 AM   Result Value Ref Range    Sodium 150 (H) 136 - 145 mmol/L    Potassium 3.2 (L) 3.5 - 5.1 mmol/L    Chloride 123 (H) 97 - 108 mmol/L    CO2 19 (L) 21 - 32 mmol/L    Anion gap 8 5 - 15 mmol/L    Glucose 130 (H) 65 - 100 mg/dL    BUN 16 6 - 20 mg/dL    Creatinine 0.93 0.55 - 1.02 mg/dL    BUN/Creatinine ratio 17 12 - 20      eGFR >60 >60 ml/min/1.73m2    Calcium 8.6 8.5 - 10.1 mg/dL    Bilirubin, total 0.3 0.2 - 1.0 mg/dL    AST (SGOT) 28 15 - 37 U/L    ALT (SGPT) 18 12 - 78 U/L    Alk. phosphatase 117 45 - 117 U/L    Protein, total 7.6 6.4 - 8.2 g/dL    Albumin 1.8 (L) 3.5 - 5.0 g/dL    Globulin 5.8 (H) 2.0 - 4.0 g/dL    A-G Ratio 0.3 (L) 1.1 - 2.2     GLUCOSE, POC    Collection Time: 01/25/23  7:34 AM   Result Value Ref Range    Glucose (POC) 111 (H) 65 - 100 mg/dL    Performed by Boogie Campos, POC    Collection Time: 01/25/23 11:27 AM   Result Value Ref Range    Glucose (POC) 105 (H) 65 - 100 mg/dL    Performed by Zia Waterman and Plan:     Acute Kidney Injury :   -probably prerenal azotemia secondary to Hypotension and poor p.o. intake   -No evidence of obstruction on renal ultrasound  -Improving renal functions noted, creatinine improved from 5.68 --4.51--2.35 --1.79--1.1--0.9 today  -On D5W @ 100 ml/hr  2/2 hypernatremia. Sodium today is still 150. I will increase rate of D5W  -no rhabdomyolysis   -will continue to monitor renal functions   -No history of chronic kidney disease, review of old labs showed normal renal functions last month    2. Acute GI bleed :   -Probably related to anticoagulation  -Stable hemoglobins, continue to monitor H&H  -Currently off anticoagulation  -GI following the patient and coloscopy is planned for today    3. Hypotension:   -Probably related to dehydration and poor intake/GI bleed  -Improved hemodynamic status   -Will continue to monitor hemodynamic status     4. History of DVT: Was on anticoagulation  On hold for acute GI bleed    5.  Metabolic acidosis:Secondary to TAL  CO2 level is 19. Increased sodium bicarbonate to 1300 mg tid. Continue same dose  Will continue to monitor Co2 levels     6. Diabetes mellitus: stable. Continue to monitor Accu-Cheks, hemoglobin A1c was 6.4    7.   Hypernatremia:   -increase rate of  D5W and monitor sodium levels    Patient on chronic Espitia drainage: ? Etiology  Leukocytosis present, suspected UTI  Continue antibiotics    Signed By: Eliza Cano MD     January 25, 2023

## 2023-01-25 NOTE — PROGRESS NOTES
OT tx session attempted at 1500, however pt off floor. Will continue to follow pt and attempt tx session at a later time as time allows. Thank you.

## 2023-01-26 VITALS
HEART RATE: 75 BPM | OXYGEN SATURATION: 97 % | WEIGHT: 140 LBS | TEMPERATURE: 99 F | HEIGHT: 67 IN | BODY MASS INDEX: 21.97 KG/M2 | RESPIRATION RATE: 18 BRPM | SYSTOLIC BLOOD PRESSURE: 107 MMHG | DIASTOLIC BLOOD PRESSURE: 54 MMHG

## 2023-01-26 LAB
BACTERIA SPEC CULT: NORMAL
GLUCOSE BLD STRIP.AUTO-MCNC: 114 MG/DL (ref 65–100)
GLUCOSE BLD STRIP.AUTO-MCNC: 129 MG/DL (ref 65–100)
GLUCOSE BLD STRIP.AUTO-MCNC: 97 MG/DL (ref 65–100)
PERFORMED BY, TECHID: ABNORMAL
PERFORMED BY, TECHID: ABNORMAL
PERFORMED BY, TECHID: NORMAL
SPECIAL REQUESTS,SREQ: NORMAL

## 2023-01-26 PROCEDURE — 74011250637 HC RX REV CODE- 250/637: Performed by: INTERNAL MEDICINE

## 2023-01-26 PROCEDURE — 82962 GLUCOSE BLOOD TEST: CPT

## 2023-01-26 PROCEDURE — 74011000250 HC RX REV CODE- 250: Performed by: INTERNAL MEDICINE

## 2023-01-26 PROCEDURE — 74011250637 HC RX REV CODE- 250/637: Performed by: FAMILY MEDICINE

## 2023-01-26 PROCEDURE — 92610 EVALUATE SWALLOWING FUNCTION: CPT

## 2023-01-26 PROCEDURE — 74011250636 HC RX REV CODE- 250/636: Performed by: INTERNAL MEDICINE

## 2023-01-26 RX ORDER — FLUCONAZOLE 100 MG/1
100 TABLET ORAL DAILY
Status: DISCONTINUED | OUTPATIENT
Start: 2023-01-27 | End: 2023-01-26 | Stop reason: HOSPADM

## 2023-01-26 RX ORDER — NYSTATIN 100000 [USP'U]/ML
500000 SUSPENSION ORAL 4 TIMES DAILY
Qty: 100 ML | Refills: 0 | Status: SHIPPED | OUTPATIENT
Start: 2023-01-26

## 2023-01-26 RX ORDER — POTASSIUM CHLORIDE 1.5 G/1.77G
40 POWDER, FOR SOLUTION ORAL
Status: COMPLETED | OUTPATIENT
Start: 2023-01-26 | End: 2023-01-26

## 2023-01-26 RX ORDER — DEXTROSE MONOHYDRATE AND SODIUM CHLORIDE 5; .225 G/100ML; G/100ML
125 INJECTION, SOLUTION INTRAVENOUS CONTINUOUS
Status: DISCONTINUED | OUTPATIENT
Start: 2023-01-26 | End: 2023-01-26 | Stop reason: HOSPADM

## 2023-01-26 RX ORDER — SODIUM BICARBONATE 650 MG/1
1300 TABLET ORAL 3 TIMES DAILY
Qty: 60 TABLET | Refills: 0 | Status: SHIPPED | OUTPATIENT
Start: 2023-01-26

## 2023-01-26 RX ORDER — FLUCONAZOLE 100 MG/1
100 TABLET ORAL DAILY
Qty: 7 TABLET | Refills: 0 | Status: SHIPPED | OUTPATIENT
Start: 2023-01-27 | End: 2023-02-03

## 2023-01-26 RX ADMIN — DEXTROSE AND SODIUM CHLORIDE 125 ML/HR: 5; 200 INJECTION, SOLUTION INTRAVENOUS at 12:15

## 2023-01-26 RX ADMIN — POTASSIUM CHLORIDE 40 MEQ: 1.5 POWDER, FOR SOLUTION ORAL at 11:31

## 2023-01-26 RX ADMIN — DEXTROSE MONOHYDRATE 125 ML/HR: 50 INJECTION, SOLUTION INTRAVENOUS at 02:45

## 2023-01-26 RX ADMIN — SODIUM BICARBONATE 1300 MG: 650 TABLET ORAL at 15:28

## 2023-01-26 RX ADMIN — NYSTATIN 500000 UNITS: 100000 SUSPENSION ORAL at 08:21

## 2023-01-26 RX ADMIN — SODIUM BICARBONATE 1300 MG: 650 TABLET ORAL at 08:21

## 2023-01-26 NOTE — DISCHARGE SUMMARY
Discharge Summary       PATIENT ID: Veronica Ramirez  MRN: [id number]   YOB: 1945    DATE OF ADMISSION: 1/19/2023  9:50 AM    DATE OF DISCHARGE:   PRIMARY CARE PROVIDER: Mary Paz MD     ATTENDING PHYSICIAN: Navid Velasquez  DISCHARGING PROVIDER: Navid Velasquez      CONSULTATIONS: IP CONSULT TO GASTROENTEROLOGY  IP CONSULT TO NEPHROLOGY    PROCEDURES/SURGERIES: Procedure(s):  COLONOSCOPY  COLON BIOPSY    ADMITTING DIAGNOSES:    Patient Active Problem List    Diagnosis Date Noted    Blood in stool 01/19/2023    Cystitis 01/19/2023    GI bleed 01/19/2023    TAL (acute kidney injury) ([facility] [address] 01/19/2023    Open wound of chin 03/17/2022    Abscess of multiple sites of buttock 03/17/2022    Open wound of left axillary region 03/17/2022    Open wound of right axillary region 03/17/2022    Lymphedema 03/17/2022    Calf ulcer, left, limited to breakdown of skin ([facility] [address] 03/17/2022    Controlled type 2 diabetes mellitus without complication, without long-term current use of insulin ([facility] [address] 07/30/2018    Recurrent depression ([facility] [address] 01/11/2018    Advanced care planning/counseling discussion 06/13/2016    H/O Pulmonary embolism (1/2015) 01/05/2015    Edema 08/27/2014    Hydradenitis 02/01/2012    HTN (hypertension) 01/21/2011    AR (allergic rhinitis) 05/21/2010    Eczema 05/21/2010    Depression 05/21/2010    Female stress incontinence 05/21/2010    Dyslipidemia 05/21/2010       DISCHARGE DIAGNOSES / PLAN:      Acute kidney injury (improving)  Acute GI bleed  History of DVT  History of diabetes diet-controlled  Hypertension  Hyperlipidemia  UTI with chronic Espitia  Leukocytosis (improving)  Oral thrush  Hypernatremia (improving)         DISCHARGE MEDICATIONS:  Current Discharge Medication List        START taking these medications    Details   fluconazole (DIFLUCAN) 100 mg tablet Take 1 Tablet by mouth daily for 7 doses. FDA advises cautious prescribing of oral fluconazole in pregnancy.   Qty: 7 Tablet, Refills: 0  Start date: 1/27/2023, End date: 2/3/2023      nystatin (MYCOSTATIN) 100,000 unit/mL suspension Take 5 mL by mouth four (4) times daily. swish and spit  Qty: 100 mL, Refills: 0  Start date: 1/26/2023      sodium bicarbonate 650 mg tablet Take 2 Tablets by mouth three (3) times daily. Qty: 60 Tablet, Refills: 0  Start date: 1/26/2023      cephALEXin (Keflex) 500 mg capsule Take 1 Capsule by mouth four (4) times daily for 7 days. Qty: 28 Capsule, Refills: 0  Start date: 1/19/2023, End date: 1/26/2023           CONTINUE these medications which have NOT CHANGED    Details   atorvastatin (LIPITOR) 80 mg tablet Take 1 Tablet by mouth daily. Indications: high cholesterol  Qty: 90 Tablet, Refills: 1    Associated Diagnoses: Hyperlipidemia, unspecified hyperlipidemia type      oxybutynin (DITROPAN) 5 mg tablet Take 1 Tablet by mouth three (3) times daily. Indications: an increased need to urinate often  Qty: 270 Tablet, Refills: 1    Associated Diagnoses: Female stress incontinence      zinc oxide 20 % ointment Apply  to affected area as needed for Skin Irritation.   Qty: 30 g, Refills: 1    Associated Diagnoses: Pressure injury of buttock, stage 1, unspecified laterality           STOP taking these medications       OTHER Comments:   Reason for Stopping:         diclofenac (VOLTAREN) 1 % gel Comments:   Reason for Stopping:         nut.tx.gluc intol,lf,soy-fiber (Glucerna 1 Jose C) 0.04-1 gram-kcal/mL liqd Comments:   Reason for Stopping:         bumetanide (BUMEX) 0.5 mg tablet Comments:   Reason for Stopping:         clindamycin (CLEOCIN T) 1 % lotion Comments:   Reason for Stopping:         apixaban (ELIQUIS) 5 mg tablet Comments:   Reason for Stopping:         gabapentin (NEURONTIN) 100 mg capsule Comments:   Reason for Stopping:         miscellaneous medical supply (Anti-Embolism Stockings) misc Comments:   Reason for Stopping:         OTHER Comments:   Reason for Stopping:         OTHER Comments:   Reason for Stopping:                 NOTIFY YOUR PHYSICIAN FOR ANY OF THE FOLLOWING:   Fever over 101 degrees for 24 hours. Chest pain, shortness of breath, fever, chills, nausea, vomiting, diarrhea, change in mentation, falling, weakness, bleeding. Severe pain or pain not relieved by medications. Or, any other signs or symptoms that you may have questions about. DISPOSITION:  x  Home With:   OT  PT  HH  RN       Long term SNF/Inpatient Rehab    Independent/assisted living    Hospice    Other:       PATIENT CONDITION AT DISCHARGE: Stable      PHYSICAL EXAMINATION AT DISCHARGE:  General:          Alert, cooperative, no distress, appears stated age. HEENT:           Atraumatic, anicteric sclerae, pink conjunctivae                          No oral ulcers, mucosa moist, throat clear, dentition fair  Neck:               Supple, symmetrical  Lungs:             Clear to auscultation bilaterally. No Wheezing or Rhonchi. No rales. Chest wall:      No tenderness  No Accessory muscle use. Heart:              Regular  rhythm,  No  murmur   No edema  Abdomen:        Soft, non-tender. Not distended. Bowel sounds normal  Extremities:     No cyanosis. No clubbing,                            Skin turgor normal, Capillary refill normal  Skin:                Not pale. Not Jaundiced  No rashes   Psych:             Not anxious or agitated. Neurologic:      Alert, moves all extremities, answers questions appropriately and responds to commands     US RETROPERITONEUM COMP   Final Result   Medical renal disease. No hydronephrosis demonstrated. CT HEAD WO CONT   Final Result   No acute abnormality.                  Recent Results (from the past 24 hour(s))   GLUCOSE, POC    Collection Time: 01/25/23  4:24 PM   Result Value Ref Range    Glucose (POC) 93 65 - 100 mg/dL    Performed by Irish Girard POC    Collection Time: 01/25/23  4:48 PM   Result Value Ref Range    Glucose (POC) 70 65 - 100 mg/dL    Performed by Live Huerta    GLUCOSE, POC    Collection Time: 01/25/23  9:13 PM   Result Value Ref Range    Glucose (POC) 149 (H) 65 - 100 mg/dL    Performed by Jovon Alegria, POC    Collection Time: 01/26/23  7:47 AM   Result Value Ref Range    Glucose (POC) 114 (H) 65 - 100 mg/dL    Performed by Trinidad Tavares, POC    Collection Time: 01/26/23 11:26 AM   Result Value Ref Range    Glucose (POC) 129 (H) 65 - 100 mg/dL    Performed by Amber [address]:    Patient is a 68y.o. year old female with signal past medical history of diabetes diet-controlled history of DVT on Eliquis hypertension hyperlipidemia came to emergency room because of lethargic patient not eating drinking well weak tired patient have large bloody bowel movement today came to emergency room seen by the ER physician [facility] shows WBC is 12.6  UA came back positive for UTI patient have a chronic Espitia  Renal function shows BUN of 170 creatinine 5.66     Patient was admitted with acute kidney injury acute GI bleed UTI     1/20  Patient resting comfortably in bed, but reports she is still experiencing generalized abdominal pain. Renal US complete: Medical renal disease. No hydronephrosis demonstrated. GI and nephrology consult pending      1/23  Patient resting comfortably in bed without any complaints at this time. Patient is still experiencing bloody bowel movements. GI has patient scheduled for a colonoscopy this morning in attempt to locate the source of the bleed. Sodium 156  Renal function improved     1/24  Patient resting comfortably in bed without any complaints at this time. She consumed some of the Golytely, but did not complete. Patient had 2 episodes of large dark loose stool overnight.   Patient is scheduled for colonoscopy this morning   IVF changed to 1/4 NS due to the hypernatremia   Complete IV rocephin yesterday      1/25  Patient resting comfortably in bed this morning and denies any complaints at this time. IVF changed to D5W  added sodium bicarbonate p.o. twice daily  Bowel prep was not clean yesterday and she still was having liquid dark stool. Patient continued bowel prep last night. Colonoscopy scheduled for this morning     1/26  Patient resting comfortably in bed and reports she is feeling better today. She denies any complaints at this time. 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 Nephrology increased rate of D5W yesterday, will continue to monitor hypernatremia. Also, increased sodium bicarbonate to 1300 mg tid. Morning labs still pending. Family has selected for patient to go to [facility] for rehab, able to accept patient today.   PT, OT and speech consult before discharge    Will discontinue all  anticoagulation due to bleeding anemia  Patient need to follow-up with nephrology regarding kidney failure and hyponatremia    Repeat BMP next week    Medication reconciliation done time chart patient 35 minutes 50% times spent counseling coordination of care        Signed:   Cat Ferreira MD  1/26/2023  12:04 PM

## 2023-01-26 NOTE — PROGRESS NOTES
Renal Progress Note    Patient: Sara Marei MRN: [id number]  SSN: [id number]    YOB: 1945  Age: 68 y.o. Sex: female      Admit Date: 1/19/2023    LOS: 7 days     Subjective:   Patient seen at bedside. Alert and awake, no acute distress. no swelling in lower extremities. No complaints of shortness of breath. Improving renal functions with creatinine down to 0.9  Sodium improved to 150 today. K 3.2  Current Facility-Administered Medications   Medication Dose Route Frequency    potassium chloride (KLOR-CON) packet for solution 40 mEq  40 mEq Oral NOW    dextrose 5% infusion  125 mL/hr IntraVENous CONTINUOUS    sodium bicarbonate tablet 1,300 mg  1,300 mg Oral TID    nystatin (MYCOSTATIN) 100,000 unit/mL oral suspension 500,000 Units  500,000 Units Swish and Swallow BID    acetaminophen (TYLENOL) tablet 650 mg  650 mg Oral Q6H PRN    Or    acetaminophen (TYLENOL) suppository 650 mg  650 mg Rectal Q6H PRN    polyethylene glycol (MIRALAX) packet 17 g  17 g Oral DAILY PRN    ondansetron (ZOFRAN ODT) tablet 4 mg  4 mg Oral Q8H PRN    Or    ondansetron (ZOFRAN) injection 4 mg  4 mg IntraVENous Q6H PRN    insulin lispro (HUMALOG) injection   SubCUTAneous AC&HS    glucose chewable tablet 16 g  4 Tablet Oral PRN    glucagon (GLUCAGEN) injection 1 mg  1 mg IntraMUSCular PRN        Vitals:    01/25/23 2010 01/25/23 2320 01/26/23 0320 01/26/23 0844   BP: 99/65  102/74 (!) 110/58   Pulse: 83 71 80 71   Resp: 17  18 18   Temp: 99 °F (37.2 °C)  99.1 °F (37.3 °C) 98.1 °F (36.7 °C)   SpO2: 100%  100% 100%   Weight:       Height:         Objective:   General: alert awake , confused, no acute distress. HEENT: EOMI, no Icterus, no Pallor, pupils reactive, mucosa dry  Neck: Neck is supple, No JVD,   Lungs: breathsounds normal, no respiratory distress on inspection, no rhonchi, no rales,  CVS: heart sounds normal, no murmurs, no rubs.   GI: soft, nontender, normal BS,   Extremeties:  no cyanosis, no edema,  Neuro: Alert, awake, answering simple questions but appears forgetful and confused, moving all extremeties   Skin: Poor skin turgor, dark discoloration of skin noted in lower extremities. Intake and Output:  Current Shift: No intake/output data recorded. Last three shifts: 01/24 1901 - 01/26 0700  In: 4392.7 [I.V.:3992.7]  Out: 1600 [Urine:1600]      Lab/Data Review:  Recent Labs     01/25/23  0638 01/24/23  1213 01/23/23  1427   WBC 10.3 12.8*  --    HGB 8.2* 9.0* 8.3*   HCT 27.0* 30.7* 27.8*    279  --      Recent Labs     01/25/23  0639 01/24/23  1213 01/23/23  1427   * 151* 152*   K 3.2* 5.3* 3.9   * 129* 127*   CO2 19* 18* 19*   * 94 108*   BUN 16 24* 34*   CREA 0.93 1.15* 1.16*   CA 8.6 9.0 8.8   PHOS  --   --  1.4*   ALB 1.8* 1.9* 1.9*   TBILI 0.3 0.4  --    ALT 18 20  --      No results for input(s): PH, PCO2, PO2, HCO3, FIO2 in the last 72 hours.   Recent Results (from the past 24 hour(s))   GLUCOSE, POC    Collection Time: 01/25/23 11:27 AM   Result Value Ref Range    Glucose (POC) 105 (H) 65 - 100 mg/dL    Performed by Roxann Co, POC    Collection Time: 01/25/23  4:24 PM   Result Value Ref Range    Glucose (POC) 93 65 - 100 mg/dL    Performed by Holden Vernon, POC    Collection Time: 01/25/23  4:48 PM   Result Value Ref Range    Glucose (POC) 70 65 - 100 mg/dL    Performed by [place]    GLUCOSE, POC    Collection Time: 01/25/23  9:13 PM   Result Value Ref Range    Glucose (POC) 149 (H) 65 - 100 mg/dL    Performed by Rosemary Feng, POC    Collection Time: 01/26/23  7:47 AM   Result Value Ref Range    Glucose (POC) 114 (H) 65 - 100 mg/dL    Performed by [place] and Plan:     Acute Kidney Injury :   -probably prerenal azotemia secondary to Hypotension and poor p.o. intake   -No evidence of obstruction on renal ultrasound  -Improving renal functions noted, creatinine improved from 5.68 >0.9.  -will cont ivf due to hypernatremia. 2.  Acute GI bleed :   -Probably related to anticoagulation  -Stable hemoglobins, continue to monitor H&H  -Currently off anticoagulation  -GI following the patient and coloscopy is planned for today    3. Hypotension:   -Probably related to dehydration and poor intake/GI bleed  -Improved hemodynamic status   -Will continue to monitor hemodynamic status     4. History of DVT:   Was on anticoagulation  On hold for acute GI bleed       5. Hypernatremia:   -Na 150  -will change D5w to D5W-1/4     6.  Hypokalemia:  -k 3.2  -po KCL 40 meq x 2 doses     Signed By: Stephany Bates MD     January 26, 2023

## 2023-01-26 NOTE — PROGRESS NOTES
Problem: Upper and Lower GI Bleed:  Discharge Outcomes  Goal: *Tolerating diet  Outcome: Progressing Towards Goal  Note: Patient is tolerating her diet with no nausea. Patient does have a poor appetite. She loves ensures       Bedside shift change report given to Jenelle Devlin RN (oncoming nurse) by Herminio Yin RN (offgoing nurse). Report included the following information SBAR, Kardex, ED Summary, Procedure Summary, Intake/Output, MAR, Accordion, and Med Rec Status.

## 2023-01-26 NOTE — PROGRESS NOTES
Bedside and Verbal shift change report given to Ayleen Reed (oncoming nurse) by Nataly Ng (offgoing nurse). Report included the following information SBAR, Kardex, Procedure Summary, Intake/Output, MAR, Accordion, and Recent Results.

## 2023-01-26 NOTE — PROGRESS NOTES
0845: Chart reviewed. Family has chosen [facility] for rehab when medically stable. SNF is able to accept patient today, if appropriate. CM will continue to follow patient and recs of medical team.    7980: Discharge summary/order noted if okay with nephrology. If cleared via nephrology, patient may discharge via medical transport to: [address] [place] [address]  [address]  [place], [address]    Room #222    Report to be called to [phone] - ask for [address] Orders faxed to [phone] per request of SNF. Nurse aware of above. 1340: Per nurse, nephrology has cleared patient for discharge. CM attempted to contact [facility] [phone] to notify of discharge orders. VM left requesting a return call. CM attempted to contact Sony Schilling [phone] with VM stating \"Familia\" and VM full. CM attempted to contact Sony Schilling [phone] receiving \"the number you are trying to reach is not reachable. \"    CM attempted to contact Tesfaye Armstrong [phone] receiving \"the subscriber you have dialed is not in service. \"    1456: CM attempted to contact [facility] [phone] to notify of discharge orders. Detailed VM left. 1540: CM telephoned [facility] reaching her to inform of discharge order. Room #222 provided. Understanding verbalized. When medical transport arranged, patient may discharge to: [address] [place] [address]  [address]  [place]    Room #222    Report to be called to [phone] - ask for [address] Medicare pt has received, reviewed, and signed 2nd IM letter informing them of their right to appeal the discharge. Signed copied has been placed on pt bedside chart. Discharge plan of care/case management plan validated with provider discharge order. Discharge Checklist Completed.

## 2023-01-26 NOTE — PROGRESS NOTES
SPEECH LANGUAGE PATHOLOGY BEDSIDE SWALLOW EVALUATIONS  Patient: Florence Mendez  (67 y.o. )  Date: 1/26/2023  Primary Diagnosis:  GI bleed, TAL, cystitis  Precautions:  Aspiration, fall    ASSESSMENT :  Patient is A&Ox1 and inconsistently follows basic commands. Daughter at bedside reports coughing with soup and majority of soup on towel at bedside. Daughter reports patient w/ thrush x1 month. Discussed w/ MD ABRAN ordered IV diflucan as patient already receiving Nystatin. Limited PO trials s/t participation. Patient appears disinterested in PO and grimacing noted. She denies pain. Per daughter previously reported food tastes sweet and salty. Oral phase c/b reduced bolus acceptance and slow A-P transit. Pharyngeal phase c/b timely swallow and HLE is wfl upon palpation. No overt s/s of pen/asp observed. Concerns for failure to thrive present. Patient will benefit from skilled intervention to address the above impairments. Patients rehabilitation potential is considered to be Fair     PLAN :  Recommendations and Planned Interventions:  Rec full liquid diet w/ strict asp/GERD precautions and meds in appleasuce. Do not force feed patient, encourage PO intake, small bites/sips, and remain upright 30 mins after PO. GI following. Rec RD consult. Frequency/Duration: Patient will be followed by speech-language pathology 3 times a week to address goals. Discharge Recommendations: Skilled Nursing Facility     SUBJECTIVE:   Daughter reports poor PO intake and thrush x1 month. OBJECTIVE:   Patient admitted w/ decreased PO intake, lethargy and concerns for GI bleed. Patient s/p EGD and colonoscopy.    Past Medical History:   Diagnosis Date    Arthritis     Diabetes ([facility]     states she is \"pre-diabetic\", diet controlled    frequency     H/O blood clots     right side of body    Heart attack ([facility] [address] 1/2014    Hydradenitis 2/1/2012    Hypercholesterolemia     Hypertension     Ill-defined condition     edema of legs Thromboembolus ([facility]      Current Diet:  DIET ADULT     Cognitive and Communication Status:  Neurologic State: Alert, Confused  Orientation Level: Oriented to person  Cognition: Decreased attention/concentration, Decreased command following  Perception: Appears intact  Perseveration: No perseveration noted  Safety/Judgement: Decreased insight into deficits, Decreased awareness of need for safety, Decreased awareness of need for assistance, Decreased awareness of environment  Swallowing Evaluation:   Oral Assessment:  Oral Assessment  Labial: No impairment  Dentition: Intact  Oral Hygiene: red mucosa being treated from oral canidida  Lingual: No impairment  Velum: No impairment  Mandible: No impairment  P.O. Trials:  Patient Position: upright in bed  Vocal quality prior to P.O.: No impairment  Consistency Presented: Thin liquid;Puree; Ice chips  How Presented: SLP-fed/presented;Spoon;Cup/sip;Straw     Bolus Acceptance: Impaired  Bolus Formation/Control: Impaired  Type of Impairment: Delayed  Propulsion: Delayed (# of seconds)  Oral Residue: Less than 10% of bolus  Initiation of Swallow: Delayed (# of seconds)  Laryngeal Elevation: Functional  Aspiration Signs/Symptoms: None  Pharyngeal Phase Characteristics: No impairment, issues, or problems              Oral Phase Severity: Mild  Pharyngeal Phase Severity : Mild  Voice:  Vocal Quality: No impairment       Pain:  Pain Scale 1: Numeric (0 - 10)  Pain Intensity 1: 0         After treatment:   Patient left in no apparent distress in bed, Call bell within reach, Nursing notified, Caregiver / family present, and Bed / chair alarm activated    COMMUNICATION/EDUCATION:   Daughter educated on diet recs, s/s aspiration, aspiration precautions and ST POC. The patient's plan of care including recommendations, planned interventions, and recommended diet changes were discussed with: Registered nurse and Physician.      Patient/family have participated as able in goal setting and plan of care. Patient/family agree to work toward stated goals and plan of care. Problem: Dysphagia (Adult)  Goal: *Acute Goals and Plan of Care (Insert Text)  Description: Speech Therapy Swallow Goals  Initiated 1/26/2023  -Patient will tolerate clear diet with thin liquids without clinical indicators of aspiration given minimal cues within 7 day(s). -Patient will tolerate PO trials without clinical indicators of aspiration given minimal cues within 7 day(s). -Patient will demonstrate understanding of swallow safety precautions and aspiration precautions, diet recs with minimal cues within 7 day(s).          -Patient/caregiver goal: to eat   Outcome: Not Met     Thank you for this referral.  Benita Fuentes M.S., M.Ed., CCC-SLP  Time Calculation: 21 mins

## 2023-01-26 NOTE — PROGRESS NOTES
Problem: Falls - Risk of  Goal: *Absence of Falls  Description: Document Thora Bare Fall Risk and appropriate interventions in the flowsheet. Outcome: Progressing Towards Goal  Note: Fall Risk Interventions:                                Problem: Patient Education: Go to Patient Education Activity  Goal: Patient/Family Education  Outcome: Progressing Towards Goal     Problem: Pressure Injury - Risk of  Goal: *Prevention of pressure injury  Description: Document Spenser Scale and appropriate interventions in the flowsheet.   Outcome: Progressing Towards Goal  Note: Pressure Injury Interventions:  Sensory Interventions: Assess changes in LOC    Moisture Interventions: Absorbent underpads    Activity Interventions: Assess need for specialty bed    Mobility Interventions: PT/OT evaluation    Nutrition Interventions: Document food/fluid/supplement intake    Friction and Shear Interventions: Apply protective barrier, creams and emollients, Foam dressings/transparent film/skin sealants, HOB 30 degrees or less, Lift sheet, Minimize layers                Problem: Patient Education: Go to Patient Education Activity  Goal: Patient/Family Education  Outcome: Progressing Towards Goal     Problem: Patient Education: Go to Patient Education Activity  Goal: Patient/Family Education  Outcome: Progressing Towards Goal     Problem: Patient Education: Go to Patient Education Activity  Goal: Patient/Family Education  Outcome: Progressing Towards Goal     Problem: Upper and Lower GI Bleed:  Discharge Outcomes  Goal: *Hemodynamically stable  Outcome: Progressing Towards Goal  Goal: *Lungs clear or at baseline  Outcome: Progressing Towards Goal  Goal: *Demonstrates independent activity or return to baseline  Outcome: Progressing Towards Goal  Goal: *Pain is controlled to three or less  Outcome: Progressing Towards Goal  Goal: *Verbalizes understanding and describes prescribed diet  Outcome: Progressing Towards Goal  Goal: *Tolerating diet  Outcome: Progressing Towards Goal  Goal: *Verbalizes name, dosage, time, side effects, and number of days to continue medications  Outcome: Progressing Towards Goal  Goal: *Anxiety reduced or absent  Outcome: Progressing Towards Goal  Goal: *Understands and describes signs and symptoms to report to providers(Stroke Metric)  Outcome: Progressing Towards Goal  Goal: *Describes follow-up/return visits to physicians  Outcome: Progressing Towards Goal  Goal: *Describes available resources and support systems  Outcome: Progressing Towards Goal

## 2023-01-26 NOTE — PROGRESS NOTES
Called report to the nurse at [facility]      Discharge plan of care/case management plan validated with provider discharge order.

## 2023-01-27 ENCOUNTER — PATIENT OUTREACH (OUTPATIENT)
Dept: CASE MANAGEMENT | Age: 78
End: 2023-01-27

## 2023-02-03 ENCOUNTER — PATIENT OUTREACH (OUTPATIENT)
Dept: CASE MANAGEMENT | Age: 78
End: 2023-02-03

## 2023-02-10 ENCOUNTER — PATIENT OUTREACH (OUTPATIENT)
Dept: CASE MANAGEMENT | Age: 78
End: 2023-02-10

## 2023-02-17 ENCOUNTER — PATIENT OUTREACH (OUTPATIENT)
Dept: CASE MANAGEMENT | Age: 78
End: 2023-02-17

## 2023-02-24 ENCOUNTER — PATIENT OUTREACH (OUTPATIENT)
Dept: CASE MANAGEMENT | Age: 78
End: 2023-02-24

## 2023-02-24 NOTE — PROGRESS NOTES
Patient remain at SNF for rehab at this time. Patient has not return to ED/Hosp in the last 30 days. Will sign off at this time.

## 2023-03-09 ENCOUNTER — TELEPHONE (OUTPATIENT)
Dept: FAMILY MEDICINE CLINIC | Age: 78
End: 2023-03-09

## 2023-03-16 ENCOUNTER — TELEPHONE (OUTPATIENT)
Dept: FAMILY MEDICINE CLINIC | Age: 78
End: 2023-03-16

## 2023-03-16 NOTE — TELEPHONE ENCOUNTER
----- Message from Priyanka Gold sent at 3/16/2023  2:16 PM EDT -----  Subject: Message to Provider    QUESTIONS  Information for Provider? Perla from [facility] is calling   to confirm patient's . Please contact her back at [phone]. Please advise  ---------------------------------------------------------------------------  --------------  CALL BACK INFO  [phone]; OK to leave message on voicemail  ---------------------------------------------------------------------------  --------------  SCRIPT ANSWERS  Relationship to Patient? Third Party  Third Party Type? Home Health Care? Representative Name?  Perla

## 2023-03-17 NOTE — TELEPHONE ENCOUNTER
[address] [facility] [place], spoke with [address] [place] [address]. She was advised patient has appt with Dr Yolanda Goodwin 3-29-23. She asked if Dr Yolanda Goodwin will follow patient. She was advised patient has to be seen in office. Verbalized understanding.

## 2023-03-29 ENCOUNTER — OFFICE VISIT (OUTPATIENT)
Dept: FAMILY MEDICINE CLINIC | Age: 78
End: 2023-03-29

## 2023-03-29 VITALS
BODY MASS INDEX: 19.93 KG/M2 | HEART RATE: 91 BPM | DIASTOLIC BLOOD PRESSURE: 67 MMHG | SYSTOLIC BLOOD PRESSURE: 117 MMHG | RESPIRATION RATE: 20 BRPM | HEIGHT: 67 IN | OXYGEN SATURATION: 99 % | TEMPERATURE: 98.1 F | WEIGHT: 127 LBS

## 2023-03-29 DIAGNOSIS — L89.301 PRESSURE INJURY OF BUTTOCK, STAGE 1, UNSPECIFIED LATERALITY: ICD-10-CM

## 2023-03-29 DIAGNOSIS — E78.5 HYPERLIPIDEMIA, UNSPECIFIED HYPERLIPIDEMIA TYPE: ICD-10-CM

## 2023-03-29 DIAGNOSIS — F33.9 RECURRENT DEPRESSION (HCC): ICD-10-CM

## 2023-03-29 DIAGNOSIS — L73.2 HYDRADENITIS: ICD-10-CM

## 2023-03-29 DIAGNOSIS — E11.9 CONTROLLED TYPE 2 DIABETES MELLITUS WITHOUT COMPLICATION, WITHOUT LONG-TERM CURRENT USE OF INSULIN (HCC): Primary | ICD-10-CM

## 2023-03-29 DIAGNOSIS — R63.4 WEIGHT LOSS: ICD-10-CM

## 2023-03-29 DIAGNOSIS — E11.40 NEUROPATHY DUE TO TYPE 2 DIABETES MELLITUS (HCC): ICD-10-CM

## 2023-03-29 DIAGNOSIS — I27.82 OTHER CHRONIC PULMONARY EMBOLISM WITHOUT ACUTE COR PULMONALE (HCC): ICD-10-CM

## 2023-03-29 DIAGNOSIS — N39.3 FEMALE STRESS INCONTINENCE: ICD-10-CM

## 2023-03-29 RX ORDER — CHLORHEXIDINE GLUCONATE 4 G/100ML
SOLUTION TOPICAL
Qty: 236 ML | Refills: 1 | Status: SHIPPED | OUTPATIENT
Start: 2023-03-29

## 2023-03-29 RX ORDER — OXYBUTYNIN CHLORIDE 5 MG/1
5 TABLET ORAL 3 TIMES DAILY
Qty: 270 TABLET | Refills: 1 | Status: SHIPPED | OUTPATIENT
Start: 2023-03-29

## 2023-03-29 RX ORDER — ZINC OXIDE 20 G/100G
OINTMENT TOPICAL AS NEEDED
Qty: 30 G | Refills: 1 | Status: SHIPPED | OUTPATIENT
Start: 2023-03-29

## 2023-03-29 RX ORDER — FEEDER CONTAINER W-GRAVITY SET
1 EACH MISCELLANEOUS DAILY
Qty: 30 EACH | Refills: 5 | Status: SHIPPED | OUTPATIENT
Start: 2023-03-29

## 2023-03-29 RX ORDER — ATORVASTATIN CALCIUM 80 MG/1
80 TABLET, FILM COATED ORAL DAILY
Qty: 90 TABLET | Refills: 1 | Status: SHIPPED | OUTPATIENT
Start: 2023-03-29

## 2023-03-29 RX ORDER — SODIUM BICARBONATE 650 MG/1
650 TABLET ORAL 3 TIMES DAILY
Qty: 90 TABLET | Refills: 2 | Status: SHIPPED | OUTPATIENT
Start: 2023-03-29

## 2023-03-29 NOTE — PROGRESS NOTES
Patient: Charisse Butler MRN: [id number]  SSN: [id number]    YOB: 1945  Age: 66 y.o. Sex: female        Subjective:     Chief Complaint   Patient presents with    Hospital Follow Up       HPI: she is a 66y.o. year old female who presents with daughters for follow up. She was admitted to hospital with dx of SIRS. She was discharged to a SNF. She went home 2 weeks ago. Patient with hx of T2D, HTN, HLD, hydradenitis and pedal edema. Patient needs medication refills. Encounter Diagnoses   Name Primary? Controlled type 2 diabetes mellitus without complication, without long-term current use of insulin (HCC) Yes    Pressure injury of buttock, stage 1, unspecified laterality     Female stress incontinence     Hyperlipidemia, unspecified hyperlipidemia type     Hydradenitis     Weight loss     Neuropathy due to type 2 diabetes mellitus ([facility] [address]     Other chronic pulmonary embolism without acute cor pulmonale (HCC)     Recurrent depression (HCC)        BP Readings from Last 3 Encounters:   03/29/23 117/67   01/26/23 (!) 107/54   09/14/22 126/80      Wt Readings from Last 3 Encounters:   03/29/23 127 lb (57.6 kg)   01/19/23 140 lb (63.5 kg)   09/14/22 133 lb 12.8 oz (60.7 kg)     Social History     Tobacco Use   Smoking Status Never   Smokeless Tobacco Never      Current and past medical information:    Current Medications after this visit[de-identified]     Current Outpatient Medications   Medication Sig    zinc oxide 20 % ointment Apply  to affected area as needed for Skin Irritation. oxybutynin (DITROPAN) 5 mg tablet Take 1 Tablet by mouth three (3) times daily. Indications: an increased need to urinate often    atorvastatin (LIPITOR) 80 mg tablet Take 1 Tablet by mouth daily. Indications: high cholesterol    sodium bicarbonate 650 mg tablet Take 1 Tablet by mouth three (3) times daily.     chlorhexidine (HIBICLENS) 4 % liquid Apply to axilla daily.    nut.tx.gluc intol,lf,soy-fiber (Glucerna 1 Jose C) 0.04-1 gram-kcal/mL liqd Take 1 Bottle by mouth daily. No current facility-administered medications for this visit. Patient Active Problem List    Diagnosis Date Noted    Neuropathy due to type 2 diabetes mellitus ([facility] [address] 03/31/2023    Blood in stool 01/19/2023    Cystitis 01/19/2023    GI bleed 01/19/2023    TAL (acute kidney injury) ([facility] 01/19/2023    Open wound of chin 03/17/2022    Abscess of multiple sites of buttock 03/17/2022    Open wound of left axillary region 03/17/2022    Open wound of right axillary region 03/17/2022    Lymphedema 03/17/2022    Calf ulcer, left, limited to breakdown of skin ([address] 03/17/2022    Controlled type 2 diabetes mellitus without complication, without long-term current use of insulin ([address] 07/30/2018    Recurrent depression ([facility] 01/11/2018    Advanced care planning/counseling discussion 06/13/2016    H/O Pulmonary embolism (1/2015) 01/05/2015    Edema 08/27/2014    Hydradenitis 02/01/2012    HTN (hypertension) 01/21/2011    AR (allergic rhinitis) 05/21/2010    Eczema 05/21/2010    Depression 05/21/2010    Female stress incontinence 05/21/2010    Dyslipidemia 05/21/2010       Past Medical History:   Diagnosis Date    Arthritis     Diabetes ([facility] [address]     states she is \"pre-diabetic\", diet controlled    frequency     H/O blood clots     right side of body    Heart attack ([facility] [address] 1/2014    Hydradenitis 2/1/2012    Hypercholesterolemia     Hypertension     Ill-defined condition     edema of legs    Thromboembolus (HCC)        Allergies   Allergen Reactions    Ciprofloxacin Other (comments)     Vomiting    Doxycycline Other (comments)     Facial swelling.     Pcn [Penicillins] Rash       Past Surgical History:   Procedure Laterality Date    COLONOSCOPY N/A 1/25/2023    COLONOSCOPY performed by Gretel Sherman MD at [address] [place] [address] History     Socioeconomic History    Marital status: LEGALLY    Tobacco Use    Smoking status: Never    Smokeless tobacco: Never   Vaping Use Vaping Use: Never used   Substance and Sexual Activity    Alcohol use: No     Alcohol/week: 0.0 standard drinks    Drug use: No    Sexual activity: Never         Objective:     Review of Systems:  Constitutional: Negative for fatigue or malaise  Derm: hx of hydradenitis b/l axilla  HEENT: right facial swelling  Cardiovascular: Negative for dizziness, chest pain or palpitations  Respiratory: Negative for cough, wheezing or SOB  Gastrointestinal:  Negative for nausea or abdominal pain  Genital/urinary: hx of incontinence, Negative for dysuria   Musculoskeletal: Negative for acute myalgias or arthralgias   Neurological: Negative for headache, weakness or paresthesia  Psychological: Negative for depression or anxiety      Vitals:    03/29/23 1515   BP: 117/67   Pulse: 91   Resp: 20   Temp: 98.1 °F (36.7 °C)   SpO2: 99%   Weight: 127 lb (57.6 kg)   Height: 5' 7\" (1.702 m)      Body mass index is 19.89 kg/m². Physical Exam:  Constitutional: well developed, well nourished, in no acute distress  Skin: multiple nodules b/l axilla  Head: normocephalic, atraumatic  Eyes: sclera clear, EOMI  Neck: normal range of motion  Cardiovascular: normal S1, S2, regular rate and rhythm, 3+ pedal edema  Respiratory: clear to auscultation bilaterally with symmetrical effort  Extremities: in wheelchair  Neurology: normal strength and sensation  Psych: active, alert and oriented, affect appropriate       Assessment and orders:       ICD-10-CM ICD-9-CM    1.  Controlled type 2 diabetes mellitus without complication, without long-term current use of insulin (HCC)  E11.9 250.00 nut.tx.gluc intol,lf,soy-fiber (Glucerna 1 Jose C) 0.04-1 gram-kcal/mL liqd      CANCELED: METABOLIC PANEL, COMPREHENSIVE      CANCELED: CBC W/O DIFF      CANCELED: TSH 3RD GENERATION      CANCELED: HEMOGLOBIN A1C WITH EAG      CANCELED: HEMOGLOBIN A1C WITH EAG      CANCELED: TSH 3RD GENERATION      CANCELED: CBC W/O DIFF      CANCELED: METABOLIC PANEL, COMPREHENSIVE 2. Pressure injury of buttock, stage 1, unspecified laterality  L89.301 707.05 zinc oxide 20 % ointment     707.21       3. Female stress incontinence  N39.3 625.6 oxybutynin (DITROPAN) 5 mg tablet      4. Hyperlipidemia, unspecified hyperlipidemia type  E78.5 272.4 atorvastatin (LIPITOR) 80 mg tablet      5. Hydradenitis  L73.2 705.83 chlorhexidine (HIBICLENS) 4 % liquid      6. Weight loss  R63.4 783.21 nut.tx.gluc intol,lf,soy-fiber (Glucerna 1 Jose C) 0.04-1 gram-kcal/mL liqd      7. Neuropathy due to type 2 diabetes mellitus (HCC)  E11.40 250.60      357.2       8. Other chronic pulmonary embolism without acute cor pulmonale (HCC)  I27.82 416.2       9. Recurrent depression ([facility] [address]  F33.9 296.30            Plan of care:  Diagnoses were discussed in detail with patient. Medications reviewed and appropriate. Patient to continue current prescribed medications as written. Medication risks/benefits/side effects discussed with patient. All of the patient's questions were addressed and answered to apparent satisfaction. The patient understands and agrees with our plan of care. The patient knows to call back if they have questions about the plan of care or if symptoms change. The patient received an After-Visit Summary which contains VS, diagnoses, orders, allergy and medication lists. Patient Care Team:  Byron White MD as PCP - General (Internal Medicine Physician)  Ester Stephens MD as PCP - [facility]  Roya Aponte MD (Cardiovascular Disease Physician)  Ju Schmitt DPM (Podiatry)  Juan Solorzano MD (General Surgery)  Pavel Cameron OD [facility])    Follow-up and Dispositions    Return in about 4 weeks (around 4/26/2023), or if symptoms worsen or fail to improve.            Future Appointments   Date Time Provider Buddy Boles   4/26/2023  2:00 PM Ester Stephens MD [facility] BS AMB       Signed By: Dawson Camarena MD     March 31, 2023

## 2023-03-29 NOTE — PROGRESS NOTES
1. \"Have you been to the ER, urgent care clinic since your last visit? Hospitalized since your last visit? \" No    2. \"Have you seen or consulted any other health care providers outside of the [address] [place] [address] since your last visit? \" No     3. For patients aged 39-70: Has the patient had a colonoscopy / FIT/ Cologuard? NA - based on age      If the patient is female:    4. For patients aged 41-77: Has the patient had a mammogram within the past 2 years? NA - based on age or sex      11. For patients aged 21-65: Has the patient had a pap smear?  NA - based on age or sex    Health Maintenance Due   Topic Date Due    COVID-19 Vaccine (1) Never done    Pneumococcal 65+ years (1 - PCV) Never done    Eye Exam Retinal or Dilated  Never done    Shingles Vaccine (1 of 2) Never done    Foot Exam Q1  09/17/2020    Lipid Screen  03/12/2022    Flu Vaccine (1) Never done    DTaP/Tdap/Td series (2 - Td or Tdap) 09/13/2022    Medicare Yearly Exam  12/15/2022

## 2023-03-31 PROBLEM — E11.40 NEUROPATHY DUE TO TYPE 2 DIABETES MELLITUS (HCC): Status: ACTIVE | Noted: 2023-03-31

## 2023-04-03 ENCOUNTER — TELEPHONE (OUTPATIENT)
Dept: FAMILY MEDICINE CLINIC | Age: 78
End: 2023-04-03

## 2023-04-03 DIAGNOSIS — I10 ESSENTIAL HYPERTENSION: ICD-10-CM

## 2023-04-03 DIAGNOSIS — E11.9 CONTROLLED TYPE 2 DIABETES MELLITUS WITHOUT COMPLICATION, WITHOUT LONG-TERM CURRENT USE OF INSULIN (HCC): Primary | ICD-10-CM

## 2023-04-03 NOTE — TELEPHONE ENCOUNTER
Patient called. No answer. Message left for return call. Patient labs not run, new labs are in. Patient may schedule lab only visit to have these re-drawn.

## 2023-04-03 NOTE — TELEPHONE ENCOUNTER
----- Message from Kathy Waldrop MD sent at 4/3/2023  2:16 PM EDT -----  Regarding: RE: Lab Notification: Samples unable to be obtained  Labs have been reordered. ----- Message -----  From: Thien Barroso LPN  Sent: 7/08/2211   1:58 PM EDT  To: Kathy Waldrop MD  Subject: FW: Lab Notification: Samples unable to be o#      ----- Message -----  From: Nelsy Mail  Sent: 3/30/2023   1:57 PM EDT  To: [facility], [facility] Nurse Pool  Subject: Lab Notification: Samples unable to be obtai#    We have been notified that samples could not be obtained for the below patient's most recent lab work. Please place new orders prior to the patient's return. If additional assistance is required, please contact Client Services at [phone].     Patient Ellen Rojas [birth year]  Ordering Provider Dr Leeann Urbano    No blood obtained for 3/29 orders        Thank you,  Bree

## 2023-04-12 PROBLEM — R65.10 SIRS (SYSTEMIC INFLAMMATORY RESPONSE SYNDROME) (HCC): Status: ACTIVE | Noted: 2022-12-12

## 2023-04-12 PROBLEM — D64.9 ANEMIA: Status: ACTIVE | Noted: 2022-12-18

## 2023-04-12 PROBLEM — N18.6 ESRD (END STAGE RENAL DISEASE) (HCC): Status: ACTIVE | Noted: 2022-12-18

## 2023-04-12 PROBLEM — E87.1 HYPONATREMIA: Status: ACTIVE | Noted: 2022-12-18

## 2023-04-18 ENCOUNTER — OFFICE VISIT (OUTPATIENT)
Dept: FAMILY MEDICINE CLINIC | Age: 78
End: 2023-04-18

## 2023-04-27 ENCOUNTER — OFFICE VISIT (OUTPATIENT)
Dept: FAMILY MEDICINE CLINIC | Age: 78
End: 2023-04-27
Payer: MEDICARE

## 2023-04-27 VITALS
HEIGHT: 67 IN | DIASTOLIC BLOOD PRESSURE: 72 MMHG | OXYGEN SATURATION: 97 % | BODY MASS INDEX: 19.78 KG/M2 | SYSTOLIC BLOOD PRESSURE: 126 MMHG | RESPIRATION RATE: 18 BRPM | TEMPERATURE: 97.8 F | HEART RATE: 107 BPM | WEIGHT: 126 LBS

## 2023-04-27 DIAGNOSIS — E11.9 CONTROLLED TYPE 2 DIABETES MELLITUS WITHOUT COMPLICATION, WITHOUT LONG-TERM CURRENT USE OF INSULIN (HCC): ICD-10-CM

## 2023-04-27 DIAGNOSIS — I10 ESSENTIAL HYPERTENSION: ICD-10-CM

## 2023-04-27 DIAGNOSIS — J30.1 SEASONAL ALLERGIC RHINITIS DUE TO POLLEN: ICD-10-CM

## 2023-04-27 DIAGNOSIS — I89.0 LYMPHEDEMA: ICD-10-CM

## 2023-04-27 DIAGNOSIS — R06.00 DYSPNEA, UNSPECIFIED TYPE: ICD-10-CM

## 2023-04-27 DIAGNOSIS — I87.8 VENOUS STASIS: Primary | ICD-10-CM

## 2023-04-27 PROCEDURE — G8536 NO DOC ELDER MAL SCRN: HCPCS | Performed by: FAMILY MEDICINE

## 2023-04-27 PROCEDURE — G9717 DOC PT DX DEP/BP F/U NT REQ: HCPCS | Performed by: FAMILY MEDICINE

## 2023-04-27 PROCEDURE — 1101F PT FALLS ASSESS-DOCD LE1/YR: CPT | Performed by: FAMILY MEDICINE

## 2023-04-27 PROCEDURE — 99214 OFFICE O/P EST MOD 30 MIN: CPT | Performed by: FAMILY MEDICINE

## 2023-04-27 PROCEDURE — 3074F SYST BP LT 130 MM HG: CPT | Performed by: FAMILY MEDICINE

## 2023-04-27 PROCEDURE — 3078F DIAST BP <80 MM HG: CPT | Performed by: FAMILY MEDICINE

## 2023-04-27 PROCEDURE — G8420 CALC BMI NORM PARAMETERS: HCPCS | Performed by: FAMILY MEDICINE

## 2023-04-27 PROCEDURE — 1090F PRES/ABSN URINE INCON ASSESS: CPT | Performed by: FAMILY MEDICINE

## 2023-04-27 PROCEDURE — 1123F ACP DISCUSS/DSCN MKR DOCD: CPT | Performed by: FAMILY MEDICINE

## 2023-04-27 PROCEDURE — G8427 DOCREV CUR MEDS BY ELIG CLIN: HCPCS | Performed by: FAMILY MEDICINE

## 2023-04-27 PROCEDURE — G8399 PT W/DXA RESULTS DOCUMENT: HCPCS | Performed by: FAMILY MEDICINE

## 2023-04-27 PROCEDURE — 3044F HG A1C LEVEL LT 7.0%: CPT | Performed by: FAMILY MEDICINE

## 2023-04-27 RX ORDER — LORATADINE 10 MG/1
10 TABLET ORAL DAILY
Qty: 90 TABLET | Refills: 1 | Status: SHIPPED | OUTPATIENT
Start: 2023-04-27

## 2023-04-27 RX ORDER — FUROSEMIDE 20 MG/1
20 TABLET ORAL DAILY
Qty: 30 TABLET | Refills: 1 | Status: SHIPPED | OUTPATIENT
Start: 2023-04-27

## 2023-04-27 NOTE — PROGRESS NOTES
[facility]    History of Present Illness:   Santa Soto is a 66 y.o. female here for   Chief Complaint   Patient presents with    Follow-up     Legs weeping and wheezing. Watery Eyes     Watery eyes         HPI:  Patient here for continued leg swelling and weeping. She is a complicated patient of a colleague. She was discharged from a skilled nursing facility mid March. Discharge summary from facility shows she was not on furosemide at discharge. Caregiver with her today that she did used to take that. She was advised to follow-up with renal as well as GI. She complains of pain in her legs and increased drainage recently. Not purulent, it is clear. This is not a new problem. She is also having a lot of itchy, watery eyes. Health Maintenance  Health Maintenance Due   Topic Date Due    COVID-19 Vaccine (1) Never done    Pneumococcal 65+ years (1 - PCV) Never done    Eye Exam Retinal or Dilated  Never done    Shingles Vaccine (1 of 2) Never done    Foot Exam Q1  09/17/2020    Lipid Screen  03/12/2022    DTaP/Tdap/Td series (2 - Td or Tdap) 09/13/2022    Medicare Yearly Exam  12/15/2022       Past Medical, Family, and Social History:     Past Medical History:   Diagnosis Date    Arthritis     Diabetes ([facility]     states she is \"pre-diabetic\", diet controlled    frequency     H/O blood clots     right side of body    Heart attack ([facility] 1/2014    Hydradenitis 2/1/2012    Hypercholesterolemia     Hypertension     Ill-defined condition     edema of legs    Thromboembolus [facility])       Past Surgical History:   Procedure Laterality Date    COLONOSCOPY N/A 1/25/2023    COLONOSCOPY performed by Angella Giron MD at [facility] ENDOSCOPY       Current Outpatient Medications on File Prior to Visit   Medication Sig Dispense Refill    OTHER Wheelchair for in home mobility and safety. 1 Each 0    brief disposable (adult) misc by Does Not Apply route.  120 Each 2    Comp Stocking,Knee,Regular,Med misc Use daily for edema. 2 Each 0    zinc sulfate (ZINC-220 PO) Take 220 Tablets by mouth daily. ascorbic acid, vitamin C, (Vitamin C) 500 mg tablet Take 1 Tablet by mouth two (2) times a day. Omeprazole delayed release (PRILOSEC D/R) 20 mg tablet Take 1 Tablet by mouth two (2) times a day. docusate sodium (Colace) 100 mg capsule Take 1 Capsule by mouth daily. zinc oxide 20 % ointment Apply  to affected area as needed for Skin Irritation. 30 g 1    oxybutynin (DITROPAN) 5 mg tablet Take 1 Tablet by mouth three (3) times daily. Indications: an increased need to urinate often 270 Tablet 1    atorvastatin (LIPITOR) 80 mg tablet Take 1 Tablet by mouth daily. Indications: high cholesterol 90 Tablet 1    sodium bicarbonate 650 mg tablet Take 1 Tablet by mouth three (3) times daily. (Patient taking differently: Take 1 Tablet by mouth three (3) times daily. TAKE 2 TABLETS BY MOUTH THREE TIMES A DAY) 90 Tablet 2    chlorhexidine (HIBICLENS) 4 % liquid Apply to axilla daily. 236 mL 1    nut.tx.gluc intol,lf,soy-fiber (Glucerna 1 Jose C) 0.04-1 gram-kcal/mL liqd Take 1 Bottle by mouth daily. 30 Each 5     No current facility-administered medications on file prior to visit. Patient Active Problem List   Diagnosis Code    AR (allergic rhinitis) J30.9    Eczema L30.9    Depression F32. A    Female stress incontinence N39.3    Dyslipidemia E78.5    HTN (hypertension) I10    Hydradenitis L73.2    Edema R60.9    H/O Pulmonary embolism (1/2015) I26.99    Advanced care planning/counseling discussion Z71.89    Recurrent depression ([facility] F33.9    Controlled type 2 diabetes mellitus without complication, without long-term current use of insulin (HCC) E11.9    Open wound of chin S01.80XA    Abscess of multiple sites of buttock L02.31    Open wound of left axillary region S41.102A    Open wound of right axillary region S41.101A    Lymphedema I89.0    Calf ulcer, left, limited to breakdown of skin ([facility] L97.221    Blood in stool K92.1    Cystitis N30.90    GI bleed K92.2    TAL (acute kidney injury) ([facility] N17.9    Neuropathy due to type 2 diabetes mellitus ([facility]) E11.40    Anemia D64.9    ESRD (end stage renal disease) ([facility] N18.6    Hyponatremia E87.1    SIRS (systemic inflammatory response syndrome) ([facility]) R65.10       Social History     Socioeconomic History    Marital status: LEGALLY    Tobacco Use    Smoking status: Never    Smokeless tobacco: Never   Vaping Use    Vaping Use: Never used   Substance and Sexual Activity    Alcohol use: No     Alcohol/week: 0.0 standard drinks    Drug use: No    Sexual activity: Never        Review of Systems   Review of Systems   Constitutional:  Negative for chills and fever. Respiratory:  Positive for shortness of breath and wheezing. Negative for cough. Cardiovascular:  Positive for leg swelling. Objective:   Visit Vitals  /72 (BP 1 Location: Right arm, BP Patient Position: Sitting, BP Cuff Size: Adult)   Pulse (!) 107   Temp 97.8 °F (36.6 °C) (Oral)   Resp 18   Ht 5' 7\" (1.702 m)   Wt 126 lb (57.2 kg)   SpO2 97%   BMI 19.73 kg/m²        Physical Exam  PHYSICAL EXAM:  Gen: Pt sitting in wheelchair, in NAD  Head: Normocephalic, atraumatic  Eyes: Sclera anicteric, EOM grossly intact, erythematous sclera, R>L  CVS: Normal S1, S2, no m/r/g  Resp: co no wheezes or rales  Extrem: BLE venous stasis edema, weeping, discolored, thickened  Pulses: 2+   Skin: Warm, dry  Neuro: Alert, oriented, appropriate        Assessment and orders:       ICD-10-CM ICD-9-CM    1. Venous stasis  I87.8 459.81 furosemide (LASIX) 20 mg tablet      2. Lymphedema  I89.0 457.1       3. Dyspnea, unspecified type  R06.00 786.09 NT-PRO BNP      NT-PRO BNP      4. Controlled type 2 diabetes mellitus without complication, without long-term current use of insulin ([facility])  E11.9 250.00 TSH 3RD GENERATION      HEMOGLOBIN A1C WITH EAG      CBC W/O DIFF      METABOLIC PANEL, COMPREHENSIVE      5.  Essential hypertension  I10 401.9 [facility] 3RD GENERATION      METABOLIC PANEL, COMPREHENSIVE      6. Seasonal allergic rhinitis due to pollen  J30.1 477.0 loratadine (CLARITIN) 10 mg tablet        Diagnoses and all orders for this visit:    1. Venous stasis  -     furosemide (LASIX) 20 mg tablet; Take 1 Tablet by mouth daily. As needed for shortness of breath or weight gain    2. Lymphedema    3. Dyspnea, unspecified type  -     NT-PRO BNP; Future    4. Controlled type 2 diabetes mellitus without complication, without long-term current use of insulin (HCC)  -     TSH 3RD GENERATION  -     HEMOGLOBIN A1C WITH EAG  -     CBC W/O DIFF  -     METABOLIC PANEL, COMPREHENSIVE    5. Essential hypertension  -     TSH 3RD GENERATION  -     METABOLIC PANEL, COMPREHENSIVE    6. Seasonal allergic rhinitis due to pollen  -     loratadine (CLARITIN) 10 mg tablet; Take 1 Tablet by mouth daily. Follow-up and Dispositions    Return in about 4 weeks (around 5/25/2023). the following changes in treatment are made: Start back on low-dose furosemide, check kidney function today previously ordered by PCP  We will also check BNP due to shortness of breath. Advised to elevate and limit sodium intake  lab results and schedule of future lab studies reviewed with patient  reviewed medications and side effects in detail  Advised patient to take Tylenol for pain. She needs follow-up with renal and GI and she is aware. I have discussed the diagnosis with the patient and the intended plan as seen in the above orders. Social history, medical history, and labs were reviewed. The patient has received an after-visit summary and questions were answered concerning future plans. I have discussed medication side effects and warnings with the patient as well. Patient/guardian verbalized understanding and accepts plan & risks. Please note that this dictation was completed with [employer], the [employer] voice recognition software.   Quite often unanticipated grammatical, syntax, homophones, and other interpretive errors are inadvertently transcribed by the computer software. Please disregard these errors. Please excuse any errors that have escaped final proofreading. Thank you.      MD ELIZABET Sargent & PER ANDERSON [facility]  04/27/23

## 2023-04-28 LAB
ALBUMIN SERPL-MCNC: 2.4 G/DL (ref 3.5–5)
ALBUMIN/GLOB SERPL: 0.4 (ref 1.1–2.2)
ALP SERPL-CCNC: 94 U/L (ref 45–117)
ALT SERPL-CCNC: 15 U/L (ref 12–78)
ANION GAP SERPL CALC-SCNC: 5 MMOL/L (ref 5–15)
AST SERPL-CCNC: 20 U/L (ref 15–37)
BILIRUB SERPL-MCNC: 0.2 MG/DL (ref 0.2–1)
BNP SERPL-MCNC: 337 PG/ML
BUN SERPL-MCNC: 11 MG/DL (ref 6–20)
BUN/CREAT SERPL: 15 (ref 12–20)
CALCIUM SERPL-MCNC: 8.8 MG/DL (ref 8.5–10.1)
CHLORIDE SERPL-SCNC: 109 MMOL/L (ref 97–108)
CO2 SERPL-SCNC: 28 MMOL/L (ref 21–32)
CREAT SERPL-MCNC: 0.72 MG/DL (ref 0.55–1.02)
ERYTHROCYTE [DISTWIDTH] IN BLOOD BY AUTOMATED COUNT: 14.8 % (ref 11.5–14.5)
EST. AVERAGE GLUCOSE BLD GHB EST-MCNC: 128 MG/DL
GLOBULIN SER CALC-MCNC: 5.4 G/DL (ref 2–4)
GLUCOSE SERPL-MCNC: 112 MG/DL (ref 65–100)
HBA1C MFR BLD: 6.1 % (ref 4–5.6)
HCT VFR BLD AUTO: 29.6 % (ref 35–47)
HGB BLD-MCNC: 8.5 G/DL (ref 11.5–16)
MCH RBC QN AUTO: 27.6 PG (ref 26–34)
MCHC RBC AUTO-ENTMCNC: 28.7 G/DL (ref 30–36.5)
MCV RBC AUTO: 96.1 FL (ref 80–99)
NRBC # BLD: 0 K/UL (ref 0–0.01)
NRBC BLD-RTO: 0 PER 100 WBC
PLATELET # BLD AUTO: 505 K/UL (ref 150–400)
PMV BLD AUTO: 9.9 FL (ref 8.9–12.9)
POTASSIUM SERPL-SCNC: 3.4 MMOL/L (ref 3.5–5.1)
PROT SERPL-MCNC: 7.8 G/DL (ref 6.4–8.2)
RBC # BLD AUTO: 3.08 M/UL (ref 3.8–5.2)
SODIUM SERPL-SCNC: 142 MMOL/L (ref 136–145)
TSH SERPL DL<=0.05 MIU/L-ACNC: 3.02 UIU/ML (ref 0.36–3.74)
WBC # BLD AUTO: 11.2 K/UL (ref 3.6–11)

## 2023-05-07 RX ORDER — POTASSIUM CHLORIDE 40 MEQ/15ML
13.33 SOLUTION ORAL DAILY
Qty: 480 ML | Refills: 0 | Status: SHIPPED | OUTPATIENT
Start: 2023-05-07

## 2023-05-11 RX ORDER — POTASSIUM CHLORIDE 3 G/15ML
5 SOLUTION ORAL DAILY
Qty: 473 ML | Refills: 1 | Status: SHIPPED | OUTPATIENT
Start: 2023-05-11

## 2023-05-31 ENCOUNTER — NURSE TRIAGE (OUTPATIENT)
Dept: OTHER | Facility: CLINIC | Age: 78
End: 2023-05-31

## 2023-05-31 NOTE — TELEPHONE ENCOUNTER
Location of patient: [address] trujillo from [facility] at [facility] with [employer]. Subjective: Caller states \"She has a lot of swelling in her R hand. She has been sick for the last few months and is not a good historian. The fingers are very swollen. Her hands are cold to the touch. When she was in the hospital, they couldn't get vitals when using that hand. The doctors said that when she was in the hospital she had a DVT in her leg. \"     Onset: about two weeks ago     Pain Severity:   she rubs her hands and her daughter thinks there could be some pain. Temperature:  unknown    What has been tried: gloves to warm the hand. Recommended disposition: Go to ED Now    Care advice provided, patient verbalizes understanding; denies any other questions or concerns; instructed to call back for any new or worsening symptoms. To ED now. Attention Provider: Thank you for allowing me to participate in the care of your patient. The patient was connected to triage in response to information provided to the ECC/PSC. Please do not respond through this encounter as the response is not directed to a shared pool.     Reason for Disposition   Patient sounds very sick or weak to the triager    Protocols used: Finger Pain-ADULT-

## 2023-06-16 ENCOUNTER — OFFICE VISIT (OUTPATIENT)
Facility: CLINIC | Age: 78
End: 2023-06-16
Payer: MEDICARE

## 2023-06-16 VITALS
SYSTOLIC BLOOD PRESSURE: 117 MMHG | HEART RATE: 92 BPM | BODY MASS INDEX: 19.58 KG/M2 | WEIGHT: 125 LBS | RESPIRATION RATE: 20 BRPM | OXYGEN SATURATION: 100 % | DIASTOLIC BLOOD PRESSURE: 75 MMHG | TEMPERATURE: 98 F

## 2023-06-16 DIAGNOSIS — Z91.81 AT HIGH RISK FOR FALLS: ICD-10-CM

## 2023-06-16 DIAGNOSIS — L73.2 HYDRADENITIS: ICD-10-CM

## 2023-06-16 DIAGNOSIS — D64.9 ANEMIA, UNSPECIFIED TYPE: ICD-10-CM

## 2023-06-16 DIAGNOSIS — I89.0 LYMPHEDEMA, NOT ELSEWHERE CLASSIFIED: ICD-10-CM

## 2023-06-16 DIAGNOSIS — I10 ESSENTIAL (PRIMARY) HYPERTENSION: Primary | ICD-10-CM

## 2023-06-16 PROBLEM — E46 PROTEIN CALORIE MALNUTRITION (HCC): Status: ACTIVE | Noted: 2023-06-16

## 2023-06-16 PROCEDURE — G8399 PT W/DXA RESULTS DOCUMENT: HCPCS | Performed by: FAMILY MEDICINE

## 2023-06-16 PROCEDURE — 1123F ACP DISCUSS/DSCN MKR DOCD: CPT | Performed by: FAMILY MEDICINE

## 2023-06-16 PROCEDURE — G8420 CALC BMI NORM PARAMETERS: HCPCS | Performed by: FAMILY MEDICINE

## 2023-06-16 PROCEDURE — 1090F PRES/ABSN URINE INCON ASSESS: CPT | Performed by: FAMILY MEDICINE

## 2023-06-16 PROCEDURE — 1036F TOBACCO NON-USER: CPT | Performed by: FAMILY MEDICINE

## 2023-06-16 PROCEDURE — 3078F DIAST BP <80 MM HG: CPT | Performed by: FAMILY MEDICINE

## 2023-06-16 PROCEDURE — 99214 OFFICE O/P EST MOD 30 MIN: CPT | Performed by: FAMILY MEDICINE

## 2023-06-16 PROCEDURE — 3074F SYST BP LT 130 MM HG: CPT | Performed by: FAMILY MEDICINE

## 2023-06-16 PROCEDURE — G8427 DOCREV CUR MEDS BY ELIG CLIN: HCPCS | Performed by: FAMILY MEDICINE

## 2023-06-16 RX ORDER — LORATADINE 10 MG/1
10 TABLET ORAL DAILY
COMMUNITY
Start: 2023-04-28

## 2023-06-16 SDOH — ECONOMIC STABILITY: HOUSING INSECURITY
IN THE LAST 12 MONTHS, WAS THERE A TIME WHEN YOU DID NOT HAVE A STEADY PLACE TO SLEEP OR SLEPT IN A SHELTER (INCLUDING NOW)?: NO

## 2023-06-16 SDOH — ECONOMIC STABILITY: FOOD INSECURITY: WITHIN THE PAST 12 MONTHS, YOU WORRIED THAT YOUR FOOD WOULD RUN OUT BEFORE YOU GOT MONEY TO BUY MORE.: NEVER TRUE

## 2023-06-16 SDOH — ECONOMIC STABILITY: FOOD INSECURITY: WITHIN THE PAST 12 MONTHS, THE FOOD YOU BOUGHT JUST DIDN'T LAST AND YOU DIDN'T HAVE MONEY TO GET MORE.: NEVER TRUE

## 2023-06-16 SDOH — ECONOMIC STABILITY: INCOME INSECURITY: HOW HARD IS IT FOR YOU TO PAY FOR THE VERY BASICS LIKE FOOD, HOUSING, MEDICAL CARE, AND HEATING?: HARD

## 2023-06-16 ASSESSMENT — PATIENT HEALTH QUESTIONNAIRE - PHQ9
SUM OF ALL RESPONSES TO PHQ QUESTIONS 1-9: 0
SUM OF ALL RESPONSES TO PHQ QUESTIONS 1-9: 0
5. POOR APPETITE OR OVEREATING: 0
1. LITTLE INTEREST OR PLEASURE IN DOING THINGS: 0
10. IF YOU CHECKED OFF ANY PROBLEMS, HOW DIFFICULT HAVE THESE PROBLEMS MADE IT FOR YOU TO DO YOUR WORK, TAKE CARE OF THINGS AT HOME, OR GET ALONG WITH OTHER PEOPLE: 0
SUM OF ALL RESPONSES TO PHQ QUESTIONS 1-9: 0
7. TROUBLE CONCENTRATING ON THINGS, SUCH AS READING THE NEWSPAPER OR WATCHING TELEVISION: 0
3. TROUBLE FALLING OR STAYING ASLEEP: 0
6. FEELING BAD ABOUT YOURSELF - OR THAT YOU ARE A FAILURE OR HAVE LET YOURSELF OR YOUR FAMILY DOWN: 0
2. FEELING DOWN, DEPRESSED OR HOPELESS: 0
8. MOVING OR SPEAKING SO SLOWLY THAT OTHER PEOPLE COULD HAVE NOTICED. OR THE OPPOSITE, BEING SO FIGETY OR RESTLESS THAT YOU HAVE BEEN MOVING AROUND A LOT MORE THAN USUAL: 0
SUM OF ALL RESPONSES TO PHQ QUESTIONS 1-9: 0
SUM OF ALL RESPONSES TO PHQ9 QUESTIONS 1 & 2: 0
4. FEELING TIRED OR HAVING LITTLE ENERGY: 0
9. THOUGHTS THAT YOU WOULD BE BETTER OFF DEAD, OR OF HURTING YOURSELF: 0

## 2023-06-16 ASSESSMENT — LIFESTYLE VARIABLES
HOW MANY STANDARD DRINKS CONTAINING ALCOHOL DO YOU HAVE ON A TYPICAL DAY: PATIENT DOES NOT DRINK
HOW OFTEN DO YOU HAVE A DRINK CONTAINING ALCOHOL: NEVER

## 2023-06-17 LAB
ALBUMIN SERPL-MCNC: 2.4 G/DL (ref 3.5–5)
ALBUMIN/GLOB SERPL: 0.4 (ref 1.1–2.2)
ALP SERPL-CCNC: 112 U/L (ref 45–117)
ALT SERPL-CCNC: 15 U/L (ref 12–78)
ANION GAP SERPL CALC-SCNC: 2 MMOL/L (ref 5–15)
AST SERPL-CCNC: 15 U/L (ref 15–37)
BILIRUB SERPL-MCNC: 0.3 MG/DL (ref 0.2–1)
BUN SERPL-MCNC: 17 MG/DL (ref 6–20)
BUN/CREAT SERPL: 21 (ref 12–20)
CALCIUM SERPL-MCNC: 9.1 MG/DL (ref 8.5–10.1)
CHLORIDE SERPL-SCNC: 107 MMOL/L (ref 97–108)
CO2 SERPL-SCNC: 28 MMOL/L (ref 21–32)
CREAT SERPL-MCNC: 0.8 MG/DL (ref 0.55–1.02)
ERYTHROCYTE [DISTWIDTH] IN BLOOD BY AUTOMATED COUNT: 15.7 % (ref 11.5–14.5)
GLOBULIN SER CALC-MCNC: 5.5 G/DL (ref 2–4)
GLUCOSE SERPL-MCNC: 105 MG/DL (ref 65–100)
HCT VFR BLD AUTO: 27.4 % (ref 35–47)
HGB BLD-MCNC: 8 G/DL (ref 11.5–16)
IRON SATN MFR SERPL: 7 % (ref 20–50)
IRON SERPL-MCNC: 17 UG/DL (ref 35–150)
MCH RBC QN AUTO: 25.3 PG (ref 26–34)
MCHC RBC AUTO-ENTMCNC: 29.2 G/DL (ref 30–36.5)
MCV RBC AUTO: 86.7 FL (ref 80–99)
NRBC # BLD: 0 K/UL (ref 0–0.01)
NRBC BLD-RTO: 0 PER 100 WBC
PLATELET # BLD AUTO: 515 K/UL (ref 150–400)
PMV BLD AUTO: 9.4 FL (ref 8.9–12.9)
POTASSIUM SERPL-SCNC: 4.3 MMOL/L (ref 3.5–5.1)
PROT SERPL-MCNC: 7.9 G/DL (ref 6.4–8.2)
RBC # BLD AUTO: 3.16 M/UL (ref 3.8–5.2)
SODIUM SERPL-SCNC: 137 MMOL/L (ref 136–145)
TIBC SERPL-MCNC: 254 UG/DL (ref 250–450)
WBC # BLD AUTO: 9.5 K/UL (ref 3.6–11)

## 2023-06-20 RX ORDER — FERROUS SULFATE 325(65) MG
325 TABLET ORAL
Qty: 90 TABLET | Refills: 1 | Status: SHIPPED | OUTPATIENT
Start: 2023-06-20

## 2023-06-22 PROBLEM — N18.6 ESRD (END STAGE RENAL DISEASE) (HCC): Status: RESOLVED | Noted: 2022-12-18 | Resolved: 2023-06-22

## 2023-06-22 PROBLEM — L97.221 CALF ULCER, LEFT, LIMITED TO BREAKDOWN OF SKIN (HCC): Status: RESOLVED | Noted: 2022-03-17 | Resolved: 2023-06-22

## 2023-06-22 PROBLEM — E46 PROTEIN CALORIE MALNUTRITION (HCC): Status: RESOLVED | Noted: 2023-06-16 | Resolved: 2023-06-22

## 2023-06-22 RX ORDER — FUROSEMIDE 20 MG/1
20 TABLET ORAL DAILY
Qty: 90 TABLET | Refills: 0 | Status: SHIPPED | OUTPATIENT
Start: 2023-06-22

## 2023-09-21 ENCOUNTER — OFFICE VISIT (OUTPATIENT)
Facility: CLINIC | Age: 78
End: 2023-09-21
Payer: MEDICARE

## 2023-09-21 VITALS
BODY MASS INDEX: 19.58 KG/M2 | DIASTOLIC BLOOD PRESSURE: 67 MMHG | RESPIRATION RATE: 14 BRPM | TEMPERATURE: 97.7 F | HEART RATE: 90 BPM | OXYGEN SATURATION: 99 % | HEIGHT: 67 IN | SYSTOLIC BLOOD PRESSURE: 106 MMHG

## 2023-09-21 DIAGNOSIS — Z79.899 LONG TERM USE OF DRUG: ICD-10-CM

## 2023-09-21 DIAGNOSIS — E03.9 ACQUIRED HYPOTHYROIDISM: ICD-10-CM

## 2023-09-21 DIAGNOSIS — E78.2 MIXED HYPERLIPIDEMIA: ICD-10-CM

## 2023-09-21 DIAGNOSIS — D64.9 ANEMIA, UNSPECIFIED TYPE: ICD-10-CM

## 2023-09-21 DIAGNOSIS — L73.2 HIDRADENITIS SUPPURATIVA: Primary | ICD-10-CM

## 2023-09-21 DIAGNOSIS — I87.8 VENOUS STASIS: ICD-10-CM

## 2023-09-21 PROBLEM — E46 PROTEIN CALORIE MALNUTRITION (HCC): Status: ACTIVE | Noted: 2023-09-21

## 2023-09-21 PROCEDURE — 3078F DIAST BP <80 MM HG: CPT | Performed by: FAMILY MEDICINE

## 2023-09-21 PROCEDURE — G8420 CALC BMI NORM PARAMETERS: HCPCS | Performed by: FAMILY MEDICINE

## 2023-09-21 PROCEDURE — G8399 PT W/DXA RESULTS DOCUMENT: HCPCS | Performed by: FAMILY MEDICINE

## 2023-09-21 PROCEDURE — G8427 DOCREV CUR MEDS BY ELIG CLIN: HCPCS | Performed by: FAMILY MEDICINE

## 2023-09-21 PROCEDURE — 1036F TOBACCO NON-USER: CPT | Performed by: FAMILY MEDICINE

## 2023-09-21 PROCEDURE — 99214 OFFICE O/P EST MOD 30 MIN: CPT | Performed by: FAMILY MEDICINE

## 2023-09-21 PROCEDURE — 3074F SYST BP LT 130 MM HG: CPT | Performed by: FAMILY MEDICINE

## 2023-09-21 PROCEDURE — 1090F PRES/ABSN URINE INCON ASSESS: CPT | Performed by: FAMILY MEDICINE

## 2023-09-21 PROCEDURE — 1123F ACP DISCUSS/DSCN MKR DOCD: CPT | Performed by: FAMILY MEDICINE

## 2023-09-21 RX ORDER — CEPHALEXIN 500 MG/1
500 CAPSULE ORAL 2 TIMES DAILY
Qty: 14 CAPSULE | Refills: 0 | Status: SHIPPED | OUTPATIENT
Start: 2023-09-21 | End: 2023-09-28

## 2023-09-21 ASSESSMENT — ENCOUNTER SYMPTOMS
SHORTNESS OF BREATH: 0
COUGH: 0

## 2023-09-22 LAB
ALBUMIN SERPL-MCNC: 2.2 G/DL (ref 3.5–5)
ALBUMIN/GLOB SERPL: 0.4 (ref 1.1–2.2)
ALP SERPL-CCNC: 102 U/L (ref 45–117)
ALT SERPL-CCNC: 15 U/L (ref 12–78)
ANION GAP SERPL CALC-SCNC: 6 MMOL/L (ref 5–15)
AST SERPL-CCNC: 30 U/L (ref 15–37)
BASOPHILS # BLD: 0.1 K/UL (ref 0–0.1)
BASOPHILS NFR BLD: 1 % (ref 0–1)
BILIRUB SERPL-MCNC: 0.5 MG/DL (ref 0.2–1)
BUN SERPL-MCNC: 11 MG/DL (ref 6–20)
BUN/CREAT SERPL: 14 (ref 12–20)
CALCIUM SERPL-MCNC: 9 MG/DL (ref 8.5–10.1)
CHLORIDE SERPL-SCNC: 108 MMOL/L (ref 97–108)
CHOLEST SERPL-MCNC: 141 MG/DL
CO2 SERPL-SCNC: 23 MMOL/L (ref 21–32)
CREAT SERPL-MCNC: 0.76 MG/DL (ref 0.55–1.02)
DIFFERENTIAL METHOD BLD: ABNORMAL
EOSINOPHIL # BLD: 0.3 K/UL (ref 0–0.4)
EOSINOPHIL NFR BLD: 3 % (ref 0–7)
ERYTHROCYTE [DISTWIDTH] IN BLOOD BY AUTOMATED COUNT: 19 % (ref 11.5–14.5)
GLOBULIN SER CALC-MCNC: 5.4 G/DL (ref 2–4)
GLUCOSE SERPL-MCNC: 111 MG/DL (ref 65–100)
HCT VFR BLD AUTO: 31.4 % (ref 35–47)
HDLC SERPL-MCNC: 38 MG/DL
HDLC SERPL: 3.7 (ref 0–5)
HGB BLD-MCNC: 9.2 G/DL (ref 11.5–16)
IMM GRANULOCYTES # BLD AUTO: 0 K/UL (ref 0–0.04)
IMM GRANULOCYTES NFR BLD AUTO: 0 % (ref 0–0.5)
LDLC SERPL CALC-MCNC: 82.4 MG/DL (ref 0–100)
LYMPHOCYTES # BLD: 1.3 K/UL (ref 0.8–3.5)
LYMPHOCYTES NFR BLD: 13 % (ref 12–49)
MCH RBC QN AUTO: 26.3 PG (ref 26–34)
MCHC RBC AUTO-ENTMCNC: 29.3 G/DL (ref 30–36.5)
MCV RBC AUTO: 89.7 FL (ref 80–99)
MONOCYTES # BLD: 0.5 K/UL (ref 0–1)
MONOCYTES NFR BLD: 5 % (ref 5–13)
NEUTS SEG # BLD: 7.5 K/UL (ref 1.8–8)
NEUTS SEG NFR BLD: 78 % (ref 32–75)
NRBC # BLD: 0 K/UL (ref 0–0.01)
NRBC BLD-RTO: 0 PER 100 WBC
PLATELET # BLD AUTO: 469 K/UL (ref 150–400)
PMV BLD AUTO: 9.9 FL (ref 8.9–12.9)
POTASSIUM SERPL-SCNC: 5.2 MMOL/L (ref 3.5–5.1)
PROT SERPL-MCNC: 7.6 G/DL (ref 6.4–8.2)
RBC # BLD AUTO: 3.5 M/UL (ref 3.8–5.2)
RBC MORPH BLD: ABNORMAL
SODIUM SERPL-SCNC: 137 MMOL/L (ref 136–145)
T4 FREE SERPL-MCNC: 1.1 NG/DL (ref 0.8–1.5)
TRIGL SERPL-MCNC: 103 MG/DL
TSH SERPL DL<=0.05 MIU/L-ACNC: 1.3 UIU/ML (ref 0.36–3.74)
VLDLC SERPL CALC-MCNC: 20.6 MG/DL
WBC # BLD AUTO: 9.7 K/UL (ref 3.6–11)

## 2023-10-24 ENCOUNTER — OFFICE VISIT (OUTPATIENT)
Facility: CLINIC | Age: 78
End: 2023-10-24

## 2023-10-24 VITALS
RESPIRATION RATE: 14 BRPM | OXYGEN SATURATION: 100 % | TEMPERATURE: 97.1 F | WEIGHT: 148.6 LBS | HEIGHT: 67 IN | SYSTOLIC BLOOD PRESSURE: 104 MMHG | BODY MASS INDEX: 23.32 KG/M2 | DIASTOLIC BLOOD PRESSURE: 67 MMHG | HEART RATE: 93 BPM

## 2023-10-24 DIAGNOSIS — Z00.00 MEDICARE ANNUAL WELLNESS VISIT, SUBSEQUENT: ICD-10-CM

## 2023-10-24 DIAGNOSIS — N39.3 FEMALE STRESS INCONTINENCE: ICD-10-CM

## 2023-10-24 DIAGNOSIS — E11.9 TYPE 2 DIABETES MELLITUS WITHOUT COMPLICATION, WITHOUT LONG-TERM CURRENT USE OF INSULIN (HCC): Primary | ICD-10-CM

## 2023-10-24 DIAGNOSIS — I10 ESSENTIAL (PRIMARY) HYPERTENSION: ICD-10-CM

## 2023-10-24 DIAGNOSIS — I89.0 LYMPHEDEMA, NOT ELSEWHERE CLASSIFIED: ICD-10-CM

## 2023-10-24 RX ORDER — MULTIVITAMIN
1 TABLET ORAL DAILY
Qty: 90 TABLET | Refills: 1 | Status: SHIPPED | OUTPATIENT
Start: 2023-10-24

## 2023-10-24 RX ORDER — DIAPER,BRIEF,ADULT, DISPOSABLE
EACH MISCELLANEOUS
Qty: 125 EACH | Refills: 0 | Status: SHIPPED | OUTPATIENT
Start: 2023-10-24

## 2023-10-24 ASSESSMENT — PATIENT HEALTH QUESTIONNAIRE - PHQ9
SUM OF ALL RESPONSES TO PHQ QUESTIONS 1-9: 3
4. FEELING TIRED OR HAVING LITTLE ENERGY: 0
6. FEELING BAD ABOUT YOURSELF - OR THAT YOU ARE A FAILURE OR HAVE LET YOURSELF OR YOUR FAMILY DOWN: 0
10. IF YOU CHECKED OFF ANY PROBLEMS, HOW DIFFICULT HAVE THESE PROBLEMS MADE IT FOR YOU TO DO YOUR WORK, TAKE CARE OF THINGS AT HOME, OR GET ALONG WITH OTHER PEOPLE: 0
SUM OF ALL RESPONSES TO PHQ QUESTIONS 1-9: 3
2. FEELING DOWN, DEPRESSED OR HOPELESS: 1
5. POOR APPETITE OR OVEREATING: 1
9. THOUGHTS THAT YOU WOULD BE BETTER OFF DEAD, OR OF HURTING YOURSELF: 0
3. TROUBLE FALLING OR STAYING ASLEEP: 0
8. MOVING OR SPEAKING SO SLOWLY THAT OTHER PEOPLE COULD HAVE NOTICED. OR THE OPPOSITE, BEING SO FIGETY OR RESTLESS THAT YOU HAVE BEEN MOVING AROUND A LOT MORE THAN USUAL: 0
SUM OF ALL RESPONSES TO PHQ QUESTIONS 1-9: 3
1. LITTLE INTEREST OR PLEASURE IN DOING THINGS: 1
SUM OF ALL RESPONSES TO PHQ9 QUESTIONS 1 & 2: 2
7. TROUBLE CONCENTRATING ON THINGS, SUCH AS READING THE NEWSPAPER OR WATCHING TELEVISION: 0
SUM OF ALL RESPONSES TO PHQ QUESTIONS 1-9: 3

## 2023-10-24 ASSESSMENT — LIFESTYLE VARIABLES
HOW OFTEN DO YOU HAVE A DRINK CONTAINING ALCOHOL: NEVER
HOW MANY STANDARD DRINKS CONTAINING ALCOHOL DO YOU HAVE ON A TYPICAL DAY: PATIENT DOES NOT DRINK

## 2023-10-24 NOTE — PATIENT INSTRUCTIONS
concerns that younger adults do not. Older adults may have problems with gum disease and decay on the roots of their teeth. They may need missing teeth replaced or broken fillings fixed. Or they may have dentures that need to be cared for. Some older adults may have trouble holding a toothbrush. You can help remind the person you are caring for to brush and floss their teeth or to clean their dentures. In some cases, you may need to do the brushing and other dental care tasks. People who have trouble using their hands or who have dementia may need this extra help. How can you help with dental care? Normal dental care  To keep the teeth and gums healthy:  Brush the teeth with fluoride toothpaste twice a day--in the morning and at night--and floss at least once a day. Plaque can quickly build up on the teeth of older adults. Watch for the signs of gum disease. These signs include gums that bleed after brushing or after eating hard foods, such as apples. See a dentist regularly. Many experts recommend checkups every 6 months. Keep the dentist up to date on any new medications the person is taking. Encourage a balanced diet that includes whole grains, vegetables, and fruits, and that is low in saturated fat and sodium. Encourage the person you're caring for not to use tobacco products. They can affect dental and general health. Many older adults have a fixed income and feel that they can't afford dental care. But most towns and [facility] have programs in which dentists help older adults by lowering fees. Contact your area's public health offices or  for information about dental care in your area. Using a toothbrush  Older adults with arthritis sometimes have trouble brushing their teeth because they can't easily hold the toothbrush. Their hands and fingers may be stiff, painful, or weak. If this is the case, you can: Offer an electric toothbrush.   Enlarge the handle of a non-electric toothbrush by

## 2024-01-24 ENCOUNTER — OFFICE VISIT (OUTPATIENT)
Facility: CLINIC | Age: 79
End: 2024-01-24

## 2024-01-24 VITALS
HEIGHT: 67 IN | OXYGEN SATURATION: 100 % | DIASTOLIC BLOOD PRESSURE: 69 MMHG | WEIGHT: 115 LBS | HEART RATE: 95 BPM | TEMPERATURE: 97.7 F | SYSTOLIC BLOOD PRESSURE: 118 MMHG | BODY MASS INDEX: 18.05 KG/M2

## 2024-01-24 DIAGNOSIS — E44.1 MILD PROTEIN-CALORIE MALNUTRITION (HCC): ICD-10-CM

## 2024-01-24 DIAGNOSIS — L73.2 HYDRADENITIS: ICD-10-CM

## 2024-01-24 DIAGNOSIS — E11.9 TYPE 2 DIABETES MELLITUS WITHOUT COMPLICATION, WITHOUT LONG-TERM CURRENT USE OF INSULIN (HCC): ICD-10-CM

## 2024-01-24 DIAGNOSIS — F33.9 RECURRENT MAJOR DEPRESSIVE DISORDER, REMISSION STATUS UNSPECIFIED (HCC): ICD-10-CM

## 2024-01-24 DIAGNOSIS — E11.40 TYPE 2 DIABETES MELLITUS WITH DIABETIC NEUROPATHY, WITHOUT LONG-TERM CURRENT USE OF INSULIN (HCC): ICD-10-CM

## 2024-01-24 DIAGNOSIS — E03.9 ACQUIRED HYPOTHYROIDISM: ICD-10-CM

## 2024-01-24 DIAGNOSIS — R13.10 DYSPHAGIA, UNSPECIFIED TYPE: Primary | ICD-10-CM

## 2024-01-24 DIAGNOSIS — I10 ESSENTIAL (PRIMARY) HYPERTENSION: ICD-10-CM

## 2024-01-24 DIAGNOSIS — D64.9 ANEMIA, UNSPECIFIED TYPE: ICD-10-CM

## 2024-01-24 RX ORDER — CHLORHEXIDINE GLUCONATE 40 MG/ML
SOLUTION TOPICAL
Qty: 237 ML | Refills: 2 | Status: SHIPPED | OUTPATIENT
Start: 2024-01-24

## 2024-01-24 RX ORDER — LACTOSE-REDUCED FOOD
LIQUID (ML) ORAL
Qty: 30 EACH | Refills: 2 | Status: SHIPPED | OUTPATIENT
Start: 2024-01-24

## 2024-01-24 ASSESSMENT — PATIENT HEALTH QUESTIONNAIRE - PHQ9
SUM OF ALL RESPONSES TO PHQ9 QUESTIONS 1 & 2: 0
1. LITTLE INTEREST OR PLEASURE IN DOING THINGS: 0
6. FEELING BAD ABOUT YOURSELF - OR THAT YOU ARE A FAILURE OR HAVE LET YOURSELF OR YOUR FAMILY DOWN: 0
4. FEELING TIRED OR HAVING LITTLE ENERGY: 1
3. TROUBLE FALLING OR STAYING ASLEEP: 0
8. MOVING OR SPEAKING SO SLOWLY THAT OTHER PEOPLE COULD HAVE NOTICED. OR THE OPPOSITE, BEING SO FIGETY OR RESTLESS THAT YOU HAVE BEEN MOVING AROUND A LOT MORE THAN USUAL: 0
SUM OF ALL RESPONSES TO PHQ QUESTIONS 1-9: 1
9. THOUGHTS THAT YOU WOULD BE BETTER OFF DEAD, OR OF HURTING YOURSELF: 0
10. IF YOU CHECKED OFF ANY PROBLEMS, HOW DIFFICULT HAVE THESE PROBLEMS MADE IT FOR YOU TO DO YOUR WORK, TAKE CARE OF THINGS AT HOME, OR GET ALONG WITH OTHER PEOPLE: 0
SUM OF ALL RESPONSES TO PHQ QUESTIONS 1-9: 1
SUM OF ALL RESPONSES TO PHQ QUESTIONS 1-9: 1
7. TROUBLE CONCENTRATING ON THINGS, SUCH AS READING THE NEWSPAPER OR WATCHING TELEVISION: 0
SUM OF ALL RESPONSES TO PHQ QUESTIONS 1-9: 1
5. POOR APPETITE OR OVEREATING: 0
2. FEELING DOWN, DEPRESSED OR HOPELESS: 0

## 2024-01-24 NOTE — PROGRESS NOTES
Chief Complaint   Patient presents with    3 Month Follow-Up         \"Have you been to the ER, urgent care clinic since your last visit?  Hospitalized since your last visit?\"    NO    “Have you seen or consulted any other health care providers outside of [facility] since your last visit?”    NO           Health Maintenance Due   Topic Date Due    COVID-19 Vaccine (1) Never done    Pneumococcal 65+ years Vaccine (1 - PCV) Never done    Shingles vaccine (1 of 2) Never done    Respiratory Syncytial Virus (RSV) Pregnant or age 60 yrs+ (1 - 1-dose 60+ series) Never done

## 2024-01-25 LAB
ALBUMIN SERPL-MCNC: 2.4 G/DL (ref 3.5–5)
ALBUMIN/GLOB SERPL: 0.4 (ref 1.1–2.2)
ALP SERPL-CCNC: 124 U/L (ref 45–117)
ALT SERPL-CCNC: 14 U/L (ref 12–78)
ANION GAP SERPL CALC-SCNC: 3 MMOL/L (ref 5–15)
AST SERPL-CCNC: 14 U/L (ref 15–37)
BILIRUB SERPL-MCNC: 0.2 MG/DL (ref 0.2–1)
BUN SERPL-MCNC: 12 MG/DL (ref 6–20)
BUN/CREAT SERPL: 18 (ref 12–20)
CALCIUM SERPL-MCNC: 9.6 MG/DL (ref 8.5–10.1)
CHLORIDE SERPL-SCNC: 107 MMOL/L (ref 97–108)
CHOLEST SERPL-MCNC: 189 MG/DL
CO2 SERPL-SCNC: 28 MMOL/L (ref 21–32)
CREAT SERPL-MCNC: 0.68 MG/DL (ref 0.55–1.02)
ERYTHROCYTE [DISTWIDTH] IN BLOOD BY AUTOMATED COUNT: 15.9 % (ref 11.5–14.5)
EST. AVERAGE GLUCOSE BLD GHB EST-MCNC: 120 MG/DL
GLOBULIN SER CALC-MCNC: 5.8 G/DL (ref 2–4)
GLUCOSE SERPL-MCNC: 98 MG/DL (ref 65–100)
HBA1C MFR BLD: 5.8 % (ref 4–5.6)
HCT VFR BLD AUTO: 31.6 % (ref 35–47)
HDLC SERPL-MCNC: 54 MG/DL
HDLC SERPL: 3.5 (ref 0–5)
HGB BLD-MCNC: 9.3 G/DL (ref 11.5–16)
IRON SATN MFR SERPL: 7 % (ref 20–50)
IRON SERPL-MCNC: 16 UG/DL (ref 35–150)
LDLC SERPL CALC-MCNC: 120.6 MG/DL (ref 0–100)
MCH RBC QN AUTO: 26.6 PG (ref 26–34)
MCHC RBC AUTO-ENTMCNC: 29.4 G/DL (ref 30–36.5)
MCV RBC AUTO: 90.5 FL (ref 80–99)
NRBC # BLD: 0 K/UL (ref 0–0.01)
NRBC BLD-RTO: 0 PER 100 WBC
PLATELET # BLD AUTO: 509 K/UL (ref 150–400)
PMV BLD AUTO: 9.9 FL (ref 8.9–12.9)
POTASSIUM SERPL-SCNC: 3.8 MMOL/L (ref 3.5–5.1)
PROT SERPL-MCNC: 8.2 G/DL (ref 6.4–8.2)
RBC # BLD AUTO: 3.49 M/UL (ref 3.8–5.2)
SODIUM SERPL-SCNC: 138 MMOL/L (ref 136–145)
TIBC SERPL-MCNC: 225 UG/DL (ref 250–450)
TRIGL SERPL-MCNC: 72 MG/DL
TSH SERPL DL<=0.05 MIU/L-ACNC: 2.47 UIU/ML (ref 0.36–3.74)
VLDLC SERPL CALC-MCNC: 14.4 MG/DL
WBC # BLD AUTO: 13.3 K/UL (ref 3.6–11)

## 2024-01-26 RX ORDER — FERROUS SULFATE 325(65) MG
325 TABLET ORAL 2 TIMES DAILY WITH MEALS
Qty: 180 TABLET | Refills: 1 | Status: SHIPPED | OUTPATIENT
Start: 2024-01-26

## 2024-02-20 DIAGNOSIS — I10 ESSENTIAL (PRIMARY) HYPERTENSION: ICD-10-CM

## 2024-02-20 DIAGNOSIS — I89.0 LYMPHEDEMA, NOT ELSEWHERE CLASSIFIED: ICD-10-CM

## 2024-02-21 RX ORDER — FUROSEMIDE 20 MG/1
20 TABLET ORAL DAILY
Qty: 90 TABLET | Refills: 0 | Status: SHIPPED | OUTPATIENT
Start: 2024-02-21

## 2024-03-20 ENCOUNTER — OFFICE VISIT (OUTPATIENT)
Facility: CLINIC | Age: 79
End: 2024-03-20
Payer: MEDICARE

## 2024-03-20 VITALS
HEART RATE: 90 BPM | DIASTOLIC BLOOD PRESSURE: 78 MMHG | BODY MASS INDEX: 17.71 KG/M2 | OXYGEN SATURATION: 100 % | HEIGHT: 67 IN | SYSTOLIC BLOOD PRESSURE: 136 MMHG | WEIGHT: 112.8 LBS | RESPIRATION RATE: 14 BRPM | TEMPERATURE: 98.7 F

## 2024-03-20 DIAGNOSIS — E78.2 MIXED HYPERLIPIDEMIA: ICD-10-CM

## 2024-03-20 DIAGNOSIS — J30.1 NON-SEASONAL ALLERGIC RHINITIS DUE TO POLLEN: ICD-10-CM

## 2024-03-20 DIAGNOSIS — G89.29 CHRONIC PAIN OF LEFT KNEE: ICD-10-CM

## 2024-03-20 DIAGNOSIS — E11.9 TYPE 2 DIABETES MELLITUS WITHOUT COMPLICATION, WITHOUT LONG-TERM CURRENT USE OF INSULIN (HCC): ICD-10-CM

## 2024-03-20 DIAGNOSIS — M25.562 CHRONIC PAIN OF LEFT KNEE: ICD-10-CM

## 2024-03-20 DIAGNOSIS — N39.3 FEMALE STRESS INCONTINENCE: ICD-10-CM

## 2024-03-20 DIAGNOSIS — L73.2 HYDRADENITIS: Primary | ICD-10-CM

## 2024-03-20 DIAGNOSIS — D64.9 ANEMIA, UNSPECIFIED TYPE: ICD-10-CM

## 2024-03-20 DIAGNOSIS — L89.301 PRESSURE INJURY OF BUTTOCK, STAGE 1, UNSPECIFIED LATERALITY: ICD-10-CM

## 2024-03-20 PROCEDURE — G8419 CALC BMI OUT NRM PARAM NOF/U: HCPCS | Performed by: FAMILY MEDICINE

## 2024-03-20 PROCEDURE — 99214 OFFICE O/P EST MOD 30 MIN: CPT | Performed by: FAMILY MEDICINE

## 2024-03-20 PROCEDURE — 1036F TOBACCO NON-USER: CPT | Performed by: FAMILY MEDICINE

## 2024-03-20 PROCEDURE — G8427 DOCREV CUR MEDS BY ELIG CLIN: HCPCS | Performed by: FAMILY MEDICINE

## 2024-03-20 PROCEDURE — 3075F SYST BP GE 130 - 139MM HG: CPT | Performed by: FAMILY MEDICINE

## 2024-03-20 PROCEDURE — 1123F ACP DISCUSS/DSCN MKR DOCD: CPT | Performed by: FAMILY MEDICINE

## 2024-03-20 PROCEDURE — 3044F HG A1C LEVEL LT 7.0%: CPT | Performed by: FAMILY MEDICINE

## 2024-03-20 PROCEDURE — 0509F URINE INCON PLAN DOCD: CPT | Performed by: FAMILY MEDICINE

## 2024-03-20 PROCEDURE — 1090F PRES/ABSN URINE INCON ASSESS: CPT | Performed by: FAMILY MEDICINE

## 2024-03-20 PROCEDURE — 3078F DIAST BP <80 MM HG: CPT | Performed by: FAMILY MEDICINE

## 2024-03-20 PROCEDURE — G8399 PT W/DXA RESULTS DOCUMENT: HCPCS | Performed by: FAMILY MEDICINE

## 2024-03-20 PROCEDURE — G8484 FLU IMMUNIZE NO ADMIN: HCPCS | Performed by: FAMILY MEDICINE

## 2024-03-20 RX ORDER — CHLORHEXIDINE GLUCONATE 40 MG/ML
SOLUTION TOPICAL
Qty: 237 ML | Refills: 2 | Status: SHIPPED | OUTPATIENT
Start: 2024-03-20

## 2024-03-20 RX ORDER — MULTIVITAMIN
1 TABLET ORAL DAILY
Qty: 90 TABLET | Refills: 1 | Status: SHIPPED | OUTPATIENT
Start: 2024-03-20

## 2024-03-20 RX ORDER — SODIUM BICARBONATE 650 MG/1
650 TABLET ORAL 3 TIMES DAILY
Qty: 90 TABLET | Refills: 1 | Status: SHIPPED | OUTPATIENT
Start: 2024-03-20

## 2024-03-20 RX ORDER — LORATADINE 10 MG/1
10 TABLET ORAL DAILY
Qty: 90 TABLET | Refills: 1 | Status: SHIPPED | OUTPATIENT
Start: 2024-03-20

## 2024-03-20 RX ORDER — FERROUS SULFATE 325(65) MG
325 TABLET ORAL 2 TIMES DAILY WITH MEALS
Qty: 180 TABLET | Refills: 1 | Status: SHIPPED | OUTPATIENT
Start: 2024-03-20

## 2024-03-20 RX ORDER — OXYBUTYNIN CHLORIDE 5 MG/1
5 TABLET ORAL 3 TIMES DAILY
Qty: 90 TABLET | Refills: 1 | Status: SHIPPED | OUTPATIENT
Start: 2024-03-20

## 2024-03-20 RX ORDER — ATORVASTATIN CALCIUM 80 MG/1
80 TABLET, FILM COATED ORAL DAILY
Qty: 90 TABLET | Refills: 1 | Status: SHIPPED | OUTPATIENT
Start: 2024-03-20

## 2024-03-20 RX ORDER — SENNOSIDES 8.6 MG
650 CAPSULE ORAL EVERY 8 HOURS PRN
Qty: 60 TABLET | Refills: 3 | Status: SHIPPED | OUTPATIENT
Start: 2024-03-20

## 2024-03-20 NOTE — PROGRESS NOTES
Progress Note    Patient: Joanne Eduardo MRN: [id number]  SSN: [id number]    YOB: 1945  Age: 79 y.o.  Sex: female        Chief Complaint   Patient presents with    Leg Pain     Right-ncreasing pain past 2 weeks    Arm Pain     Left-increasing pain past 2 weeks    decreased appetite     Approx 2 weeks     she is a 79 y.o. year old female who presents presents with daughter for follow up. Patient with hx of T2D, HTN, HLD, hydradenitis and pedal edema. She complains of left foot pain and b/l pedal edema. Patient denies HA, dizziness, SOB, CP, abdominal pain, dysuria, acute myalgias or arthralgias.      Encounter Diagnoses   Name Primary?    Hydradenitis Yes    Type 2 diabetes mellitus without complication, without long-term current use of insulin (HCC)     Mixed hyperlipidemia     Female stress incontinence     Anemia, unspecified type     Non-seasonal allergic rhinitis due to pollen     Chronic pain of left knee      BP Readings from Last 3 Encounters:   03/20/24 136/78   01/24/24 118/69   10/24/23 104/67     Wt Readings from Last 3 Encounters:   03/20/24 51.2 kg (112 lb 12.8 oz)   01/24/24 52.2 kg (115 lb)   10/24/23 67.4 kg (148 lb 9.6 oz)     Body mass index is 17.67 kg/m².    CMP:    Lab Results   Component Value Date/Time     01/24/2024 03:50 PM    K 3.8 01/24/2024 03:50 PM     01/24/2024 03:50 PM    CO2 28 01/24/2024 03:50 PM    BUN 12 01/24/2024 03:50 PM    CREATININE 0.68 01/24/2024 03:50 PM    GFRAA >60 09/14/2022 03:25 PM    AGRATIO 0.4 01/24/2024 03:50 PM    AGRATIO 0.4 04/27/2023 04:30 PM    LABGLOM >60 01/24/2024 03:50 PM    GLUCOSE 98 01/24/2024 03:50 PM    PROT 8.2 01/24/2024 03:50 PM    LABALBU 2.4 01/24/2024 03:50 PM    CALCIUM 9.6 01/24/2024 03:50 PM    BILITOT 0.2 01/24/2024 03:50 PM    ALKPHOS 124 01/24/2024 03:50 PM    ALKPHOS 94 04/27/2023 04:30 PM    AST 14 01/24/2024 03:50 PM    ALT 14 01/24/2024 03:50 PM       Patient Active Problem List   Diagnosis    Depression

## 2024-03-20 NOTE — PROGRESS NOTES
Chief Complaint   Patient presents with    Leg Pain     Right-ncreasing pain past 2 weeks    Arm Pain     Left-increasing pain past 2 weeks    decreased appetite     Approx 2 weeks         \"Have you been to the ER, urgent care clinic since your last visit?  Hospitalized since your last visit?\"    NO    “Have you seen or consulted any other health care providers outside of [facility] since your last visit?”    NO           There are no preventive care reminders to display for this patient.

## 2024-04-01 ENCOUNTER — TELEPHONE (OUTPATIENT)
Facility: CLINIC | Age: 79
End: 2024-04-01

## 2024-04-01 NOTE — TELEPHONE ENCOUNTER
Ema states that the pt needs inpatient wound care. She states she is unable to see the wound due to hardness and draining of blood. Ema states she is going to call the squad and have the pt taken to [facility] for wound care.

## 2024-04-02 ENCOUNTER — TELEPHONE (OUTPATIENT)
Facility: CLINIC | Age: 79
End: 2024-04-02

## 2024-04-02 NOTE — TELEPHONE ENCOUNTER
Pt was seen at [facility] for blood in her urine. Pt is starting an antibiotic today. Pt's dtr would like her to be seen sooner than 4-15 for a hospital follow up. Please advise.

## 2024-04-03 NOTE — TELEPHONE ENCOUNTER
Returned call to pt's daughter, Jody, on HIPAA. Made her aware Dr Poole on vacation and no sooner appt's available but lookin at her note, the ED prescribed her some antibiotics so she should be good until the 15th. Jody agreed and stated her mother is doing fine, she just thought she had to be seen within 7 days. She stated that the home health nurse said she needs wound care. Jody made aware a message would be sent to Dr Poole letting him know. She verbalized understanding

## 2024-04-09 ENCOUNTER — TELEPHONE (OUTPATIENT)
Facility: CLINIC | Age: 79
End: 2024-04-09

## 2024-04-09 NOTE — TELEPHONE ENCOUNTER
Pt is getting wound care on her armpits, and buttox. The Chlorhexidine was not covered by insurance, so pt has not been using anything. Pt is also not following orders fpr wound care. Noa states that the pt's living environment is horrible. She states there are bugs, and trash all around and the smells. Noa has reported this to [facility] and they are going to be reaching out to  for help, and their next steps will be to contact [facility]. Please advise.

## 2024-04-12 ENCOUNTER — TELEPHONE (OUTPATIENT)
Facility: CLINIC | Age: 79
End: 2024-04-12

## 2024-04-12 DIAGNOSIS — E11.40 TYPE 2 DIABETES MELLITUS WITH DIABETIC NEUROPATHY, WITHOUT LONG-TERM CURRENT USE OF INSULIN (HCC): ICD-10-CM

## 2024-04-12 DIAGNOSIS — L89.301 PRESSURE INJURY OF BUTTOCK, STAGE 1, UNSPECIFIED LATERALITY: ICD-10-CM

## 2024-04-12 DIAGNOSIS — L73.2 HYDRADENITIS: Primary | ICD-10-CM

## 2024-04-12 NOTE — TELEPHONE ENCOUNTER
Called [facility] to see what I needed to do as pt has a referral for wound care. Per ,  pt needs to be admitted to wound care clinic.    [facility] stated the pt needed to be admitted to the hospital.    How should I go about doing this referral?    Please advise..

## 2024-04-12 NOTE — TELEPHONE ENCOUNTER
Zuleima states that the pt has Hydradenitis and there is nothing they can do for the pt. She states that the pt needs to be admitted to a wound clinic for care, and steroids. Please have Dr felix in order for referral for pt to be treated at a wound clinic as soon as possible. Please advise.

## 2024-04-15 ENCOUNTER — TELEPHONE (OUTPATIENT)
Facility: CLINIC | Age: 79
End: 2024-04-15

## 2024-04-15 NOTE — TELEPHONE ENCOUNTER
Pt's dtr states they still have not received a tess lift for the pt. She says it has been almost a year since they asked for one. Please advise.

## 2024-04-18 ENCOUNTER — OFFICE VISIT (OUTPATIENT)
Facility: CLINIC | Age: 79
End: 2024-04-18
Payer: MEDICARE

## 2024-04-18 VITALS
HEART RATE: 87 BPM | HEIGHT: 67 IN | OXYGEN SATURATION: 100 % | DIASTOLIC BLOOD PRESSURE: 73 MMHG | WEIGHT: 115.4 LBS | RESPIRATION RATE: 14 BRPM | BODY MASS INDEX: 18.11 KG/M2 | TEMPERATURE: 97.5 F | SYSTOLIC BLOOD PRESSURE: 131 MMHG

## 2024-04-18 DIAGNOSIS — I10 ESSENTIAL (PRIMARY) HYPERTENSION: ICD-10-CM

## 2024-04-18 DIAGNOSIS — N39.3 FEMALE STRESS INCONTINENCE: ICD-10-CM

## 2024-04-18 DIAGNOSIS — D64.9 ANEMIA, UNSPECIFIED TYPE: ICD-10-CM

## 2024-04-18 DIAGNOSIS — L89.301 PRESSURE INJURY OF BUTTOCK, STAGE 1, UNSPECIFIED LATERALITY: ICD-10-CM

## 2024-04-18 DIAGNOSIS — E11.40 TYPE 2 DIABETES MELLITUS WITH DIABETIC NEUROPATHY, WITHOUT LONG-TERM CURRENT USE OF INSULIN (HCC): ICD-10-CM

## 2024-04-18 DIAGNOSIS — N93.9 VAGINAL BLEEDING: Primary | ICD-10-CM

## 2024-04-18 PROCEDURE — 0509F URINE INCON PLAN DOCD: CPT | Performed by: FAMILY MEDICINE

## 2024-04-18 PROCEDURE — 3075F SYST BP GE 130 - 139MM HG: CPT | Performed by: FAMILY MEDICINE

## 2024-04-18 PROCEDURE — 1123F ACP DISCUSS/DSCN MKR DOCD: CPT | Performed by: FAMILY MEDICINE

## 2024-04-18 PROCEDURE — G8427 DOCREV CUR MEDS BY ELIG CLIN: HCPCS | Performed by: FAMILY MEDICINE

## 2024-04-18 PROCEDURE — 3078F DIAST BP <80 MM HG: CPT | Performed by: FAMILY MEDICINE

## 2024-04-18 PROCEDURE — 99214 OFFICE O/P EST MOD 30 MIN: CPT | Performed by: FAMILY MEDICINE

## 2024-04-18 PROCEDURE — G8399 PT W/DXA RESULTS DOCUMENT: HCPCS | Performed by: FAMILY MEDICINE

## 2024-04-18 PROCEDURE — 3044F HG A1C LEVEL LT 7.0%: CPT | Performed by: FAMILY MEDICINE

## 2024-04-18 PROCEDURE — 1036F TOBACCO NON-USER: CPT | Performed by: FAMILY MEDICINE

## 2024-04-18 PROCEDURE — G8419 CALC BMI OUT NRM PARAM NOF/U: HCPCS | Performed by: FAMILY MEDICINE

## 2024-04-18 PROCEDURE — 1090F PRES/ABSN URINE INCON ASSESS: CPT | Performed by: FAMILY MEDICINE

## 2024-04-18 RX ORDER — POLYMYXIN B SULFATE AND TRIMETHOPRIM 1; 10000 MG/ML; [USP'U]/ML
SOLUTION OPHTHALMIC
COMMUNITY
Start: 2024-04-13

## 2024-04-18 RX ORDER — FERROUS SULFATE 325(65) MG
325 TABLET ORAL
Qty: 90 TABLET | Refills: 1 | Status: SHIPPED | OUTPATIENT
Start: 2024-04-18

## 2024-04-18 NOTE — PROGRESS NOTES
Physical Activity: Inactive (10/24/2023)    Exercise Vital Sign     Days of Exercise per Week: 0 days     Minutes of Exercise per Session: 0 min   Stress: Not on file   Social Connections: Not on file   Intimate Partner Violence: Not on file   Housing Stability: Unknown (6/16/2023)    Housing Stability Vital Sign     Unable to Pay for Housing in the Last Year: Not on file     Number of Places Lived in the Last Year: Not on file     Unstable Housing in the Last Year: No     Family History   Problem Relation Age of Onset    Cancer Mother     Asthma Father     Heart Disease Mother     Alcohol Abuse Brother      Current Outpatient Medications   Medication Sig    trimethoprim-polymyxin b (POLYTRIM) 30280-6.1 UNIT/ML-% ophthalmic solution     ferrous sulfate (IRON 325) 325 (65 Fe) MG tablet Take 1 tablet by mouth daily (with breakfast)    atorvastatin (LIPITOR) 80 MG tablet Take 1 tablet by mouth daily    furosemide (LASIX) 20 MG tablet Take 1 tablet by mouth once daily    chlorhexidine gluconate (ANTISEPTIC SKIN CLEANSER) 4 % SOLN external solution Apply to axilla daily.    Multiple Vitamin (MULTIVITAMIN) TABS Take 1 tablet by mouth daily    loratadine (CLARITIN) 10 MG tablet Take 1 tablet by mouth daily    oxyBUTYnin (DITROPAN) 5 MG tablet Take 1 tablet by mouth 3 times daily    sodium bicarbonate 650 MG tablet Take 1 tablet by mouth 3 times daily    acetaminophen (ACETAMINOPHEN 8 HOUR) 650 MG extended release tablet Take 1 tablet by mouth every 8 hours as needed for Pain     No current facility-administered medications for this visit.     Allergies   Allergen Reactions    Ciprofloxacin Other (See Comments)     Vomiting    Doxycycline Other (See Comments)     Facial swelling.    Penicillins Rash       Review of Systems:  Constitutional: c/o weakness  Derm: pressure ulcer of buttocks  Resp: Negative for wheezing or SOB  CV: Negative for chest pain or palpitations  GI: Negative for nausea or abdominal pain  : see

## 2024-04-22 ENCOUNTER — TELEPHONE (OUTPATIENT)
Facility: CLINIC | Age: 79
End: 2024-04-22

## 2024-04-22 NOTE — TELEPHONE ENCOUNTER
Returned call to Jody cortes. She was advised Gyn referral placed. Verbalizes understanding.       ----- Message from Chandler Graves sent at 4/22/2024  4:33 PM EDT -----  Subject: Referral Request    Reason for referral request? daughter called and said md was supposed to   call in a referral for a obgny to get her mom in faster for her urgent   need  Provider patient wants to be referred to(if known):     Provider Phone Number(if known):    Additional Information for Provider?   ---------------------------------------------------------------------------  --------------  CALL BACK INFO    [phone]; OK to leave message on voicemail  ---------------------------------------------------------------------------  --------------

## 2024-04-24 ENCOUNTER — HOSPITAL ENCOUNTER (OUTPATIENT)
Facility: HOSPITAL | Age: 79
Discharge: HOME | End: 2024-04-24
Attending: SURGERY
Payer: MEDICARE

## 2024-04-24 VITALS
RESPIRATION RATE: 16 BRPM | WEIGHT: 123 LBS | HEIGHT: 61 IN | BODY MASS INDEX: 23.22 KG/M2 | HEART RATE: 80 BPM | SYSTOLIC BLOOD PRESSURE: 124 MMHG | TEMPERATURE: 97.3 F | DIASTOLIC BLOOD PRESSURE: 64 MMHG

## 2024-04-24 DIAGNOSIS — L73.2 HIDRADENITIS SUPPURATIVA: Primary | ICD-10-CM

## 2024-04-24 PROCEDURE — 87077 CULTURE AEROBIC IDENTIFY: CPT

## 2024-04-24 PROCEDURE — 87186 SC STD MICRODIL/AGAR DIL: CPT

## 2024-04-24 PROCEDURE — 87070 CULTURE OTHR SPECIMN AEROBIC: CPT

## 2024-04-24 PROCEDURE — 99214 OFFICE O/P EST MOD 30 MIN: CPT

## 2024-04-24 PROCEDURE — 87147 CULTURE TYPE IMMUNOLOGIC: CPT

## 2024-04-24 PROCEDURE — 87205 SMEAR GRAM STAIN: CPT

## 2024-04-24 RX ORDER — CLOBETASOL PROPIONATE 0.5 MG/G
OINTMENT TOPICAL ONCE
Status: CANCELLED | OUTPATIENT
Start: 2024-04-24 | End: 2024-04-24

## 2024-04-24 RX ORDER — SODIUM CHLOR/HYPOCHLOROUS ACID 0.033 %
SOLUTION, IRRIGATION IRRIGATION ONCE
OUTPATIENT
Start: 2024-04-24 | End: 2024-04-24

## 2024-04-24 RX ORDER — IBUPROFEN 200 MG
TABLET ORAL ONCE
Status: CANCELLED | OUTPATIENT
Start: 2024-04-24 | End: 2024-04-24

## 2024-04-24 RX ORDER — BETAMETHASONE DIPROPIONATE 0.5 MG/G
CREAM TOPICAL ONCE
Status: CANCELLED | OUTPATIENT
Start: 2024-04-24 | End: 2024-04-24

## 2024-04-24 RX ORDER — GENTAMICIN SULFATE 1 MG/G
OINTMENT TOPICAL ONCE
OUTPATIENT
Start: 2024-04-24 | End: 2024-04-24

## 2024-04-24 RX ORDER — LIDOCAINE 40 MG/G
CREAM TOPICAL ONCE
OUTPATIENT
Start: 2024-04-24 | End: 2024-04-24

## 2024-04-24 RX ORDER — GINSENG 100 MG
CAPSULE ORAL ONCE
Status: CANCELLED | OUTPATIENT
Start: 2024-04-24 | End: 2024-04-24

## 2024-04-24 RX ORDER — LIDOCAINE HYDROCHLORIDE 40 MG/ML
SOLUTION TOPICAL ONCE
Status: CANCELLED | OUTPATIENT
Start: 2024-04-24 | End: 2024-04-24

## 2024-04-24 RX ORDER — BETAMETHASONE DIPROPIONATE 0.5 MG/G
CREAM TOPICAL ONCE
OUTPATIENT
Start: 2024-04-24 | End: 2024-04-24

## 2024-04-24 RX ORDER — BACITRACIN ZINC AND POLYMYXIN B SULFATE 500; 1000 [USP'U]/G; [USP'U]/G
OINTMENT TOPICAL ONCE
Status: CANCELLED | OUTPATIENT
Start: 2024-04-24 | End: 2024-04-24

## 2024-04-24 RX ORDER — LIDOCAINE HYDROCHLORIDE 40 MG/ML
SOLUTION TOPICAL ONCE
OUTPATIENT
Start: 2024-04-24 | End: 2024-04-24

## 2024-04-24 RX ORDER — LIDOCAINE 40 MG/G
CREAM TOPICAL ONCE
Status: CANCELLED | OUTPATIENT
Start: 2024-04-24 | End: 2024-04-24

## 2024-04-24 RX ORDER — LIDOCAINE HYDROCHLORIDE 20 MG/ML
JELLY TOPICAL ONCE
Status: CANCELLED | OUTPATIENT
Start: 2024-04-24 | End: 2024-04-24

## 2024-04-24 RX ORDER — LIDOCAINE 50 MG/G
OINTMENT TOPICAL ONCE
OUTPATIENT
Start: 2024-04-24 | End: 2024-04-24

## 2024-04-24 RX ORDER — TRIAMCINOLONE ACETONIDE 1 MG/G
OINTMENT TOPICAL ONCE
OUTPATIENT
Start: 2024-04-24 | End: 2024-04-24

## 2024-04-24 RX ORDER — TRIAMCINOLONE ACETONIDE 1 MG/G
OINTMENT TOPICAL ONCE
Status: CANCELLED | OUTPATIENT
Start: 2024-04-24 | End: 2024-04-24

## 2024-04-24 RX ORDER — LIDOCAINE HYDROCHLORIDE 20 MG/ML
JELLY TOPICAL ONCE
OUTPATIENT
Start: 2024-04-24 | End: 2024-04-24

## 2024-04-24 RX ORDER — IBUPROFEN 200 MG
TABLET ORAL ONCE
OUTPATIENT
Start: 2024-04-24 | End: 2024-04-24

## 2024-04-24 RX ORDER — GENTAMICIN SULFATE 1 MG/G
OINTMENT TOPICAL ONCE
Status: CANCELLED | OUTPATIENT
Start: 2024-04-24 | End: 2024-04-24

## 2024-04-24 RX ORDER — GINSENG 100 MG
CAPSULE ORAL ONCE
OUTPATIENT
Start: 2024-04-24 | End: 2024-04-24

## 2024-04-24 RX ORDER — CLOBETASOL PROPIONATE 0.5 MG/G
OINTMENT TOPICAL ONCE
OUTPATIENT
Start: 2024-04-24 | End: 2024-04-24

## 2024-04-24 RX ORDER — SODIUM CHLOR/HYPOCHLOROUS ACID 0.033 %
SOLUTION, IRRIGATION IRRIGATION ONCE
Status: CANCELLED | OUTPATIENT
Start: 2024-04-24 | End: 2024-04-24

## 2024-04-24 RX ORDER — BACITRACIN ZINC AND POLYMYXIN B SULFATE 500; 1000 [USP'U]/G; [USP'U]/G
OINTMENT TOPICAL ONCE
OUTPATIENT
Start: 2024-04-24 | End: 2024-04-24

## 2024-04-24 RX ORDER — LIDOCAINE 50 MG/G
OINTMENT TOPICAL ONCE
Status: CANCELLED | OUTPATIENT
Start: 2024-04-24 | End: 2024-04-24

## 2024-04-24 NOTE — WOUND CARE
Wound Clinic Physician Orders and Discharge Instructions  [facility]  [address], [address]  [place] [address]  Telephone: [phone]     FAX [phone]    NAME:  Joanne Eduardo  YOB: 1945  MEDICAL RECORD NUMBER:  [id number]  DATE:  4/24/2024      Return Appointment:  [] Dressing Supply Provider:   [x] Home Healthcare: [facility]  [x] Return Appointment:    Dr. Pena    2     Week(s)  [] Nurse Visit:     [] Discharge from [facility]: [] Healed        [] Refer to Provider:         [] Consult    Follow-up Information:  [] Ordered Tests:   [] Referrals:   [] Rx:   [x] Other: culture taken right buttocks and left Axilla       Wound Cleansing:   Do not scrub or use excessive force.  Cleanse wound prior to applying a clean dressing with:  [x] Normal Saline   [] Keep Wound Dry in Shower     [] Wound Cleanser   [x] Cleanse wound with Mild Soap & Water  (Dial Soap)  [] Other:       Topical Treatments:  Do not apply lotions, creams, or ointments to wound bed unless directed.   [] Apply moisturizing lotion to skin surrounding the wound prior to dressing change.  [] Apply antifungal ointment to skin surrounding the wound prior to dressing change.  [] Apply thin film of moisture barrier ointment to skin immediately around wound.  [] Apply Betadine to skin immediately around wound   [] Other:      Dressings:           Wound Location  SACRUM    [x] Apply Primary Dressing:       [] MediHoney Gel [] MediHoney Alginate  [x] Calcium Alginate with Silver   [] Calcium Alginate without silver   [] Collagen with silver [] Collagen without Silver    [] Santyl with moist saline gauze     [] Hydrofera Blue (cut to size and moistened with normal saline)  [] Hydrofera Blue Ready (cut to size)      [] Normal Saline wet to dry  [] Betadine wet to dry    [] Hydrogel  [] Mepitel     [] Bactroban/Mupirocin   [] Iodoform Packing Strip [] Plain Packing Strip   [] Skin Sub:   [] 
KEEP THE WOUND COVERED AT ALL TIMES.

## 2024-04-24 NOTE — DISCHARGE INSTRUCTIONS
Other:      [x] Cover and Secure with:     [x] Gauze [] Ronni [] Kerlix   [] Zetuvit Plus Silicone Border [x] Super Absorbant [x] ABD     [] Ace Wrap [] Other:    Limit contact of tape with skin.    [x] Change dressing: [] Daily    [x] Every Other Day or as needed  [] Twice per week   [] Three times per week   [] Once a week [] Do Not Change Dressing   [] Other:       Dressings:           Wound Location  BILATERAL AXILLA's   [x] Apply Primary Dressing:       [] MediHoney Gel [] MediHoney Alginate  [x] Calcium Alginate with Silver   [] Calcium Alginate without silver   [] Collagen with silver [] Collagen without Silver    [] Santyl with moist saline gauze     [] Hydrofera Blue (cut to size and moistened with normal saline)  [] Hydrofera Blue Ready (cut to size)      [] Normal Saline wet to dry  [] Betadine wet to dry    [] Hydrogel  [] Mepitel     [] Bactroban/Mupirocin   [] Iodoform Packing Strip [] Plain Packing Strip   [] Skin Sub:   [] Other:      [x] Cover and Secure with:     [x] Gauze [] Ronni [] Kerlix   [] Zetuvit Plus Silicone Border [x] Super Absorbant [x] ABD     [] Ace Wrap [] Other:    Limit contact of tape with skin.    [x] Change dressing: [] Daily    [x] Every Other Day or as needed  [] Twice per week   [] Three times per week   [] Once a week [] Do Not Change Dressing   [] Other:       Negative Pressure:           Wound Location:   [] Pressure @  mm/Hg  []Continuous []Intermittent   [] Black Foam  [] White Foam [] Bridge  [] Change dressing 3 times per week     [] Other:     Pressure Relief:  [x] When sitting, shift position or do seat lifts every 15 minutes.  [x] Wheelchair cushion [] Specialty Bed/Mattress  [] Turn every 2 hours when in bed.  Avoid direct pressure on wound site.  Limit side lying to 30 degree tilt.  Limit HOB elevation to 30 degrees.  [x] Other keep pressure off wounds     Edema Control:  Apply: [] Compression Stocking:  mmHg  []Right Leg []Left Leg   [] Tubigrip []Right Leg

## 2024-04-24 NOTE — PROGRESS NOTES
1026   Margins Undefined edges 04/24/24 1026   Wound Thickness Description not for Pressure Injury Full thickness 04/24/24 1026   Number of days: 0            Written patient dismissal instructions given to patient and signed by patient or POA.  Patient's daughter voiced understanding that the importance of adherence to instructions will likely help with wound maintenance.  Likelihood of healing is very low given patient's need for surgery and multiple comorbidities.    Electronically signed by Yasemin Green DO on 4/24/2024 at 11:53 AM

## 2024-04-27 LAB
BACTERIA SPEC CULT: ABNORMAL
GRAM STN SPEC: ABNORMAL
GRAM STN SPEC: ABNORMAL
Lab: ABNORMAL

## 2024-04-28 LAB
BACTERIA SPEC CULT: ABNORMAL
GRAM STN SPEC: ABNORMAL
Lab: ABNORMAL

## 2024-05-08 ENCOUNTER — TELEPHONE (OUTPATIENT)
Facility: CLINIC | Age: 79
End: 2024-05-08

## 2024-05-08 NOTE — TELEPHONE ENCOUNTER
Call to all numbers, no answer, message left on 1.     Please reschedule 5/17 appt. Dr Poole will be out that afternoon

## 2024-05-14 ENCOUNTER — TELEPHONE (OUTPATIENT)
Facility: CLINIC | Age: 79
End: 2024-05-14

## 2024-05-14 NOTE — TELEPHONE ENCOUNTER
Pt's daughter(Jody) called stating pt's face, eye and mouth is really swollen with soreness. Pt's face was swollen a little on yesterday but has worsened since, advised to go to ER/Urgent care due to no soon availability. Jody stated she would like to scheduled for next avail appt ,pt is scheduled for 5/16.    Reiterated she should take pt to ER/Urgent care, pt's daughter stated she may take her tomorrow if not she will bring her in on Thursday..    Please advise..

## 2024-05-14 NOTE — TELEPHONE ENCOUNTER
Returned call, voicemail not set up.   Please advise patient to go to Urgent Care/ER for further evaluation.

## 2024-06-04 ENCOUNTER — OFFICE VISIT (OUTPATIENT)
Facility: CLINIC | Age: 79
End: 2024-06-04
Payer: MEDICARE

## 2024-06-04 VITALS
SYSTOLIC BLOOD PRESSURE: 114 MMHG | RESPIRATION RATE: 14 BRPM | HEIGHT: 67 IN | BODY MASS INDEX: 17.74 KG/M2 | TEMPERATURE: 98.6 F | WEIGHT: 113 LBS | DIASTOLIC BLOOD PRESSURE: 66 MMHG | OXYGEN SATURATION: 100 % | HEART RATE: 99 BPM

## 2024-06-04 DIAGNOSIS — I10 ESSENTIAL (PRIMARY) HYPERTENSION: Primary | ICD-10-CM

## 2024-06-04 DIAGNOSIS — N39.3 FEMALE STRESS INCONTINENCE: ICD-10-CM

## 2024-06-04 DIAGNOSIS — D64.9 ANEMIA, UNSPECIFIED TYPE: ICD-10-CM

## 2024-06-04 DIAGNOSIS — L89.301 PRESSURE INJURY OF BUTTOCK, STAGE 1, UNSPECIFIED LATERALITY: ICD-10-CM

## 2024-06-04 DIAGNOSIS — L73.2 HYDRADENITIS: ICD-10-CM

## 2024-06-04 PROCEDURE — G8399 PT W/DXA RESULTS DOCUMENT: HCPCS | Performed by: FAMILY MEDICINE

## 2024-06-04 PROCEDURE — 3078F DIAST BP <80 MM HG: CPT | Performed by: FAMILY MEDICINE

## 2024-06-04 PROCEDURE — G8427 DOCREV CUR MEDS BY ELIG CLIN: HCPCS | Performed by: FAMILY MEDICINE

## 2024-06-04 PROCEDURE — 1090F PRES/ABSN URINE INCON ASSESS: CPT | Performed by: FAMILY MEDICINE

## 2024-06-04 PROCEDURE — G8419 CALC BMI OUT NRM PARAM NOF/U: HCPCS | Performed by: FAMILY MEDICINE

## 2024-06-04 PROCEDURE — 1123F ACP DISCUSS/DSCN MKR DOCD: CPT | Performed by: FAMILY MEDICINE

## 2024-06-04 PROCEDURE — 3074F SYST BP LT 130 MM HG: CPT | Performed by: FAMILY MEDICINE

## 2024-06-04 PROCEDURE — 0509F URINE INCON PLAN DOCD: CPT | Performed by: FAMILY MEDICINE

## 2024-06-04 PROCEDURE — 99214 OFFICE O/P EST MOD 30 MIN: CPT | Performed by: FAMILY MEDICINE

## 2024-06-04 PROCEDURE — 1036F TOBACCO NON-USER: CPT | Performed by: FAMILY MEDICINE

## 2024-06-04 NOTE — PROGRESS NOTES
Chief Complaint   Patient presents with    Follow-up Chronic Condition         \"Have you been to the ER, urgent care clinic since your last visit?  Hospitalized since your last visit?\"    YES    “Have you seen or consulted any other health care providers outside of [facility] since your last visit?”    NO           There are no preventive care reminders to display for this patient.

## 2024-06-05 LAB
ALBUMIN SERPL-MCNC: 2.2 G/DL (ref 3.5–5)
ALBUMIN/GLOB SERPL: 0.4 (ref 1.1–2.2)
ALP SERPL-CCNC: 127 U/L (ref 45–117)
ALT SERPL-CCNC: 10 U/L (ref 12–78)
ANION GAP SERPL CALC-SCNC: 3 MMOL/L (ref 5–15)
AST SERPL-CCNC: 13 U/L (ref 15–37)
BILIRUB SERPL-MCNC: 0.3 MG/DL (ref 0.2–1)
BUN SERPL-MCNC: 8 MG/DL (ref 6–20)
BUN/CREAT SERPL: 11 (ref 12–20)
CALCIUM SERPL-MCNC: 9.1 MG/DL (ref 8.5–10.1)
CHLORIDE SERPL-SCNC: 107 MMOL/L (ref 97–108)
CO2 SERPL-SCNC: 28 MMOL/L (ref 21–32)
CREAT SERPL-MCNC: 0.76 MG/DL (ref 0.55–1.02)
ERYTHROCYTE [DISTWIDTH] IN BLOOD BY AUTOMATED COUNT: 16.8 % (ref 11.5–14.5)
GLOBULIN SER CALC-MCNC: 5.6 G/DL (ref 2–4)
GLUCOSE SERPL-MCNC: 101 MG/DL (ref 65–100)
HCT VFR BLD AUTO: 28.1 % (ref 35–47)
HGB BLD-MCNC: 8.2 G/DL (ref 11.5–16)
MCH RBC QN AUTO: 24.7 PG (ref 26–34)
MCHC RBC AUTO-ENTMCNC: 29.2 G/DL (ref 30–36.5)
MCV RBC AUTO: 84.6 FL (ref 80–99)
NRBC # BLD: 0 K/UL (ref 0–0.01)
NRBC BLD-RTO: 0 PER 100 WBC
PLATELET # BLD AUTO: 459 K/UL (ref 150–400)
PMV BLD AUTO: 10.2 FL (ref 8.9–12.9)
POTASSIUM SERPL-SCNC: 4.2 MMOL/L (ref 3.5–5.1)
PROT SERPL-MCNC: 7.8 G/DL (ref 6.4–8.2)
RBC # BLD AUTO: 3.32 M/UL (ref 3.8–5.2)
SODIUM SERPL-SCNC: 138 MMOL/L (ref 136–145)
WBC # BLD AUTO: 11.7 K/UL (ref 3.6–11)

## 2024-06-10 ENCOUNTER — HOSPITAL ENCOUNTER (OUTPATIENT)
Facility: HOSPITAL | Age: 79
Discharge: HOME | End: 2024-06-10
Attending: SURGERY
Payer: MEDICARE

## 2024-06-10 VITALS
HEART RATE: 85 BPM | DIASTOLIC BLOOD PRESSURE: 65 MMHG | SYSTOLIC BLOOD PRESSURE: 122 MMHG | RESPIRATION RATE: 16 BRPM | TEMPERATURE: 97.3 F

## 2024-06-10 DIAGNOSIS — L73.2 HIDRADENITIS SUPPURATIVA: Primary | ICD-10-CM

## 2024-06-10 PROCEDURE — 97602 WOUND(S) CARE NON-SELECTIVE: CPT

## 2024-06-10 PROCEDURE — 99214 OFFICE O/P EST MOD 30 MIN: CPT

## 2024-06-10 RX ORDER — LIDOCAINE HYDROCHLORIDE 20 MG/ML
JELLY TOPICAL ONCE
OUTPATIENT
Start: 2024-06-10 | End: 2024-06-10

## 2024-06-10 RX ORDER — BACITRACIN ZINC AND POLYMYXIN B SULFATE 500; 1000 [USP'U]/G; [USP'U]/G
OINTMENT TOPICAL ONCE
OUTPATIENT
Start: 2024-06-10 | End: 2024-06-10

## 2024-06-10 RX ORDER — SODIUM CHLOR/HYPOCHLOROUS ACID 0.033 %
SOLUTION, IRRIGATION IRRIGATION ONCE
OUTPATIENT
Start: 2024-06-10 | End: 2024-06-10

## 2024-06-10 RX ORDER — BETAMETHASONE DIPROPIONATE 0.5 MG/G
CREAM TOPICAL ONCE
OUTPATIENT
Start: 2024-06-10 | End: 2024-06-10

## 2024-06-10 RX ORDER — GINSENG 100 MG
CAPSULE ORAL ONCE
OUTPATIENT
Start: 2024-06-10 | End: 2024-06-10

## 2024-06-10 RX ORDER — SULFAMETHOXAZOLE AND TRIMETHOPRIM 800; 160 MG/1; MG/1
1 TABLET ORAL 2 TIMES DAILY
Qty: 20 TABLET | Refills: 0 | Status: SHIPPED | OUTPATIENT
Start: 2024-06-10 | End: 2024-06-20

## 2024-06-10 RX ORDER — IBUPROFEN 200 MG
TABLET ORAL ONCE
OUTPATIENT
Start: 2024-06-10 | End: 2024-06-10

## 2024-06-10 RX ORDER — LIDOCAINE 50 MG/G
OINTMENT TOPICAL ONCE
OUTPATIENT
Start: 2024-06-10 | End: 2024-06-10

## 2024-06-10 RX ORDER — TRIAMCINOLONE ACETONIDE 1 MG/G
OINTMENT TOPICAL ONCE
OUTPATIENT
Start: 2024-06-10 | End: 2024-06-10

## 2024-06-10 RX ORDER — LIDOCAINE HYDROCHLORIDE 40 MG/ML
SOLUTION TOPICAL ONCE
OUTPATIENT
Start: 2024-06-10 | End: 2024-06-10

## 2024-06-10 RX ORDER — LIDOCAINE 40 MG/G
CREAM TOPICAL ONCE
OUTPATIENT
Start: 2024-06-10 | End: 2024-06-10

## 2024-06-10 RX ORDER — GENTAMICIN SULFATE 1 MG/G
OINTMENT TOPICAL ONCE
OUTPATIENT
Start: 2024-06-10 | End: 2024-06-10

## 2024-06-10 RX ORDER — CLOBETASOL PROPIONATE 0.5 MG/G
OINTMENT TOPICAL ONCE
OUTPATIENT
Start: 2024-06-10 | End: 2024-06-10

## 2024-06-10 NOTE — DISCHARGE INSTRUCTIONS
Wound Clinic Physician Orders and Discharge Instructions  [facility]  [address], [address]  [place] [address]  Telephone: [phone]     FAX [phone]    NAME:  Joanne Eduardo  YOB: 1945  MEDICAL RECORD NUMBER:  [id number]  DATE:  6/10/2024      Return Appointment:  [x] Dressing Supply Provider:  [facility]  [] Home Healthcare:   [x] Return Appointment:  1 Week(s)  [] Nurse Visit:     [] Discharge from [facility]: [] Healed        [] Refer to Provider:         [] Consult    Follow-up Information:  [] Ordered Tests:   [x] Referrals: Dr. Chun 6/18/24  [x] Rx: Doxycycline to pharmacy  [] Other:       Wound Cleansing:   Do not scrub or use excessive force.  Cleanse wound prior to applying a clean dressing with:  [] Normal Saline   [] Keep Wound Dry in Shower     [] Wound Cleanser   [x] Cleanse wound with Mild Soap & Water  (Dial Soap)  [] Other:       Topical Treatments:  Do not apply lotions, creams, or ointments to wound bed unless directed.   [] Apply moisturizing lotion to skin surrounding the wound prior to dressing change.  [] Apply antifungal ointment to skin surrounding the wound prior to dressing change.  [] Apply thin film of moisture barrier ointment to skin immediately around wound.  [] Apply Betadine to skin immediately around wound   [] Other:      Dressings:           Wound Location  SACRUM    [x] Apply Primary Dressing:       [] MediHoney Gel [] MediHoney Alginate  [x] Calcium Alginate with Silver   [] Calcium Alginate without silver   [] Collagen with silver [] Collagen without Silver    [] Santyl with moist saline gauze     [] Hydrofera Blue (cut to size and moistened with normal saline)  [] Hydrofera Blue Ready (cut to size)      [] Normal Saline wet to dry  [] Betadine wet to dry    [] Hydrogel  [] Mepitel     [] Bactroban/Mupirocin   [] Iodoform Packing Strip [] Plain Packing Strip   [] Skin Sub:   [] Other:      [x] Cover and Secure with:

## 2024-06-10 NOTE — CONSULTS
[facility]   Wound Care and Hyperbaric Oxygen Therapy Center   Medical Staff Note     Joanne Eduardo  MEDICAL RECORD NUMBER:  [id number]  AGE: 79 y.o.   GENDER: female  : 1945  EPISODE DATE:  6/10/2024      Chief Complaint:   Chief Complaint   Patient presents with    Wound Check     Sacral, and bilateral axilla       History of Present Illness:     Joanne Eduardo is a 79 y.o. female who presents today for wound/ulcer evaluation.     History of Wound Context: Per original history and physical on this patient. Changes in history since last evaluation: ***  Wound/Ulcer Pain Timing/Severity: {PAIN ASSESSMENT WOUND:31597:::1}  Quality of pain: {PAIN QUALITY::::1}  Severity:  {NUMBERS; 0-10:5044} / 10   Modifying Factors: {Modifying Factors Wound:335182308:::1}  Associated Signs/Symptoms: {ASSOCIATED SYMPTOMS WOUND:663916015:::1}    Ulcer Identification:  Ulcer Type: {Wound type:67070:::1}    Contributing Factors: {HEALTH FACTORS:362284883:::1}    Wound: ***     Past Medical History:       Diagnosis Date    Arthritis     Burning with urination     Diabetes (HCC)     states she is \"pre-diabetic\", diet controlled    H/O blood clots     right side of body    Heart attack (HCC) 2014    Hydradenitis 2012    Hypercholesterolemia     Hypertension     Ill-defined condition     edema of legs    Thromboembolus (HCC)        Past Surgical History:   Past Surgical History:   Procedure Laterality Date    COLONOSCOPY N/A 2023    COLONOSCOPY performed by Susy Avendano MD at [facility] ENDOSCOPY       Allergy:  Allergies   Allergen Reactions    Ciprofloxacin Other (See Comments)     Vomiting    Doxycycline Other (See Comments)     Facial swelling.    Penicillins Rash       Social History:   Social History     Tobacco Use    Smoking status: Never     Passive exposure: Never    Smokeless tobacco: Never   Substance Use Topics    Alcohol use: No     Alcohol/week: 0.0 standard drinks of alcohol    Drug

## 2024-06-10 NOTE — WOUND CARE
Wound Care Supplies      Supply Company:                    [employer]     Ordering Center:     [facility]  [address] [place] [address], [address] [address]    Phone: [phone]  Fax: [phone]    Patient Information:      Robert Eduardo  [address] [address]   [phone]   : 1945  AGE: 79 y.o.     GENDER: female   EPISODE DATE: 6/10/2024    Insurance:      PRIMARY INSURANCE:  Plan: MEDICARE PART A AND B  Coverage: MEDICARE  Effective Date: 3/1/2010  Group Number: [unfilled]  Subscriber Number: [id number] - (Medicare)    Payer/Plan Subscr  Sex Relation Sub. Ins. ID Effective Group Num   1. MEDICARE - ME* PARAG EDUARDOMIAN CLEARY 1945 Female Self [id number] 3/1/10                                    [address] [address]   2. MATTHEW COMPLE* ENMANUELROBERT SOMMER 1945 Female Self [id number]                                   [address] [address]       Patient Wound Information:      Problem List Items Addressed This Visit          Other    Hidradenitis suppurativa - Primary    Relevant Medications    collagenase ointment (Start on 6/10/2024  4:00 PM)    Other Relevant Orders    Initiate Outpatient Wound Care Protocol       WOUNDS REQUIRING DRESSING SUPPLIES:     Wound 24 Sacrum #1 (Active)   Wound Image   06/10/24 1439   Wound Etiology Other 06/10/24 1439   Dressing Status Other (Comment) 06/10/24 1439   Wound Cleansed Cleansed with saline 06/10/24 1439   Wound Length (cm) 7 cm 06/10/24 1439   Wound Width (cm) 9 cm 06/10/24 1439   Wound Depth (cm) 0.4 cm 06/10/24 1439   Wound Surface Area (cm^2) 63 cm^2 06/10/24 1439   Change in Wound Size % (l*w) 40 06/10/24 1439   Wound Volume (cm^3) 25.2 cm^3 06/10/24 1439   Wound Healing % 40 06/10/24 1439   Wound Assessment Granulation tissue 06/10/24 1439   Drainage Amount Moderate (25-50%) 06/10/24 1439   Drainage Description Serosanguinous 06/10/24 1439   Odor None 06/10/24 1439   Tiki-wound 
with:     [x] Gauze [] Ronni [] Kerlix   [] Zetuvit Plus Silicone Border [x] Super Absorbant [x] ABD     [] Ace Wrap [] Other:    Limit contact of tape with skin.    [x] Change dressing: [] Daily    [x] Every Other Day or as needed  [] Twice per week   [] Three times per week   [] Once a week [] Do Not Change Dressing   [] Other:       Dressings:           Wound Location  BILATERAL AXILLA's   [x] Apply Primary Dressing:       [] MediHoney Gel [] MediHoney Alginate  [x] Calcium Alginate with Silver   [] Calcium Alginate without silver   [] Collagen with silver [] Collagen without Silver    [] Santyl with moist saline gauze     [] Hydrofera Blue (cut to size and moistened with normal saline)  [] Hydrofera Blue Ready (cut to size)      [] Normal Saline wet to dry  [] Betadine wet to dry    [] Hydrogel  [] Mepitel     [] Bactroban/Mupirocin   [] Iodoform Packing Strip [] Plain Packing Strip   [] Skin Sub:   [x] Other:  Santyl in clinic    [x] Cover and Secure with:     [x] Gauze [] Ronni [] Kerlix   [] Zetuvit Plus Silicone Border [x] Super Absorbant [x] ABD     [] Ace Wrap [] Other:    Limit contact of tape with skin.    [x] Change dressing: [] Daily    [x] Every Other Day or as needed  [] Twice per week   [] Three times per week   [] Once a week [] Do Not Change Dressing   [] Other:       Negative Pressure:           Wound Location:   [] Pressure @  mm/Hg  []Continuous []Intermittent   [] Black Foam  [] White Foam [] Bridge  [] Change dressing 3 times per week     [] Other:     Pressure Relief:  [x] When sitting, shift position or do seat lifts every 15 minutes.  [x] Wheelchair cushion [] Specialty Bed/Mattress  [] Turn every 2 hours when in bed.  Avoid direct pressure on wound site.  Limit side lying to 30 degree tilt.  Limit HOB elevation to 30 degrees.  [x] Other keep pressure off wounds     Dietary:  [] Diet as tolerated: [x] Calorie Diabetic Diet: [] No Added Salt:  [x] Increase Protein: []

## 2024-06-13 NOTE — PROGRESS NOTES
Joanne Eduardo is a 79 y.o. female presents for a problem visit.    No chief complaint on file.    No LMP recorded. Patient is postmenopausal.    Last Pap: not remember pet pt    The patient is reporting having: bleeding started 2 months ago, now spotting      Examination chaperoned by Ioana Shah MA.

## 2024-06-14 ENCOUNTER — OFFICE VISIT (OUTPATIENT)
Age: 79
End: 2024-06-14
Payer: MEDICARE

## 2024-06-14 VITALS — SYSTOLIC BLOOD PRESSURE: 131 MMHG | HEART RATE: 97 BPM | DIASTOLIC BLOOD PRESSURE: 70 MMHG

## 2024-06-14 DIAGNOSIS — N95.0 POSTMENOPAUSAL BLEEDING: Primary | ICD-10-CM

## 2024-06-14 PROCEDURE — 1036F TOBACCO NON-USER: CPT | Performed by: STUDENT IN AN ORGANIZED HEALTH CARE EDUCATION/TRAINING PROGRAM

## 2024-06-14 PROCEDURE — G8399 PT W/DXA RESULTS DOCUMENT: HCPCS | Performed by: STUDENT IN AN ORGANIZED HEALTH CARE EDUCATION/TRAINING PROGRAM

## 2024-06-14 PROCEDURE — G8427 DOCREV CUR MEDS BY ELIG CLIN: HCPCS | Performed by: STUDENT IN AN ORGANIZED HEALTH CARE EDUCATION/TRAINING PROGRAM

## 2024-06-14 PROCEDURE — 3075F SYST BP GE 130 - 139MM HG: CPT | Performed by: STUDENT IN AN ORGANIZED HEALTH CARE EDUCATION/TRAINING PROGRAM

## 2024-06-14 PROCEDURE — 1123F ACP DISCUSS/DSCN MKR DOCD: CPT | Performed by: STUDENT IN AN ORGANIZED HEALTH CARE EDUCATION/TRAINING PROGRAM

## 2024-06-14 PROCEDURE — 1090F PRES/ABSN URINE INCON ASSESS: CPT | Performed by: STUDENT IN AN ORGANIZED HEALTH CARE EDUCATION/TRAINING PROGRAM

## 2024-06-14 PROCEDURE — 99204 OFFICE O/P NEW MOD 45 MIN: CPT | Performed by: STUDENT IN AN ORGANIZED HEALTH CARE EDUCATION/TRAINING PROGRAM

## 2024-06-14 PROCEDURE — 3078F DIAST BP <80 MM HG: CPT | Performed by: STUDENT IN AN ORGANIZED HEALTH CARE EDUCATION/TRAINING PROGRAM

## 2024-06-14 PROCEDURE — G8419 CALC BMI OUT NRM PARAM NOF/U: HCPCS | Performed by: STUDENT IN AN ORGANIZED HEALTH CARE EDUCATION/TRAINING PROGRAM

## 2024-06-14 NOTE — PROGRESS NOTES
OB/GYN Problem VIsit    HPI  Joanne Eduardo is a No obstetric history on file.,  79 y.o. female who presents for a problem visit.     Present with daughter, vickie.    Here for episode of vaginal bleeding about 2 months ago. Has been seen by other specialists and they were not sure it was coming from the vagina. Still has some spotting on her pullups, though this is in he setting of some chronic wounds. Patient denies pelvic pain, other vaginal discharge, F/C. NO h/o similar sxs, no h/o significant gyn issue in the past.     Past Medical History:   Diagnosis Date    Arthritis     Burning with urination     Diabetes ([facility])     states she is \"pre-diabetic\", diet controlled    H/O blood clots     right side of body    Heart attack ([facility]) 1/2014    Hydradenitis 2/1/2012    Hypercholesterolemia     Hypertension     Ill-defined condition     edema of legs    Thromboembolus ([facility])      Past Surgical History:   Procedure Laterality Date    COLONOSCOPY N/A 1/25/2023    COLONOSCOPY performed by Susy Avendano MD at [facility] ENDOSCOPY     Social History     Occupational History    Not on file   Tobacco Use    Smoking status: Never     Passive exposure: Never    Smokeless tobacco: Never   Substance and Sexual Activity    Alcohol use: No     Alcohol/week: 0.0 standard drinks of alcohol    Drug use: No    Sexual activity: Not on file     Family History   Problem Relation Age of Onset    Cancer Mother     Asthma Father     Heart Disease Mother     Alcohol Abuse Brother        Allergies   Allergen Reactions    Ciprofloxacin Other (See Comments)     Vomiting    Doxycycline Other (See Comments)     Facial swelling.    Penicillins Rash     Prior to Admission medications    Medication Sig Start Date End Date Taking? Authorizing Provider   sulfamethoxazole-trimethoprim (BACTRIM DS;SEPTRA DS) 800-160 MG per tablet Take 1 tablet by mouth 2 times daily for 10 days 6/10/24 6/20/24  Jeannette Pena MD   ferrous sulfate (FEROSUL) 325 (65 Fe)

## 2024-06-24 ENCOUNTER — HOSPITAL ENCOUNTER (OUTPATIENT)
Facility: HOSPITAL | Age: 79
Discharge: HOME | End: 2024-06-24
Attending: SURGERY
Payer: MEDICARE

## 2024-06-24 VITALS
RESPIRATION RATE: 18 BRPM | DIASTOLIC BLOOD PRESSURE: 68 MMHG | SYSTOLIC BLOOD PRESSURE: 108 MMHG | TEMPERATURE: 98.4 F | HEART RATE: 79 BPM

## 2024-06-24 DIAGNOSIS — L73.2 HIDRADENITIS SUPPURATIVA: Primary | ICD-10-CM

## 2024-06-24 PROCEDURE — 99214 OFFICE O/P EST MOD 30 MIN: CPT

## 2024-06-24 RX ORDER — LIDOCAINE 50 MG/G
OINTMENT TOPICAL ONCE
OUTPATIENT
Start: 2024-06-24 | End: 2024-06-24

## 2024-06-24 RX ORDER — BETAMETHASONE DIPROPIONATE 0.5 MG/G
CREAM TOPICAL ONCE
OUTPATIENT
Start: 2024-06-24 | End: 2024-06-24

## 2024-06-24 RX ORDER — TRIAMCINOLONE ACETONIDE 1 MG/G
OINTMENT TOPICAL ONCE
OUTPATIENT
Start: 2024-06-24 | End: 2024-06-24

## 2024-06-24 RX ORDER — LIDOCAINE HYDROCHLORIDE 20 MG/ML
JELLY TOPICAL ONCE
OUTPATIENT
Start: 2024-06-24 | End: 2024-06-24

## 2024-06-24 RX ORDER — BACITRACIN ZINC AND POLYMYXIN B SULFATE 500; 1000 [USP'U]/G; [USP'U]/G
OINTMENT TOPICAL ONCE
OUTPATIENT
Start: 2024-06-24 | End: 2024-06-24

## 2024-06-24 RX ORDER — GENTAMICIN SULFATE 1 MG/G
OINTMENT TOPICAL ONCE
OUTPATIENT
Start: 2024-06-24 | End: 2024-06-24

## 2024-06-24 RX ORDER — LIDOCAINE HYDROCHLORIDE 40 MG/ML
SOLUTION TOPICAL ONCE
OUTPATIENT
Start: 2024-06-24 | End: 2024-06-24

## 2024-06-24 RX ORDER — GINSENG 100 MG
CAPSULE ORAL ONCE
OUTPATIENT
Start: 2024-06-24 | End: 2024-06-24

## 2024-06-24 RX ORDER — LIDOCAINE 40 MG/G
CREAM TOPICAL ONCE
OUTPATIENT
Start: 2024-06-24 | End: 2024-06-24

## 2024-06-24 RX ORDER — CLOBETASOL PROPIONATE 0.5 MG/G
OINTMENT TOPICAL ONCE
OUTPATIENT
Start: 2024-06-24 | End: 2024-06-24

## 2024-06-24 RX ORDER — SODIUM CHLOR/HYPOCHLOROUS ACID 0.033 %
SOLUTION, IRRIGATION IRRIGATION ONCE
OUTPATIENT
Start: 2024-06-24 | End: 2024-06-24

## 2024-06-24 RX ORDER — IBUPROFEN 200 MG
TABLET ORAL ONCE
OUTPATIENT
Start: 2024-06-24 | End: 2024-06-24

## 2024-06-24 NOTE — PROGRESS NOTES
[facility]   Wound Care and Hyperbaric Oxygen Therapy Center   Medical Staff Progress Note     Joanne Eduardo  MEDICAL RECORD NUMBER:  [id number]  AGE: 79 y.o.   GENDER: female  : 1945  EPISODE DATE:  2024    Chief complaint and reason for visit:     Chief Complaint   Patient presents with    Wound Check     Right and left axilla and sacrum wounds       Patient presenting for follow up evaluation of wound(s) per chief complaint.      Subjective and ROS: Symptoms, wound related issues, or other pertinent wound history since last visit: no new wound care issues. Tolerating current treatment. No systemic complaints above baseline.    History of Wound Context: Per original history and physical on this patient. Changes in history since last evaluation: none    Medical Decision Making:     Problem List Items Addressed This Visit          Other    Hidradenitis suppurativa - Primary    Relevant Orders    Initiate Outpatient Wound Care Protocol       Wounds and Treatment Plan:  30 day recheck of hydradenitis of axillae and groin.  Not surgical candidate, no debridement.  All w/o signs infection.  Returns to be able to obtain dressings.  RTC monthly.    Other associated diagnoses or problems addressed:  none    Pertinent imaging reviewed including independent interpretation include:  None    Pertinent labs reviewed.   Medical records and review of external note (s) from other providers done as well.    New lab or imaging orders placed:  None     Prescription drug management: N/A     Discussion of management or test interpretation with N/A.    Comorbid conditions affecting wound healing: As per PMH which was reviewed.    Risk of complications and/or mortality of patient management:  This patient has a minimal risk of morbidity and mortality from additional diagnostic testing or treatment. This is due to the above conditions affecting wound healing as well as patient and procedure risk factors.

## 2024-06-24 NOTE — DISCHARGE INSTRUCTIONS
Wound Clinic Physician Orders and Discharge Instructions  [facility]  [address], [address]  [place] [address]  Telephone: [phone]     FAX [phone]    NAME:  Joanne Eduardo  YOB: 1945  MEDICAL RECORD NUMBER:  [id number]  DATE:  6/24/2024      Return Appointment:  [] Dressing Supply Provider:  [facility]  [] Home Healthcare:   [x] Return Appointment:  3 Week(s)  [] Nurse Visit:     [] Discharge from [facility]: [] Healed        [] Refer to Provider:         [] Consult    Follow-up Information:  [] Ordered Tests:   [x] Referrals: Dr. Chun 7/2/24  [] Rx:   [] Other:       Wound Cleansing:   Do not scrub or use excessive force.  Cleanse wound prior to applying a clean dressing with:  [] Normal Saline   [] Keep Wound Dry in Shower     [] Wound Cleanser   [x] Cleanse wound with Mild Soap & Water  (Dial Soap)  [] Other:       Topical Treatments:  Do not apply lotions, creams, or ointments to wound bed unless directed.   [] Apply moisturizing lotion to skin surrounding the wound prior to dressing change.  [] Apply antifungal ointment to skin surrounding the wound prior to dressing change.  [] Apply thin film of moisture barrier ointment to skin immediately around wound.  [] Apply Betadine to skin immediately around wound   [] Other:      Dressings:           Wound Location  SACRUM    [x] Apply Primary Dressing:       [] MediHoney Gel [] MediHoney Alginate  [x] Calcium Alginate with Silver   [] Calcium Alginate without silver   [] Collagen with silver [] Collagen without Silver    [] Santyl with moist saline gauze     [] Hydrofera Blue (cut to size and moistened with normal saline)  [] Hydrofera Blue Ready (cut to size)      [] Normal Saline wet to dry  [] Betadine wet to dry    [] Hydrogel  [] Mepitel     [] Bactroban/Mupirocin   [] Iodoform Packing Strip [] Plain Packing Strip   [] Skin Sub:   [] Other:      [x] Cover and Secure with:     [x] Gauze []

## 2024-06-24 NOTE — WOUND CARE
Wound Clinic Physician Orders and Discharge Instructions  [facility]  [address], [address]  [place] [address]  Telephone: [phone]     FAX [phone]    NAME:  Joanne Eduardo  YOB: 1945  MEDICAL RECORD NUMBER:  [id number]  DATE:  6/24/2024      Return Appointment:  [] Dressing Supply Provider:  [facility]  [] Home Healthcare:   [x] Return Appointment:  3 Week(s)  [] Nurse Visit:     [] Discharge from [facility]: [] Healed        [] Refer to Provider:         [] Consult    Follow-up Information:  [] Ordered Tests:   [x] Referrals: Dr. Chun 7/2/24  [] Rx:   [] Other:       Wound Cleansing:   Do not scrub or use excessive force.  Cleanse wound prior to applying a clean dressing with:  [] Normal Saline   [] Keep Wound Dry in Shower     [] Wound Cleanser   [x] Cleanse wound with Mild Soap & Water  (Dial Soap)  [] Other:       Topical Treatments:  Do not apply lotions, creams, or ointments to wound bed unless directed.   [] Apply moisturizing lotion to skin surrounding the wound prior to dressing change.  [] Apply antifungal ointment to skin surrounding the wound prior to dressing change.  [] Apply thin film of moisture barrier ointment to skin immediately around wound.  [] Apply Betadine to skin immediately around wound   [] Other:      Dressings:           Wound Location  SACRUM    [x] Apply Primary Dressing:       [] MediHoney Gel [] MediHoney Alginate  [x] Calcium Alginate with Silver   [] Calcium Alginate without silver   [] Collagen with silver [] Collagen without Silver    [] Santyl with moist saline gauze     [] Hydrofera Blue (cut to size and moistened with normal saline)  [] Hydrofera Blue Ready (cut to size)      [] Normal Saline wet to dry  [] Betadine wet to dry    [] Hydrogel  [] Mepitel     [] Bactroban/Mupirocin   [] Iodoform Packing Strip [] Plain Packing Strip   [] Skin Sub:   [] Other:      [x] Cover and Secure with:     [x] Gauze []

## 2024-06-25 NOTE — WOUND CARE
Family with patient unsure if supplies or antibiotics were received for patient. Email sent to [email or web address] to follow up on supplies and instructed her to call back if antibiotic not received from last visit and taken by patient.    Received message from [email or web address] stating supplies were delivered to a resident 6/13/24.

## 2024-07-18 ENCOUNTER — TRANSCRIBE ORDERS (OUTPATIENT)
Facility: HOSPITAL | Age: 79
End: 2024-07-18

## 2024-07-18 DIAGNOSIS — N95.0 POST-MENOPAUSE BLEEDING: Primary | ICD-10-CM

## 2024-07-29 ENCOUNTER — HOSPITAL ENCOUNTER (OUTPATIENT)
Facility: HOSPITAL | Age: 79
Discharge: HOME | End: 2024-07-29
Attending: SURGERY
Payer: MEDICARE

## 2024-07-29 VITALS
RESPIRATION RATE: 18 BRPM | DIASTOLIC BLOOD PRESSURE: 84 MMHG | TEMPERATURE: 98.4 F | HEART RATE: 100 BPM | SYSTOLIC BLOOD PRESSURE: 135 MMHG

## 2024-07-29 PROCEDURE — 99214 OFFICE O/P EST MOD 30 MIN: CPT

## 2024-07-29 NOTE — DISCHARGE INSTRUCTIONS
Wound Clinic Physician Orders and Discharge Instructions  [facility]  [address], [address]  [place] [address]  Telephone: [phone]     FAX [phone]    NAME:  Joanne Eduardo  YOB: 1945  MEDICAL RECORD NUMBER:  [id number]  DATE:  7/29/2024      Return Appointment:  [x] Dressing Supply Provider:  [facility]  [] Home Healthcare:   [x] Return Appointment:  4 Week(s)  [] Nurse Visit:     [] Discharge from [facility]: [] Healed        [] Refer to Provider:         [] Consult    Follow-up Information:  [] Ordered Tests:   [x] Referrals: Dr. Chun 8/1/24  [] Rx:   [] Other:       Wound Cleansing:   Do not scrub or use excessive force.  Cleanse wound prior to applying a clean dressing with:  [] Normal Saline   [] Keep Wound Dry in Shower     [] Wound Cleanser   [x] Cleanse wound with Mild Soap & Water  (Dial Soap)  [] Other:       Topical Treatments:  Do not apply lotions, creams, or ointments to wound bed unless directed.   [] Apply moisturizing lotion to skin surrounding the wound prior to dressing change.  [] Apply antifungal ointment to skin surrounding the wound prior to dressing change.  [] Apply thin film of moisture barrier ointment to skin immediately around wound.  [] Apply Betadine to skin immediately around wound   [] Other:      Dressings:           Wound Location Right buttocks and bilateral Axilla   [x] Apply Primary Dressing:       [] MediHoney Gel [] MediHoney Alginate  [x] Calcium Alginate with Silver   [] Calcium Alginate without silver   [] Collagen with silver [] Collagen without Silver    [] Santyl with moist saline gauze     [] Hydrofera Blue (cut to size and moistened with normal saline)  [] Hydrofera Blue Ready (cut to size)      [] Normal Saline wet to dry  [] Betadine wet to dry    [] Hydrogel  [] Mepitel     [] Bactroban/Mupirocin   [] Iodoform Packing Strip [] Plain Packing Strip   [] Skin Sub:   [] Other:      [x] Cover and

## 2024-07-29 NOTE — WOUND CARE
Wound Clinic Physician Orders and Discharge Instructions  [facility]  [address], [address]  [place] [address]  Telephone: [phone]     FAX [phone]    NAME:  Joanne Eduardo  YOB: 1945  MEDICAL RECORD NUMBER:  [id number]  DATE:  7/29/2024      Return Appointment:  [x] Dressing Supply Provider:  [employer]  [] Home Healthcare:   [x] Return Appointment:  4 Week(s)  [] Nurse Visit:     [] Discharge from [facility]: [] Healed        [] Refer to Provider:         [] Consult    Follow-up Information:  [] Ordered Tests:   [x] Referrals: Dr. Chun 8/1/24  [] Rx:   [] Other:       Wound Cleansing:   Do not scrub or use excessive force.  Cleanse wound prior to applying a clean dressing with:  [] Normal Saline   [] Keep Wound Dry in Shower     [] Wound Cleanser   [x] Cleanse wound with Mild Soap & Water  (Dial Soap)  [] Other:       Topical Treatments:  Do not apply lotions, creams, or ointments to wound bed unless directed.   [] Apply moisturizing lotion to skin surrounding the wound prior to dressing change.  [] Apply antifungal ointment to skin surrounding the wound prior to dressing change.  [] Apply thin film of moisture barrier ointment to skin immediately around wound.  [] Apply Betadine to skin immediately around wound   [] Other:      Dressings:           Wound Location Right buttocks and bilateral Axilla   [x] Apply Primary Dressing:       [] MediHoney Gel [] MediHoney Alginate  [x] Calcium Alginate with Silver   [] Calcium Alginate without silver   [] Collagen with silver [] Collagen without Silver    [] Santyl with moist saline gauze     [] Hydrofera Blue (cut to size and moistened with normal saline)  [] Hydrofera Blue Ready (cut to size)      [] Normal Saline wet to dry  [] Betadine wet to dry    [] Hydrogel  [] Mepitel     [] Bactroban/Mupirocin   [] Iodoform Packing Strip [] Plain Packing Strip   [] Skin Sub:   [] Other:      [x] Cover and

## 2024-07-29 NOTE — PROGRESS NOTES
without Silver    [] Santyl with moist saline gauze     [] Hydrofera Blue (cut to size and moistened with normal saline)  [] Hydrofera Blue Ready (cut to size)      [] Normal Saline wet to dry  [] Betadine wet to dry    [] Hydrogel  [] Mepitel     [] Bactroban/Mupirocin   [] Iodoform Packing Strip [] Plain Packing Strip   [] Skin Sub:   [] Other:      [x] Cover and Secure with:     [x] Gauze [] Ronni [] Kerlix   [] Zetuvit Plus Silicone Border [] Super Absorbant [x] ABD     [] Ace Wrap [] Other:    Limit contact of tape with skin.    [x] Change dressing: [] Daily    [x] Every Other Day or as needed  [] Twice per week   [] Three times per week   [] Once a week [] Do Not Change Dressing   [] Other:    Pressure Relief:  [x] When sitting, shift position or do seat lifts every 15 minutes.  [x] Wheelchair cushion [] Specialty Bed/Mattress  [] Turn every 2 hours when in bed.  Avoid direct pressure on wound site.  Limit side lying to 30 degree tilt.  Limit HOB elevation to 30 degrees.  [x] Other keep pressure off wounds     Dietary:  [] Diet as tolerated: [x] Calorie Diabetic Diet: [] No Added Salt:  [x] Increase Protein: [] Other:     Activity:  [x] Activity as tolerated:    [] Patient has no activity restrictions      [] Strict Bedrest   [] Remain off Work      [] May return to full duty work                                     [] Return to work with restrictions     Physician:  [] Dr. Yasemin Green   [x] Dr. Jeannette Pena  [] Teto Hackett PA-C  [] Dr. Candis Hall  [] Dr. Xavi Bales  [] Dr.Silky Horton    Nurse Case Manger:  Liz[employer]      Wound Care Center Information: Should you experience any significant changes in your wound(s) or have questions about your wound care, please contact the Wound Center at [phone]. Our hours are Monday - Friday 8am - 4:30pm, closed all major holidays. If you need help with your wound outside these hours and cannot wait until we are again available, contact your PCP or go to

## 2024-07-29 NOTE — WOUND CARE
Wound Care Supplies      Supply Company:     [employer]      Ordering Center:     [facility]  [address] [place] [address], [address] [address]    Phone: [phone]  Fax: [phone]    Patient Information:      Robert Eduardo  [address] [facility] [address]   [phone]   : 1945  AGE: 79 y.o.     GENDER: female   EPISODE DATE: 2024    Insurance:      PRIMARY INSURANCE:  Plan: MEDICARE PART A AND B  Coverage: MEDICARE  Effective Date: 3/1/2010  Group Number: [unfilled]  Subscriber Number: [id number] - (Medicare)    Payer/Plan Subscr  Sex Relation Sub. Ins. ID Effective Group Num   1. MEDICARE - ME* PARAG EDUARDOMIAN CLEARY 1945 Female Self [id number] 3/1/10                                    [address] [address]   2. MATTHEW COMPLE* ENMANUELROSROBERT CLEARY 1945 Female Self [id number]                                   [address] [address]       Patient Wound Information:      Problem List Items Addressed This Visit    None      WOUNDS REQUIRING DRESSING SUPPLIES:     Wound 24 Buttocks Right #1 (Active)   Wound Image   24 1323   Wound Etiology Other 24 1323   Dressing Status Other (Comment) 24 1323   Wound Cleansed Cleansed with saline 24 1323   Wound Length (cm) 14 cm 24 1323   Wound Width (cm) 7 cm 24 1323   Wound Depth (cm) 0.1 cm 24 1323   Wound Surface Area (cm^2) 98 cm^2 24 1323   Change in Wound Size % (l*w) 6.67 24 1323   Wound Volume (cm^3) 9.8 cm^3 24 1323   Wound Healing % 77 24 1323   Wound Assessment Granulation tissue 24 1323   Drainage Amount Moderate (25-50%) 24 1323   Drainage Description Serosanguinous;Yellow;Purulent 24 1323   Odor None 24 1323   Tiki-wound Assessment Intact 24 1323   Margins Undefined edges 24 1323   Wound Thickness Description not for Pressure Injury Full thickness 24 1323   Number of days: 96

## 2024-08-26 ENCOUNTER — HOSPITAL ENCOUNTER (OUTPATIENT)
Facility: HOSPITAL | Age: 79
Discharge: HOME | End: 2024-08-26
Attending: SURGERY
Payer: MEDICARE

## 2024-08-26 VITALS
SYSTOLIC BLOOD PRESSURE: 113 MMHG | DIASTOLIC BLOOD PRESSURE: 64 MMHG | HEART RATE: 82 BPM | TEMPERATURE: 98.5 F | RESPIRATION RATE: 18 BRPM

## 2024-08-26 DIAGNOSIS — L73.2 HIDRADENITIS SUPPURATIVA: Primary | ICD-10-CM

## 2024-08-26 PROCEDURE — 99214 OFFICE O/P EST MOD 30 MIN: CPT

## 2024-08-26 RX ORDER — TRIAMCINOLONE ACETONIDE 1 MG/G
OINTMENT TOPICAL ONCE
OUTPATIENT
Start: 2024-08-26 | End: 2024-08-26

## 2024-08-26 RX ORDER — BACITRACIN ZINC AND POLYMYXIN B SULFATE 500; 1000 [USP'U]/G; [USP'U]/G
OINTMENT TOPICAL ONCE
OUTPATIENT
Start: 2024-08-26 | End: 2024-08-26

## 2024-08-26 RX ORDER — GINSENG 100 MG
CAPSULE ORAL ONCE
OUTPATIENT
Start: 2024-08-26 | End: 2024-08-26

## 2024-08-26 RX ORDER — SILVER SULFADIAZINE 10 MG/G
CREAM TOPICAL ONCE
OUTPATIENT
Start: 2024-08-26 | End: 2024-08-26

## 2024-08-26 RX ORDER — BETAMETHASONE DIPROPIONATE 0.5 MG/G
CREAM TOPICAL ONCE
OUTPATIENT
Start: 2024-08-26 | End: 2024-08-26

## 2024-08-26 RX ORDER — LIDOCAINE 50 MG/G
OINTMENT TOPICAL ONCE
OUTPATIENT
Start: 2024-08-26 | End: 2024-08-26

## 2024-08-26 RX ORDER — MUPIROCIN 20 MG/G
OINTMENT TOPICAL ONCE
OUTPATIENT
Start: 2024-08-26 | End: 2024-08-26

## 2024-08-26 RX ORDER — LIDOCAINE HYDROCHLORIDE 20 MG/ML
JELLY TOPICAL ONCE
OUTPATIENT
Start: 2024-08-26 | End: 2024-08-26

## 2024-08-26 RX ORDER — NEOMYCIN/BACITRACIN/POLYMYXINB 3.5-400-5K
OINTMENT (GRAM) TOPICAL ONCE
OUTPATIENT
Start: 2024-08-26 | End: 2024-08-26

## 2024-08-26 RX ORDER — GENTAMICIN SULFATE 1 MG/G
OINTMENT TOPICAL ONCE
OUTPATIENT
Start: 2024-08-26 | End: 2024-08-26

## 2024-08-26 RX ORDER — LIDOCAINE HYDROCHLORIDE 40 MG/ML
SOLUTION TOPICAL ONCE
OUTPATIENT
Start: 2024-08-26 | End: 2024-08-26

## 2024-08-26 RX ORDER — CLOBETASOL PROPIONATE 0.5 MG/G
OINTMENT TOPICAL ONCE
OUTPATIENT
Start: 2024-08-26 | End: 2024-08-26

## 2024-08-26 RX ORDER — SODIUM CHLOR/HYPOCHLOROUS ACID 0.033 %
SOLUTION, IRRIGATION IRRIGATION ONCE
OUTPATIENT
Start: 2024-08-26 | End: 2024-08-26

## 2024-08-26 RX ORDER — LIDOCAINE 40 MG/G
CREAM TOPICAL ONCE
OUTPATIENT
Start: 2024-08-26 | End: 2024-08-26

## 2024-08-26 NOTE — PROGRESS NOTES
Medications on File Prior to Encounter   Medication Sig Dispense Refill    ferrous sulfate (FEROSUL) 325 (65 Fe) MG tablet 0 Refill(s)      trimethoprim-polymyxin b (POLYTRIM) 89668-6.1 UNIT/ML-% ophthalmic solution       ferrous sulfate (IRON 325) 325 (65 Fe) MG tablet Take 1 tablet by mouth daily (with breakfast) 90 tablet 1    chlorhexidine gluconate (ANTISEPTIC SKIN CLEANSER) 4 % SOLN external solution Apply to axilla daily. 237 mL 2    Multiple Vitamin (MULTIVITAMIN) TABS Take 1 tablet by mouth daily 90 tablet 1    atorvastatin (LIPITOR) 80 MG tablet Take 1 tablet by mouth daily 90 tablet 1    loratadine (CLARITIN) 10 MG tablet Take 1 tablet by mouth daily 90 tablet 1    oxyBUTYnin (DITROPAN) 5 MG tablet Take 1 tablet by mouth 3 times daily 90 tablet 1    acetaminophen (ACETAMINOPHEN 8 HOUR) 650 MG extended release tablet Take 1 tablet by mouth every 8 hours as needed for Pain 60 tablet 3    furosemide (LASIX) 20 MG tablet Take 1 tablet by mouth once daily 90 tablet 0     No current facility-administered medications on file prior to encounter.       Immunization History:   Immunization History   Administered Date(s) Administered    Rabies IM Diploid Cell Culture (Imovax) 05/31/2023, 06/05/2023, 06/09/2023    TDaP, ADACEL (age 10y-64y), BOOSTRIX (age 10y+), IM, 0.5mL 09/13/2012, 05/31/2023, 04/01/2024        Complete      Review of Systems:     Constitutional:  no fever,  no chills,  no sweats, No weakness, No fatigue, No decreased activity.  Eye: No recent visual problem, No icterus, No discharge, No double vision.  Ear/Nose/Mouth/Throat: No decreased hearing, No ear pain, No nasal congestion, No sore throat.  Respiratory: No shortness of breath, No cough, No sputum production, No hemoptysis, No wheezing, No cyanosis.  Cardiovascular: No chest pain, No palpitations, No bradycardia, No tachycardia, No peripheral edema, No syncope.  Gastrointestinal: No nausea,  No vomiting, No diarrhea, No constipation, No  heartburn,  No abdominal pain.  Genitourinary: No dysuria, No hematuria, No change in urine stream, No urethral discharge, No lesions.  Hematology/Lymphatics: No bruising tendency, No bleeding tendency, No petechiae, No swollen lymph glands.  Endocrine: No excessive thirst, No polyuria, No cold intolerance, No heat intolerance, No excessive hunger.  Immunologic: Not immunocompromised, No recurrent fevers, No recurrent infections.  Musculoskeletal: No back pain, No neck pain, No joint pain, No muscle pain, No claudication, No decreased range of motion, No trauma.  Integumentary: No rash, No pruritus, No abrasions, hidradenitis  Neurologic: Alert and oriented X4, No abnormal balance, No headache, No confusion, No numbness, No tingling.  Psychiatric: No anxiety, No depression, No deepa.    Physical Exam:     Vitals & Measurements:    Wt Readings from Last 3 Encounters:   06/04/24 51.3 kg (113 lb)   04/24/24 55.8 kg (123 lb)   04/18/24 52.3 kg (115 lb 6.4 oz)     Temp Readings from Last 3 Encounters:   08/26/24 98.5 °F (36.9 °C) (Temporal)   07/29/24 98.4 °F (36.9 °C) (Temporal)   06/24/24 98.4 °F (36.9 °C) (Temporal)     BP Readings from Last 3 Encounters:   08/26/24 113/64   07/29/24 135/84   06/24/24 108/68     Pulse Readings from Last 3 Encounters:   08/26/24 82   07/29/24 100   06/24/24 79      Ht Readings from Last 3 Encounters:   06/04/24 1.702 m (5' 7\")   04/24/24 1.549 m (5' 1\")   04/18/24 1.702 m (5' 7\")        General: well appearing, no acute distress  Head: Normal  Face: Nornal  HEENT: atraumatic, PERRLA, moist mucosa, normal pharynx, normal tonsils and adenoids, normal tongue, no fluid in sinuses  Neck: Trachea midline, no carotid bruit, no masses  Chest: Normal.  Respiratory: Normal chest wall expansion, CTA B, no r/r/w, no rubs  Cardiovascular: RRR, no m/r/g, Normal S1 and S2  Abdomen: Soft, non tender, non-distended, normal bowel sounds in all quadrants, no hepatosplenomegaly, no tympany.

## 2024-08-26 NOTE — WOUND CARE
Wound Care Supplies      Supply Company:     [employer] [address] [place] [address] f: [phone] f: [phone] p: [phone] [email or web address]      Ordering Center:     [facility]  [address] [place] [address], [address] [address]    Phone: [phone]  Fax: [phone]    Patient Information:      Robert Eduardo  [address] [facility] [address] [phone]   : 1945  AGE: 79 y.o.     GENDER: female   EPISODE DATE: 2024    Insurance:      PRIMARY INSURANCE:  Plan: MEDICARE PART A AND B  Coverage: MEDICARE  Effective Date: 3/1/2010  Group Number: [unfilled]  Subscriber Number: [id number] - (Medicare)    Payer/Plan Subscr  Sex Relation Sub. Ins. ID Effective Group Num   1. MEDICARE - MEROBERT LEONARD 1945 Female Self [id number] 3/1/10                                    [address] [address]   2. MATTHEW COMPLEROBERT LEONARD 1945 Female Self [id number]                                   [address] [address]       Patient Wound Information:      Problem List Items Addressed This Visit          Other    Hidradenitis suppurativa - Primary    Relevant Orders    Initiate Outpatient Wound Care Protocol       WOUNDS REQUIRING DRESSING SUPPLIES:     Wound 24 Buttocks Right #1 (Active)   Wound Image   24 1438   Wound Etiology Other 24 1438   Dressing Status Other (Comment) 24 1438   Wound Cleansed Cleansed with saline 24 1438   Wound Length (cm) 3 cm 24 1438   Wound Width (cm) 0.6 cm 24 1438   Wound Depth (cm) 1 cm 24 1438   Wound Surface Area (cm^2) 1.8 cm^2 24 1438   Change in Wound Size % (l*w) 98.29 24 1438   Wound Volume (cm^3) 1.8 cm^3 24 1438   Wound Healing % 96 24 1438   Wound Assessment Granulation tissue 24 143   Drainage Amount Moderate (25-50%) 24   Drainage Description Serosanguinous 24   Odor None  08/26/24 1438   Tiki-wound Assessment Intact 08/26/24 1438   Margins Undefined edges 08/26/24 1438   Wound Thickness Description not for Pressure Injury Full thickness 08/26/24 1438   Number of days: 124       Wound 04/24/24 Axilla Left #2 (Active)   Wound Image   08/26/24 1435   Wound Etiology Other 08/26/24 1435   Dressing Status Other (Comment) 08/26/24 1435   Wound Cleansed Cleansed with saline 08/26/24 1435   Wound Length (cm) 2.5 cm 08/26/24 1435   Wound Width (cm) 0.5 cm 08/26/24 1435   Wound Depth (cm) 0.1 cm 08/26/24 1435   Wound Surface Area (cm^2) 1.25 cm^2 08/26/24 1435   Change in Wound Size % (l*w) 94.79 08/26/24 1435   Wound Volume (cm^3) 0.125 cm^3 08/26/24 1435   Wound Healing % 98 08/26/24 1435   Wound Assessment Granulation tissue;Slough 08/26/24 1435   Drainage Amount Moderate (25-50%) 08/26/24 1435   Drainage Description Serosanguinous 08/26/24 1435   Odor None 08/26/24 1435   Tiki-wound Assessment Intact 08/26/24 1435   Margins Undefined edges 08/26/24 1435   Wound Thickness Description not for Pressure Injury Full thickness 08/26/24 1435   Number of days: 124          Supplies Requested :      WOUND #: 1 and 2   PRIMARY DRESSING:  Alginate with silver pad    Cover and Secure with: 4X4 gauze pad     FREQUENCY OF DRESSING CHANGES:  Every other day             ADDITIONAL ITEMS:  [] Gloves:   [] Small [x] Medium [] Large  [x] Tape 4\" Medipore  [x] Normal Saline  [] Skin Prep   [] Adhesive Remover   [] Cotton/Foam Tip Applicators  [] Tongue Depressor   [] Other:    Patient Wound(s) Debrided: [x] Yes. Date last debrided  06/10/2024   [] No    Debribement Type: Autolytic    Patient currently being seen by Home Health: [] Yes   [x] No    Duration for needed supplies:  []15  [x]30  []60  []90 Days    Electronically signed by Ana Clayton RN on 8/26/2024 at 3:22 PM     Provider Information:      PROVIDER'S NAME: Dr. Pena

## 2024-08-26 NOTE — DISCHARGE INSTRUCTIONS
Wound Clinic Physician Orders and Discharge Instructions  [facility]  [address], [address]  [place] [address]  Telephone: [phone]     FAX [phone]    NAME:  Joanne Eduardo  YOB: 1945  MEDICAL RECORD NUMBER:  [id number]  DATE:  8/26/2024      Return Appointment:  [x] Dressing Supply Provider:  [employer]  [] Home Healthcare:   [x] Return Appointment:  8 Week(s)  [] Nurse Visit:     [] Discharge from [facility]: [] Healed        [] Refer to Provider:         [] Consult    Follow-up Information:  [] Ordered Tests:   [x] Referrals: Dr. Chun 8/29/24  [] Rx:   [] Other:       Wound Cleansing:   Do not scrub or use excessive force.  Cleanse wound prior to applying a clean dressing with:  [] Normal Saline   [] Keep Wound Dry in Shower     [] Wound Cleanser   [x] Cleanse wound with Mild Soap & Water  (Dial Soap)  [] Other:       Topical Treatments:  Do not apply lotions, creams, or ointments to wound bed unless directed.   [] Apply moisturizing lotion to skin surrounding the wound prior to dressing change.  [] Apply antifungal ointment to skin surrounding the wound prior to dressing change.  [] Apply thin film of moisture barrier ointment to skin immediately around wound.  [] Apply Betadine to skin immediately around wound   [] Other:      Dressings:           Wound Location Right buttocks, Left Axilla   [x] Apply Primary Dressing:       [] MediHoney Gel [] MediHoney Alginate  [x] Calcium Alginate with Silver   [] Calcium Alginate without silver   [] Collagen with silver [] Collagen without Silver    [] Santyl with moist saline gauze     [] Hydrofera Blue (cut to size and moistened with normal saline)  [] Hydrofera Blue Ready (cut to size)      [] Normal Saline wet to dry  [] Betadine wet to dry    [] Hydrogel  [] Mepitel     [] Bactroban/Mupirocin   [] Iodoform Packing Strip [] Plain Packing Strip   [] Skin Sub:   [] Other:      [x] Cover and Secure with:     [x]

## 2024-08-26 NOTE — WOUND CARE
Wound Clinic Physician Orders and Discharge Instructions  [facility]  [address], [address]  [place] [address]  Telephone: [phone]     FAX [phone]    NAME:  Joanne Eduardo  YOB: 1945  MEDICAL RECORD NUMBER:  [id number]  DATE:  8/26/2024      Return Appointment:  [x] Dressing Supply Provider:  [employer]  [] Home Healthcare:   [x] Return Appointment:  8 Week(s)  [] Nurse Visit:     [] Discharge from [facility]: [] Healed        [] Refer to Provider:         [] Consult    Follow-up Information:  [] Ordered Tests:   [x] Referrals: Dr. Chun 8/29/24  [] Rx:   [] Other:       Wound Cleansing:   Do not scrub or use excessive force.  Cleanse wound prior to applying a clean dressing with:  [] Normal Saline   [] Keep Wound Dry in Shower     [] Wound Cleanser   [x] Cleanse wound with Mild Soap & Water  (Dial Soap)  [] Other:       Topical Treatments:  Do not apply lotions, creams, or ointments to wound bed unless directed.   [] Apply moisturizing lotion to skin surrounding the wound prior to dressing change.  [] Apply antifungal ointment to skin surrounding the wound prior to dressing change.  [] Apply thin film of moisture barrier ointment to skin immediately around wound.  [] Apply Betadine to skin immediately around wound   [] Other:      Dressings:           Wound Location Right buttocks, Left Axilla   [x] Apply Primary Dressing:       [] MediHoney Gel [] MediHoney Alginate  [x] Calcium Alginate with Silver   [] Calcium Alginate without silver   [] Collagen with silver [] Collagen without Silver    [] Santyl with moist saline gauze     [] Hydrofera Blue (cut to size and moistened with normal saline)  [] Hydrofera Blue Ready (cut to size)      [] Normal Saline wet to dry  [] Betadine wet to dry    [] Hydrogel  [] Mepitel     [] Bactroban/Mupirocin   [] Iodoform Packing Strip [] Plain Packing Strip   [] Skin Sub:   [] Other:      [x] Cover and Secure with:     [x]  Gauze [] Ronni [] Kerlix   [] Zetuvit Plus Silicone Border [] Super Absorbant [] ABD     [] Ace Wrap [] Other:    Limit contact of tape with skin.    [x] Change dressing: [] Daily    [x] Every Other Day or as needed  [] Twice per week   [] Three times per week   [] Once a week [] Do Not Change Dressing   [] Other:    Pressure Relief:  [x] When sitting, shift position or do seat lifts every 15 minutes.  [x] Wheelchair cushion [] Specialty Bed/Mattress  [] Turn every 2 hours when in bed.  Avoid direct pressure on wound site.  Limit side lying to 30 degree tilt.  Limit HOB elevation to 30 degrees.  [x] Other keep pressure off wounds     Dietary:  [] Diet as tolerated: [x] Calorie Diabetic Diet: [] No Added Salt:  [x] Increase Protein: [] Other:     Activity:  [x] Activity as tolerated:    [] Patient has no activity restrictions      [] Strict Bedrest   [] Remain off Work      [] May return to full duty work                                     [] Return to work with restrictions     Physician:  [] Dr. Yasemin Green   [x] Dr. Jeannette Pena  [] Teto Hackett PA-C  [] Dr. Candis Hall  [] Dr. Xavi Bales  [] Dr.Silky Horton    Nurse Case Manger:  Lizy      Wound Care Center Information: Should you experience any significant changes in your wound(s) or have questions about your wound care, please contact the Wound Center at [phone]. Our hours are Monday - Friday 8am - 4:30pm, closed all major holidays. If you need help with your wound outside these hours and cannot wait until we are again available, contact your PCP or go to the hospital emergency room.     PLEASE NOTE: IF YOU ARE UNABLE TO OBTAIN WOUND SUPPLIES, CONTINUE TO USE THE SUPPLIES YOU HAVE AVAILABLE UNTIL YOU ARE ABLE TO REACH US. IT IS MOST IMPORTANT TO KEEP THE WOUND COVERED AT ALL TIMES.

## 2024-09-04 ENCOUNTER — OFFICE VISIT (OUTPATIENT)
Facility: CLINIC | Age: 79
End: 2024-09-04

## 2024-09-04 VITALS
HEART RATE: 81 BPM | RESPIRATION RATE: 14 BRPM | HEIGHT: 67 IN | BODY MASS INDEX: 18.36 KG/M2 | OXYGEN SATURATION: 100 % | SYSTOLIC BLOOD PRESSURE: 114 MMHG | DIASTOLIC BLOOD PRESSURE: 68 MMHG | TEMPERATURE: 98.5 F | WEIGHT: 117 LBS

## 2024-09-04 DIAGNOSIS — E11.9 TYPE 2 DIABETES MELLITUS WITHOUT COMPLICATION, WITHOUT LONG-TERM CURRENT USE OF INSULIN (HCC): ICD-10-CM

## 2024-09-04 DIAGNOSIS — J30.1 NON-SEASONAL ALLERGIC RHINITIS DUE TO POLLEN: ICD-10-CM

## 2024-09-04 DIAGNOSIS — I89.0 LYMPHEDEMA, NOT ELSEWHERE CLASSIFIED: ICD-10-CM

## 2024-09-04 DIAGNOSIS — L30.9 DERMATITIS: ICD-10-CM

## 2024-09-04 DIAGNOSIS — E78.2 MIXED HYPERLIPIDEMIA: ICD-10-CM

## 2024-09-04 DIAGNOSIS — N39.3 FEMALE STRESS INCONTINENCE: ICD-10-CM

## 2024-09-04 DIAGNOSIS — R53.81 PHYSICAL DEBILITY: ICD-10-CM

## 2024-09-04 DIAGNOSIS — I10 ESSENTIAL (PRIMARY) HYPERTENSION: Primary | ICD-10-CM

## 2024-09-04 RX ORDER — OXYBUTYNIN CHLORIDE 5 MG/1
5 TABLET ORAL 3 TIMES DAILY
Qty: 90 TABLET | Refills: 1 | Status: SHIPPED | OUTPATIENT
Start: 2024-09-04

## 2024-09-04 RX ORDER — ATORVASTATIN CALCIUM 80 MG/1
80 TABLET, FILM COATED ORAL DAILY
Qty: 90 TABLET | Refills: 1 | Status: SHIPPED | OUTPATIENT
Start: 2024-09-04

## 2024-09-04 RX ORDER — MUPIROCIN 20 MG/G
OINTMENT TOPICAL
COMMUNITY
Start: 2024-08-17

## 2024-09-04 RX ORDER — FUROSEMIDE 20 MG
20 TABLET ORAL PRN
Qty: 90 TABLET | Refills: 0 | Status: SHIPPED | OUTPATIENT
Start: 2024-09-04

## 2024-09-04 RX ORDER — LEVOCETIRIZINE DIHYDROCHLORIDE 5 MG/1
5 TABLET, FILM COATED ORAL NIGHTLY
Qty: 30 TABLET | Refills: 2 | Status: SHIPPED | OUTPATIENT
Start: 2024-09-04

## 2024-09-04 SDOH — ECONOMIC STABILITY: FOOD INSECURITY: WITHIN THE PAST 12 MONTHS, YOU WORRIED THAT YOUR FOOD WOULD RUN OUT BEFORE YOU GOT MONEY TO BUY MORE.: NEVER TRUE

## 2024-09-04 SDOH — ECONOMIC STABILITY: INCOME INSECURITY: HOW HARD IS IT FOR YOU TO PAY FOR THE VERY BASICS LIKE FOOD, HOUSING, MEDICAL CARE, AND HEATING?: NOT HARD AT ALL

## 2024-09-04 SDOH — ECONOMIC STABILITY: FOOD INSECURITY: WITHIN THE PAST 12 MONTHS, THE FOOD YOU BOUGHT JUST DIDN'T LAST AND YOU DIDN'T HAVE MONEY TO GET MORE.: NEVER TRUE

## 2024-09-04 NOTE — PROGRESS NOTES
Chief Complaint   Patient presents with    Follow-up Chronic Condition        \"Have you been to the ER, urgent care clinic since your last visit?  Hospitalized since your last visit?\"    [facility] ed 8/16     “Have you seen or consulted any other health care providers outside of [facility] since your last visit?”    NO            Click Here for Release of Records Request     Health Maintenance Due   Topic Date Due    Flu vaccine (1) Never done    COVID-19 Vaccine (1 - 2023-24 season) Never done       
or if symptoms change.  The patient received an After-Visit Summary which contains VS, diagnoses, orders, allergy and medication lists.      Future Appointments   Date Time Provider Department Center   9/10/2024  2:00 PM Silas Chun MD [facility]   10/21/2024  1:00 PM [facility]   10/25/2024  1:00 PM Darnell Poole MD [facility]   12/4/2024  2:20 PM Darnell Poole MD [facility]       Follow-up and Dispositions    Return in about 3 months (around 12/4/2024).

## 2024-09-12 NOTE — TELEPHONE ENCOUNTER
Advised of message below. Hayley Berry stated they were going to set her up with injections and was calling to check on the status. Advised we have not received anything from them.  She will call Dr. 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 ---------   EXAM: [id number] - CT CHEST - ORDERED BY: TERESA PALMA DATE: 11/24/2023    INTERPRETATION: EXAMINATION: CT CHEST  CLINICAL INDICATION: Bronchiectasis.  TECHNIQUE: Noncontrast CT of the chest was obtained.  COMPARISON: Multiple CT, most recent 11/26/2021.  FINDINGS:  AIRWAYS AND LUNGS: Tracheobronchial secretions. Bronchiectasis and mucoid impaction with scattered nodularity, predominantly clustered, is unchanged.  MEDIASTINUM AND PLEURA: Calcified mediastinal lymph nodes. The visualized portion of the thyroid gland is heterogeneous. There is no pleural effusion. There is no pneumothorax.  HEART AND VESSELS: The heart is normal in size. There are atherosclerotic calcifications of the aorta and coronary arteries. There is no pericardial effusion.  UPPER ABDOMEN: Images of the upper abdomen demonstrate no abnormality.  BONES AND SOFT TISSUES: The bones are unremarkable. The soft tissues are unremarkable.  IMPRESSION: Stable exam  --- End of Report ---       BERNARD SEARS MD; Attending Radiologist This document has been electronically signed. Dec 5 2023 6:18PM

## 2024-09-18 NOTE — PROGRESS NOTES
Outreach attempt was made to schedule a Medicare Wellness Visit. This was the first attempt. Contact was made, MWV appointment scheduled.    Patient: Trell Garcia MRN: [id number]  SSN: [id number]    YOB: 1945  Age: 68 y.o. Sex: female        Subjective:     Chief Complaint   Patient presents with    Medication Refill    Annual Wellness Visit       HPI: she is a 68y.o. year old female who presents with daughter for follow up of her multiple chronic medical conditions. Patient with hx of T2D, HTN, HLD and pedal edema. Patient with hx of hydradenitis. Patient denies F/C, HA, dizziness, SOB, CP, abdominal pain, acute myalgias or arthralgias. Encounter Diagnoses   Name Primary?  Infection of index finger Yes    Essential hypertension     Hyperlipidemia, unspecified hyperlipidemia type     Other chronic pulmonary embolism without acute cor pulmonale (HCC)     Lymphedema     Female stress incontinence     Non-seasonal allergic rhinitis     Controlled type 2 diabetes mellitus without complication, without long-term current use of insulin (HCC)     Neuropathy due to type 2 diabetes mellitus ([facility] [address]     Recurrent depression (HCC)        Anemia:    Lab Results   Component Value Date/Time    WBC 12.1 (H) 10/23/2019 02:41 PM    HGB 10.8 (L) 10/23/2019 02:41 PM    HCT 32.7 (L) 10/23/2019 02:41 PM    PLATELET 839 (H) 92/51/1504 02:41 PM    MCV 88 10/23/2019 02:41 PM     Diabetes: This patient is being treating under a comprehensive plan of care for diabetes. Overall the patient feels well with good energy level. Insulin dependence: no   Pertinent Labs:   Lab Results   Component Value Date/Time    Hemoglobin A1c 6.7 (H) 03/12/2021 11:35 AM      Body mass index is 27.59 kg/m².      Lab Results   Component Value Date/Time    LDL, calculated 97.8 03/12/2021 11:35 AM        Wt Readings from Last 3 Encounters:   12/14/21 146 lb (66.2 kg)   03/12/21 156 lb 9.6 oz (71 kg)   02/27/20 147 lb 6.4 oz (66.9 kg)        Social History     Tobacco Use   Smoking Status Never Smoker   Smokeless Tobacco Never Used        Medications, diet and exercise as means of diabetic control with a goal of an A1C of less than 7.0% discussed. Diabetic foot care and annual eye exam discussed as well. Check blood sugars while fasting just before breakfast on most days and occasionally before dinner. Write down readings in a diabetic log book and bring them to the next visit. Call the office for fasting sugars over 200 or below 75 on two or more occasions. Call immediately if having symptoms of high sugar (frequent urination, always thirsty) or low sugar (dizzy, lethargic, sweaty, nauseated, headache). Our overall goal is to reduce or eliminate the long term consequences of poorly controlled diabetes. Patient expresses understanding and agreement with our plan of care. Hypertension:  The patient reports:  taking medications as instructed, no medication side effects noted, no TIA's, no chest pain on exertion, no dyspnea on exertion, noting swelling of ankles. BP Readings from Last 3 Encounters:   12/14/21 113/73   03/12/21 123/72   02/27/20 125/75     Lab Results   Component Value Date/Time    Sodium 139 03/12/2021 11:35 AM    Potassium 4.0 03/12/2021 11:35 AM    Chloride 104 03/12/2021 11:35 AM    CO2 29 03/12/2021 11:35 AM    Anion gap 6 03/12/2021 11:35 AM    Glucose 120 (H) 03/12/2021 11:35 AM    BUN 13 03/12/2021 11:35 AM    Creatinine 0.97 03/12/2021 11:35 AM    BUN/Creatinine ratio 13 03/12/2021 11:35 AM    GFR est AA >60 03/12/2021 11:35 AM    GFR est non-AA 56 (L) 03/12/2021 11:35 AM    Calcium 8.6 03/12/2021 11:35 AM     Patient advised to log blood pressures at home weekly and bring to next visit. Call office as soon as possible if BP's over 140/90 on multiple occasions or with symptoms of dizziness, chest pain, shortness of breath, headache or ankle swelling. Our goal is to normalize the blood pressure to decrease the risks of strokes and heart attacks. The patient is in agreement with the plan.       Current and past medical information:    Current Medications after this visit[de-identified]     Current Outpatient Medications   Medication Sig    atorvastatin (LIPITOR) 80 mg tablet Take 1 Tablet by mouth daily. Indications: high cholesterol    apixaban (ELIQUIS) 5 mg tablet Take 1 Tablet by mouth every twelve (12) hours. Indications: deep vein thrombosis prevention    oxybutynin (DITROPAN) 5 mg tablet Take 1 Tablet by mouth three (3) times daily. Indications: an increased need to urinate often    ergocalciferol (ERGOCALCIFEROL) 1,250 mcg (50,000 unit) capsule Take 1 Capsule by mouth every seven (7) days. Indications: low vitamin D levels    fluticasone propionate (FLONASE) 50 mcg/actuation nasal spray 2 Sprays by Both Nostrils route daily. Indications: inflammation of the nose due to an allergy    bumetanide (BUMEX) 2 mg tablet Take 1 Tablet by mouth daily. Indications: visible water retention    clindamycin (CLEOCIN) 300 mg capsule Take 1 Capsule by mouth three (3) times daily for 10 days.  gabapentin (NEURONTIN) 100 mg capsule Take 1 Capsule by mouth three (3) times daily as needed for Pain.  triamcinolone acetonide (KENALOG) 0.1 % ointment Apply to bilateral medial feet twice daily, use thin layer    ferrous sulfate 325 mg (65 mg iron) tablet Take 1 Tab by mouth daily.  OTHER Shower Chair- Use As Directed    OTHER Pull Ups    clindamycin (CLEOCIN T) 1 % lotion Apply to axilla twice a day    miscellaneous medical supply (Anti-Embolism Stockings) misc 1 Units by Does Not Apply route daily. Please measure and fit for one pair of below the knee anti-embolism stockings    zinc oxide 20 % ointment Apply  to affected area as needed for Skin Irritation. No current facility-administered medications for this visit.        Patient Active Problem List    Diagnosis Date Noted    Controlled type 2 diabetes mellitus without complication, without long-term current use of insulin ([facility] [address] 07/30/2018    Recurrent depression ([facility] [address] 01/11/2018  Advanced care planning/counseling discussion 06/13/2016    H/O Pulmonary embolism (1/2015) 01/05/2015    Edema 08/27/2014    Hydradenitis 02/01/2012    HTN (hypertension) 01/21/2011    AR (allergic rhinitis) 05/21/2010    Eczema 05/21/2010    Depression 05/21/2010    Female stress incontinence 05/21/2010    Dyslipidemia 05/21/2010       Past Medical History:   Diagnosis Date    Arthritis     Diabetes ([facility] [address]     states she is \"pre-diabetic\", diet controlled    frequency     H/O blood clots     right side of body    Heart attack ([facility] [address] 1/2014    Hydradenitis 2/1/2012    Hypercholesterolemia     Hypertension     Ill-defined condition     edema of legs       Allergies   Allergen Reactions    Ciprofloxacin Other (comments)     Vomiting    Doxycycline Other (comments)     Vomiting    Pcn [Penicillins] Rash       History reviewed. No pertinent surgical history.     Social History     Socioeconomic History    Marital status: LEGALLY    Tobacco Use    Smoking status: Never Smoker    Smokeless tobacco: Never Used   Substance and Sexual Activity    Alcohol use: No     Alcohol/week: 0.0 standard drinks    Drug use: No    Sexual activity: Never         Objective:     Review of Systems:  Constitutional: Negative for fatigue or malaise  Derm: hx of hydradenitis b/l axilla  HEENT: Negative for acute hearing or vision changes  Cardiovascular: Negative for dizziness, chest pain or palpitations  Respiratory: Negative for cough, wheezing or SOB  Gastreintestinal:  Negative for nausea or abdominal pain  Genital/urinary: hx of incontinence, Negative for dysuria   Muscoloskeletal: Negative for acute myalgias or arthralgias   Neurological: Negative for headache, weakness or paresthesia  Psychological: Negative for depression or anxiety      Vitals:    12/14/21 1405   BP: 113/73   Pulse: 93   Resp: 16   Temp: 99.1 °F (37.3 °C)   TempSrc: Oral   SpO2: 100%   Weight: 146 lb (66.2 kg)   Height: 5' 1\" (1.549 m)      Body mass index is 27.59 kg/m². Physical Exam:  Constitutional: well developed, well nourished, in no acute distress  Head: normocephalic, atraumatic  Eyes: sclera clear, EOMI  Neck: normal range of motion  Cardiovascular: normal S1, S2, regular rate and rhythm, 3+ pedal edema  Respiratory: clear to auscultation bilaterally with symmetrical effort  Abdomen: soft, normal bowel sounds  Extremities: full range of motion, antalgic gait  Neurology: normal strength and sensation  Psych: active, alert and oriented, affect appropriate       Assessment and orders:       ICD-10-CM ICD-9-CM    1. Infection of index finger  L08.9 686.9 clindamycin (CLEOCIN) 300 mg capsule   2. Essential hypertension  I10 401.9 bumetanide (BUMEX) 2 mg tablet      LIPID PANEL      METABOLIC PANEL, COMPREHENSIVE      TSH 3RD GENERATION      CBC W/O DIFF      LIPID PANEL      METABOLIC PANEL, COMPREHENSIVE      TSH 3RD GENERATION      CBC W/O DIFF   3. Hyperlipidemia, unspecified hyperlipidemia type  E78.5 272.4 atorvastatin (LIPITOR) 80 mg tablet      LIPID PANEL      METABOLIC PANEL, COMPREHENSIVE      LIPID PANEL      METABOLIC PANEL, COMPREHENSIVE   4. Other chronic pulmonary embolism without acute cor pulmonale (HCC)  I27.82 416.2 apixaban (ELIQUIS) 5 mg tablet   5. Lymphedema  I89.0 457.1 bumetanide (BUMEX) 2 mg tablet   6. Female stress incontinence  N39.3 625.6 oxybutynin (DITROPAN) 5 mg tablet   7. Non-seasonal allergic rhinitis  J30.89 477.8 fluticasone propionate (FLONASE) 50 mcg/actuation nasal spray   8. Controlled type 2 diabetes mellitus without complication, without long-term current use of insulin (HCC)  E11.9 250.00    9. Neuropathy due to type 2 diabetes mellitus ([facility])  E11.40 250.60 gabapentin (NEURONTIN) 100 mg capsule     357.2    10. Recurrent depression ([facility] [address]  F33.9 296.30         Plan of care:  Diagnoses were discussed in detail with patient. Medication risks/benefits/side effects discussed with patient.    All of the patient's questions were addressed and answered to apparent satisfaction. The patient understands and agrees with our plan of care. The patient knows to call back if they have questions about the plan of care or if symptoms change. The patient received an After-Visit Summary which contains VS, diagnoses, orders, allergy and medication lists. Patient Care Team:  Cee Bowman MD as PCP - General (Family Medicine)  Cee Bowman MD as PCP [facility]  Merry Telles MD (Cardiology)  Mari Correa DPM (Podiatry)  Jackie Spears MD (General Surgery)  ADE[facility])    Follow-up and Dispositions    · Return in about 6 months (around 6/14/2022), or if symptoms worsen or fail to improve. Future Appointments   Date Time Provider Buddy Boles   6/14/2022  1:40 PM [facility]       Signed By: Roberto Grey MD     December 15, 2021        The following Annual Medicare Wellness Exam is distinct and separate from the medical evaluation and decision making. This is the Subsequent Medicare Annual Wellness Exam, performed 12 months or more after the Initial AWV or the last Subsequent AWV    I have reviewed the patient's medical history in detail and updated the computerized patient record. Assessment/Plan   Education and counseling provided:  Are appropriate based on today's review and evaluation  End-of-Life planning (with patient's consent)    1.  Medicare annual wellness visit, subsequent       Depression Risk Factor Screening     3 most recent PHQ Screens 3/12/2021   Little interest or pleasure in doing things Not at all   Feeling down, depressed, irritable, or hopeless Not at all   Total Score PHQ 2 0       Alcohol Risk Screen    Do you average more than 1 drink per night or more than 7 drinks a week:  No    On any one occasion in the past three months have you have had more than 3 drinks containing alcohol: No        Functional Ability and Level of Safety    Hearing: Hearing is good. Activities of Daily Living: The home contains: no safety equipment. Patient does total self care      Ambulation: with difficulty, uses a cane     Fall Risk:  Fall Risk Assessment, last 12 mths 12/14/2021   Able to walk? Yes   Fall in past 12 months? 0   Do you feel unsteady? 1   Are you worried about falling 0   Is the gait abnormal? 1   Number of falls in past 12 months 0   Fall with injury? -      Abuse Screen:  Patient is not abused       Cognitive Screening    Has your family/caregiver stated any concerns about your memory: no       Health Maintenance Due     Health Maintenance Due   Topic Date Due    COVID-19 Vaccine (1) Never done    Eye Exam Retinal or Dilated  Never done    Shingrix Vaccine Age 50> (1 of 2) Never done    Pneumococcal 65+ years (1 of 1 - PPSV23) Never done    Medicare Yearly Exam  03/08/2020    Foot Exam Q1  09/17/2020    MICROALBUMIN Q1  02/27/2021    Flu Vaccine (1) Never done       Patient Care Team   Patient Care Team:  Joanna Schaffer MD as PCP - General (Family Medicine)  Joanna Schaffer [facility] as PCP - [facility]  Aniceto Benz MD (Cardiology)  Dillon Navarrete DPM (Podiatry)  Ilene Boland MD (General Surgery)  Duane Bouchard, ADELE (Optometry)    History     Patient Active Problem List   Diagnosis Code    AR (allergic rhinitis) J30.9    Eczema L30.9    Depression F32. A    Female stress incontinence N39.3    Dyslipidemia E78.5    HTN (hypertension) I10    Hydradenitis L73.2    Edema R60.9    H/O Pulmonary embolism (1/2015) I26.99    Advanced care planning/counseling discussion Z71.89    Recurrent depression ([address] F33.9    Controlled type 2 diabetes mellitus without complication, without long-term current use of insulin (HCC) E11.9     Past Medical History:   Diagnosis Date    Arthritis     Diabetes ([address]     states she is \"pre-diabetic\", diet controlled    frequency  H/O blood clots     right side of body    Heart attack ([facility] [address] 1/2014    Hydradenitis 2/1/2012    Hypercholesterolemia     Hypertension     Ill-defined condition     edema of legs      History reviewed. No pertinent surgical history. Current Outpatient Medications   Medication Sig Dispense Refill    atorvastatin (LIPITOR) 80 mg tablet Take 1 Tablet by mouth daily. Indications: high cholesterol 90 Tablet 1    apixaban (ELIQUIS) 5 mg tablet Take 1 Tablet by mouth every twelve (12) hours. Indications: deep vein thrombosis prevention 180 Tablet 1    oxybutynin (DITROPAN) 5 mg tablet Take 1 Tablet by mouth three (3) times daily. Indications: an increased need to urinate often 270 Tablet 1    ergocalciferol (ERGOCALCIFEROL) 1,250 mcg (50,000 unit) capsule Take 1 Capsule by mouth every seven (7) days. Indications: low vitamin D levels 12 Capsule 1    fluticasone propionate (FLONASE) 50 mcg/actuation nasal spray 2 Sprays by Both Nostrils route daily. Indications: inflammation of the nose due to an allergy 1 Each 2    bumetanide (BUMEX) 2 mg tablet Take 1 Tablet by mouth daily. Indications: visible water retention 90 Tablet 1    clindamycin (CLEOCIN) 300 mg capsule Take 1 Capsule by mouth three (3) times daily for 10 days. 30 Capsule 0    gabapentin (NEURONTIN) 100 mg capsule Take 1 Capsule by mouth three (3) times daily as needed for Pain. 90 Capsule 2    triamcinolone acetonide (KENALOG) 0.1 % ointment Apply to bilateral medial feet twice daily, use thin layer 30 g 0    ferrous sulfate 325 mg (65 mg iron) tablet Take 1 Tab by mouth daily. 90 Tab 1    OTHER Shower Chair- Use As Directed 1 Each 0    OTHER Pull Ups 100 Each 99    clindamycin (CLEOCIN T) 1 % lotion Apply to axilla twice a day 1 Bottle 2    miscellaneous medical supply (Anti-Embolism Stockings) misc 1 Units by Does Not Apply route daily.  Please measure and fit for one pair of below the knee anti-embolism stockings 2 Each 0    zinc oxide 20 % ointment Apply  to affected area as needed for Skin Irritation.  30 g 1     Allergies   Allergen Reactions    Ciprofloxacin Other (comments)     Vomiting    Doxycycline Other (comments)     Vomiting    Pcn [Penicillins] Rash       Family History   Problem Relation Age of Onset    Heart Disease Mother     Cancer Mother     Asthma Father     Alcohol abuse Brother      Social History     Tobacco Use    Smoking status: Never Smoker    Smokeless tobacco: Never Used   Substance Use Topics    Alcohol use: No     Alcohol/week: 0.0 standard drinks         Roberto Grey MD

## 2024-10-03 ENCOUNTER — TELEPHONE (OUTPATIENT)
Age: 79
End: 2024-10-03

## 2024-10-03 RX ORDER — MEGESTROL ACETATE 20 MG/1
TABLET ORAL
Qty: 30 TABLET | Refills: 0 | Status: SHIPPED | OUTPATIENT
Start: 2024-10-03

## 2024-10-03 NOTE — TELEPHONE ENCOUNTER
PT name and  verified    80 yo last ov 24      Jody-daughter of PT, on grecia form dated 24, calling on PT behalf, stating she was seen for vaginal spotting/bleeding in  and was suppose to fu in July but canceled the appt because the bleeding subsided.  Jody states that the bleeding/spotting started again and has been \"sporadic and not consistent\" for the past couple of months, and for 3 days it has been more than spotting, as she changes her daily. PT is not saturating a depends and is not complaining of abdominal pain, but does have a vaginal odor.  Jody also reports today she seems more weak and sleeping all day, but states she did stay up late.  PT has a repeat us that was scheduled with CS for the ED for 10/9/24. PT cannot climb on examining table and is transported via WC.  PT last us was done 24 at [facility] and is scanned into her  chart.  Jody was seeing if there was something she could get to help with the bleeding and to see if she could schedule with  the same day as the us.  RN consulted  and reviewed PT sx and states he can send her something to the pharmacy.   RN relayed to Jody and verified pharmacy and placed PT on schedule for 10/9/24 for 120 pm.  RN reviewed sx to Jody to report PT to the ED, saturating a depends/pad within 30 minutes, less than an hour,if PT is lethargic and cannot be awaken, any clots noted golf ball/egg size or bigger.  Jody verbalizes understanding.      Please review  Preferred pharmacy verified  LD patient    Thank you

## 2024-10-03 NOTE — TELEPHONE ENCOUNTER
Chart reviewed.  Pt designated as not a candidate for surgical intervention.  Did not follow up with repeat US from last visit in June.  Pt may benefit from Megace oral treatment to slow or stop bleeding.  Has appt for US and see Dr. Presley in next couple weeks.

## 2024-11-12 ENCOUNTER — OFFICE VISIT (OUTPATIENT)
Facility: CLINIC | Age: 79
End: 2024-11-12

## 2024-11-12 VITALS
WEIGHT: 117 LBS | BODY MASS INDEX: 18.36 KG/M2 | DIASTOLIC BLOOD PRESSURE: 72 MMHG | TEMPERATURE: 98.8 F | HEIGHT: 67 IN | HEART RATE: 81 BPM | RESPIRATION RATE: 20 BRPM | OXYGEN SATURATION: 100 % | SYSTOLIC BLOOD PRESSURE: 126 MMHG

## 2024-11-12 DIAGNOSIS — I10 ESSENTIAL (PRIMARY) HYPERTENSION: Primary | ICD-10-CM

## 2024-11-12 DIAGNOSIS — E78.2 MIXED HYPERLIPIDEMIA: ICD-10-CM

## 2024-11-12 DIAGNOSIS — Z00.00 MEDICARE ANNUAL WELLNESS VISIT, SUBSEQUENT: ICD-10-CM

## 2024-11-12 DIAGNOSIS — N39.3 FEMALE STRESS INCONTINENCE: ICD-10-CM

## 2024-11-12 DIAGNOSIS — D64.9 ANEMIA, UNSPECIFIED TYPE: ICD-10-CM

## 2024-11-12 DIAGNOSIS — E11.9 TYPE 2 DIABETES MELLITUS WITHOUT COMPLICATION, WITHOUT LONG-TERM CURRENT USE OF INSULIN (HCC): ICD-10-CM

## 2024-11-12 RX ORDER — FERROUS SULFATE 325(65) MG
325 TABLET ORAL
Qty: 90 TABLET | Refills: 1 | Status: SHIPPED | OUTPATIENT
Start: 2024-11-12

## 2024-11-12 RX ORDER — OXYBUTYNIN CHLORIDE 5 MG/1
5 TABLET ORAL 3 TIMES DAILY
Qty: 90 TABLET | Refills: 1 | Status: SHIPPED | OUTPATIENT
Start: 2024-11-12

## 2024-11-12 RX ORDER — FUROSEMIDE 20 MG/1
20 TABLET ORAL PRN
Qty: 90 TABLET | Refills: 1 | Status: SHIPPED | OUTPATIENT
Start: 2024-11-12

## 2024-11-12 ASSESSMENT — PATIENT HEALTH QUESTIONNAIRE - PHQ9
SUM OF ALL RESPONSES TO PHQ QUESTIONS 1-9: 0
SUM OF ALL RESPONSES TO PHQ QUESTIONS 1-9: 0
5. POOR APPETITE OR OVEREATING: NOT AT ALL
9. THOUGHTS THAT YOU WOULD BE BETTER OFF DEAD, OR OF HURTING YOURSELF: NOT AT ALL
1. LITTLE INTEREST OR PLEASURE IN DOING THINGS: NOT AT ALL
6. FEELING BAD ABOUT YOURSELF - OR THAT YOU ARE A FAILURE OR HAVE LET YOURSELF OR YOUR FAMILY DOWN: NOT AT ALL
3. TROUBLE FALLING OR STAYING ASLEEP: NOT AT ALL
10. IF YOU CHECKED OFF ANY PROBLEMS, HOW DIFFICULT HAVE THESE PROBLEMS MADE IT FOR YOU TO DO YOUR WORK, TAKE CARE OF THINGS AT HOME, OR GET ALONG WITH OTHER PEOPLE: NOT DIFFICULT AT ALL
7. TROUBLE CONCENTRATING ON THINGS, SUCH AS READING THE NEWSPAPER OR WATCHING TELEVISION: NOT AT ALL
SUM OF ALL RESPONSES TO PHQ QUESTIONS 1-9: 0
SUM OF ALL RESPONSES TO PHQ9 QUESTIONS 1 & 2: 0
4. FEELING TIRED OR HAVING LITTLE ENERGY: NOT AT ALL
2. FEELING DOWN, DEPRESSED OR HOPELESS: NOT AT ALL
8. MOVING OR SPEAKING SO SLOWLY THAT OTHER PEOPLE COULD HAVE NOTICED. OR THE OPPOSITE, BEING SO FIGETY OR RESTLESS THAT YOU HAVE BEEN MOVING AROUND A LOT MORE THAN USUAL: NOT AT ALL
SUM OF ALL RESPONSES TO PHQ QUESTIONS 1-9: 0

## 2024-11-12 NOTE — PROGRESS NOTES
Reviewed record in preparation for visit and have necessary documentation  Pt did not bring medication to office visit for review  opportunity was given for questions  \"Have you been to the ER, urgent care clinic since your last visit?  Hospitalized since your last visit?\"    NO    “Have you seen or consulted any other health care providers outside of [facility] since your last visit?”    NO      Click Here for Release of Records Request     Goals that were addressed and/or need to be completed during or after this appointment include   Health Maintenance Due   Topic Date Due    Diabetic Alb to Cr ratio (uACR) test  Never done    Flu vaccine (1) Never done    COVID-19 Vaccine (1 - 2023-24 season) Never done    Annual Wellness Visit (Medicare)  10/24/2024

## 2024-11-19 NOTE — PATIENT INSTRUCTIONS
if you are having problems. It's also a good idea to know your test results and keep a list of the medicines you take.  Current as of: October 24, 2023  Content Version: 14.2  © 2024 [employer].   Care instructions adapted under license by [employer]. If you have questions about a medical condition or this instruction, always ask your healthcare professional. [employer] disclaims any warranty or liability for your use of this information.           Advance Directives: Care Instructions  Overview  An advance directive is a legal way to state your wishes at the end of your life. It tells your family and your doctor what to do if you can't say what you want.  There are two main types of advance directives. You can change them any time your wishes change.  Living will.  This form tells your family and your doctor your wishes about life support and other treatment. The form is also called a declaration.  Medical power of .  This form lets you name a person to make treatment decisions for you when you can't speak for yourself. This person is called a health care agent (health care proxy, health care surrogate). The form is also called a durable power of  for health care.  If you do not have an advance directive, decisions about your medical care may be made by a family member, or by a doctor or a  who doesn't know you.  It may help to think of an advance directive as a gift to the people who care for you. If you have one, they won't have to make tough decisions by themselves.  For more information, including forms for your state, see the [employer] website (www.caringinfo.org/planning/advance-directives/).  Follow-up care is a key part of your treatment and safety. Be sure to make and go to all appointments, and call your doctor if you are having problems. It's also a good idea to know your test results and keep a list of the medicines you take.  What should you include in

## 2024-11-19 NOTE — PROGRESS NOTES
Progress Note    Patient: Joanne Eduardo MRN: [id number]  SSN: [id number]    YOB: 1945  Age: 79 y.o.  Sex: female        Chief Complaint   Patient presents with    Medicare AWV    Immunizations     Declines flu vaccine     she is a 79 y.o. year old female who presents presents with daughter for follow up of chronic health problems. Patient with dx of T2D, HTN, HLD, hydradenitis and pedal edema. Patient denies HA, dizziness, SOB, CP, abdominal/pelvic pain, acute myalgias or arthralgias. BP and BS well controlled.       Encounter Diagnoses   Name Primary?    Essential (primary) hypertension Yes    Mixed hyperlipidemia     Anemia, unspecified type     Female stress incontinence     Type 2 diabetes mellitus without complication, without long-term current use of insulin ([facility])     Medicare annual wellness visit, subsequent      BP Readings from Last 3 Encounters:   11/12/24 126/72   09/04/24 114/68   08/26/24 113/64     Wt Readings from Last 3 Encounters:   11/12/24 53.1 kg (117 lb)   09/04/24 53.1 kg (117 lb)   06/04/24 51.3 kg (113 lb)     Body mass index is 18.32 kg/m².    CMP:    Lab Results   Component Value Date/Time     06/04/2024 02:05 PM    K 4.2 06/04/2024 02:05 PM     06/04/2024 02:05 PM    CO2 28 06/04/2024 02:05 PM    BUN 8 06/04/2024 02:05 PM    CREATININE 0.76 06/04/2024 02:05 PM    GFRAA >60 09/14/2022 03:25 PM    LABGLOM 80 06/04/2024 02:05 PM    GLUCOSE 101 06/04/2024 02:05 PM    CALCIUM 9.1 06/04/2024 02:05 PM    BILITOT 0.3 06/04/2024 02:05 PM    ALKPHOS 127 06/04/2024 02:05 PM    AST 13 06/04/2024 02:05 PM    ALT 10 06/04/2024 02:05 PM       Patient Active Problem List   Diagnosis    Depression    Hidradenitis suppurativa    Eczema    Advanced care planning/counseling discussion    Controlled type 2 diabetes mellitus without complication, without long-term current use of insulin (HCC)    HTN (hypertension)    AR (allergic rhinitis)    Recurrent depression (HCC)

## 2024-12-23 ENCOUNTER — HOSPITAL ENCOUNTER (OUTPATIENT)
Facility: HOSPITAL | Age: 79
Discharge: HOME | End: 2024-12-23
Attending: PHYSICIAN ASSISTANT
Payer: MEDICARE

## 2024-12-23 VITALS
HEART RATE: 89 BPM | BODY MASS INDEX: 23.6 KG/M2 | HEIGHT: 61 IN | DIASTOLIC BLOOD PRESSURE: 74 MMHG | TEMPERATURE: 97.3 F | SYSTOLIC BLOOD PRESSURE: 125 MMHG | RESPIRATION RATE: 16 BRPM | WEIGHT: 125 LBS

## 2024-12-23 DIAGNOSIS — L73.2 HIDRADENITIS SUPPURATIVA: ICD-10-CM

## 2024-12-23 DIAGNOSIS — S41.102A OPEN WOUND OF LEFT AXILLARY REGION, INITIAL ENCOUNTER: Primary | ICD-10-CM

## 2024-12-23 DIAGNOSIS — S41.101A OPEN WOUND OF RIGHT AXILLARY REGION, INITIAL ENCOUNTER: ICD-10-CM

## 2024-12-23 PROCEDURE — 11045 DBRDMT SUBQ TISS EACH ADDL: CPT

## 2024-12-23 PROCEDURE — 99215 OFFICE O/P EST HI 40 MIN: CPT

## 2024-12-23 PROCEDURE — 11042 DBRDMT SUBQ TIS 1ST 20SQCM/<: CPT

## 2024-12-23 NOTE — PROGRESS NOTES
oxyBUTYnin (DITROPAN) 5 MG tablet Take 1 tablet by mouth 3 times daily 90 tablet 1    megestrol (MEGACE) 20 MG tablet Take 2 tablets now, then once a day 30 tablet 0    mupirocin (BACTROBAN) 2 % ointment APPLY A THIN FILM TWICE DAILY TO OPEN SKIN WOUND BACK      levocetirizine (XYZAL) 5 MG tablet Take 1 tablet by mouth nightly 30 tablet 2    atorvastatin (LIPITOR) 80 MG tablet Take 1 tablet by mouth daily 90 tablet 1    diphenhydrAMINE (BENADRYL) 2 % cream Apply topically 3 times daily as needed. 15 g 0    trimethoprim-polymyxin b (POLYTRIM) 98498-2.1 UNIT/ML-% ophthalmic solution       chlorhexidine gluconate (ANTISEPTIC SKIN CLEANSER) 4 % SOLN external solution Apply to axilla daily. 237 mL 2    Multiple Vitamin (MULTIVITAMIN) TABS Take 1 tablet by mouth daily 90 tablet 1    acetaminophen (ACETAMINOPHEN 8 HOUR) 650 MG extended release tablet Take 1 tablet by mouth every 8 hours as needed for Pain 60 tablet 3     No current facility-administered medications on file prior to encounter.         Written patient dismissal instructions given to patient and signed by patient or POA.         Electronically signed by Teto Hackett PA-C on 12/23/2024 at 12:07 PM

## 2024-12-23 NOTE — DISCHARGE INSTRUCTIONS
Wound Clinic Physician Orders and Discharge Instructions  [facility]  [address], [address]  [place] [address]  Telephone: [phone]     FAX [phone]    NAME:  Joanne Eduardo  YOB: 1945  MEDICAL RECORD NUMBER:  [id number]  DATE:  12/23/2024      Return Appointment:    [x] Dressing Supply Provider: Nirmal  [] Home Healthcare:  [x] Return Appointment: 4 Week(s)  [] Nurse Visit:     [] Discharge from [facility]:   [] Healed        [] Refer to Provider:         [] Consult    Follow-up Information:    [] Ordered Tests:    [] Vascular/Arterial Testing   [] Please call [phone] to schedule at any [facility] facility      [] Imaging    [] Please call [phone] to schedule at any [facility] facility    [] Referrals:    [] Infectious Disease  [] Vascular  [] Other:    [] Rx to pharmacy:   [] Would Culture obtained in clinic  [] Other:       Wound Cleansing:   Do not scrub or use excessive force.  Cleanse wound prior to applying a clean dressing with:    [] Normal Saline   [x] Mild Soap & Water in shower  [] Keep Wound Dry in Shower  [] Wear a cast cover to shower  [] Other:       Topical Treatments:  Do not apply lotions, creams, or ointments to wound bed unless directed.     [] Apply moisturizing lotion to skin surrounding the wound prior to dressing change.  [] Apply thin film of moisture barrier ointment (Zinc) to skin immediately around wound.  [] Apply Betadine to skin immediately around wound   [] Apply Skin Prep to skin immediately around wound  [] Other:      Dressings:           Wound Location R & L Axilla, Sacrum    [x] Apply Primary Dressing:       [] MediHoney Gel  [x] Calcium Alginate with Silver   [] Calcium Alginate without silver   [] Collagen with silver [] Collagen without Silver    [] Santyl with moist saline gauze     [] Hydrofera Blue (cut to size and moistened with normal saline)  [] Hydrofera Blue Ready (cut to size)      [] Normal Saline wet to dry

## 2024-12-23 NOTE — WOUND CARE
Wound Care Supplies      Supply Company:     [employer] [address] [place] [address] f: [phone] f: [phone] p: [phone] [email or web address]      Ordering Center:     [facility]  [address] [place] [address], [address] [address]    Phone: [phone]  Fax: [phone]    Patient Information:      Robert Eduardo  [address] [facility] [address] [phone]   : 1945  AGE: 79 y.o.     GENDER: female   EPISODE DATE: 2024    Insurance:      PRIMARY INSURANCE:  Plan: MEDICARE PART A AND B  Coverage: MEDICARE  Effective Date: 3/1/2010  Group Number: [unfilled]  Subscriber Number: [id number] - (Medicare)    Payer/Plan Subscr  Sex Relation Sub. Ins. ID Effective Group Num   1. MEDICARE - ME* ROBERT EDUARDO 1945 Female Self [id number] 3/1/10                                    [address] [address]   2. [place]* ROBERT EDUARDO 1945 Female Self [id number] 24                                    [address] [address]       Patient Wound Information:      Problem List Items Addressed This Visit    None      WOUNDS REQUIRING DRESSING SUPPLIES:     Wound 24 Axilla Left #2 (Active)   Wound Image   24 1115   Wound Etiology Other 24 1115   Dressing Status Other (Comment) 24 1115   Wound Cleansed Cleansed with saline 24 1115   Dressing/Treatment Alginate with Ag;Gauze dressing/dressing sponge 24 1204   Wound Length (cm) 6 cm 24 1115   Wound Width (cm) 6 cm 24 1115   Wound Depth (cm) 0.2 cm 24 1115   Wound Surface Area (cm^2) 36 cm^2 24 1115   Change in Wound Size % (l*w) -50 24 1115   Wound Volume (cm^3) 7.2 cm^3 24 1115   Wound Healing % 0 24   Wound Assessment Slough;Granulation tissue 24   Drainage Amount Copious (>75 % saturated) 24   Drainage Description Yellow;Green;Thick 24   Odor Mild 24   Tiki-wound 
US. IT IS MOST IMPORTANT TO KEEP THE WOUND COVERED AT ALL TIMES.

## 2025-01-23 ENCOUNTER — TELEPHONE (OUTPATIENT)
Facility: HOSPITAL | Age: 80
End: 2025-01-23

## 2025-01-23 NOTE — TELEPHONE ENCOUNTER
Called both numbers in chart to schedule a follow up after patient missed last scheduled appointment, unable to get through on first number and no answer/voicemail on second number.

## 2025-02-18 ENCOUNTER — OFFICE VISIT (OUTPATIENT)
Facility: CLINIC | Age: 80
End: 2025-02-18

## 2025-02-18 VITALS
DIASTOLIC BLOOD PRESSURE: 72 MMHG | SYSTOLIC BLOOD PRESSURE: 127 MMHG | OXYGEN SATURATION: 100 % | TEMPERATURE: 98.2 F | WEIGHT: 115.6 LBS | RESPIRATION RATE: 16 BRPM | BODY MASS INDEX: 18.15 KG/M2 | HEIGHT: 67 IN | HEART RATE: 95 BPM

## 2025-02-18 DIAGNOSIS — N39.3 FEMALE STRESS INCONTINENCE: ICD-10-CM

## 2025-02-18 DIAGNOSIS — D64.9 ANEMIA, UNSPECIFIED TYPE: ICD-10-CM

## 2025-02-18 DIAGNOSIS — E11.9 TYPE 2 DIABETES MELLITUS WITHOUT COMPLICATION, WITHOUT LONG-TERM CURRENT USE OF INSULIN (HCC): Primary | ICD-10-CM

## 2025-02-18 DIAGNOSIS — M25.562 CHRONIC PAIN OF LEFT KNEE: ICD-10-CM

## 2025-02-18 DIAGNOSIS — Z91.81 AT HIGH RISK FOR FALLS: ICD-10-CM

## 2025-02-18 DIAGNOSIS — G89.29 CHRONIC PAIN OF LEFT KNEE: ICD-10-CM

## 2025-02-18 DIAGNOSIS — E78.2 MIXED HYPERLIPIDEMIA: ICD-10-CM

## 2025-02-18 DIAGNOSIS — I89.0 LYMPHEDEMA, NOT ELSEWHERE CLASSIFIED: ICD-10-CM

## 2025-02-18 DIAGNOSIS — I10 ESSENTIAL (PRIMARY) HYPERTENSION: ICD-10-CM

## 2025-02-18 DIAGNOSIS — R53.81 PHYSICAL DEBILITY: ICD-10-CM

## 2025-02-18 RX ORDER — ATORVASTATIN CALCIUM 80 MG/1
80 TABLET, FILM COATED ORAL DAILY
Qty: 90 TABLET | Refills: 1 | Status: SHIPPED | OUTPATIENT
Start: 2025-02-18

## 2025-02-18 RX ORDER — OXYBUTYNIN CHLORIDE 5 MG/1
5 TABLET ORAL 3 TIMES DAILY
Qty: 90 TABLET | Refills: 1 | Status: SHIPPED | OUTPATIENT
Start: 2025-02-18

## 2025-02-18 RX ORDER — FUROSEMIDE 20 MG/1
20 TABLET ORAL PRN
Qty: 90 TABLET | Refills: 1 | Status: SHIPPED | OUTPATIENT
Start: 2025-02-18

## 2025-02-18 RX ORDER — FERROUS SULFATE 325(65) MG
325 TABLET ORAL
Qty: 90 TABLET | Refills: 1 | Status: SHIPPED | OUTPATIENT
Start: 2025-02-18

## 2025-02-18 RX ORDER — WHEELCHAIR
EACH MISCELLANEOUS
Qty: 1 EACH | Refills: 0 | Status: SHIPPED | OUTPATIENT
Start: 2025-02-18

## 2025-02-18 RX ORDER — MEGESTROL ACETATE 20 MG/1
TABLET ORAL
Qty: 90 TABLET | Refills: 1 | Status: SHIPPED | OUTPATIENT
Start: 2025-02-18

## 2025-02-18 RX ORDER — WHEELCHAIR
EACH MISCELLANEOUS
Qty: 1 EACH | Refills: 0 | Status: SHIPPED | OUTPATIENT
Start: 2025-02-18 | End: 2025-02-18 | Stop reason: SDUPTHER

## 2025-02-18 SDOH — ECONOMIC STABILITY: FOOD INSECURITY: WITHIN THE PAST 12 MONTHS, THE FOOD YOU BOUGHT JUST DIDN'T LAST AND YOU DIDN'T HAVE MONEY TO GET MORE.: NEVER TRUE

## 2025-02-18 SDOH — ECONOMIC STABILITY: FOOD INSECURITY: WITHIN THE PAST 12 MONTHS, YOU WORRIED THAT YOUR FOOD WOULD RUN OUT BEFORE YOU GOT MONEY TO BUY MORE.: NEVER TRUE

## 2025-02-18 ASSESSMENT — PATIENT HEALTH QUESTIONNAIRE - PHQ9
SUM OF ALL RESPONSES TO PHQ QUESTIONS 1-9: 0
SUM OF ALL RESPONSES TO PHQ QUESTIONS 1-9: 0
8. MOVING OR SPEAKING SO SLOWLY THAT OTHER PEOPLE COULD HAVE NOTICED. OR THE OPPOSITE, BEING SO FIGETY OR RESTLESS THAT YOU HAVE BEEN MOVING AROUND A LOT MORE THAN USUAL: NOT AT ALL
SUM OF ALL RESPONSES TO PHQ QUESTIONS 1-9: 0
SUM OF ALL RESPONSES TO PHQ QUESTIONS 1-9: 0
10. IF YOU CHECKED OFF ANY PROBLEMS, HOW DIFFICULT HAVE THESE PROBLEMS MADE IT FOR YOU TO DO YOUR WORK, TAKE CARE OF THINGS AT HOME, OR GET ALONG WITH OTHER PEOPLE: NOT DIFFICULT AT ALL
7. TROUBLE CONCENTRATING ON THINGS, SUCH AS READING THE NEWSPAPER OR WATCHING TELEVISION: NOT AT ALL
5. POOR APPETITE OR OVEREATING: NOT AT ALL
1. LITTLE INTEREST OR PLEASURE IN DOING THINGS: NOT AT ALL
6. FEELING BAD ABOUT YOURSELF - OR THAT YOU ARE A FAILURE OR HAVE LET YOURSELF OR YOUR FAMILY DOWN: NOT AT ALL
SUM OF ALL RESPONSES TO PHQ9 QUESTIONS 1 & 2: 0
9. THOUGHTS THAT YOU WOULD BE BETTER OFF DEAD, OR OF HURTING YOURSELF: NOT AT ALL
3. TROUBLE FALLING OR STAYING ASLEEP: NOT AT ALL
2. FEELING DOWN, DEPRESSED OR HOPELESS: NOT AT ALL
4. FEELING TIRED OR HAVING LITTLE ENERGY: NOT AT ALL

## 2025-02-20 LAB
ALBUMIN SERPL-MCNC: 2 G/DL (ref 3.5–5)
ALBUMIN/GLOB SERPL: 0.3 (ref 1.1–2.2)
ALP SERPL-CCNC: 140 U/L (ref 45–117)
ALT SERPL-CCNC: 9 U/L (ref 12–78)
ANION GAP SERPL CALC-SCNC: 5 MMOL/L (ref 2–12)
AST SERPL-CCNC: 12 U/L (ref 15–37)
BILIRUB SERPL-MCNC: 0.3 MG/DL (ref 0.2–1)
BUN SERPL-MCNC: 8 MG/DL (ref 6–20)
BUN/CREAT SERPL: 11 (ref 12–20)
CALCIUM SERPL-MCNC: 9.4 MG/DL (ref 8.5–10.1)
CHLORIDE SERPL-SCNC: 101 MMOL/L (ref 97–108)
CHOLEST SERPL-MCNC: 164 MG/DL
CO2 SERPL-SCNC: 28 MMOL/L (ref 21–32)
CREAT SERPL-MCNC: 0.75 MG/DL (ref 0.55–1.02)
ERYTHROCYTE [DISTWIDTH] IN BLOOD BY AUTOMATED COUNT: 17 % (ref 11.5–14.5)
EST. AVERAGE GLUCOSE BLD GHB EST-MCNC: 131 MG/DL
GLOBULIN SER CALC-MCNC: 6.1 G/DL (ref 2–4)
GLUCOSE SERPL-MCNC: 100 MG/DL (ref 65–100)
HBA1C MFR BLD: 6.2 % (ref 4–5.6)
HCT VFR BLD AUTO: 31.7 % (ref 35–47)
HDLC SERPL-MCNC: 45 MG/DL
HDLC SERPL: 3.6 (ref 0–5)
HGB BLD-MCNC: 9.2 G/DL (ref 11.5–16)
LDLC SERPL CALC-MCNC: 98.6 MG/DL (ref 0–100)
MCH RBC QN AUTO: 24.2 PG (ref 26–34)
MCHC RBC AUTO-ENTMCNC: 29 G/DL (ref 30–36.5)
MCV RBC AUTO: 83.4 FL (ref 80–99)
NRBC # BLD: 0 K/UL (ref 0–0.01)
NRBC BLD-RTO: 0 PER 100 WBC
PLATELET # BLD AUTO: 597 K/UL (ref 150–400)
PMV BLD AUTO: 9.7 FL (ref 8.9–12.9)
POTASSIUM SERPL-SCNC: 4 MMOL/L (ref 3.5–5.1)
PROT SERPL-MCNC: 8.1 G/DL (ref 6.4–8.2)
RBC # BLD AUTO: 3.8 M/UL (ref 3.8–5.2)
SODIUM SERPL-SCNC: 134 MMOL/L (ref 136–145)
TRIGL SERPL-MCNC: 102 MG/DL
TSH SERPL DL<=0.05 MIU/L-ACNC: 2.85 UIU/ML (ref 0.36–3.74)
VLDLC SERPL CALC-MCNC: 20.4 MG/DL
WBC # BLD AUTO: 12.1 K/UL (ref 3.6–11)

## 2025-02-21 NOTE — PROGRESS NOTES
Chief Complaint   Patient presents with    Follow-up Chronic Condition      \"Have you been to the ER, urgent care clinic since your last visit?  Hospitalized since your last visit?\"    NO    “Have you seen or consulted any other health care providers outside of [facility] since your last visit?”    NO            Click Here for Release of Records Request     Health Maintenance Due   Topic Date Due    Diabetic Alb to Cr ratio (uACR) test  Never done    Pneumococcal 50+ years Vaccine (1 of 2 - PCV) Never done    Shingles vaccine (1 of 2) Never done    Respiratory Syncytial Virus (RSV) Pregnant or age 60 yrs+ (1 - 1-dose 75+ series) Never done    COVID-19 Vaccine (1 - 2024-25 season) Never done    Lipids  01/24/2025     
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)    Anemia    Hyponatremia    SIRS (systemic inflammatory response syndrome) (HCC)    Protein calorie malnutrition     Past Surgical History:   Procedure Laterality Date    COLONOSCOPY N/A 1/25/2023    COLONOSCOPY performed by Susy Avendano MD at [facility] ENDOSCOPY     Social History     Socioeconomic History    Marital status: Legally      Spouse name: Not on file    Number of children: Not on file    Years of education: Not on file    Highest education level: Not on file   Occupational History    Not on file   Tobacco Use    Smoking status: Never     Passive exposure: Never    Smokeless tobacco: Never   Substance and Sexual Activity    Alcohol use: No     Alcohol/week: 0.0 standard drinks of alcohol    Drug use: No    Sexual activity: Defer   Other Topics Concern    Not on file   Social History Narrative    Not on file     Social Determinants of Health     Financial Resource Strain: Low Risk  (9/4/2024)    Overall Financial Resource Strain (CARDIA)     Difficulty of Paying Living Expenses: Not hard at all   Food Insecurity: No Food Insecurity (2/18/2025)    Hunger Vital Sign     Worried About Running Out of Food in the Last Year: Never true     Ran Out of Food in the Last Year: Never true   Transportation Needs: Unmet Transportation Needs (2/18/2025)    PRAPARE - Transportation     Lack of Transportation (Medical): Yes     Lack of Transportation (Non-Medical): Yes   Physical Activity: Inactive (11/12/2024)

## 2025-02-24 ENCOUNTER — HOSPITAL ENCOUNTER (OUTPATIENT)
Facility: HOSPITAL | Age: 80
Discharge: HOME | End: 2025-02-24
Attending: PHYSICIAN ASSISTANT
Payer: MEDICARE

## 2025-02-24 VITALS
RESPIRATION RATE: 16 BRPM | HEART RATE: 93 BPM | DIASTOLIC BLOOD PRESSURE: 58 MMHG | SYSTOLIC BLOOD PRESSURE: 102 MMHG | TEMPERATURE: 98.2 F

## 2025-02-24 DIAGNOSIS — S41.102D OPEN WOUND OF LEFT AXILLARY REGION, SUBSEQUENT ENCOUNTER: ICD-10-CM

## 2025-02-24 DIAGNOSIS — L73.2 HIDRADENITIS SUPPURATIVA OF ANUS: ICD-10-CM

## 2025-02-24 DIAGNOSIS — L73.2 HIDRADENITIS SUPPURATIVA: Primary | ICD-10-CM

## 2025-02-24 DIAGNOSIS — S41.101D OPEN WOUND OF RIGHT AXILLARY REGION, SUBSEQUENT ENCOUNTER: ICD-10-CM

## 2025-02-24 PROCEDURE — 99214 OFFICE O/P EST MOD 30 MIN: CPT

## 2025-02-24 RX ORDER — SILVER SULFADIAZINE 10 MG/G
CREAM TOPICAL PRN
OUTPATIENT
Start: 2025-02-24

## 2025-02-24 RX ORDER — TRIAMCINOLONE ACETONIDE 1 MG/G
OINTMENT TOPICAL PRN
OUTPATIENT
Start: 2025-02-24

## 2025-02-24 RX ORDER — GINSENG 100 MG
CAPSULE ORAL PRN
OUTPATIENT
Start: 2025-02-24

## 2025-02-24 RX ORDER — LIDOCAINE 40 MG/G
CREAM TOPICAL PRN
OUTPATIENT
Start: 2025-02-24

## 2025-02-24 RX ORDER — BETAMETHASONE DIPROPIONATE 0.5 MG/G
CREAM TOPICAL PRN
OUTPATIENT
Start: 2025-02-24

## 2025-02-24 RX ORDER — BACITRACIN ZINC AND POLYMYXIN B SULFATE 500; 1000 [USP'U]/G; [USP'U]/G
OINTMENT TOPICAL PRN
OUTPATIENT
Start: 2025-02-24

## 2025-02-24 RX ORDER — MUPIROCIN 20 MG/G
OINTMENT TOPICAL PRN
OUTPATIENT
Start: 2025-02-24

## 2025-02-24 RX ORDER — SODIUM CHLOR/HYPOCHLOROUS ACID 0.033 %
SOLUTION, IRRIGATION IRRIGATION PRN
OUTPATIENT
Start: 2025-02-24

## 2025-02-24 RX ORDER — CLOBETASOL PROPIONATE 0.5 MG/G
OINTMENT TOPICAL PRN
OUTPATIENT
Start: 2025-02-24

## 2025-02-24 RX ORDER — NEOMYCIN/BACITRACIN/POLYMYXINB 3.5-400-5K
OINTMENT (GRAM) TOPICAL PRN
OUTPATIENT
Start: 2025-02-24

## 2025-02-24 RX ORDER — LIDOCAINE 50 MG/G
OINTMENT TOPICAL PRN
OUTPATIENT
Start: 2025-02-24

## 2025-02-24 RX ORDER — LIDOCAINE HYDROCHLORIDE 40 MG/ML
SOLUTION TOPICAL PRN
OUTPATIENT
Start: 2025-02-24

## 2025-02-24 RX ORDER — LIDOCAINE HYDROCHLORIDE 20 MG/ML
JELLY TOPICAL PRN
OUTPATIENT
Start: 2025-02-24

## 2025-02-24 RX ORDER — GENTAMICIN SULFATE 1 MG/G
OINTMENT TOPICAL PRN
OUTPATIENT
Start: 2025-02-24

## 2025-02-24 NOTE — CONSULTS
[facility]   Wound Care and Hyperbaric Oxygen Therapy Center   Medical Staff Note     Joanne Eduardo  MEDICAL RECORD NUMBER:  [id number]  AGE: 79 y.o.   GENDER: female  : 1945  EPISODE DATE:  2025      Chief Complaint:   Chief Complaint   Patient presents with    Wound Check     Sacral and bilateral axilla       History of Present Illness:     Joanne Eduardo is a 79 y.o. female who presents today for wound/ulcer evaluation.  She has an extensive history of hidradenitis involving the sacrum and bilateral axillary area and anogenital area.  She has not been a compliant patient.  She has not been undergoing any treatment currently.  Planing of drainage from all the wound.    History of Wound Context: Per original history and physical on this patient. Changes in history since last evaluation: Per HPI      Ulcer Identification:  Ulcer Type: Hidradenitis    Contributing Factors: HEALTH FACTORS: None    Wound: Extensive hidradenitis    Past Medical History:       Diagnosis Date    Arthritis     Burning with urination     Diabetes ([facility])     states she is \"pre-diabetic\", diet controlled    H/O blood clots     right side of body    Heart attack (HCC) 2014    Hydradenitis 2012    Hypercholesterolemia     Hypertension     Ill-defined condition     edema of legs    Thromboembolus ([facility])        Past Surgical History:   Past Surgical History:   Procedure Laterality Date    COLONOSCOPY N/A 2023    COLONOSCOPY performed by Susy Avendano MD at [facility] ENDOSCOPY       Allergy:  Allergies   Allergen Reactions    Ciprofloxacin Other (See Comments)     Vomiting    Doxycycline Other (See Comments)     Facial swelling.    Penicillins Rash       Social History:   Social History     Tobacco Use    Smoking status: Never     Passive exposure: Never    Smokeless tobacco: Never   Substance Use Topics    Alcohol use: No     Alcohol/week: 0.0 standard drinks of alcohol    Drug use: No       Family History:

## 2025-02-24 NOTE — DISCHARGE INSTRUCTIONS
Wound Clinic Physician Orders and Discharge Instructions  [facility]  [address], [address]  [place] [address]  Telephone: [phone]     FAX [phone]    NAME:  Joanne Eduardo  YOB: 1945  MEDICAL RECORD NUMBER:  [id number]  DATE:  2/24/2025      Return Appointment:    [x] Dressing Supply Provider: Nirmal  [] Home Healthcare:  [x] Return Appointment: 4 Week(s)  [] Nurse Visit:     [] Discharge from [facility]:   [] Healed        [] Refer to Provider:         [] Consult    Follow-up Information:    [] Ordered Tests:    [] Vascular/Arterial Testing   [] Please call [phone] to schedule at any [facility] facility      [] Imaging    [] Please call [phone] to schedule at any [facility] facility    [x] Referrals:    [x] Infectious Disease  [] Vascular  [x] Other: Dermatology (daughter to speak to PCP first)    [x] Rx to pharmacy: Augmentin  [] Would Culture obtained in clinic  [] Other:       Wound Cleansing:   Do not scrub or use excessive force.  Cleanse wound prior to applying a clean dressing with:    [] Normal Saline   [x] Mild Soap & Water in shower    [] Keep Wound Dry in Shower  [] Wear a cast cover to shower  [] Other:       Topical Treatments:  Do not apply lotions, creams, or ointments to wound bed unless directed.     [] Apply moisturizing lotion to skin surrounding the wound prior to dressing change.  [] Apply thin film of moisture barrier ointment (Zinc) to skin immediately around wound.  [] Apply Betadine to skin immediately around wound   [] Apply Skin Prep to skin immediately around wound  [] Other:      Dressings:           Wound Location R & L Axilla, Sacrum    [x] Apply Primary Dressing:       [] MediHoney Gel  [x] Calcium Alginate with Silver   [] Calcium Alginate without silver   [] Collagen with silver [] Collagen without Silver    [] Santyl with moist saline gauze     [] Hydrofera Blue (cut to size and moistened with normal saline)  []

## 2025-02-24 NOTE — WOUND CARE
Wound Care Supplies      Supply Company:     [employer] [address] [place] [address] f: [phone] f: [phone] p: [phone] [email or web address]      Ordering Center:     [facility]  [address] [place] [address], [address] [place] [address]    Phone: [phone]  Fax: [phone]    Patient Information:      Robert Eduardo  [address] [facility] [address]   [phone]   : 1945  AGE: 79 y.o.     GENDER: female   EPISODE DATE: 2025    Insurance:      PRIMARY INSURANCE:  Plan: MEDICARE PART A AND B  Coverage: MEDICARE  Effective Date: 3/1/2010  Group Number: [unfilled]  Subscriber Number: [id number] - (Medicare)    Payer/Plan Subscr  Sex Relation Sub. Ins. ID Effective Group Num   1. MEDICARE - ME* ROBERT EDUARDO P 1945 Female Self [id number] 3/1/10                                    [address] [address]   2. MATTHEW COMPLE* ROBERT EDUARDO P 1945 Female Self [id number]                                   [address] [address]       Patient Wound Information:      Problem List Items Addressed This Visit          Other    Hidradenitis suppurativa - Primary    Relevant Medications    lidocaine 4 % jelly    Other Relevant Orders    Initiate Outpatient Wound Care Protocol    Initiate Oxygen Therapy Protocol    Ambulatory referral to Infectious Disease    Open wound of left axillary region    Relevant Medications    lidocaine 4 % jelly    Other Relevant Orders    Initiate Outpatient Wound Care Protocol    Initiate Oxygen Therapy Protocol    Ambulatory referral to Infectious Disease    Open wound of right axillary region    Relevant Medications    lidocaine 4 % jelly    Other Relevant Orders    Initiate Outpatient Wound Care Protocol    Initiate Oxygen Therapy Protocol    Ambulatory referral to Infectious Disease       WOUNDS REQUIRING DRESSING SUPPLIES:     Wound 24 Axilla Left #2 (Active)   Wound Image   25 1302

## 2025-02-28 PROBLEM — L73.2 HIDRADENITIS SUPPURATIVA OF ANUS: Status: ACTIVE | Noted: 2025-02-28

## 2025-03-05 NOTE — PROGRESS NOTES
Progress Note    Patient: Kelin Dean MRN: [id number]  SSN: [id number]    YOB: 1945  Age: 68 y.o.   Sex: female      Admit Date: 1/19/2023    LOS: 5 days     Subjective:   Here for the colonoscopy, however the bowel prep is not clean, still have dark stool liquid,  No pain, no nausea vomiting,  Past Medical History:   Diagnosis Date    Arthritis     Diabetes ([facility]     states she is \"pre-diabetic\", diet controlled    frequency     H/O blood clots     right side of body    [facility] 1/2014    Hydradenitis 2/1/2012    Hypercholesterolemia     Hypertension     Ill-defined condition     edema of legs    Thromboembolus (HCC)         Current Facility-Administered Medications:     dextrose 5% infusion, 100 mL/hr, IntraVENous, CONTINUOUS, Mary Haywood MD    sodium bicarbonate tablet 1,300 mg, 1,300 mg, Oral, TID, Lewanda Aschoff, MD    sodium phosphate 15 mmol in dextrose 5% 250 mL infusion, , IntraVENous, ONCE, Esperanza Haywood MD    nystatin (MYCOSTATIN) 100,000 unit/mL oral suspension 500,000 Units, 500,000 Units, Swish and Swallow, BID, Fadi Velasquez MD, 500,000 Units at 01/23/23 9987    pantoprazole (PROTONIX) 40 mg in 0.9% sodium chloride 10 mL injection, 40 mg, IntraVENous, Q12H, Gregory Cardozo MD, 40 mg at 01/23/23 0936    acetaminophen (TYLENOL) tablet 650 mg, 650 mg, Oral, Q6H PRN, 650 mg at 01/21/23 1735 **OR** acetaminophen (TYLENOL) suppository 650 mg, 650 mg, Rectal, Q6H PRN, Fadi Velasquez MD    polyethylene glycol (MIRALAX) packet 17 g, 17 g, Oral, DAILY PRN, Fadi Velasquez MD    ondansetron (ZOFRAN ODT) tablet 4 mg, 4 mg, Oral, Q8H PRN **OR** ondansetron (ZOFRAN) injection 4 mg, 4 mg, IntraVENous, Q6H PRN, Fadi Velasquez MD    insulin lispro (HUMALOG) injection, , SubCUTAneous, AC&HS, Fadi Velasquez MD    glucose chewable tablet 16 g, 4 Tablet, Oral, PRN, Fadi Velasquez MD    glucagon (GLUCAGEN) injection 1 mg, 1 mg, IntraMUSCular, PRN, Liza Velasquez, Spoke with Ms Tripathi  - scheduled ECHO on 03/17 and Hepatology on 05/07   MD    Facility-Administered Medications Ordered in Other Encounters:     0.9% sodium chloride infusion, , IntraVENous, CONTINUOUS, James Cano, CRNA, New Bag at 01/24/23 1358    Objective:     Vitals:    01/23/23 1724 01/23/23 1944 01/24/23 0843 01/24/23 1432   BP: 104/60 114/62 (!) 88/55 112/61   Pulse: 90 73 70 75   Resp: 19 16 16 12   Temp: 97.6 °F (36.4 °C) 97.6 °F (36.4 °C) 97.4 °F (36.3 °C)    SpO2: 100% 93% 92% 100%   Weight:       Height:            Intake and Output:  Current Shift: No intake/output data recorded. Last three shifts: 01/22 1901 - 01/24 0700  In: -   Out: 950 [Urine:950]    Physical Exam:   Physical Exam  Constitutional:       Appearance: She is ill-appearing. HENT:      Head: Atraumatic. Cardiovascular:      Rate and Rhythm: Rhythm irregular. Heart sounds: Normal heart sounds. Pulmonary:      Breath sounds: Normal breath sounds. Abdominal:      Palpations: Abdomen is soft. Skin:     General: Skin is warm. Neurological:      Mental Status: Mental status is at baseline.    Psychiatric:         Mood and Affect: Mood normal.        Lab/Data Review:  Recent Results (from the past 24 hour(s))   GLUCOSE, POC    Collection Time: 01/23/23  4:24 PM   Result Value Ref Range    Glucose (POC) 93 65 - 100 mg/dL    Performed by Matthew Gibson, POC    Collection Time: 01/23/23 11:16 PM   Result Value Ref Range    Glucose (POC) 94 65 - 100 mg/dL    Performed by [address] [place] [address], POC    Collection Time: 01/24/23  7:54 AM   Result Value Ref Range    Glucose (POC) 88 65 - 100 mg/dL    Performed by Abimael Radish    METABOLIC PANEL, COMPREHENSIVE    Collection Time: 01/24/23 12:13 PM   Result Value Ref Range    Sodium 151 (H) 136 - 145 mmol/L    Potassium 5.3 (H) 3.5 - 5.1 mmol/L    Chloride 129 (H) 97 - 108 mmol/L    CO2 18 (L) 21 - 32 mmol/L    Anion gap 4 (L) 5 - 15 mmol/L    Glucose 94 65 - 100 mg/dL    BUN 24 (H) 6 - 20 mg/dL    Creatinine 1.15 (H) 0.55 - 1.02 mg/dL BUN/Creatinine ratio 21 (H) 12 - 20      eGFR 49 (L) >60 ml/min/1.73m2    Calcium 9.0 8.5 - 10.1 mg/dL    Bilirubin, total 0.4 0.2 - 1.0 mg/dL    AST (SGOT) 38 (H) 15 - 37 U/L    ALT (SGPT) 20 12 - 78 U/L    Alk. phosphatase 126 (H) 45 - 117 U/L    Protein, total 8.1 6.4 - 8.2 g/dL    Albumin 1.9 (L) 3.5 - 5.0 g/dL    Globulin 6.2 (H) 2.0 - 4.0 g/dL    A-G Ratio 0.3 (L) 1.1 - 2.2     CBC W/O DIFF    Collection Time: 01/24/23 12:13 PM   Result Value Ref Range    WBC 12.8 (H) 3.6 - 11.0 K/uL    RBC 3.40 (L) 3.80 - 5.20 M/uL    HGB 9.0 (L) 11.5 - 16.0 g/dL    HCT 30.7 (L) 35.0 - 47.0 %    MCV 90.3 80.0 - 99.0 FL    MCH 26.5 26.0 - 34.0 PG    MCHC 29.3 (L) 30.0 - 36.5 g/dL    RDW 21.5 (H) 11.5 - 14.5 %    PLATELET 864 977 - 705 K/uL    MPV 10.4 8.9 - 12.9 FL    NRBC 0.0 0.0  WBC    ABSOLUTE NRBC 0.00 0.00 - 0.01 K/uL        US RETROPERITONEUM COMP   Final Result   Medical renal disease. No hydronephrosis demonstrated. CT HEAD WO CONT   Final Result   No acute abnormality.                  Assessment:     Active Problems:    Blood in stool (1/19/2023)      Cystitis (1/19/2023)      GI bleed (1/19/2023)      TAL (acute kidney injury) ([facility] (1/19/2023)  GI bleeding  UPPER vs lower  GI bleeding  Mild anemia, hemoglobin dropped further,    EGD   mild gastritis, no active bleeding  Not able to perform colonoscopy today due to the poor prep  UTI, possible sepsis,  Plan:   Continue current monitor hemoglobin  Keep the patient on clean liquid diet  bowel preparation, tonight tomorrow    Reschedule Colonoscopy  in am.          Signed By: Samir Baires MD     January 24, 2023        Thank you for allowing me to participate in this patients care  Cc Referring Physician   Isabel Benson MD

## 2025-03-11 ENCOUNTER — OFFICE VISIT (OUTPATIENT)
Age: 80
End: 2025-03-11
Payer: COMMERCIAL

## 2025-03-11 VITALS
SYSTOLIC BLOOD PRESSURE: 111 MMHG | OXYGEN SATURATION: 99 % | BODY MASS INDEX: 18.11 KG/M2 | RESPIRATION RATE: 16 BRPM | DIASTOLIC BLOOD PRESSURE: 70 MMHG | TEMPERATURE: 98 F | HEART RATE: 98 BPM | HEIGHT: 67 IN

## 2025-03-11 DIAGNOSIS — L73.2 HIDRADENITIS SUPPURATIVA: Primary | ICD-10-CM

## 2025-03-11 DIAGNOSIS — T14.8XXA WOUND INFECTION: ICD-10-CM

## 2025-03-11 DIAGNOSIS — S41.102S: ICD-10-CM

## 2025-03-11 DIAGNOSIS — L08.9 WOUND INFECTION: ICD-10-CM

## 2025-03-11 DIAGNOSIS — S31.819A OPEN WOUND OF RIGHT BUTTOCK, INITIAL ENCOUNTER: ICD-10-CM

## 2025-03-11 DIAGNOSIS — Z86.14 HISTORY OF MRSA INFECTION: ICD-10-CM

## 2025-03-11 PROCEDURE — G8427 DOCREV CUR MEDS BY ELIG CLIN: HCPCS | Performed by: INTERNAL MEDICINE

## 2025-03-11 PROCEDURE — G8399 PT W/DXA RESULTS DOCUMENT: HCPCS | Performed by: INTERNAL MEDICINE

## 2025-03-11 PROCEDURE — 3074F SYST BP LT 130 MM HG: CPT | Performed by: INTERNAL MEDICINE

## 2025-03-11 PROCEDURE — 1036F TOBACCO NON-USER: CPT | Performed by: INTERNAL MEDICINE

## 2025-03-11 PROCEDURE — 1123F ACP DISCUSS/DSCN MKR DOCD: CPT | Performed by: INTERNAL MEDICINE

## 2025-03-11 PROCEDURE — 1090F PRES/ABSN URINE INCON ASSESS: CPT | Performed by: INTERNAL MEDICINE

## 2025-03-11 PROCEDURE — 1125F AMNT PAIN NOTED PAIN PRSNT: CPT | Performed by: INTERNAL MEDICINE

## 2025-03-11 PROCEDURE — 99203 OFFICE O/P NEW LOW 30 MIN: CPT | Performed by: INTERNAL MEDICINE

## 2025-03-11 PROCEDURE — 3078F DIAST BP <80 MM HG: CPT | Performed by: INTERNAL MEDICINE

## 2025-03-11 PROCEDURE — G8419 CALC BMI OUT NRM PARAM NOF/U: HCPCS | Performed by: INTERNAL MEDICINE

## 2025-03-11 NOTE — PROGRESS NOTES
\"Have you been to the ER, urgent care clinic since your last visit?  Hospitalized since your last visit?\"    NO    “Have you seen or consulted any other health care providers outside our system since your last visit?”    NO      Chief Complaint   Patient presents with    New Patient     Hidradenitis     /70 (BP Site: Right Upper Arm, Patient Position: Sitting, BP Cuff Size: Medium Adult)   Pulse 98   Temp 98 °F (36.7 °C) (Oral)   Resp 16   Ht 1.702 m (5' 7\")   SpO2 99%   BMI 18.11 kg/m²

## 2025-03-15 ASSESSMENT — ENCOUNTER SYMPTOMS: COLOR CHANGE: 0

## 2025-03-15 NOTE — PROGRESS NOTES
Subjective    Joanne Eduardo is a 80 y.o. female    HPI Patient with reported long history of Hidradenitis suppurativa involving primarily axillary areas, groin and buttocks, followed at the Wound Center referred to ID Clinic because of active drainage.  She states drainage is mostly from her buttocks and armpits. No fever or chills.  States that she had been on Humira by Dermatology in the past.     Review of Systems   Constitutional:  Negative for chills and fever.   Musculoskeletal: Negative.    Skin:  Positive for wound. Negative for color change and rash.   Allergic/Immunologic: Negative for immunocompromised state.   Hematological: Negative.    Psychiatric/Behavioral: Negative.           Past Medical History:   Diagnosis Date    Arthritis     Burning with urination     Diabetes (HCC)     states she is \"pre-diabetic\", diet controlled    H/O blood clots     right side of body    Heart attack (HCC) 1/2014    Hydradenitis 2/1/2012    Hypercholesterolemia     Hypertension     Ill-defined condition     edema of legs    Thromboembolus ([facility])         Past Surgical History:   Procedure Laterality Date    COLONOSCOPY N/A 1/25/2023    COLONOSCOPY performed by Susy Avendano MD at [facility] ENDOSCOPY     Family History   Problem Relation Age of Onset    Cancer Mother     Asthma Father     Heart Disease Mother     Alcohol Abuse Brother      Social History     Socioeconomic History    Marital status: Legally      Spouse name: Not on file    Number of children: Not on file    Years of education: Not on file    Highest education level: Not on file   Occupational History    Not on file   Tobacco Use    Smoking status: Never     Passive exposure: Never    Smokeless tobacco: Never   Substance and Sexual Activity    Alcohol use: No     Alcohol/week: 0.0 standard drinks of alcohol    Drug use: No    Sexual activity: Defer   Other Topics Concern    Not on file   Social History Narrative    Not on file     Social Drivers of Health

## 2025-03-16 ENCOUNTER — RESULTS FOLLOW-UP (OUTPATIENT)
Age: 80
End: 2025-03-16

## 2025-03-16 DIAGNOSIS — L73.2 HIDRADENITIS SUPPURATIVA: Primary | ICD-10-CM

## 2025-03-16 DIAGNOSIS — L08.9 WOUND INFECTION: ICD-10-CM

## 2025-03-16 DIAGNOSIS — T14.8XXA WOUND INFECTION: ICD-10-CM

## 2025-03-16 LAB
BACTERIA SPEC AEROBE CULT: ABNORMAL
BACTERIA SPEC ANAEROBE CULT: ABNORMAL
BACTERIA SPEC ANAEROBE CULT: ABNORMAL

## 2025-03-16 RX ORDER — SULFAMETHOXAZOLE AND TRIMETHOPRIM 800; 160 MG/1; MG/1
2 TABLET ORAL 2 TIMES DAILY
Qty: 56 TABLET | Refills: 0 | Status: SHIPPED | OUTPATIENT
Start: 2025-03-16 | End: 2025-03-30

## 2025-03-16 NOTE — RESULT ENCOUNTER NOTE
Patient seen in ID Clinic with Hidradenitis suppurativa with draining wounds in her left axilla and right buttock.  Cultures were taken and left axilla grew Acinetobacter baumannii and MRSA, both sensitive to Bactrim. Buttock culture grew Escherichia coli, Proteus mirabilis, MRSA and Group B streptococci.  E. Coli, Proteus and MRSA again sensitive to Bactrim.  Sensitivity testing not done for Group B strep because they are uniformly sensitive to Penicillin, however, patient is allergic to penicillins; also sensitive to Levaquin but she is allergic to Ciprofloxacin.  May have to consider Linezolid orally if there is not complete resolution with Bactrim. Will send prescription for Bactrim to her [facility].  Spoke to patient's daughter about results and recommendation.  She inquired about IV antibiotics but this would require a PICC line and two different antibiotics, Vancomycin and Zosyn.  Requested call next week regarding response to treatment.   Also asked about next Wound Center follow-up.

## 2025-03-24 ENCOUNTER — HOSPITAL ENCOUNTER (OUTPATIENT)
Facility: HOSPITAL | Age: 80
Discharge: HOME | End: 2025-03-24
Attending: PHYSICIAN ASSISTANT
Payer: MEDICARE

## 2025-03-24 VITALS
SYSTOLIC BLOOD PRESSURE: 106 MMHG | HEART RATE: 85 BPM | TEMPERATURE: 97.8 F | DIASTOLIC BLOOD PRESSURE: 59 MMHG | RESPIRATION RATE: 16 BRPM

## 2025-03-24 DIAGNOSIS — L89.323 PRESSURE ULCER OF ISCHIUM, LEFT, STAGE III (HCC): ICD-10-CM

## 2025-03-24 DIAGNOSIS — S41.101D OPEN WOUND OF RIGHT AXILLARY REGION, SUBSEQUENT ENCOUNTER: ICD-10-CM

## 2025-03-24 DIAGNOSIS — S41.102D OPEN WOUND OF LEFT AXILLARY REGION, SUBSEQUENT ENCOUNTER: ICD-10-CM

## 2025-03-24 DIAGNOSIS — L73.2 HIDRADENITIS SUPPURATIVA OF ANUS: ICD-10-CM

## 2025-03-24 DIAGNOSIS — L73.2 HIDRADENITIS SUPPURATIVA: Primary | ICD-10-CM

## 2025-03-24 PROCEDURE — 99214 OFFICE O/P EST MOD 30 MIN: CPT

## 2025-03-24 RX ORDER — LIDOCAINE HYDROCHLORIDE 40 MG/ML
SOLUTION TOPICAL PRN
OUTPATIENT
Start: 2025-03-24

## 2025-03-24 RX ORDER — TRIAMCINOLONE ACETONIDE 1 MG/G
OINTMENT TOPICAL PRN
OUTPATIENT
Start: 2025-03-24

## 2025-03-24 RX ORDER — MUPIROCIN 20 MG/G
OINTMENT TOPICAL PRN
OUTPATIENT
Start: 2025-03-24

## 2025-03-24 RX ORDER — SILVER SULFADIAZINE 10 MG/G
CREAM TOPICAL PRN
OUTPATIENT
Start: 2025-03-24

## 2025-03-24 RX ORDER — GENTAMICIN SULFATE 1 MG/G
OINTMENT TOPICAL PRN
OUTPATIENT
Start: 2025-03-24

## 2025-03-24 RX ORDER — CLOBETASOL PROPIONATE 0.5 MG/G
OINTMENT TOPICAL PRN
OUTPATIENT
Start: 2025-03-24

## 2025-03-24 RX ORDER — BETAMETHASONE DIPROPIONATE 0.5 MG/G
CREAM TOPICAL PRN
OUTPATIENT
Start: 2025-03-24

## 2025-03-24 RX ORDER — GINSENG 100 MG
CAPSULE ORAL PRN
OUTPATIENT
Start: 2025-03-24

## 2025-03-24 RX ORDER — NEOMYCIN/BACITRACIN/POLYMYXINB 3.5-400-5K
OINTMENT (GRAM) TOPICAL PRN
OUTPATIENT
Start: 2025-03-24

## 2025-03-24 RX ORDER — LIDOCAINE 50 MG/G
OINTMENT TOPICAL PRN
OUTPATIENT
Start: 2025-03-24

## 2025-03-24 RX ORDER — SODIUM CHLOR/HYPOCHLOROUS ACID 0.033 %
SOLUTION, IRRIGATION IRRIGATION PRN
OUTPATIENT
Start: 2025-03-24

## 2025-03-24 RX ORDER — LIDOCAINE HYDROCHLORIDE 20 MG/ML
JELLY TOPICAL PRN
OUTPATIENT
Start: 2025-03-24

## 2025-03-24 RX ORDER — LIDOCAINE 40 MG/G
CREAM TOPICAL PRN
OUTPATIENT
Start: 2025-03-24

## 2025-03-24 RX ORDER — BACITRACIN ZINC AND POLYMYXIN B SULFATE 500; 1000 [USP'U]/G; [USP'U]/G
OINTMENT TOPICAL PRN
OUTPATIENT
Start: 2025-03-24

## 2025-03-24 NOTE — DISCHARGE INSTRUCTIONS
Wound Clinic Physician Orders and Discharge Instructions  [facility]  [address], [address]  [place] [address]  Telephone: [phone]     FAX [phone]    NAME:  Joanne Eduardo  YOB: 1945  MEDICAL RECORD NUMBER:  [id number]  DATE:  3/24/2025      Return Appointment:    [x] Dressing Supply Provider: Nirmal  [] Home Healthcare:  [x] Return Appointment: 2 Week(s)  [] Nurse Visit:     [] Discharge from [facility]:   [] Healed        [] Refer to Provider:         [] Consult    Follow-up Information:    [] Ordered Tests:    [] Vascular/Arterial Testing   [] Please call [phone] to schedule at any [facility] facility      [] Imaging    [] Please call [phone] to schedule at any [facility] facility    [x] Referrals:    [x] Infectious Disease Dr. Luna  [] Vascular  [x] Other: Dermatology (daughter to speak to PCP first)    [] Rx to pharmacy:   [] Would Culture obtained in clinic  [] Other:       Wound Cleansing:   Do not scrub or use excessive force.  Cleanse wound prior to applying a clean dressing with:    [] Normal Saline   [x] Mild Soap & Water in shower    [] Keep Wound Dry in Shower  [] Wear a cast cover to shower  [] Other:       Topical Treatments:  Do not apply lotions, creams, or ointments to wound bed unless directed.     [] Apply moisturizing lotion to skin surrounding the wound prior to dressing change.  [] Apply thin film of moisture barrier ointment (Zinc) to skin immediately around wound.  [] Apply Betadine to skin immediately around wound   [] Apply Skin Prep to skin immediately around wound  [] Other:      Dressings:           Wound Location L Axilla and Sacrum    [x] Apply Primary Dressing:       [] MediHoney Gel  [x] Calcium Alginate with Silver   [] Calcium Alginate without silver   [] Collagen with silver [] Collagen without Silver    [] Santyl with moist saline gauze     [] Hydrofera Blue (cut to size and moistened with normal saline)  [] Hydrofera

## 2025-03-24 NOTE — WOUND CARE
Wound Care Supplies      Supply Company:     [employer] [address] [place] [address] f: [phone] f: [phone] p: [phone] [email or web address]      Ordering Center:     [facility]  [address] [place] [address], [address] [place] [address]    Phone: [phone]  Fax: [phone]    Patient Information:      Robert Eduardo  [address] [facility] [address]   [phone]   : 1945  AGE: 80 y.o.     GENDER: female   EPISODE DATE: 3/24/2025    Insurance:      PRIMARY INSURANCE:  Plan: MEDICARE PART A AND B  Coverage: MEDICARE  Effective Date: 3/1/2010  Group Number: [unfilled]  Subscriber Number: [id number] - (Medicare)    Payer/Plan Subscr  Sex Relation Sub. Ins. ID Effective Group Num   1. MEDICARE - ME* ROBERT EDUARDO 1945 Female Self [id number] 3/1/10                                    [address] [address]   2. [place]* ROBERT EDUARDO 1945 Female Self [id number] 3/1/25                                    [address] [address]       Patient Wound Information:      Problem List Items Addressed This Visit          Musculoskeletal and Integument    Hidradenitis suppurativa - Primary    Relevant Orders    MD COMMUNICATION 1    MD COMMUNICATION 2    Initiate Outpatient Wound Care Protocol    Initiate Oxygen Therapy Protocol    Hidradenitis suppurativa of anus    Relevant Orders    MD COMMUNICATION 1    MD COMMUNICATION 2    Initiate Outpatient Wound Care Protocol    Initiate Oxygen Therapy Protocol       Other    Open wound of left axillary region    Relevant Orders    MD COMMUNICATION 1    MD COMMUNICATION 2    Initiate Outpatient Wound Care Protocol    Initiate Oxygen Therapy Protocol    Open wound of right axillary region    Relevant Orders    MD COMMUNICATION 1    MD COMMUNICATION 2    Initiate Outpatient Wound Care Protocol    Initiate Oxygen Therapy Protocol       WOUNDS REQUIRING DRESSING SUPPLIES:     Wound 24 Axilla Left #2

## 2025-03-24 NOTE — WOUND CARE
MD notes faxed to [facility] for possible mattress. Daughter instructed to purchase a gel cushion for W/C.

## 2025-03-24 NOTE — CONSULTS
[facility]   Wound Care and Hyperbar[facility] Oxygen Therapy Center   Medical Staff Note     Joanne Eduardo  MEDICAL RECORD NUMBER:  [id number]  AGE: 80 y.o.   GENDER: female  : 1945  EPISODE DATE:  3/24/2025      Chief Complaint:   Chief Complaint   Patient presents with    Wound Check     Bilateral axilla and sacral        History of Present Illness:     Joanne Eduardo is a 80 y.o. female who presents today for wound/ulcer evaluation.  She has an extensive history of hidradenitis involving the sacrum and bilateral axillary area and anogenital area.  She has not been a compliant patient.  She has not been undergoing any treatment currently.  Planing of drainage from all the wound.  She was seen by Dr. Luna and recommended Bactrim.  The meantime she has not seen a dermatologist yet.    History of Wound Context: Per original history and physical on this patient. Changes in history since last evaluation: Per HPI      Ulcer Identification:  Ulcer Type: Left axillary hidradenitis and right ischial open wound    Contributing Factors: HEALTH FACTORS: None    Wound: Left axillary hidradenitis and right ischial open wound    Past Medical History:       Diagnosis Date    Arthritis     Burning with urination     Diabetes ([facility])     states she is \"pre-diabetic\", diet controlled    H/O blood clots     right side of body    Heart attack (HCC) 2014    Hydradenitis 2012    Hypercholesterolemia     Hypertension     Ill-defined condition     edema of legs    Thromboembolus ([facility])        Past Surgical History:   Past Surgical History:   Procedure Laterality Date    COLONOSCOPY N/A 2023    COLONOSCOPY performed by Susy Avendano MD at [facility] ENDOSCOPY       Allergy:  Allergies   Allergen Reactions    Ciprofloxacin Other (See Comments)     Vomiting    Doxycycline Other (See Comments)     Facial swelling.    Penicillins Rash       Social History:   Social History     Tobacco Use    Smoking status: Never

## 2025-03-24 NOTE — WOUND CARE
Wound Clinic Physician Orders and Discharge Instructions  [facility]  [address], [address]  [place] [address]  Telephone: [phone]     FAX [phone]    NAME:  Joanne Eduardo  YOB: 1945  MEDICAL RECORD NUMBER:  [id number]  DATE:  3/24/2025      Return Appointment:    [x] Dressing Supply Provider: Nirmal  [] Home Healthcare:  [x] Return Appointment: 2 Week(s)  [] Nurse Visit:     [] Discharge from [facility]:   [] Healed        [] Refer to Provider:         [] Consult    Follow-up Information:    [] Ordered Tests:    [] Vascular/Arterial Testing   [] Please call [phone] to schedule at any [facility] facility      [] Imaging    [] Please call [phone] to schedule at any [facility] facility    [x] Referrals:    [x] Infectious Disease Dr. Luna  [] Vascular  [x] Other: Dermatology (daughter to speak to PCP first)    [] Rx to pharmacy:   [] Would Culture obtained in clinic  [] Other:       Wound Cleansing:   Do not scrub or use excessive force.  Cleanse wound prior to applying a clean dressing with:    [] Normal Saline   [x] Mild Soap & Water in shower    [] Keep Wound Dry in Shower  [] Wear a cast cover to shower  [] Other:       Topical Treatments:  Do not apply lotions, creams, or ointments to wound bed unless directed.     [] Apply moisturizing lotion to skin surrounding the wound prior to dressing change.  [] Apply thin film of moisture barrier ointment (Zinc) to skin immediately around wound.  [] Apply Betadine to skin immediately around wound   [] Apply Skin Prep to skin immediately around wound  [] Other:      Dressings:           Wound Location L Axilla and Sacrum    [x] Apply Primary Dressing:       [] MediHoney Gel  [x] Calcium Alginate with Silver   [] Calcium Alginate without silver   [] Collagen with silver [] Collagen without Silver    [] Santyl with moist saline gauze     [] Hydrofera Blue (cut to size and moistened with normal saline)  []

## 2025-04-07 ENCOUNTER — HOSPITAL ENCOUNTER (OUTPATIENT)
Facility: HOSPITAL | Age: 80
Discharge: HOME | End: 2025-04-07
Attending: PHYSICIAN ASSISTANT
Payer: MEDICARE

## 2025-04-07 VITALS
HEART RATE: 101 BPM | SYSTOLIC BLOOD PRESSURE: 106 MMHG | TEMPERATURE: 98.1 F | DIASTOLIC BLOOD PRESSURE: 53 MMHG | RESPIRATION RATE: 18 BRPM

## 2025-04-07 DIAGNOSIS — L73.2 HIDRADENITIS SUPPURATIVA OF ANUS: ICD-10-CM

## 2025-04-07 DIAGNOSIS — S41.102D OPEN WOUND OF LEFT AXILLARY REGION, SUBSEQUENT ENCOUNTER: ICD-10-CM

## 2025-04-07 DIAGNOSIS — L89.323 PRESSURE ULCER OF ISCHIUM, LEFT, STAGE III (HCC): ICD-10-CM

## 2025-04-07 DIAGNOSIS — L73.2 HIDRADENITIS SUPPURATIVA: Primary | ICD-10-CM

## 2025-04-07 DIAGNOSIS — S41.101D OPEN WOUND OF RIGHT AXILLARY REGION, SUBSEQUENT ENCOUNTER: ICD-10-CM

## 2025-04-07 PROCEDURE — 99214 OFFICE O/P EST MOD 30 MIN: CPT

## 2025-04-07 RX ORDER — SODIUM CHLOR/HYPOCHLOROUS ACID 0.033 %
SOLUTION, IRRIGATION IRRIGATION PRN
OUTPATIENT
Start: 2025-04-07

## 2025-04-07 RX ORDER — LIDOCAINE HYDROCHLORIDE 40 MG/ML
SOLUTION TOPICAL PRN
OUTPATIENT
Start: 2025-04-07

## 2025-04-07 RX ORDER — LIDOCAINE 40 MG/G
CREAM TOPICAL PRN
OUTPATIENT
Start: 2025-04-07

## 2025-04-07 RX ORDER — MUPIROCIN 20 MG/G
OINTMENT TOPICAL PRN
OUTPATIENT
Start: 2025-04-07

## 2025-04-07 RX ORDER — NEOMYCIN/BACITRACIN/POLYMYXINB 3.5-400-5K
OINTMENT (GRAM) TOPICAL PRN
OUTPATIENT
Start: 2025-04-07

## 2025-04-07 RX ORDER — GINSENG 100 MG
CAPSULE ORAL PRN
OUTPATIENT
Start: 2025-04-07

## 2025-04-07 RX ORDER — CLOBETASOL PROPIONATE 0.5 MG/G
OINTMENT TOPICAL PRN
OUTPATIENT
Start: 2025-04-07

## 2025-04-07 RX ORDER — TRIAMCINOLONE ACETONIDE 1 MG/G
OINTMENT TOPICAL PRN
OUTPATIENT
Start: 2025-04-07

## 2025-04-07 RX ORDER — SILVER SULFADIAZINE 10 MG/G
CREAM TOPICAL PRN
OUTPATIENT
Start: 2025-04-07

## 2025-04-07 RX ORDER — LIDOCAINE HYDROCHLORIDE 20 MG/ML
JELLY TOPICAL PRN
OUTPATIENT
Start: 2025-04-07

## 2025-04-07 RX ORDER — LIDOCAINE 50 MG/G
OINTMENT TOPICAL PRN
OUTPATIENT
Start: 2025-04-07

## 2025-04-07 RX ORDER — GENTAMICIN SULFATE 1 MG/G
OINTMENT TOPICAL PRN
OUTPATIENT
Start: 2025-04-07

## 2025-04-07 RX ORDER — BACITRACIN ZINC AND POLYMYXIN B SULFATE 500; 1000 [USP'U]/G; [USP'U]/G
OINTMENT TOPICAL PRN
OUTPATIENT
Start: 2025-04-07

## 2025-04-07 RX ORDER — BETAMETHASONE DIPROPIONATE 0.5 MG/G
CREAM TOPICAL PRN
OUTPATIENT
Start: 2025-04-07

## 2025-04-07 NOTE — DISCHARGE INSTRUCTIONS
available, contact your PCP or go to the hospital emergency room.     PLEASE NOTE: IF YOU ARE UNABLE TO OBTAIN WOUND SUPPLIES, CONTINUE TO USE THE SUPPLIES YOU HAVE AVAILABLE UNTIL YOU ARE ABLE TO REACH US. IT IS MOST IMPORTANT TO KEEP THE WOUND COVERED AT ALL TIMES.

## 2025-04-07 NOTE — WOUND CARE
Dr. Pena notified of new /53. He stated that was ok, patient and family notified and educated patient on diet and fluid intake.

## 2025-04-07 NOTE — PROGRESS NOTES
educating the patient/family/caregiver  Ordering medications, tests, or procedures  Referring and communicating with other health care professionals as needed  Documenting clinical information in the electronic or other health record  Independently interpreting results (not reported separately) and communicating results to the patient/family/caregiver  Care coordination (not reported separately)    Electronically signed by Jeannette Pena MD on 4/7/2025 at 1:54 PM

## 2025-04-07 NOTE — WOUND CARE
Wound Clinic Physician Orders and Discharge Instructions  [facility]  [address], [address]  [place] [address]  Telephone: [phone]     FAX [phone]    NAME:  Joanne Eduardo  YOB: 1945  MEDICAL RECORD NUMBER:  [id number]  DATE:  4/7/2025      Return Appointment:    [] Dressing Supply Provider: Nirmal  [] Home Healthcare:  [x] Return Appointment: 3 Week(s)  [] Nurse Visit:     [] Discharge from [facility]:   [] Healed        [] Refer to Provider:         [] Consult    Follow-up Information:    [] Ordered Tests:    [] Vascular/Arterial Testing   [] Please call [phone] to schedule at any [facility] facility      [] Imaging    [] Please call [phone] to schedule at any [facility] facility    [x] Referrals:    [x] Infectious Disease Dr. Luna  [] Vascular  [x] Other: Dermatology (daughter to speak to PCP first)    [] Rx to pharmacy:   [] Would Culture obtained in clinic  [x] Other: Recommend ER for low BP      Wound Cleansing:   Do not scrub or use excessive force.  Cleanse wound prior to applying a clean dressing with:    [] Normal Saline   [x] Mild Soap & Water in shower    [] Keep Wound Dry in Shower  [] Wear a cast cover to shower  [] Other:       Topical Treatments:  Do not apply lotions, creams, or ointments to wound bed unless directed.     [] Apply moisturizing lotion to skin surrounding the wound prior to dressing change.  [] Apply thin film of moisture barrier ointment (Zinc) to skin immediately around wound.  [] Apply Betadine to skin immediately around wound   [] Apply Skin Prep to skin immediately around wound  [] Other:      Dressings:           Wound Location L Axilla and Sacrum    [x] Apply Primary Dressing:       [] MediHoney Gel  [x] Calcium Alginate with Silver   [] Calcium Alginate without silver   [] Collagen with silver [] Collagen without Silver    [] Santyl with moist saline gauze     [] Hydrofera Blue (cut to size and moistened with

## 2025-04-10 ENCOUNTER — TELEPHONE (OUTPATIENT)
Facility: CLINIC | Age: 80
End: 2025-04-10

## 2025-04-10 NOTE — TELEPHONE ENCOUNTER
Pt daughter states pt went to the wound care center Monday. Stated they will like for pt to follow up with pcp for having low BP. Also will like pcp to set up a consult with physical therapy. Pt daughter stated if she can get the BP monitor to work she'll keep pt readings from now till her next OV. Follow up appt has been made for 4/21. Please advise.

## 2025-04-17 ENCOUNTER — TELEPHONE (OUTPATIENT)
Age: 80
End: 2025-04-17

## 2025-04-17 NOTE — TELEPHONE ENCOUNTER
Two patient identifiers used      HIPAA verified to speak to daughter.    80 year old patient last see in the office on 6/14/2024 for new patient post menopausal  See below.  80yo here with daughter for likely PMB about 2 mos ago.   Will return for pelvic US and f/u. Pelvic US to be performed with radiology given patient limited mobility.  Patient with multiple chronic medical issues and is not interested in surgical management of whatever her issue may be.   RTO prn if symptoms persist or worsen.  Instructions given to pt.  Handouts given to pt.       Patient has not followed up for the post menopausal bleeding and did not get the ultrasound done.    Daughter reports the bleeding stopped and they did not set up the appointment . Patient was prescribed Megace and stopped taking that when the bleeding stopped.      Daughter is calling to say that the vaginal bleeding has started back the past two days and yesterday it was \"a lot \" and she has not checked for today. Daughter reports the vaginal bleeding is bright red and had clot. Daughter reports patient has not complained of cramping or pelvic pain.    This nurse recommended and provided the phone number for central scheduling to call to set up the ultrasound down stairs and then to call the office back to set up appointment with Dr. Presley to discuss the results of the ultrasound.    Daughter is going to look for the megace to see if any pills are left .     ? Send refill to confirmed pharmacy.    Please advise /further recommendations.        This nurse provided pain and bleeding precautions.    Please advise

## 2025-04-18 RX ORDER — MEGESTROL ACETATE 20 MG/1
20 TABLET ORAL DAILY
Qty: 30 TABLET | Refills: 2 | Status: SHIPPED | OUTPATIENT
Start: 2025-04-18

## 2025-04-18 NOTE — TELEPHONE ENCOUNTER
This nurse reached the sister, HIPAA verified and advised of prescription sent by Md and importance of scheduling the vaginal ultrasound    Jody the sister states patient is scheduled for 4/23/2025 to have the ultrasound completed.      Jody verbalized understanding.    DREW

## 2025-04-18 NOTE — TELEPHONE ENCOUNTER
Lizy Presley MD to Me      4/18/25  2:28 PM  I sent some megace in, but please stress the importance that they follow up and get an ultrasound        This nurse attempted to reach the daughter and was not able to reach the daughter and the voice mail box is full. .    Patient does not have my chart.

## 2025-04-21 ENCOUNTER — OFFICE VISIT (OUTPATIENT)
Facility: CLINIC | Age: 80
End: 2025-04-21

## 2025-04-21 VITALS
RESPIRATION RATE: 16 BRPM | TEMPERATURE: 98.3 F | HEART RATE: 89 BPM | SYSTOLIC BLOOD PRESSURE: 110 MMHG | OXYGEN SATURATION: 100 % | DIASTOLIC BLOOD PRESSURE: 67 MMHG

## 2025-04-21 DIAGNOSIS — L98.9 SKIN LESION OF RIGHT LOWER EXTREMITY: ICD-10-CM

## 2025-04-21 DIAGNOSIS — E11.40 TYPE 2 DIABETES MELLITUS WITH DIABETIC NEUROPATHY, WITHOUT LONG-TERM CURRENT USE OF INSULIN (HCC): ICD-10-CM

## 2025-04-21 DIAGNOSIS — R13.10 DYSPHAGIA, UNSPECIFIED TYPE: ICD-10-CM

## 2025-04-21 DIAGNOSIS — L98.9 SKIN LESION OF LEFT LOWER EXTREMITY: ICD-10-CM

## 2025-04-21 DIAGNOSIS — L73.2 HIDRADENITIS SUPPURATIVA: Primary | ICD-10-CM

## 2025-04-21 DIAGNOSIS — R53.81 PHYSICAL DEBILITY: ICD-10-CM

## 2025-04-21 DIAGNOSIS — Z91.81 AT HIGH RISK FOR FALLS: ICD-10-CM

## 2025-04-21 RX ORDER — MUPIROCIN 20 MG/G
OINTMENT TOPICAL
Qty: 22 G | Refills: 1 | Status: SHIPPED | OUTPATIENT
Start: 2025-04-21

## 2025-04-21 NOTE — PROGRESS NOTES
Chief Complaint   Patient presents with    blood pressure     Low at wound care appt 4-14-25      \"Have you been to the ER, urgent care clinic since your last visit?  Hospitalized since your last visit?\"    NO    “Have you seen or consulted any other health care providers outside of [facility] since your last visit?”    NO            Click Here for Release of Records Request     Health Maintenance Due   Topic Date Due    Diabetic Alb to Cr ratio (uACR) test  Never done    Pneumococcal 50+ years Vaccine (1 of 2 - PCV) Never done    Shingles vaccine (1 of 2) Never done    Respiratory Syncytial Virus (RSV) Pregnant or age 60 yrs+ (1 - 1-dose 75+ series) Never done    COVID-19 Vaccine (1 - 2024-25 season) Never done

## 2025-04-28 ENCOUNTER — HOSPITAL ENCOUNTER (OUTPATIENT)
Facility: HOSPITAL | Age: 80
Discharge: HOME | End: 2025-04-28
Attending: PHYSICIAN ASSISTANT
Payer: MEDICARE

## 2025-04-28 VITALS
DIASTOLIC BLOOD PRESSURE: 72 MMHG | HEART RATE: 91 BPM | TEMPERATURE: 98.1 F | SYSTOLIC BLOOD PRESSURE: 121 MMHG | RESPIRATION RATE: 16 BRPM

## 2025-04-28 DIAGNOSIS — L89.323 PRESSURE ULCER OF ISCHIUM, LEFT, STAGE III (HCC): ICD-10-CM

## 2025-04-28 DIAGNOSIS — L73.2 HIDRADENITIS SUPPURATIVA: Primary | ICD-10-CM

## 2025-04-28 DIAGNOSIS — S41.102D OPEN WOUND OF LEFT AXILLARY REGION, SUBSEQUENT ENCOUNTER: ICD-10-CM

## 2025-04-28 DIAGNOSIS — S41.101D OPEN WOUND OF RIGHT AXILLARY REGION, SUBSEQUENT ENCOUNTER: ICD-10-CM

## 2025-04-28 DIAGNOSIS — L73.2 HIDRADENITIS SUPPURATIVA OF ANUS: ICD-10-CM

## 2025-04-28 PROCEDURE — 99214 OFFICE O/P EST MOD 30 MIN: CPT

## 2025-04-28 RX ORDER — MUPIROCIN 20 MG/G
OINTMENT TOPICAL PRN
OUTPATIENT
Start: 2025-04-28

## 2025-04-28 RX ORDER — LIDOCAINE HYDROCHLORIDE 20 MG/ML
JELLY TOPICAL PRN
OUTPATIENT
Start: 2025-04-28

## 2025-04-28 RX ORDER — CLOBETASOL PROPIONATE 0.5 MG/G
OINTMENT TOPICAL PRN
OUTPATIENT
Start: 2025-04-28

## 2025-04-28 RX ORDER — LIDOCAINE 50 MG/G
OINTMENT TOPICAL PRN
OUTPATIENT
Start: 2025-04-28

## 2025-04-28 RX ORDER — TRIAMCINOLONE ACETONIDE 1 MG/G
OINTMENT TOPICAL PRN
OUTPATIENT
Start: 2025-04-28

## 2025-04-28 RX ORDER — BACITRACIN ZINC AND POLYMYXIN B SULFATE 500; 1000 [USP'U]/G; [USP'U]/G
OINTMENT TOPICAL PRN
OUTPATIENT
Start: 2025-04-28

## 2025-04-28 RX ORDER — SILVER SULFADIAZINE 10 MG/G
CREAM TOPICAL PRN
OUTPATIENT
Start: 2025-04-28

## 2025-04-28 RX ORDER — GENTAMICIN SULFATE 1 MG/G
OINTMENT TOPICAL PRN
OUTPATIENT
Start: 2025-04-28

## 2025-04-28 RX ORDER — GINSENG 100 MG
CAPSULE ORAL PRN
OUTPATIENT
Start: 2025-04-28

## 2025-04-28 RX ORDER — LIDOCAINE HYDROCHLORIDE 40 MG/ML
SOLUTION TOPICAL PRN
OUTPATIENT
Start: 2025-04-28

## 2025-04-28 RX ORDER — SODIUM CHLOR/HYPOCHLOROUS ACID 0.033 %
SOLUTION, IRRIGATION IRRIGATION PRN
OUTPATIENT
Start: 2025-04-28

## 2025-04-28 RX ORDER — LIDOCAINE 40 MG/G
CREAM TOPICAL PRN
OUTPATIENT
Start: 2025-04-28

## 2025-04-28 RX ORDER — NEOMYCIN/BACITRACIN/POLYMYXINB 3.5-400-5K
OINTMENT (GRAM) TOPICAL PRN
OUTPATIENT
Start: 2025-04-28

## 2025-04-28 RX ORDER — BETAMETHASONE DIPROPIONATE 0.5 MG/G
CREAM TOPICAL PRN
OUTPATIENT
Start: 2025-04-28

## 2025-04-28 NOTE — DISCHARGE INSTRUCTIONS
Wound Clinic Physician Orders and Discharge Instructions  [facility]  [address], [address]  [place] [address]  Telephone: [phone]     FAX [phone]    NAME:  Joanne Eduardo  YOB: 1945  MEDICAL RECORD NUMBER:  [id number]  DATE:  4/28/2025      Return Appointment:    [x] Dressing Supply Provider: Nirmal  [] Home Healthcare:  [x] Return Appointment: 3 Week(s)  [] Nurse Visit:     [] Discharge from [facility]:   [] Healed        [] Refer to Provider:         [] Consult    Follow-up Information:    [] Ordered Tests:    [] Vascular/Arterial Testing   [] Please call [phone] to schedule at any [facility] facility      [] Imaging    [] Please call [phone] to schedule at any [facility] facility    [x] Referrals:    [x] Infectious Disease Dr. Luna  [] Vascular  [x] Other: Dermatology     [] Rx to pharmacy:   [] Would Culture obtained in clinic  [] Other:       Wound Cleansing:   Do not scrub or use excessive force.  Cleanse wound prior to applying a clean dressing with:    [] Normal Saline   [x] Mild Soap & Water in shower    [] Keep Wound Dry in Shower  [] Wear a cast cover to shower  [] Other:       Topical Treatments:  Do not apply lotions, creams, or ointments to wound bed unless directed.     [] Apply moisturizing lotion to skin surrounding the wound prior to dressing change.  [] Apply thin film of moisture barrier ointment (Zinc) to skin immediately around wound.  [] Apply Betadine to skin immediately around wound   [] Apply Skin Prep to skin immediately around wound  [] Other:      Dressings:           Wound Location L Axilla and Left Ischium   [x] Apply Primary Dressing:       [] MediHoney Gel  [x] Calcium Alginate with Silver (Packing to Ischium)   [] Calcium Alginate without silver   [] Collagen with silver [] Collagen without Silver    [] Santyl with moist saline gauze     [] Hydrofera Blue (cut to size and moistened with normal saline)  [] Hydrofera Blue

## 2025-04-28 NOTE — WOUND CARE
Wound Care Supplies      Supply Company:     [employer] [address] [place] [address] f: [phone] f: [phone] p: [phone] [email or web address]      Ordering Center:     [facility]  [address] [place] [address], [address] [place] [address]    Phone: [phone]  Fax: [phone]    Patient Information:      Robert Eduardo  [address] [facility] [address]   [phone]   : 1945  AGE: 80 y.o.     GENDER: female   EPISODE DATE: 2025    Insurance:      PRIMARY INSURANCE:  Plan: MEDICARE PART A AND B  Coverage: MEDICARE  Effective Date: 3/1/2010  Group Number: [unfilled]  Subscriber Number: [id number] - (Medicare)    Payer/Plan Subscr  Sex Relation Sub. Ins. ID Effective Group Num   1. MEDICARE - ME* ROBERT EDUARDO P 1945 Female Self [id number] 3/1/10                                    [address] [address]   2. MATTHEW COMPLE* ROBERT EDUARDO P 1945 Female Self [id number]                                   [address] [address]       Patient Wound Information:      Problem List Items Addressed This Visit          Musculoskeletal and Integument    Hidradenitis suppurativa - Primary    Relevant Orders    MD COMMUNICATION 1    MD COMMUNICATION 2    Initiate Outpatient Wound Care Protocol    Initiate Oxygen Therapy Protocol    Hidradenitis suppurativa of anus    Relevant Orders    MD COMMUNICATION 1    MD COMMUNICATION 2    Initiate Outpatient Wound Care Protocol    Initiate Oxygen Therapy Protocol       Other    Open wound of left axillary region    Relevant Orders    MD COMMUNICATION 1    MD COMMUNICATION 2    Initiate Outpatient Wound Care Protocol    Initiate Oxygen Therapy Protocol    Open wound of right axillary region    Relevant Orders    MD COMMUNICATION 1    MD COMMUNICATION 2    Initiate Outpatient Wound Care Protocol    Initiate Oxygen Therapy Protocol    Pressure ulcer of ischium, left, stage III (HCC)    Relevant 
hospital emergency room.     PLEASE NOTE: IF YOU ARE UNABLE TO OBTAIN WOUND SUPPLIES, CONTINUE TO USE THE SUPPLIES YOU HAVE AVAILABLE UNTIL YOU ARE ABLE TO REACH US. IT IS MOST IMPORTANT TO KEEP THE WOUND COVERED AT ALL TIMES.

## 2025-04-28 NOTE — PROGRESS NOTES
patient/family/caregiver  Ordering medications, tests, or procedures  Referring and communicating with other health care professionals as needed  Documenting clinical information in the electronic or other health record  Independently interpreting results (not reported separately) and communicating results to the patient/family/caregiver  Care coordination (not reported separately)    Electronically signed by Jeannette Pena MD on 4/28/2025 at 1:51 PM

## 2025-04-29 ENCOUNTER — HOSPITAL ENCOUNTER (OUTPATIENT)
Facility: HOSPITAL | Age: 80
Discharge: HOME | End: 2025-05-02
Attending: STUDENT IN AN ORGANIZED HEALTH CARE EDUCATION/TRAINING PROGRAM
Payer: MEDICARE

## 2025-04-29 DIAGNOSIS — N95.0 POSTMENOPAUSAL BLEEDING: ICD-10-CM

## 2025-04-29 DIAGNOSIS — N95.0 POST-MENOPAUSE BLEEDING: ICD-10-CM

## 2025-04-29 PROCEDURE — 76830 TRANSVAGINAL US NON-OB: CPT

## 2025-04-29 PROCEDURE — 76856 US EXAM PELVIC COMPLETE: CPT

## 2025-05-02 ENCOUNTER — RESULTS FOLLOW-UP (OUTPATIENT)
Age: 80
End: 2025-05-02

## 2025-05-23 ENCOUNTER — OFFICE VISIT (OUTPATIENT)
Facility: CLINIC | Age: 80
End: 2025-05-23
Payer: MEDICARE

## 2025-05-23 VITALS
SYSTOLIC BLOOD PRESSURE: 121 MMHG | WEIGHT: 121.6 LBS | HEART RATE: 95 BPM | TEMPERATURE: 97.3 F | DIASTOLIC BLOOD PRESSURE: 72 MMHG | BODY MASS INDEX: 19.05 KG/M2 | RESPIRATION RATE: 16 BRPM | OXYGEN SATURATION: 100 %

## 2025-05-23 DIAGNOSIS — L98.9 SKIN LESION OF LEFT LOWER EXTREMITY: Primary | ICD-10-CM

## 2025-05-23 DIAGNOSIS — I10 ESSENTIAL (PRIMARY) HYPERTENSION: ICD-10-CM

## 2025-05-23 DIAGNOSIS — E11.40 TYPE 2 DIABETES MELLITUS WITH DIABETIC NEUROPATHY, WITHOUT LONG-TERM CURRENT USE OF INSULIN (HCC): ICD-10-CM

## 2025-05-23 DIAGNOSIS — I89.0 LYMPHEDEMA, NOT ELSEWHERE CLASSIFIED: ICD-10-CM

## 2025-05-23 PROCEDURE — 99214 OFFICE O/P EST MOD 30 MIN: CPT | Performed by: FAMILY MEDICINE

## 2025-05-23 PROCEDURE — 1123F ACP DISCUSS/DSCN MKR DOCD: CPT | Performed by: FAMILY MEDICINE

## 2025-05-23 PROCEDURE — G2211 COMPLEX E/M VISIT ADD ON: HCPCS | Performed by: FAMILY MEDICINE

## 2025-05-23 PROCEDURE — 1036F TOBACCO NON-USER: CPT | Performed by: FAMILY MEDICINE

## 2025-05-23 PROCEDURE — G8427 DOCREV CUR MEDS BY ELIG CLIN: HCPCS | Performed by: FAMILY MEDICINE

## 2025-05-23 PROCEDURE — 3074F SYST BP LT 130 MM HG: CPT | Performed by: FAMILY MEDICINE

## 2025-05-23 PROCEDURE — 3078F DIAST BP <80 MM HG: CPT | Performed by: FAMILY MEDICINE

## 2025-05-23 PROCEDURE — 3044F HG A1C LEVEL LT 7.0%: CPT | Performed by: FAMILY MEDICINE

## 2025-05-23 PROCEDURE — G8420 CALC BMI NORM PARAMETERS: HCPCS | Performed by: FAMILY MEDICINE

## 2025-05-23 PROCEDURE — G8399 PT W/DXA RESULTS DOCUMENT: HCPCS | Performed by: FAMILY MEDICINE

## 2025-05-23 PROCEDURE — 1090F PRES/ABSN URINE INCON ASSESS: CPT | Performed by: FAMILY MEDICINE

## 2025-05-23 RX ORDER — TORSEMIDE 20 MG/1
20 TABLET ORAL DAILY
Qty: 90 TABLET | Refills: 1 | Status: SHIPPED | OUTPATIENT
Start: 2025-05-23

## 2025-05-23 NOTE — PROGRESS NOTES
Chief Complaint   Patient presents with    3 Month Follow-Up        \"Have you been to the ER, urgent care clinic since your last visit?  Hospitalized since your last visit?\"    NO    “Have you seen or consulted any other health care providers outside of [facility] since your last visit?”    NO            Click Here for Release of Records Request     Health Maintenance Due   Topic Date Due    Diabetic Alb to Cr ratio (uACR) test  Never done    Pneumococcal 50+ years Vaccine (1 of 2 - PCV) Never done    Shingles vaccine (1 of 2) Never done    Respiratory Syncytial Virus (RSV) Pregnant or age 60 yrs+ (1 - 1-dose 75+ series) Never done    COVID-19 Vaccine (1 - 2024-25 season) Never done

## 2025-05-28 ENCOUNTER — HOSPITAL ENCOUNTER (OUTPATIENT)
Facility: HOSPITAL | Age: 80
Discharge: HOME | End: 2025-05-28
Attending: PHYSICIAN ASSISTANT
Payer: MEDICARE

## 2025-05-28 VITALS — SYSTOLIC BLOOD PRESSURE: 102 MMHG | TEMPERATURE: 97.2 F | RESPIRATION RATE: 18 BRPM | DIASTOLIC BLOOD PRESSURE: 61 MMHG

## 2025-05-28 DIAGNOSIS — L73.2 HIDRADENITIS SUPPURATIVA OF ANUS: ICD-10-CM

## 2025-05-28 DIAGNOSIS — L73.2 HIDRADENITIS SUPPURATIVA: Primary | ICD-10-CM

## 2025-05-28 PROCEDURE — 99214 OFFICE O/P EST MOD 30 MIN: CPT

## 2025-05-28 RX ORDER — LIDOCAINE 40 MG/G
CREAM TOPICAL PRN
OUTPATIENT
Start: 2025-05-28

## 2025-05-28 RX ORDER — NEOMYCIN/BACITRACIN/POLYMYXINB 3.5-400-5K
OINTMENT (GRAM) TOPICAL PRN
OUTPATIENT
Start: 2025-05-28

## 2025-05-28 RX ORDER — LIDOCAINE HYDROCHLORIDE 40 MG/ML
SOLUTION TOPICAL PRN
OUTPATIENT
Start: 2025-05-28

## 2025-05-28 RX ORDER — TRIAMCINOLONE ACETONIDE 1 MG/G
OINTMENT TOPICAL PRN
OUTPATIENT
Start: 2025-05-28

## 2025-05-28 RX ORDER — LIDOCAINE 50 MG/G
OINTMENT TOPICAL PRN
OUTPATIENT
Start: 2025-05-28

## 2025-05-28 RX ORDER — BACITRACIN ZINC AND POLYMYXIN B SULFATE 500; 1000 [USP'U]/G; [USP'U]/G
OINTMENT TOPICAL PRN
OUTPATIENT
Start: 2025-05-28

## 2025-05-28 RX ORDER — GENTAMICIN SULFATE 1 MG/G
OINTMENT TOPICAL PRN
OUTPATIENT
Start: 2025-05-28

## 2025-05-28 RX ORDER — CLOBETASOL PROPIONATE 0.5 MG/G
OINTMENT TOPICAL PRN
OUTPATIENT
Start: 2025-05-28

## 2025-05-28 RX ORDER — SODIUM CHLOR/HYPOCHLOROUS ACID 0.033 %
SOLUTION, IRRIGATION IRRIGATION PRN
OUTPATIENT
Start: 2025-05-28

## 2025-05-28 RX ORDER — SILVER SULFADIAZINE 10 MG/G
CREAM TOPICAL PRN
OUTPATIENT
Start: 2025-05-28

## 2025-05-28 RX ORDER — MUPIROCIN 20 MG/G
OINTMENT TOPICAL PRN
OUTPATIENT
Start: 2025-05-28

## 2025-05-28 RX ORDER — GINSENG 100 MG
CAPSULE ORAL PRN
OUTPATIENT
Start: 2025-05-28

## 2025-05-28 RX ORDER — LIDOCAINE HYDROCHLORIDE 20 MG/ML
JELLY TOPICAL PRN
OUTPATIENT
Start: 2025-05-28

## 2025-05-28 RX ORDER — BETAMETHASONE DIPROPIONATE 0.5 MG/G
CREAM TOPICAL PRN
OUTPATIENT
Start: 2025-05-28

## 2025-05-28 NOTE — WOUND CARE
Ready (cut to size)      [] Normal Saline wet to dry  [] Betadine wet to dry    [] Hydrogel       [] Bactroban/Mupirocin   [] Iodoform Packing Strip [] Plain Packing Strip   [] Skin Sub, Steri-Strips, Mepitel (DO NOT REMOVE)  [] Other:      [x] Cover and Secure with:     [x] Gauze [] Ronni [] Kerlix   [] Zetuvit Plus Silicone Border or Foam Border [] Super Absorbant [x] ABD     [] Ace Wrap [x] Other: Foam to Ischium   Limit contact of tape with skin.    [x] Change dressing: [x] Daily   or [x] Every Other Day   [] Twice per week   [] Three times per week   [] Once a week [] Do Not Change Dressing   [] Other:     Pressure Relief:  [x] When sitting, shift position or do seat lifts, every 15 minutes.  [x] Wheelchair Gel cushion (Gel Cushion from [employer] or [employer])[x] Specialty Bed/Mattress   [x] Turn every 2 hours when in bed.  Avoid direct pressure on wound site.  Limit side lying to 30 degree tilt.  Limit HOB elevation to 30 degrees.  [x] Other: not in w/c more than 1 hour at a time     Dietary:  [x] Diet as tolerated [] Diabetic Diet [] No Added Salt  [x] Increase Protein [x] Other: Jacob BID     Activity:  [x] Activity as tolerated  [] Patient has no activity restrictions      [] Strict Bedrest   [] Remain off Work:       [] May return to full duty work                                    [] Return to work with restrictions:     Physician:  [] Dr. Denise Horton  [] Dr. Yasemin Green   [x] Dr. Jeannette Pena  [] Teto Hackett PA-C  [] Dr. Candis Hall  [] Dr. Xavi Bales  [] Dr. Verito Navarro    Nurse Case Manger:  Lizy      Wound Care Center Information: Should you experience any significant changes in your wound(s) or have questions about your wound care, please contact the Wound Center at [phone]. Our hours are Monday - Friday 8am - 4:30pm, closed all major holidays. If you need help with your wound outside these hours and cannot wait until we are again available, contact your PCP or go to the 
in w/c more than 1 hour at a time     Dietary:  [x] Diet as tolerated [] Diabetic Diet [] No Added Salt  [x] Increase Protein [x] Other: Jacob BID     Activity:  [x] Activity as tolerated  [] Patient has no activity restrictions      [] Strict Bedrest   [] Remain off Work:       [] May return to full duty work                                    [] Return to work with restrictions:     Physician:  [] Dr. Denise Horton  [x] Dr. Yasemin Green   [] Dr. Jeannette Pena  [] Teto Hackett PA-C  [] Dr. Candis Hall  [] Dr. Xavi Bales  [] Dr. Verito Navarro    Nurse Case Manger: Shi Dukes RN      Wound Care Center Information: Should you experience any significant changes in your wound(s) or have questions about your wound care, please contact the Wound Center at [phone]. Our hours are Monday - Friday 8am - 4:30pm, closed all major holidays. If you need help with your wound outside these hours and cannot wait until we are again available, contact your PCP or go to the hospital emergency room.     PLEASE NOTE: IF YOU ARE UNABLE TO OBTAIN WOUND SUPPLIES, CONTINUE TO USE THE SUPPLIES YOU HAVE AVAILABLE UNTIL YOU ARE ABLE TO REACH US. IT IS MOST IMPORTANT TO KEEP THE WOUND COVERED AT ALL TIMES.

## 2025-05-28 NOTE — DISCHARGE INSTRUCTIONS
Dressings:           Wound Location L Axilla and right axilla  [x] Apply Primary Dressing:       [] MediHoney Gel  [x] Calcium Alginate with Silver (Packing to Ischium)   [] Calcium Alginate without silver   [] Collagen with silver [] Collagen without Silver    [] Santyl with moist saline gauze     [] Hydrofera Blue (cut to size and moistened with normal saline)  [] Hydrofera Blue Ready (cut to size)      [] Normal Saline wet to dry  [] Betadine wet to dry    [] Hydrogel       [] Bactroban/Mupirocin   [] Iodoform Packing Strip [] Plain Packing Strip   [] Skin Sub, Steri-Strips, Mepitel (DO NOT REMOVE)  [] Other:      [x] Cover and Secure with:     [x] Gauze [] Ronni [] Kerlix   [] Zetuvit Plus Silicone Border or Foam Border [] Super Absorbant [x] ABD     [] Ace Wrap [x] Other: Foam to Ischium   Limit contact of tape with skin.    [x] Change dressing: [x] Daily   or [x] Every Other Day   [] Twice per week   [] Three times per week   [] Once a week [] Do Not Change Dressing   [] Other:

## 2025-05-28 NOTE — PROGRESS NOTES
[facility]   History and Physical Note   Referring Provider: Dr. Pena  Reason for Referral: Hidradenitis     Joanne Eduardo  MEDICAL RECORD NUMBER:  [id number]  AGE: 80 y.o.   GENDER: female  : 1945  EPISODE DATE:  2025    Chief complaint and reason for visit:     Chief Complaint   Patient presents with    Wound Check     Left axilla, right axilla, left isch, right isch, left foot, and right foot         HISTORY of PRESENT ILLNESS HPI     Joanne Eduardo is a 80 y.o. female who presents today for an evaluation of her wounds Wound duration:  10+ year(s).    History of Wound Context: Patient is a long-time patient of Dr. Pena's who I am seeing in his absence.  Patient is here with her daughter who states she has never had surgery of her wounds. Patient had no new complaints and denied chest pain or shortness of breath.  Comorbidities include CAD, hypertension, hypercholesterolemia, and   Pertinent associated symptoms:  She has urinary incontience. drainage  and impaired mobility    PAST MEDICAL HISTORY        Diagnosis Date    Arthritis     Burning with urination     Diabetes (HCC)     states she is \"pre-diabetic\", diet controlled    H/O blood clots     right side of body    Heart attack (HCC) 2014    Hydradenitis 2012    Hypercholesterolemia     Hypertension     Ill-defined condition     edema of legs    Thromboembolus (HCC)        PAST SURGICAL HISTORY  Past Surgical History:   Procedure Laterality Date    COLONOSCOPY N/A 2023    COLONOSCOPY performed by Susy Avendano MD at [facility] ENDOSCOPY       FAMILY HISTORY  Family History   Problem Relation Age of Onset    Cancer Mother     Asthma Father     Heart Disease Mother     Alcohol Abuse Brother        SOCIAL HISTORY  Social History     Tobacco Use    Smoking status: Never     Passive exposure: Never    Smokeless tobacco: Never   Substance Use Topics    Alcohol use: No     Alcohol/week: 0.0 standard drinks of

## 2025-05-30 NOTE — PROGRESS NOTES
Progress Note    Patient: Joanne Eduardo MRN: [id number]  SSN: [id number]    YOB: 1945  Age: 80 y.o.  Sex: female        Chief Complaint   Patient presents with    3 Month Follow-Up     she is a 80 y.o. year old female who presents with daughter for follow up of chronic health problems. Patient with dx of T2D, HTN, HLD, hydradenitis and pedal edema. Patient dependent on wheel chair. She is receiving wound care. Patient denies HA, dizziness, SOB, CP, abdominal/pelvic pain, acute myalgias or arthralgias. BP and BS well controlled.       Encounter Diagnoses   Name Primary?    Skin lesion of left lower extremity Yes    Lymphedema, not elsewhere classified     Essential (primary) hypertension     Type 2 diabetes mellitus with diabetic neuropathy, without long-term current use of insulin (HCC)      BP Readings from Last 3 Encounters:   05/28/25 102/61   05/23/25 121/72   04/28/25 121/72     Wt Readings from Last 3 Encounters:   05/23/25 55.2 kg (121 lb 9.6 oz)   02/18/25 52.4 kg (115 lb 9.6 oz)   12/23/24 56.7 kg (125 lb)     Body mass index is 19.05 kg/m².    CBC:   Lab Results   Component Value Date/Time    WBC 12.1 02/18/2025 04:00 PM    RBC 3.80 02/18/2025 04:00 PM    HGB 9.2 02/18/2025 04:00 PM    HCT 31.7 02/18/2025 04:00 PM    MCV 83.4 02/18/2025 04:00 PM    MCH 24.2 02/18/2025 04:00 PM    MCHC 29.0 02/18/2025 04:00 PM    RDW 17.0 02/18/2025 04:00 PM     02/18/2025 04:00 PM    MPV 9.7 02/18/2025 04:00 PM     CMP:    Lab Results   Component Value Date/Time     02/18/2025 04:00 PM    K 4.0 02/18/2025 04:00 PM     02/18/2025 04:00 PM    CO2 28 02/18/2025 04:00 PM    BUN 8 02/18/2025 04:00 PM    CREATININE 0.75 02/18/2025 04:00 PM    GFRAA >60 09/14/2022 03:25 PM    AGRATIO 0.4 04/27/2023 04:30 PM    LABGLOM 81 02/18/2025 04:00 PM    LABGLOM >60 01/24/2024 03:50 PM    LABGLOM >60 04/27/2023 04:30 PM    GLUCOSE 100 02/18/2025 04:00 PM    CALCIUM 9.4 02/18/2025 04:00 PM    BILITOT 0.3

## 2025-06-16 ENCOUNTER — OFFICE VISIT (OUTPATIENT)
Age: 80
End: 2025-06-16
Payer: MEDICARE

## 2025-06-16 VITALS — DIASTOLIC BLOOD PRESSURE: 66 MMHG | HEART RATE: 92 BPM | SYSTOLIC BLOOD PRESSURE: 109 MMHG

## 2025-06-16 DIAGNOSIS — N95.0 POST-MENOPAUSAL BLEEDING: Primary | ICD-10-CM

## 2025-06-16 PROCEDURE — 1159F MED LIST DOCD IN RCRD: CPT | Performed by: STUDENT IN AN ORGANIZED HEALTH CARE EDUCATION/TRAINING PROGRAM

## 2025-06-16 PROCEDURE — G8427 DOCREV CUR MEDS BY ELIG CLIN: HCPCS | Performed by: STUDENT IN AN ORGANIZED HEALTH CARE EDUCATION/TRAINING PROGRAM

## 2025-06-16 PROCEDURE — 1123F ACP DISCUSS/DSCN MKR DOCD: CPT | Performed by: STUDENT IN AN ORGANIZED HEALTH CARE EDUCATION/TRAINING PROGRAM

## 2025-06-16 PROCEDURE — 1160F RVW MEDS BY RX/DR IN RCRD: CPT | Performed by: STUDENT IN AN ORGANIZED HEALTH CARE EDUCATION/TRAINING PROGRAM

## 2025-06-16 PROCEDURE — G8399 PT W/DXA RESULTS DOCUMENT: HCPCS | Performed by: STUDENT IN AN ORGANIZED HEALTH CARE EDUCATION/TRAINING PROGRAM

## 2025-06-16 PROCEDURE — 1090F PRES/ABSN URINE INCON ASSESS: CPT | Performed by: STUDENT IN AN ORGANIZED HEALTH CARE EDUCATION/TRAINING PROGRAM

## 2025-06-16 PROCEDURE — 1036F TOBACCO NON-USER: CPT | Performed by: STUDENT IN AN ORGANIZED HEALTH CARE EDUCATION/TRAINING PROGRAM

## 2025-06-16 PROCEDURE — 3078F DIAST BP <80 MM HG: CPT | Performed by: STUDENT IN AN ORGANIZED HEALTH CARE EDUCATION/TRAINING PROGRAM

## 2025-06-16 PROCEDURE — 99214 OFFICE O/P EST MOD 30 MIN: CPT | Performed by: STUDENT IN AN ORGANIZED HEALTH CARE EDUCATION/TRAINING PROGRAM

## 2025-06-16 PROCEDURE — 3074F SYST BP LT 130 MM HG: CPT | Performed by: STUDENT IN AN ORGANIZED HEALTH CARE EDUCATION/TRAINING PROGRAM

## 2025-06-16 PROCEDURE — G8420 CALC BMI NORM PARAMETERS: HCPCS | Performed by: STUDENT IN AN ORGANIZED HEALTH CARE EDUCATION/TRAINING PROGRAM

## 2025-06-16 RX ORDER — MEGESTROL ACETATE 40 MG/1
40 TABLET ORAL DAILY
Qty: 30 TABLET | Refills: 6 | Status: SHIPPED | OUTPATIENT
Start: 2025-06-16

## 2025-06-24 ENCOUNTER — TELEPHONE (OUTPATIENT)
Age: 80
End: 2025-06-24

## 2025-06-24 NOTE — TELEPHONE ENCOUNTER
Patient was referred to Dr Arreguin by Dr Presley.  Called patient and spoke with daughter and made appointment for Tuesday, July 15th @ 1:30pm  pt told [facility]

## 2025-07-17 ENCOUNTER — OFFICE VISIT (OUTPATIENT)
Facility: CLINIC | Age: 80
End: 2025-07-17

## 2025-07-17 VITALS
WEIGHT: 112.8 LBS | HEART RATE: 92 BPM | DIASTOLIC BLOOD PRESSURE: 64 MMHG | RESPIRATION RATE: 16 BRPM | BODY MASS INDEX: 17.71 KG/M2 | SYSTOLIC BLOOD PRESSURE: 106 MMHG | TEMPERATURE: 97.8 F | OXYGEN SATURATION: 100 % | HEIGHT: 67 IN

## 2025-07-17 DIAGNOSIS — I89.0 LYMPHEDEMA, NOT ELSEWHERE CLASSIFIED: ICD-10-CM

## 2025-07-17 DIAGNOSIS — L98.9 SKIN LESION OF LEFT LOWER EXTREMITY: ICD-10-CM

## 2025-07-17 DIAGNOSIS — R53.81 PHYSICAL DEBILITY: ICD-10-CM

## 2025-07-17 DIAGNOSIS — D64.9 ANEMIA, UNSPECIFIED TYPE: ICD-10-CM

## 2025-07-17 DIAGNOSIS — I10 ESSENTIAL (PRIMARY) HYPERTENSION: ICD-10-CM

## 2025-07-17 DIAGNOSIS — E11.40 TYPE 2 DIABETES MELLITUS WITH DIABETIC NEUROPATHY, WITHOUT LONG-TERM CURRENT USE OF INSULIN (HCC): Primary | ICD-10-CM

## 2025-07-17 DIAGNOSIS — L98.9 SKIN LESION OF RIGHT LOWER EXTREMITY: ICD-10-CM

## 2025-07-17 LAB
HBA1C MFR BLD: 6.9 %
HEMOGLOBIN, POC: 6.8 G/DL

## 2025-07-17 RX ORDER — MUPIROCIN 2 %
OINTMENT (GRAM) TOPICAL
Qty: 22 G | Refills: 1 | Status: SHIPPED | OUTPATIENT
Start: 2025-07-17

## 2025-07-17 NOTE — PROGRESS NOTES
Chief Complaint   Patient presents with    Follow-up Chronic Condition      \"Have you been to the ER, urgent care clinic since your last visit?  Hospitalized since your last visit?\"    YES 6-14-25 [facility] choking episode on hot dog    “Have you seen or consulted any other health care providers outside of [facility] since your last visit?”    NO            Click Here for Release of Records Request     Health Maintenance Due   Topic Date Due    Diabetic Alb to Cr ratio (uACR) test  Never done    Pneumococcal 50+ years Vaccine (1 of 2 - PCV) Never done    Shingles vaccine (1 of 2) Never done    Respiratory Syncytial Virus (RSV) Pregnant or age 60 yrs+ (1 - 1-dose 75+ series) Never done    COVID-19 Vaccine (1 - 2024-25 season) Never done

## 2025-07-17 NOTE — PROGRESS NOTES
Transfer/ lift chair  Progress Note    Patient: Joanne Eduardo MRN: [id number]  SSN: [id number]    YOB: 1945  Age: 80 y.o.  Sex: female        Chief Complaint   Patient presents with    Follow-up Chronic Condition     she is a 80 y.o. year old female who presents with daughter for follow up of chronic health problems. Patient with dx of T2D, HTN, HLD, hydradenitis and pedal edema. Patient dependent on wheel chair. She is receiving wound care. Daughter asks for transfer chair. Patient denies HA, dizziness, SOB, CP, abdominal/pelvic pain, acute myalgias or arthralgias. BP and BS well controlled.       Encounter Diagnoses   Name Primary?    Type 2 diabetes mellitus with diabetic neuropathy, without long-term current use of insulin (HCC) Yes    Essential (primary) hypertension     Lymphedema, not elsewhere classified     Skin lesion of left lower extremity     Skin lesion of right lower extremity     Anemia, unspecified type      BP Readings from Last 3 Encounters:   07/17/25 106/64   06/16/25 109/66   05/28/25 102/61     Wt Readings from Last 3 Encounters:   07/17/25 51.2 kg (112 lb 12.8 oz)   05/23/25 55.2 kg (121 lb 9.6 oz)   02/18/25 52.4 kg (115 lb 9.6 oz)     Body mass index is 17.67 kg/m².    CBC:   Lab Results   Component Value Date/Time    WBC 12.1 02/18/2025 04:00 PM    RBC 3.80 02/18/2025 04:00 PM    HGB 9.2 02/18/2025 04:00 PM    HCT 31.7 02/18/2025 04:00 PM    MCV 83.4 02/18/2025 04:00 PM    MCH 24.2 02/18/2025 04:00 PM    MCHC 29.0 02/18/2025 04:00 PM    RDW 17.0 02/18/2025 04:00 PM     02/18/2025 04:00 PM    MPV 9.7 02/18/2025 04:00 PM     CMP:    Lab Results   Component Value Date/Time     02/18/2025 04:00 PM    K 4.0 02/18/2025 04:00 PM     02/18/2025 04:00 PM    CO2 28 02/18/2025 04:00 PM    BUN 8 02/18/2025 04:00 PM    CREATININE 0.75 02/18/2025 04:00 PM    GFRAA >60 09/14/2022 03:25 PM    AGRATIO 0.4 04/27/2023 04:30 PM    LABGLOM 81 02/18/2025 04:00 PM    LABGLOM >60

## 2025-07-18 ENCOUNTER — TELEPHONE (OUTPATIENT)
Facility: CLINIC | Age: 80
End: 2025-07-18

## 2025-07-18 DIAGNOSIS — D64.9 ANEMIA, UNSPECIFIED TYPE: Primary | ICD-10-CM

## 2025-07-18 NOTE — TELEPHONE ENCOUNTER
Called patient's dtrJody, mailbox full. Please advise lab has been ordered and may return to office to have blood drawn.    ----- Message from SANDRA ARANDA LPN sent at 2025  7:37 AM EDT -----  Regarding: FW: Lab Notification: Unable to obtain samples    ----- Message -----  From: Karo Curry  Sent: 2025   7:11 AM EDT  To: [facility] Client Services Staff Pool; #  Subject: Lab Notification: Unable to obtain samples       We have been notified that samples could not be obtained for this patient's most recent lab work.  Please place new orders prior to the patient's return.    If additional assistance is required, please contact Client Services at [phone].    Patient:  Joanne Eduardo  :  1945  Ordering Provider:  SILVERIO Kwon MD    Thank you,    [facility] Client Services

## 2025-07-25 ENCOUNTER — HOSPITAL ENCOUNTER (OUTPATIENT)
Facility: HOSPITAL | Age: 80
Discharge: HOME | End: 2025-07-25
Attending: PHYSICIAN ASSISTANT
Payer: MEDICARE

## 2025-07-25 VITALS
SYSTOLIC BLOOD PRESSURE: 102 MMHG | DIASTOLIC BLOOD PRESSURE: 52 MMHG | RESPIRATION RATE: 18 BRPM | HEART RATE: 98 BPM | TEMPERATURE: 97.2 F

## 2025-07-25 DIAGNOSIS — L73.2 HIDRADENITIS SUPPURATIVA OF ANUS: ICD-10-CM

## 2025-07-25 DIAGNOSIS — L89.323 PRESSURE ULCER OF ISCHIUM, LEFT, STAGE III (HCC): ICD-10-CM

## 2025-07-25 DIAGNOSIS — S41.102D OPEN WOUND OF LEFT AXILLARY REGION, SUBSEQUENT ENCOUNTER: ICD-10-CM

## 2025-07-25 DIAGNOSIS — S41.101D OPEN WOUND OF RIGHT AXILLARY REGION, SUBSEQUENT ENCOUNTER: ICD-10-CM

## 2025-07-25 DIAGNOSIS — L73.2 HIDRADENITIS SUPPURATIVA: Primary | ICD-10-CM

## 2025-07-25 DIAGNOSIS — L89.313 PRESSURE INJURY OF RIGHT ISCHIUM, STAGE 3 (HCC): ICD-10-CM

## 2025-07-25 DIAGNOSIS — E11.40 NEUROPATHY DUE TO TYPE 2 DIABETES MELLITUS (HCC): ICD-10-CM

## 2025-07-25 PROCEDURE — 11045 DBRDMT SUBQ TISS EACH ADDL: CPT

## 2025-07-25 PROCEDURE — 11042 DBRDMT SUBQ TIS 1ST 20SQCM/<: CPT

## 2025-07-25 RX ORDER — GINSENG 100 MG
CAPSULE ORAL PRN
OUTPATIENT
Start: 2025-07-25

## 2025-07-25 RX ORDER — GENTAMICIN SULFATE 1 MG/G
OINTMENT TOPICAL PRN
OUTPATIENT
Start: 2025-07-25

## 2025-07-25 RX ORDER — CLOBETASOL PROPIONATE 0.5 MG/G
OINTMENT TOPICAL PRN
OUTPATIENT
Start: 2025-07-25

## 2025-07-25 RX ORDER — LIDOCAINE HYDROCHLORIDE 20 MG/ML
JELLY TOPICAL PRN
OUTPATIENT
Start: 2025-07-25

## 2025-07-25 RX ORDER — BETAMETHASONE DIPROPIONATE 0.5 MG/G
CREAM TOPICAL PRN
OUTPATIENT
Start: 2025-07-25

## 2025-07-25 RX ORDER — LIDOCAINE 40 MG/G
CREAM TOPICAL PRN
OUTPATIENT
Start: 2025-07-25

## 2025-07-25 RX ORDER — MUPIROCIN 2 %
OINTMENT (GRAM) TOPICAL PRN
OUTPATIENT
Start: 2025-07-25

## 2025-07-25 RX ORDER — LIDOCAINE 50 MG/G
OINTMENT TOPICAL PRN
OUTPATIENT
Start: 2025-07-25

## 2025-07-25 RX ORDER — SODIUM CHLOR/HYPOCHLOROUS ACID 0.033 %
SOLUTION, IRRIGATION IRRIGATION PRN
OUTPATIENT
Start: 2025-07-25

## 2025-07-25 RX ORDER — SILVER SULFADIAZINE 10 MG/G
CREAM TOPICAL PRN
OUTPATIENT
Start: 2025-07-25

## 2025-07-25 RX ORDER — LIDOCAINE HYDROCHLORIDE 40 MG/ML
SOLUTION TOPICAL PRN
OUTPATIENT
Start: 2025-07-25

## 2025-07-25 RX ORDER — NEOMYCIN/BACITRACIN/POLYMYXINB 3.5-400-5K
OINTMENT (GRAM) TOPICAL PRN
OUTPATIENT
Start: 2025-07-25

## 2025-07-25 RX ORDER — BACITRACIN ZINC AND POLYMYXIN B SULFATE 500; 1000 [USP'U]/G; [USP'U]/G
OINTMENT TOPICAL PRN
OUTPATIENT
Start: 2025-07-25

## 2025-07-25 RX ORDER — TRIAMCINOLONE ACETONIDE 1 MG/G
OINTMENT TOPICAL PRN
OUTPATIENT
Start: 2025-07-25

## 2025-07-25 NOTE — DISCHARGE INSTRUCTIONS
Wound Clinic Physician Orders and Discharge Instructions  [facility]  [address], [address]  [place] [address]  Telephone: [phone]     FAX [phone]    NAME:  Joanne Eduardo  YOB: 1945  MEDICAL RECORD NUMBER:  [id number]  DATE:  7/25/2025      Return Appointment:    [x] Dressing Supply Provider: Nirmal  [] Home Healthcare:  [x] Return Appointment: 4 Week(s)  [] Nurse Visit:     [] Discharge from [facility]:   [] Healed        [] Refer to Provider:         [] Consult    Follow-up Information:    [] Ordered Tests:    [] Vascular/Arterial Testing   [] Please call [phone] to schedule at any [facility] facility      [] Imaging    [] Please call [phone] to schedule at any [facility] facility    [x] Referrals:    [x] Infectious Disease Dr. Luna  [] Vascular  [x] Other: Dermatology     [] Rx to pharmacy:   [] Would Culture obtained in clinic  [x] Other: get a gel cushion       Wound Cleansing:   Do not scrub or use excessive force.  Cleanse wound prior to applying a clean dressing with:    [] Normal Saline   [x] Mild Soap & Water in shower    [] Keep Wound Dry in Shower  [] Wear a cast cover to shower  [] Other:       Topical Treatments:  Do not apply lotions, creams, or ointments to wound bed unless directed.     [] Apply moisturizing lotion to skin surrounding the wound prior to dressing change.  [] Apply thin film of moisture barrier ointment (Zinc) to skin immediately around wound.  [] Apply Betadine to skin immediately around wound   [] Apply Skin Prep to skin immediately around wound  [] Other:     Dressings:           Wound Location  bilateral ischium   [x] Apply Primary Dressing:       [] MediHoney Gel  [x] Calcium Alginate with Silver (Packing to left Ischium) tunnel at 12 and 6   [] Calcium Alginate without silver   [] Collagen with silver [] Collagen without Silver    [] Santyl with moist saline gauze     [] Hydrofera Blue (cut to size and moistened

## 2025-07-25 NOTE — WOUND CARE
Wound Clinic Physician Orders and Discharge Instructions  [facility]  [address], [address]  [place] [address]  Telephone: [phone]     FAX [phone]    NAME:  Joanne Eduardo  YOB: 1945  MEDICAL RECORD NUMBER:  [id number]  DATE:  7/25/2025      Return Appointment:    [x] Dressing Supply Provider: Nirmal  [] Home Healthcare:  [x] Return Appointment: 4 Week(s)  [] Nurse Visit:     [] Discharge from [facility]:   [] Healed        [] Refer to Provider:         [] Consult    Follow-up Information:    [] Ordered Tests:    [] Vascular/Arterial Testing   [] Please call [phone] to schedule at any [facility] facility      [] Imaging    [] Please call [phone] to schedule at any [facility] facility    [x] Referrals:    [x] Infectious Disease Dr. Luna  [] Vascular  [x] Other: Dermatology     [] Rx to pharmacy:   [] Would Culture obtained in clinic  [x] Other: get a gel cushion       Wound Cleansing:   Do not scrub or use excessive force.  Cleanse wound prior to applying a clean dressing with:    [] Normal Saline   [x] Mild Soap & Water in shower    [] Keep Wound Dry in Shower  [] Wear a cast cover to shower  [] Other:       Topical Treatments:  Do not apply lotions, creams, or ointments to wound bed unless directed.     [] Apply moisturizing lotion to skin surrounding the wound prior to dressing change.  [] Apply thin film of moisture barrier ointment (Zinc) to skin immediately around wound.  [] Apply Betadine to skin immediately around wound   [] Apply Skin Prep to skin immediately around wound  [] Other:     Dressings:           Wound Location  bilateral ischium   [x] Apply Primary Dressing:       [] MediHoney Gel  [x] Calcium Alginate with Silver (Packing to left Ischium) tunnel at 12 and 6   [] Calcium Alginate without silver   [] Collagen with silver [] Collagen without Silver    [] Santyl with moist saline gauze     [] Hydrofera Blue (cut to size and moistened 
axillary region    Relevant Medications    lidocaine 4 % jelly    Other Relevant Orders    MD COMMUNICATION 1    MD COMMUNICATION 2    Initiate Outpatient Wound Care Protocol    Initiate Oxygen Therapy Protocol    Pressure ulcer of ischium, left, stage III (HCC)    Relevant Medications    lidocaine 4 % jelly    Other Relevant Orders    MD COMMUNICATION 1    MD COMMUNICATION 2    Initiate Outpatient Wound Care Protocol    Initiate Oxygen Therapy Protocol       WOUNDS REQUIRING DRESSING SUPPLIES:     Wound 04/24/24 Axilla Left #2 (Active)   Wound Image   07/25/25 1250   Wound Etiology Other 07/25/25 1251   Dressing Status Other (Comment) 07/25/25 1251   Wound Cleansed Cleansed with saline 07/25/25 1251   Dressing/Treatment Alginate with Ag;Gauze dressing/dressing sponge 07/25/25 1319   Wound Length (cm) 7 cm 07/25/25 1251   Wound Width (cm) 3.3 cm 07/25/25 1251   Wound Depth (cm) 1 cm 07/25/25 1251   Wound Surface Area (cm^2) 23.1 cm^2 07/25/25 1251   Change in Wound Size % (l*w) 3.75 07/25/25 1251   Wound Volume (cm^3) 23.1 cm^3 07/25/25 1251   Wound Healing % -221 07/25/25 1251   Wound Assessment Granulation tissue;Slough 07/25/25 1251   Drainage Amount Moderate (25-50%) 07/25/25 1251   Drainage Description Serosanguinous 07/25/25 1251   Odor None 07/25/25 1251   Tiki-wound Assessment Intact 07/25/25 1251   Margins Undefined edges 07/25/25 1251   Wound Thickness Description not for Pressure Injury Full thickness 07/25/25 1251   Number of days: 457       Wound 12/23/24 Ischium Left #1 (Active)   Wound Image   07/25/25 1254   Wound Etiology Other 07/25/25 1258   Dressing Status Other (Comment) 05/28/25 1322   Wound Cleansed Cleansed with saline 07/25/25 1258   Dressing/Treatment Alginate with Ag;Gauze dressing/dressing sponge;Dry dressing 07/25/25 1319   Wound Length (cm) 8 cm 07/25/25 1258   Wound Width (cm) 2.1 cm 07/25/25 1258   Wound Depth (cm) 1.5 cm 07/25/25 1258   Wound Surface Area (cm^2) 16.8 cm^2 07/25/25

## 2025-07-25 NOTE — PROGRESS NOTES
[facility]   Wound Care and [facility] O[facility] Therapy Center   Medical Staff Note     Joanne Eduardo  MEDICAL RECORD NUMBER:  [id number]  AGE: 80 y.o.   GENDER: female  : 1945  EPISODE DATE:  2025    Chief complaint and reason for visit:     Chief Complaint   Patient presents with    Wound Check     R & L axilla, R & L ischium      Patient presenting for evaluation of wound(s) per chief complaint.      81 yo F with hydradenitis suppurative, impaired mobility, T2DM with neuropathy presents with bilateral and ischial wounds. Patient is new to me, but not the wound care center.  Patient reports no wound pain at baseline.  She reports wound pain with prolonged applied pressure.  She spends most of her day sitting.  She used to have a gel cushion but it deflated.  No wound erythema.  Endorses wound drainage from the ischial wounds. No fever. No wound odor.     Patient accompanied by daughter today who is doing the dressing changes.  Patient arrived without dressings applied on wounds today.    Medical Decision Making:     Problem List Items Addressed This Visit          Endocrine    Neuropathy due to type 2 diabetes mellitus (HCC)       Musculoskeletal and Integument    Hidradenitis suppurativa - Primary    Relevant Medications    lidocaine 4 % jelly    Other Relevant Orders    MD COMMUNICATION 1    MD COMMUNICATION 2    Initiate Outpatient Wound Care Protocol    Initiate Oxygen Therapy Protocol    Hidradenitis suppurativa of anus    Relevant Medications    lidocaine 4 % jelly    Other Relevant Orders    MD COMMUNICATION 1    MD COMMUNICATION 2    Initiate Outpatient Wound Care Protocol    Initiate Oxygen Therapy Protocol       Other    Open wound of left axillary region    Relevant Medications    lidocaine 4 % jelly    Other Relevant Orders    MD COMMUNICATION 1    MD COMMUNICATION 2    Initiate Outpatient Wound Care Protocol    Initiate Oxygen Therapy Protocol    Open wound of right

## 2025-08-12 ENCOUNTER — OFFICE VISIT (OUTPATIENT)
Age: 80
End: 2025-08-12
Payer: MEDICARE

## 2025-08-12 VITALS
BODY MASS INDEX: 17.67 KG/M2 | HEIGHT: 67 IN | HEART RATE: 106 BPM | SYSTOLIC BLOOD PRESSURE: 102 MMHG | DIASTOLIC BLOOD PRESSURE: 66 MMHG

## 2025-08-12 DIAGNOSIS — N95.0 PMB (POSTMENOPAUSAL BLEEDING): ICD-10-CM

## 2025-08-12 DIAGNOSIS — R93.89 THICKENED ENDOMETRIUM: Primary | ICD-10-CM

## 2025-08-12 PROCEDURE — G8399 PT W/DXA RESULTS DOCUMENT: HCPCS | Performed by: OBSTETRICS & GYNECOLOGY

## 2025-08-12 PROCEDURE — G8428 CUR MEDS NOT DOCUMENT: HCPCS | Performed by: OBSTETRICS & GYNECOLOGY

## 2025-08-12 PROCEDURE — G8419 CALC BMI OUT NRM PARAM NOF/U: HCPCS | Performed by: OBSTETRICS & GYNECOLOGY

## 2025-08-12 PROCEDURE — 1036F TOBACCO NON-USER: CPT | Performed by: OBSTETRICS & GYNECOLOGY

## 2025-08-12 PROCEDURE — 1123F ACP DISCUSS/DSCN MKR DOCD: CPT | Performed by: OBSTETRICS & GYNECOLOGY

## 2025-08-12 PROCEDURE — 3078F DIAST BP <80 MM HG: CPT | Performed by: OBSTETRICS & GYNECOLOGY

## 2025-08-12 PROCEDURE — 1090F PRES/ABSN URINE INCON ASSESS: CPT | Performed by: OBSTETRICS & GYNECOLOGY

## 2025-08-12 PROCEDURE — 3074F SYST BP LT 130 MM HG: CPT | Performed by: OBSTETRICS & GYNECOLOGY

## 2025-08-12 PROCEDURE — 1126F AMNT PAIN NOTED NONE PRSNT: CPT | Performed by: OBSTETRICS & GYNECOLOGY

## 2025-08-12 PROCEDURE — 99204 OFFICE O/P NEW MOD 45 MIN: CPT | Performed by: OBSTETRICS & GYNECOLOGY

## 2025-08-12 ASSESSMENT — PATIENT HEALTH QUESTIONNAIRE - PHQ9
SUM OF ALL RESPONSES TO PHQ QUESTIONS 1-9: 6
2. FEELING DOWN, DEPRESSED OR HOPELESS: NOT AT ALL
1. LITTLE INTEREST OR PLEASURE IN DOING THINGS: NOT AT ALL
SUM OF ALL RESPONSES TO PHQ QUESTIONS 1-9: 6
8. MOVING OR SPEAKING SO SLOWLY THAT OTHER PEOPLE COULD HAVE NOTICED. OR THE OPPOSITE, BEING SO FIGETY OR RESTLESS THAT YOU HAVE BEEN MOVING AROUND A LOT MORE THAN USUAL: NOT AT ALL
6. FEELING BAD ABOUT YOURSELF - OR THAT YOU ARE A FAILURE OR HAVE LET YOURSELF OR YOUR FAMILY DOWN: NOT AT ALL
SUM OF ALL RESPONSES TO PHQ QUESTIONS 1-9: 6
4. FEELING TIRED OR HAVING LITTLE ENERGY: NEARLY EVERY DAY
SUM OF ALL RESPONSES TO PHQ QUESTIONS 1-9: 6
9. THOUGHTS THAT YOU WOULD BE BETTER OFF DEAD, OR OF HURTING YOURSELF: NOT AT ALL
5. POOR APPETITE OR OVEREATING: NOT AT ALL
7. TROUBLE CONCENTRATING ON THINGS, SUCH AS READING THE NEWSPAPER OR WATCHING TELEVISION: NOT AT ALL
10. IF YOU CHECKED OFF ANY PROBLEMS, HOW DIFFICULT HAVE THESE PROBLEMS MADE IT FOR YOU TO DO YOUR WORK, TAKE CARE OF THINGS AT HOME, OR GET ALONG WITH OTHER PEOPLE: SOMEWHAT DIFFICULT
3. TROUBLE FALLING OR STAYING ASLEEP: NEARLY EVERY DAY

## 2025-08-13 ENCOUNTER — OFFICE VISIT (OUTPATIENT)
Facility: CLINIC | Age: 80
End: 2025-08-13

## 2025-08-13 ENCOUNTER — APPOINTMENT (OUTPATIENT)
Facility: HOSPITAL | Age: 80
DRG: 871 | End: 2025-08-13
Payer: MEDICARE

## 2025-08-13 ENCOUNTER — HOSPITAL ENCOUNTER (INPATIENT)
Facility: HOSPITAL | Age: 80
LOS: 5 days | Discharge: HOME HEALTH | DRG: 871 | End: 2025-08-18
Attending: EMERGENCY MEDICINE | Admitting: FAMILY MEDICINE
Payer: MEDICARE

## 2025-08-13 VITALS
HEIGHT: 67 IN | SYSTOLIC BLOOD PRESSURE: 128 MMHG | TEMPERATURE: 97.5 F | WEIGHT: 110.8 LBS | HEART RATE: 108 BPM | OXYGEN SATURATION: 100 % | DIASTOLIC BLOOD PRESSURE: 66 MMHG | BODY MASS INDEX: 17.39 KG/M2 | RESPIRATION RATE: 16 BRPM

## 2025-08-13 DIAGNOSIS — N39.3 FEMALE STRESS INCONTINENCE: ICD-10-CM

## 2025-08-13 DIAGNOSIS — A41.9 SEPTICEMIA (HCC): ICD-10-CM

## 2025-08-13 DIAGNOSIS — L89.313 PRESSURE INJURY OF RIGHT ISCHIUM, STAGE 3 (HCC): ICD-10-CM

## 2025-08-13 DIAGNOSIS — R13.10 DYSPHAGIA, UNSPECIFIED TYPE: ICD-10-CM

## 2025-08-13 DIAGNOSIS — L89.323 PRESSURE SORE OF LEFT ISCHIUM, STAGE 3 (HCC): ICD-10-CM

## 2025-08-13 DIAGNOSIS — I70.90 ARTERIAL OCCLUSION: ICD-10-CM

## 2025-08-13 DIAGNOSIS — E11.40 TYPE 2 DIABETES MELLITUS WITH DIABETIC NEUROPATHY, WITHOUT LONG-TERM CURRENT USE OF INSULIN (HCC): ICD-10-CM

## 2025-08-13 DIAGNOSIS — T14.8XXA OPEN WOUND: Primary | ICD-10-CM

## 2025-08-13 DIAGNOSIS — I10 ESSENTIAL (PRIMARY) HYPERTENSION: ICD-10-CM

## 2025-08-13 DIAGNOSIS — I89.0 LYMPHEDEMA, NOT ELSEWHERE CLASSIFIED: ICD-10-CM

## 2025-08-13 DIAGNOSIS — R63.4 WEIGHT LOSS, NON-INTENTIONAL: ICD-10-CM

## 2025-08-13 DIAGNOSIS — R13.10 DYSPHAGIA, UNSPECIFIED TYPE: Primary | ICD-10-CM

## 2025-08-13 DIAGNOSIS — K92.2 GASTROINTESTINAL HEMORRHAGE, UNSPECIFIED GASTROINTESTINAL HEMORRHAGE TYPE: ICD-10-CM

## 2025-08-13 LAB
ALBUMIN SERPL-MCNC: 1.6 G/DL (ref 3.5–5)
ALBUMIN/GLOB SERPL: 0.2 (ref 1.1–2.2)
ALP SERPL-CCNC: 133 U/L (ref 45–117)
ALT SERPL-CCNC: 10 U/L (ref 12–78)
ANION GAP SERPL CALC-SCNC: 5 MMOL/L (ref 2–12)
AST SERPL W P-5'-P-CCNC: 10 U/L (ref 15–37)
BASOPHILS # BLD: 0.05 K/UL (ref 0–0.1)
BASOPHILS NFR BLD: 0.3 % (ref 0–1)
BILIRUB SERPL-MCNC: 0.3 MG/DL (ref 0.2–1)
BUN SERPL-MCNC: 9 MG/DL (ref 6–20)
BUN/CREAT SERPL: 10 (ref 12–20)
CA-I BLD-MCNC: 9.3 MG/DL (ref 8.5–10.1)
CHLORIDE SERPL-SCNC: 103 MMOL/L (ref 97–108)
CO2 SERPL-SCNC: 27 MMOL/L (ref 21–32)
CREAT SERPL-MCNC: 0.89 MG/DL (ref 0.55–1.02)
DIFFERENTIAL METHOD BLD: ABNORMAL
EOSINOPHIL # BLD: 0.22 K/UL (ref 0–0.4)
EOSINOPHIL NFR BLD: 1.3 % (ref 0–7)
ERYTHROCYTE [DISTWIDTH] IN BLOOD BY AUTOMATED COUNT: 19.7 % (ref 11.5–14.5)
FLUAV RNA SPEC QL NAA+PROBE: NOT DETECTED
FLUBV RNA SPEC QL NAA+PROBE: NOT DETECTED
GLOBULIN SER CALC-MCNC: 6.9 G/DL (ref 2–4)
GLUCOSE SERPL-MCNC: 128 MG/DL (ref 65–100)
HCT VFR BLD AUTO: 24.3 % (ref 35–47)
HGB BLD-MCNC: 7.3 G/DL (ref 11.5–16)
IMM GRANULOCYTES # BLD AUTO: 0.07 K/UL (ref 0–0.04)
IMM GRANULOCYTES NFR BLD AUTO: 0.4 % (ref 0–0.5)
LACTATE BLD-SCNC: 1.4 MMOL/L (ref 0.4–2)
LYMPHOCYTES # BLD: 1.56 K/UL (ref 0.8–3.5)
LYMPHOCYTES NFR BLD: 9 % (ref 12–49)
MCH RBC QN AUTO: 22.4 PG (ref 26–34)
MCHC RBC AUTO-ENTMCNC: 30 G/DL (ref 30–36.5)
MCV RBC AUTO: 74.5 FL (ref 80–99)
MONOCYTES # BLD: 0.69 K/UL (ref 0–1)
MONOCYTES NFR BLD: 4 % (ref 5–13)
NEUTS SEG # BLD: 14.71 K/UL (ref 1.8–8)
NEUTS SEG NFR BLD: 85 % (ref 32–75)
NRBC # BLD: 0 K/UL (ref 0–0.01)
NRBC BLD-RTO: 0 PER 100 WBC
PERFORMED BY:: NORMAL
PLATELET # BLD AUTO: 631 K/UL (ref 150–400)
PMV BLD AUTO: 9.1 FL (ref 8.9–12.9)
POTASSIUM SERPL-SCNC: 3.8 MMOL/L (ref 3.5–5.1)
PROCALCITONIN SERPL-MCNC: 1.53 NG/ML
PROT SERPL-MCNC: 8.5 G/DL (ref 6.4–8.2)
RBC # BLD AUTO: 3.26 M/UL (ref 3.8–5.2)
RBC MORPH BLD: ABNORMAL
S PYO DNA THROAT QL NAA+PROBE: NOT DETECTED
SARS-COV-2 RNA RESP QL NAA+PROBE: NOT DETECTED
SODIUM SERPL-SCNC: 135 MMOL/L (ref 136–145)
WBC # BLD AUTO: 17.3 K/UL (ref 3.6–11)

## 2025-08-13 PROCEDURE — 96372 THER/PROPH/DIAG INJ SC/IM: CPT

## 2025-08-13 PROCEDURE — 96361 HYDRATE IV INFUSION ADD-ON: CPT

## 2025-08-13 PROCEDURE — 87636 SARSCOV2 & INF A&B AMP PRB: CPT

## 2025-08-13 PROCEDURE — 81001 URINALYSIS AUTO W/SCOPE: CPT

## 2025-08-13 PROCEDURE — 96374 THER/PROPH/DIAG INJ IV PUSH: CPT

## 2025-08-13 PROCEDURE — 93005 ELECTROCARDIOGRAM TRACING: CPT | Performed by: EMERGENCY MEDICINE

## 2025-08-13 PROCEDURE — 84145 PROCALCITONIN (PCT): CPT

## 2025-08-13 PROCEDURE — 1100000000 HC RM PRIVATE

## 2025-08-13 PROCEDURE — 71045 X-RAY EXAM CHEST 1 VIEW: CPT

## 2025-08-13 PROCEDURE — 6360000002 HC RX W HCPCS: Performed by: EMERGENCY MEDICINE

## 2025-08-13 PROCEDURE — 80053 COMPREHEN METABOLIC PANEL: CPT

## 2025-08-13 PROCEDURE — 99285 EMERGENCY DEPT VISIT HI MDM: CPT

## 2025-08-13 PROCEDURE — 2580000003 HC RX 258: Performed by: EMERGENCY MEDICINE

## 2025-08-13 PROCEDURE — 85025 COMPLETE CBC W/AUTO DIFF WBC: CPT

## 2025-08-13 PROCEDURE — 87040 BLOOD CULTURE FOR BACTERIA: CPT

## 2025-08-13 PROCEDURE — 87651 STREP A DNA AMP PROBE: CPT

## 2025-08-13 PROCEDURE — 83605 ASSAY OF LACTIC ACID: CPT

## 2025-08-13 RX ORDER — MAGNESIUM SULFATE IN WATER 40 MG/ML
2000 INJECTION, SOLUTION INTRAVENOUS PRN
Status: DISCONTINUED | OUTPATIENT
Start: 2025-08-13 | End: 2025-08-18 | Stop reason: HOSPADM

## 2025-08-13 RX ORDER — ATORVASTATIN CALCIUM 40 MG/1
80 TABLET, FILM COATED ORAL DAILY
Status: DISCONTINUED | OUTPATIENT
Start: 2025-08-14 | End: 2025-08-18 | Stop reason: HOSPADM

## 2025-08-13 RX ORDER — ACETAMINOPHEN 325 MG/1
650 TABLET ORAL EVERY 6 HOURS PRN
Status: DISCONTINUED | OUTPATIENT
Start: 2025-08-13 | End: 2025-08-18 | Stop reason: HOSPADM

## 2025-08-13 RX ORDER — SODIUM CHLORIDE 0.9 % (FLUSH) 0.9 %
5-40 SYRINGE (ML) INJECTION EVERY 12 HOURS SCHEDULED
Status: DISCONTINUED | OUTPATIENT
Start: 2025-08-13 | End: 2025-08-18 | Stop reason: HOSPADM

## 2025-08-13 RX ORDER — ONDANSETRON 2 MG/ML
4 INJECTION INTRAMUSCULAR; INTRAVENOUS EVERY 6 HOURS PRN
Status: DISCONTINUED | OUTPATIENT
Start: 2025-08-13 | End: 2025-08-18 | Stop reason: HOSPADM

## 2025-08-13 RX ORDER — DEXTROSE MONOHYDRATE 100 MG/ML
INJECTION, SOLUTION INTRAVENOUS CONTINUOUS PRN
Status: DISCONTINUED | OUTPATIENT
Start: 2025-08-13 | End: 2025-08-18 | Stop reason: HOSPADM

## 2025-08-13 RX ORDER — SODIUM CHLORIDE 9 MG/ML
INJECTION, SOLUTION INTRAVENOUS CONTINUOUS
Status: DISCONTINUED | OUTPATIENT
Start: 2025-08-13 | End: 2025-08-18 | Stop reason: HOSPADM

## 2025-08-13 RX ORDER — ONDANSETRON 4 MG/1
4 TABLET, ORALLY DISINTEGRATING ORAL EVERY 8 HOURS PRN
Status: DISCONTINUED | OUTPATIENT
Start: 2025-08-13 | End: 2025-08-18 | Stop reason: HOSPADM

## 2025-08-13 RX ORDER — ENOXAPARIN SODIUM 100 MG/ML
30 INJECTION SUBCUTANEOUS DAILY
Status: DISCONTINUED | OUTPATIENT
Start: 2025-08-14 | End: 2025-08-17 | Stop reason: DRUGHIGH

## 2025-08-13 RX ORDER — 0.9 % SODIUM CHLORIDE 0.9 %
250 INTRAVENOUS SOLUTION INTRAVENOUS ONCE
Status: COMPLETED | OUTPATIENT
Start: 2025-08-13 | End: 2025-08-13

## 2025-08-13 RX ORDER — SODIUM CHLORIDE 0.9 % (FLUSH) 0.9 %
5-40 SYRINGE (ML) INJECTION PRN
Status: DISCONTINUED | OUTPATIENT
Start: 2025-08-13 | End: 2025-08-18 | Stop reason: HOSPADM

## 2025-08-13 RX ORDER — FERROUS SULFATE 325(65) MG
325 TABLET ORAL
Status: DISCONTINUED | OUTPATIENT
Start: 2025-08-15 | End: 2025-08-18 | Stop reason: HOSPADM

## 2025-08-13 RX ORDER — POTASSIUM CHLORIDE 7.45 MG/ML
10 INJECTION INTRAVENOUS PRN
Status: DISCONTINUED | OUTPATIENT
Start: 2025-08-13 | End: 2025-08-18 | Stop reason: HOSPADM

## 2025-08-13 RX ORDER — MEGESTROL ACETATE 20 MG/1
40 TABLET ORAL DAILY
Status: DISCONTINUED | OUTPATIENT
Start: 2025-08-14 | End: 2025-08-15

## 2025-08-13 RX ORDER — INSULIN LISPRO 100 [IU]/ML
0-16 INJECTION, SOLUTION INTRAVENOUS; SUBCUTANEOUS
Status: DISCONTINUED | OUTPATIENT
Start: 2025-08-13 | End: 2025-08-18 | Stop reason: HOSPADM

## 2025-08-13 RX ORDER — ACETAMINOPHEN 650 MG/1
650 SUPPOSITORY RECTAL EVERY 6 HOURS PRN
Status: DISCONTINUED | OUTPATIENT
Start: 2025-08-13 | End: 2025-08-18 | Stop reason: HOSPADM

## 2025-08-13 RX ORDER — POTASSIUM CHLORIDE 1500 MG/1
40 TABLET, EXTENDED RELEASE ORAL PRN
Status: DISCONTINUED | OUTPATIENT
Start: 2025-08-13 | End: 2025-08-18 | Stop reason: HOSPADM

## 2025-08-13 RX ORDER — OXYBUTYNIN CHLORIDE 5 MG/1
5 TABLET ORAL 3 TIMES DAILY
Status: DISCONTINUED | OUTPATIENT
Start: 2025-08-13 | End: 2025-08-18 | Stop reason: HOSPADM

## 2025-08-13 RX ORDER — GLUCAGON 1 MG/ML
1 KIT INJECTION PRN
Status: DISCONTINUED | OUTPATIENT
Start: 2025-08-13 | End: 2025-08-18 | Stop reason: HOSPADM

## 2025-08-13 RX ORDER — POLYETHYLENE GLYCOL 3350 17 G/17G
17 POWDER, FOR SOLUTION ORAL DAILY PRN
Status: DISCONTINUED | OUTPATIENT
Start: 2025-08-13 | End: 2025-08-18 | Stop reason: HOSPADM

## 2025-08-13 RX ORDER — SODIUM CHLORIDE 9 MG/ML
INJECTION, SOLUTION INTRAVENOUS PRN
Status: DISCONTINUED | OUTPATIENT
Start: 2025-08-13 | End: 2025-08-18 | Stop reason: HOSPADM

## 2025-08-13 RX ADMIN — SODIUM CHLORIDE 250 ML: 0.9 INJECTION, SOLUTION INTRAVENOUS at 23:05

## 2025-08-13 RX ADMIN — LIDOCAINE HYDROCHLORIDE 1000 MG: 10 INJECTION, SOLUTION EPIDURAL; INFILTRATION; INTRACAUDAL; PERINEURAL at 18:44

## 2025-08-13 RX ADMIN — SODIUM CHLORIDE 1250 MG: 0.9 INJECTION, SOLUTION INTRAVENOUS at 22:04

## 2025-08-13 RX ADMIN — SODIUM CHLORIDE 250 ML: 0.9 INJECTION, SOLUTION INTRAVENOUS at 20:56

## 2025-08-13 ASSESSMENT — PAIN - FUNCTIONAL ASSESSMENT: PAIN_FUNCTIONAL_ASSESSMENT: 0-10

## 2025-08-13 ASSESSMENT — PAIN SCALES - GENERAL: PAINLEVEL_OUTOF10: 0

## 2025-08-14 LAB
ABO + RH BLD: NORMAL
ALBUMIN SERPL-MCNC: 1.4 G/DL (ref 3.5–5)
ALBUMIN/GLOB SERPL: 0.2 (ref 1.1–2.2)
ALP SERPL-CCNC: 119 U/L (ref 45–117)
ALT SERPL-CCNC: 6 U/L (ref 12–78)
ANION GAP SERPL CALC-SCNC: 8 MMOL/L (ref 2–12)
APPEARANCE UR: CLEAR
AST SERPL W P-5'-P-CCNC: 12 U/L (ref 15–37)
BACTERIA URNS QL MICRO: NEGATIVE /HPF
BASOPHILS # BLD: 0.05 K/UL (ref 0–0.1)
BASOPHILS NFR BLD: 0.3 % (ref 0–1)
BILIRUB SERPL-MCNC: 0.3 MG/DL (ref 0.2–1)
BILIRUB UR QL: NEGATIVE
BLOOD GROUP ANTIBODIES SERPL: NEGATIVE
BUN SERPL-MCNC: 9 MG/DL (ref 6–20)
BUN/CREAT SERPL: 11 (ref 12–20)
CA-I BLD-MCNC: 8.5 MG/DL (ref 8.5–10.1)
CHLORIDE SERPL-SCNC: 108 MMOL/L (ref 97–108)
CO2 SERPL-SCNC: 25 MMOL/L (ref 21–32)
COLOR UR: NORMAL
CREAT SERPL-MCNC: 0.79 MG/DL (ref 0.55–1.02)
DATE LAST DOSE: NORMAL
DIFFERENTIAL METHOD BLD: ABNORMAL
DOSE AMOUNT: NORMAL UNITS
EKG ATRIAL RATE: 103 BPM
EKG DIAGNOSIS: NORMAL
EKG P AXIS: 57 DEGREES
EKG P-R INTERVAL: 120 MS
EKG Q-T INTERVAL: 334 MS
EKG QRS DURATION: 64 MS
EKG QTC CALCULATION (BAZETT): 437 MS
EKG R AXIS: 58 DEGREES
EKG T AXIS: 35 DEGREES
EKG VENTRICULAR RATE: 103 BPM
EOSINOPHIL # BLD: 0.24 K/UL (ref 0–0.4)
EOSINOPHIL NFR BLD: 1.5 % (ref 0–7)
EPITH CASTS URNS QL MICRO: NORMAL /LPF
ERYTHROCYTE [DISTWIDTH] IN BLOOD BY AUTOMATED COUNT: 19.6 % (ref 11.5–14.5)
ERYTHROCYTE [DISTWIDTH] IN BLOOD BY AUTOMATED COUNT: 19.7 % (ref 11.5–14.5)
EST. AVERAGE GLUCOSE BLD GHB EST-MCNC: 137 MG/DL
GLOBULIN SER CALC-MCNC: 6.4 G/DL (ref 2–4)
GLUCOSE BLD STRIP.AUTO-MCNC: 107 MG/DL (ref 65–100)
GLUCOSE BLD STRIP.AUTO-MCNC: 108 MG/DL (ref 65–100)
GLUCOSE BLD STRIP.AUTO-MCNC: 121 MG/DL (ref 65–100)
GLUCOSE BLD STRIP.AUTO-MCNC: 123 MG/DL (ref 65–100)
GLUCOSE BLD STRIP.AUTO-MCNC: 91 MG/DL (ref 65–100)
GLUCOSE SERPL-MCNC: 116 MG/DL (ref 65–100)
GLUCOSE UR STRIP.AUTO-MCNC: NEGATIVE MG/DL
HBA1C MFR BLD: 6.4 % (ref 4–5.6)
HCT VFR BLD AUTO: 23.1 % (ref 35–47)
HCT VFR BLD AUTO: 24.5 % (ref 35–47)
HGB BLD-MCNC: 6.9 G/DL (ref 11.5–16)
HGB BLD-MCNC: 7.1 G/DL (ref 11.5–16)
HGB UR QL STRIP: NEGATIVE
IMM GRANULOCYTES # BLD AUTO: 0.08 K/UL (ref 0–0.04)
IMM GRANULOCYTES NFR BLD AUTO: 0.5 % (ref 0–0.5)
KETONES UR QL STRIP.AUTO: NEGATIVE MG/DL
LEUKOCYTE ESTERASE UR QL STRIP.AUTO: NEGATIVE
LYMPHOCYTES # BLD: 1.39 K/UL (ref 0.8–3.5)
LYMPHOCYTES NFR BLD: 8.7 % (ref 12–49)
MCH RBC QN AUTO: 22 PG (ref 26–34)
MCH RBC QN AUTO: 22 PG (ref 26–34)
MCHC RBC AUTO-ENTMCNC: 29 G/DL (ref 30–36.5)
MCHC RBC AUTO-ENTMCNC: 29.9 G/DL (ref 30–36.5)
MCV RBC AUTO: 73.6 FL (ref 80–99)
MCV RBC AUTO: 76.1 FL (ref 80–99)
MONOCYTES # BLD: 0.88 K/UL (ref 0–1)
MONOCYTES NFR BLD: 5.5 % (ref 5–13)
NEUTS SEG # BLD: 13.36 K/UL (ref 1.8–8)
NEUTS SEG NFR BLD: 83.5 % (ref 32–75)
NITRITE UR QL STRIP.AUTO: NEGATIVE
NRBC # BLD: 0 K/UL (ref 0–0.01)
NRBC # BLD: 0 K/UL (ref 0–0.01)
NRBC BLD-RTO: 0 PER 100 WBC
NRBC BLD-RTO: 0 PER 100 WBC
PERFORMED BY:: ABNORMAL
PERFORMED BY:: NORMAL
PH UR STRIP: 7 (ref 5–8)
PLATELET # BLD AUTO: 544 K/UL (ref 150–400)
PLATELET # BLD AUTO: 580 K/UL (ref 150–400)
PMV BLD AUTO: 9.1 FL (ref 8.9–12.9)
PMV BLD AUTO: 9.3 FL (ref 8.9–12.9)
POTASSIUM SERPL-SCNC: 4.2 MMOL/L (ref 3.5–5.1)
PROT SERPL-MCNC: 7.8 G/DL (ref 6.4–8.2)
PROT UR STRIP-MCNC: NEGATIVE MG/DL
RBC # BLD AUTO: 3.14 M/UL (ref 3.8–5.2)
RBC # BLD AUTO: 3.22 M/UL (ref 3.8–5.2)
RBC #/AREA URNS HPF: NORMAL /HPF (ref 0–5)
RBC MORPH BLD: ABNORMAL
RBC MORPH BLD: ABNORMAL
SODIUM SERPL-SCNC: 141 MMOL/L (ref 136–145)
SP GR UR REFRACTOMETRY: 1.02 (ref 1–1.03)
SPECIMEN EXP DATE BLD: NORMAL
URINE CULTURE IF INDICATED: NORMAL
UROBILINOGEN UR QL STRIP.AUTO: 0.1 EU/DL (ref 0.1–1)
VANCOMYCIN SERPL-MCNC: 11 UG/ML
WBC # BLD AUTO: 15.3 K/UL (ref 3.6–11)
WBC # BLD AUTO: 16 K/UL (ref 3.6–11)
WBC URNS QL MICRO: NORMAL /HPF (ref 0–4)

## 2025-08-14 PROCEDURE — 80202 ASSAY OF VANCOMYCIN: CPT

## 2025-08-14 PROCEDURE — 87186 SC STD MICRODIL/AGAR DIL: CPT

## 2025-08-14 PROCEDURE — 1100000000 HC RM PRIVATE

## 2025-08-14 PROCEDURE — 85027 COMPLETE CBC AUTOMATED: CPT

## 2025-08-14 PROCEDURE — 87070 CULTURE OTHR SPECIMN AEROBIC: CPT

## 2025-08-14 PROCEDURE — 2580000003 HC RX 258: Performed by: FAMILY MEDICINE

## 2025-08-14 PROCEDURE — 6360000002 HC RX W HCPCS: Performed by: FAMILY MEDICINE

## 2025-08-14 PROCEDURE — 93010 ELECTROCARDIOGRAM REPORT: CPT | Performed by: SPECIALIST

## 2025-08-14 PROCEDURE — 86850 RBC ANTIBODY SCREEN: CPT

## 2025-08-14 PROCEDURE — 87077 CULTURE AEROBIC IDENTIFY: CPT

## 2025-08-14 PROCEDURE — 80053 COMPREHEN METABOLIC PANEL: CPT

## 2025-08-14 PROCEDURE — 83036 HEMOGLOBIN GLYCOSYLATED A1C: CPT

## 2025-08-14 PROCEDURE — 85025 COMPLETE CBC W/AUTO DIFF WBC: CPT

## 2025-08-14 PROCEDURE — 2500000003 HC RX 250 WO HCPCS: Performed by: FAMILY MEDICINE

## 2025-08-14 PROCEDURE — 92610 EVALUATE SWALLOWING FUNCTION: CPT

## 2025-08-14 PROCEDURE — 36415 COLL VENOUS BLD VENIPUNCTURE: CPT

## 2025-08-14 PROCEDURE — 86901 BLOOD TYPING SEROLOGIC RH(D): CPT

## 2025-08-14 PROCEDURE — 86900 BLOOD TYPING SEROLOGIC ABO: CPT

## 2025-08-14 PROCEDURE — 6370000000 HC RX 637 (ALT 250 FOR IP): Performed by: INTERNAL MEDICINE

## 2025-08-14 PROCEDURE — 87205 SMEAR GRAM STAIN: CPT

## 2025-08-14 PROCEDURE — 82962 GLUCOSE BLOOD TEST: CPT

## 2025-08-14 PROCEDURE — 87147 CULTURE TYPE IMMUNOLOGIC: CPT

## 2025-08-14 PROCEDURE — 6370000000 HC RX 637 (ALT 250 FOR IP): Performed by: FAMILY MEDICINE

## 2025-08-14 PROCEDURE — 99223 1ST HOSP IP/OBS HIGH 75: CPT | Performed by: INTERNAL MEDICINE

## 2025-08-14 RX ORDER — NYSTATIN 100000 [USP'U]/ML
5 SUSPENSION ORAL 4 TIMES DAILY
Status: DISCONTINUED | OUTPATIENT
Start: 2025-08-14 | End: 2025-08-18 | Stop reason: HOSPADM

## 2025-08-14 RX ORDER — SODIUM CHLORIDE 9 MG/ML
INJECTION, SOLUTION INTRAVENOUS PRN
Status: DISCONTINUED | OUTPATIENT
Start: 2025-08-14 | End: 2025-08-18 | Stop reason: HOSPADM

## 2025-08-14 RX ADMIN — OXYBUTYNIN CHLORIDE 5 MG: 5 TABLET ORAL at 20:37

## 2025-08-14 RX ADMIN — VANCOMYCIN HYDROCHLORIDE 1000 MG: 1 INJECTION, POWDER, LYOPHILIZED, FOR SOLUTION INTRAVENOUS at 18:57

## 2025-08-14 RX ADMIN — NYSTATIN 500000 UNITS: 100000 SUSPENSION ORAL at 20:37

## 2025-08-14 RX ADMIN — NYSTATIN 500000 UNITS: 100000 SUSPENSION ORAL at 18:57

## 2025-08-14 RX ADMIN — MEROPENEM 1000 MG: 1 INJECTION INTRAVENOUS at 00:59

## 2025-08-14 RX ADMIN — SODIUM CHLORIDE, PRESERVATIVE FREE 10 ML: 5 INJECTION INTRAVENOUS at 10:22

## 2025-08-14 RX ADMIN — ENOXAPARIN SODIUM 30 MG: 100 INJECTION SUBCUTANEOUS at 10:22

## 2025-08-14 RX ADMIN — OXYBUTYNIN CHLORIDE 5 MG: 5 TABLET ORAL at 13:34

## 2025-08-14 RX ADMIN — MEROPENEM 1000 MG: 1 INJECTION INTRAVENOUS at 13:33

## 2025-08-14 RX ADMIN — SODIUM CHLORIDE, PRESERVATIVE FREE 10 ML: 5 INJECTION INTRAVENOUS at 00:01

## 2025-08-14 RX ADMIN — NYSTATIN 500000 UNITS: 100000 SUSPENSION ORAL at 13:34

## 2025-08-14 RX ADMIN — SODIUM CHLORIDE: 0.9 INJECTION, SOLUTION INTRAVENOUS at 13:31

## 2025-08-14 RX ADMIN — SODIUM CHLORIDE: 0.9 INJECTION, SOLUTION INTRAVENOUS at 00:01

## 2025-08-14 ASSESSMENT — PAIN SCALES - GENERAL
PAINLEVEL_OUTOF10: 0

## 2025-08-15 ENCOUNTER — ANESTHESIA EVENT (OUTPATIENT)
Facility: HOSPITAL | Age: 80
End: 2025-08-15
Payer: MEDICARE

## 2025-08-15 ENCOUNTER — ANESTHESIA (OUTPATIENT)
Facility: HOSPITAL | Age: 80
End: 2025-08-15
Payer: MEDICARE

## 2025-08-15 PROBLEM — R13.10 DYSPHAGIA: Status: ACTIVE | Noted: 2025-08-15

## 2025-08-15 PROBLEM — T14.8XXA OPEN WOUND: Status: ACTIVE | Noted: 2025-08-15

## 2025-08-15 PROBLEM — B37.0 THRUSH: Status: ACTIVE | Noted: 2025-08-15

## 2025-08-15 LAB
ALBUMIN SERPL-MCNC: 1.4 G/DL (ref 3.5–5)
ALBUMIN/GLOB SERPL: 0.2 (ref 1.1–2.2)
ALP SERPL-CCNC: 116 U/L (ref 45–117)
ALT SERPL-CCNC: 7 U/L (ref 12–78)
ANION GAP SERPL CALC-SCNC: 9 MMOL/L (ref 2–12)
AST SERPL W P-5'-P-CCNC: 16 U/L (ref 15–37)
BASOPHILS # BLD: 0.05 K/UL (ref 0–0.1)
BASOPHILS NFR BLD: 0.4 % (ref 0–1)
BILIRUB SERPL-MCNC: 0.3 MG/DL (ref 0.2–1)
BUN SERPL-MCNC: 5 MG/DL (ref 6–20)
BUN/CREAT SERPL: 8 (ref 12–20)
CA-I BLD-MCNC: 8.9 MG/DL (ref 8.5–10.1)
CHLORIDE SERPL-SCNC: 111 MMOL/L (ref 97–108)
CO2 SERPL-SCNC: 20 MMOL/L (ref 21–32)
CREAT SERPL-MCNC: 0.6 MG/DL (ref 0.55–1.02)
CRP SERPL-MCNC: 10.2 MG/DL (ref 0–0.3)
DIFFERENTIAL METHOD BLD: ABNORMAL
EOSINOPHIL # BLD: 0.22 K/UL (ref 0–0.4)
EOSINOPHIL NFR BLD: 1.9 % (ref 0–7)
ERYTHROCYTE [DISTWIDTH] IN BLOOD BY AUTOMATED COUNT: 19.7 % (ref 11.5–14.5)
GLOBULIN SER CALC-MCNC: 6.4 G/DL (ref 2–4)
GLUCOSE BLD STRIP.AUTO-MCNC: 154 MG/DL (ref 65–100)
GLUCOSE BLD STRIP.AUTO-MCNC: 83 MG/DL (ref 65–100)
GLUCOSE BLD STRIP.AUTO-MCNC: 85 MG/DL (ref 65–100)
GLUCOSE BLD STRIP.AUTO-MCNC: 91 MG/DL (ref 65–100)
GLUCOSE SERPL-MCNC: 89 MG/DL (ref 65–100)
HCT VFR BLD AUTO: 24.1 % (ref 35–47)
HGB BLD-MCNC: 7 G/DL (ref 11.5–16)
IMM GRANULOCYTES # BLD AUTO: 0.05 K/UL (ref 0–0.04)
IMM GRANULOCYTES NFR BLD AUTO: 0.4 % (ref 0–0.5)
LYMPHOCYTES # BLD: 1.46 K/UL (ref 0.8–3.5)
LYMPHOCYTES NFR BLD: 12.9 % (ref 12–49)
MCH RBC QN AUTO: 21.9 PG (ref 26–34)
MCHC RBC AUTO-ENTMCNC: 29 G/DL (ref 30–36.5)
MCV RBC AUTO: 75.3 FL (ref 80–99)
MONOCYTES # BLD: 0.5 K/UL (ref 0–1)
MONOCYTES NFR BLD: 4.4 % (ref 5–13)
NEUTS SEG # BLD: 9.08 K/UL (ref 1.8–8)
NEUTS SEG NFR BLD: 80 % (ref 32–75)
NRBC # BLD: 0 K/UL (ref 0–0.01)
NRBC BLD-RTO: 0 PER 100 WBC
PERFORMED BY:: ABNORMAL
PERFORMED BY:: NORMAL
PLATELET # BLD AUTO: 584 K/UL (ref 150–400)
PMV BLD AUTO: 9.2 FL (ref 8.9–12.9)
POTASSIUM SERPL-SCNC: 3.8 MMOL/L (ref 3.5–5.1)
PROCALCITONIN SERPL-MCNC: 0.05 NG/ML
PROT SERPL-MCNC: 7.8 G/DL (ref 6.4–8.2)
RBC # BLD AUTO: 3.2 M/UL (ref 3.8–5.2)
SODIUM SERPL-SCNC: 140 MMOL/L (ref 136–145)
WBC # BLD AUTO: 11.4 K/UL (ref 3.6–11)

## 2025-08-15 PROCEDURE — 2580000003 HC RX 258: Performed by: FAMILY MEDICINE

## 2025-08-15 PROCEDURE — 88305 TISSUE EXAM BY PATHOLOGIST: CPT

## 2025-08-15 PROCEDURE — 85025 COMPLETE CBC W/AUTO DIFF WBC: CPT

## 2025-08-15 PROCEDURE — 3600007512: Performed by: INTERNAL MEDICINE

## 2025-08-15 PROCEDURE — 6370000000 HC RX 637 (ALT 250 FOR IP): Performed by: INTERNAL MEDICINE

## 2025-08-15 PROCEDURE — 6360000002 HC RX W HCPCS: Performed by: FAMILY MEDICINE

## 2025-08-15 PROCEDURE — 6360000002 HC RX W HCPCS: Performed by: INTERNAL MEDICINE

## 2025-08-15 PROCEDURE — 36415 COLL VENOUS BLD VENIPUNCTURE: CPT

## 2025-08-15 PROCEDURE — 82962 GLUCOSE BLOOD TEST: CPT

## 2025-08-15 PROCEDURE — 7100000011 HC PHASE II RECOVERY - ADDTL 15 MIN: Performed by: INTERNAL MEDICINE

## 2025-08-15 PROCEDURE — 3700000000 HC ANESTHESIA ATTENDED CARE: Performed by: INTERNAL MEDICINE

## 2025-08-15 PROCEDURE — 1100000000 HC RM PRIVATE

## 2025-08-15 PROCEDURE — 6360000002 HC RX W HCPCS

## 2025-08-15 PROCEDURE — 7100000010 HC PHASE II RECOVERY - FIRST 15 MIN: Performed by: INTERNAL MEDICINE

## 2025-08-15 PROCEDURE — 92526 ORAL FUNCTION THERAPY: CPT

## 2025-08-15 PROCEDURE — 84145 PROCALCITONIN (PCT): CPT

## 2025-08-15 PROCEDURE — 2500000003 HC RX 250 WO HCPCS: Performed by: FAMILY MEDICINE

## 2025-08-15 PROCEDURE — 80053 COMPREHEN METABOLIC PANEL: CPT

## 2025-08-15 PROCEDURE — 86140 C-REACTIVE PROTEIN: CPT

## 2025-08-15 PROCEDURE — 3600007502: Performed by: INTERNAL MEDICINE

## 2025-08-15 PROCEDURE — 2709999900 HC NON-CHARGEABLE SUPPLY: Performed by: INTERNAL MEDICINE

## 2025-08-15 PROCEDURE — 6370000000 HC RX 637 (ALT 250 FOR IP): Performed by: FAMILY MEDICINE

## 2025-08-15 PROCEDURE — 0DB38ZX EXCISION OF LOWER ESOPHAGUS, VIA NATURAL OR ARTIFICIAL OPENING ENDOSCOPIC, DIAGNOSTIC: ICD-10-PCS | Performed by: INTERNAL MEDICINE

## 2025-08-15 PROCEDURE — 99232 SBSQ HOSP IP/OBS MODERATE 35: CPT | Performed by: INTERNAL MEDICINE

## 2025-08-15 PROCEDURE — 3700000001 HC ADD 15 MINUTES (ANESTHESIA): Performed by: INTERNAL MEDICINE

## 2025-08-15 PROCEDURE — 2580000003 HC RX 258

## 2025-08-15 RX ORDER — LIDOCAINE HYDROCHLORIDE 20 MG/ML
INJECTION, SOLUTION EPIDURAL; INFILTRATION; INTRACAUDAL; PERINEURAL
Status: DISCONTINUED | OUTPATIENT
Start: 2025-08-15 | End: 2025-08-15 | Stop reason: SDUPTHER

## 2025-08-15 RX ORDER — FLUCONAZOLE 2 MG/ML
400 INJECTION, SOLUTION INTRAVENOUS EVERY 24 HOURS
Status: DISCONTINUED | OUTPATIENT
Start: 2025-08-15 | End: 2025-08-16

## 2025-08-15 RX ORDER — MEGESTROL ACETATE 40 MG/ML
40 SUSPENSION ORAL DAILY
Status: DISCONTINUED | OUTPATIENT
Start: 2025-08-15 | End: 2025-08-18 | Stop reason: HOSPADM

## 2025-08-15 RX ORDER — PHENYLEPHRINE HCL IN 0.9% NACL 1 MG/10 ML
SYRINGE (ML) INTRAVENOUS
Status: DISCONTINUED | OUTPATIENT
Start: 2025-08-15 | End: 2025-08-15 | Stop reason: SDUPTHER

## 2025-08-15 RX ORDER — SODIUM CHLORIDE, SODIUM LACTATE, POTASSIUM CHLORIDE, CALCIUM CHLORIDE 600; 310; 30; 20 MG/100ML; MG/100ML; MG/100ML; MG/100ML
INJECTION, SOLUTION INTRAVENOUS
Status: DISCONTINUED | OUTPATIENT
Start: 2025-08-15 | End: 2025-08-15 | Stop reason: SDUPTHER

## 2025-08-15 RX ORDER — PROPOFOL 10 MG/ML
INJECTION, EMULSION INTRAVENOUS
Status: DISCONTINUED | OUTPATIENT
Start: 2025-08-15 | End: 2025-08-15 | Stop reason: SDUPTHER

## 2025-08-15 RX ADMIN — PROPOFOL 40 MG: 10 INJECTION, EMULSION INTRAVENOUS at 11:18

## 2025-08-15 RX ADMIN — SODIUM CHLORIDE, PRESERVATIVE FREE 10 ML: 5 INJECTION INTRAVENOUS at 12:41

## 2025-08-15 RX ADMIN — NYSTATIN 500000 UNITS: 100000 SUSPENSION ORAL at 12:33

## 2025-08-15 RX ADMIN — MEROPENEM 1000 MG: 1 INJECTION INTRAVENOUS at 00:00

## 2025-08-15 RX ADMIN — OXYBUTYNIN CHLORIDE 5 MG: 5 TABLET ORAL at 15:14

## 2025-08-15 RX ADMIN — FLUCONAZOLE 400 MG: 2 INJECTION, SOLUTION INTRAVENOUS at 12:38

## 2025-08-15 RX ADMIN — VANCOMYCIN HYDROCHLORIDE 1000 MG: 1 INJECTION, POWDER, LYOPHILIZED, FOR SOLUTION INTRAVENOUS at 16:59

## 2025-08-15 RX ADMIN — SODIUM CHLORIDE, PRESERVATIVE FREE 10 ML: 5 INJECTION INTRAVENOUS at 12:42

## 2025-08-15 RX ADMIN — MEGESTROL ACETATE 40 MG: 400 SUSPENSION ORAL at 15:14

## 2025-08-15 RX ADMIN — NYSTATIN 500000 UNITS: 100000 SUSPENSION ORAL at 19:57

## 2025-08-15 RX ADMIN — NYSTATIN 500000 UNITS: 100000 SUSPENSION ORAL at 16:59

## 2025-08-15 RX ADMIN — SODIUM CHLORIDE, POTASSIUM CHLORIDE, SODIUM LACTATE AND CALCIUM CHLORIDE: 600; 310; 30; 20 INJECTION, SOLUTION INTRAVENOUS at 11:14

## 2025-08-15 RX ADMIN — OXYBUTYNIN CHLORIDE 5 MG: 5 TABLET ORAL at 19:57

## 2025-08-15 RX ADMIN — PROPOFOL 50 MG: 10 INJECTION, EMULSION INTRAVENOUS at 11:14

## 2025-08-15 RX ADMIN — ACETAMINOPHEN 650 MG: 325 TABLET ORAL at 19:57

## 2025-08-15 RX ADMIN — OXYBUTYNIN CHLORIDE 5 MG: 5 TABLET ORAL at 12:34

## 2025-08-15 RX ADMIN — Medication 100 MCG: at 11:18

## 2025-08-15 RX ADMIN — FERROUS SULFATE TAB 325 MG (65 MG ELEMENTAL FE) 325 MG: 325 (65 FE) TAB at 12:49

## 2025-08-15 RX ADMIN — LIDOCAINE HYDROCHLORIDE 40 MG: 20 SOLUTION INTRAVENOUS at 11:14

## 2025-08-15 RX ADMIN — MEROPENEM 1000 MG: 1 INJECTION INTRAVENOUS at 12:57

## 2025-08-15 RX ADMIN — ATORVASTATIN CALCIUM 80 MG: 40 TABLET, FILM COATED ORAL at 12:48

## 2025-08-15 ASSESSMENT — PAIN DESCRIPTION - LOCATION: LOCATION: GENERALIZED

## 2025-08-15 ASSESSMENT — PAIN SCALES - GENERAL
PAINLEVEL_OUTOF10: 3
PAINLEVEL_OUTOF10: 0

## 2025-08-15 ASSESSMENT — PAIN SCALES - WONG BAKER
WONGBAKER_NUMERICALRESPONSE: NO HURT

## 2025-08-15 ASSESSMENT — PAIN - FUNCTIONAL ASSESSMENT
PAIN_FUNCTIONAL_ASSESSMENT: 0-10
PAIN_FUNCTIONAL_ASSESSMENT: 0-10

## 2025-08-15 ASSESSMENT — PAIN DESCRIPTION - DESCRIPTORS: DESCRIPTORS: ACHING

## 2025-08-16 LAB
ALBUMIN SERPL-MCNC: 1.3 G/DL (ref 3.5–5)
ALBUMIN/GLOB SERPL: 0.2 (ref 1.1–2.2)
ALP SERPL-CCNC: 105 U/L (ref 45–117)
ALT SERPL-CCNC: 6 U/L (ref 12–78)
ANION GAP SERPL CALC-SCNC: 5 MMOL/L (ref 2–12)
AST SERPL W P-5'-P-CCNC: 14 U/L (ref 15–37)
BASOPHILS # BLD: 0.05 K/UL (ref 0–0.1)
BASOPHILS NFR BLD: 0.4 % (ref 0–1)
BILIRUB SERPL-MCNC: 0.2 MG/DL (ref 0.2–1)
BUN SERPL-MCNC: 6 MG/DL (ref 6–20)
BUN/CREAT SERPL: 9 (ref 12–20)
CA-I BLD-MCNC: 8.6 MG/DL (ref 8.5–10.1)
CHLORIDE SERPL-SCNC: 113 MMOL/L (ref 97–108)
CO2 SERPL-SCNC: 22 MMOL/L (ref 21–32)
CREAT SERPL-MCNC: 0.68 MG/DL (ref 0.55–1.02)
CRP SERPL-MCNC: 7.09 MG/DL (ref 0–0.3)
DATE LAST DOSE: ABNORMAL
DIFFERENTIAL METHOD BLD: ABNORMAL
DOSE AMOUNT: ABNORMAL UNITS
EOSINOPHIL # BLD: 0.22 K/UL (ref 0–0.4)
EOSINOPHIL NFR BLD: 1.8 % (ref 0–7)
ERYTHROCYTE [DISTWIDTH] IN BLOOD BY AUTOMATED COUNT: 19.8 % (ref 11.5–14.5)
GLOBULIN SER CALC-MCNC: 5.3 G/DL (ref 2–4)
GLUCOSE BLD STRIP.AUTO-MCNC: 121 MG/DL (ref 65–100)
GLUCOSE BLD STRIP.AUTO-MCNC: 151 MG/DL (ref 65–100)
GLUCOSE BLD STRIP.AUTO-MCNC: 95 MG/DL (ref 65–100)
GLUCOSE BLD STRIP.AUTO-MCNC: 95 MG/DL (ref 65–100)
GLUCOSE SERPL-MCNC: 141 MG/DL (ref 65–100)
HCT VFR BLD AUTO: 23.2 % (ref 35–47)
HGB BLD-MCNC: 6.8 G/DL (ref 11.5–16)
IMM GRANULOCYTES # BLD AUTO: 0.06 K/UL (ref 0–0.04)
IMM GRANULOCYTES NFR BLD AUTO: 0.5 % (ref 0–0.5)
LYMPHOCYTES # BLD: 1.31 K/UL (ref 0.8–3.5)
LYMPHOCYTES NFR BLD: 10.8 % (ref 12–49)
MCH RBC QN AUTO: 22.1 PG (ref 26–34)
MCHC RBC AUTO-ENTMCNC: 29.3 G/DL (ref 30–36.5)
MCV RBC AUTO: 75.6 FL (ref 80–99)
MONOCYTES # BLD: 0.6 K/UL (ref 0–1)
MONOCYTES NFR BLD: 5 % (ref 5–13)
NEUTS SEG # BLD: 9.87 K/UL (ref 1.8–8)
NEUTS SEG NFR BLD: 81.5 % (ref 32–75)
NRBC # BLD: 0 K/UL (ref 0–0.01)
NRBC BLD-RTO: 0 PER 100 WBC
PERFORMED BY:: ABNORMAL
PERFORMED BY:: ABNORMAL
PERFORMED BY:: NORMAL
PERFORMED BY:: NORMAL
PLATELET # BLD AUTO: 539 K/UL (ref 150–400)
PMV BLD AUTO: 8.8 FL (ref 8.9–12.9)
POTASSIUM SERPL-SCNC: 3.5 MMOL/L (ref 3.5–5.1)
PROCALCITONIN SERPL-MCNC: 0.09 NG/ML
PROT SERPL-MCNC: 6.6 G/DL (ref 6.4–8.2)
RBC # BLD AUTO: 3.07 M/UL (ref 3.8–5.2)
SODIUM SERPL-SCNC: 140 MMOL/L (ref 136–145)
VANCOMYCIN TROUGH SERPL-MCNC: 11.9 UG/ML (ref 5–10)
WBC # BLD AUTO: 12.1 K/UL (ref 3.6–11)

## 2025-08-16 PROCEDURE — 82962 GLUCOSE BLOOD TEST: CPT

## 2025-08-16 PROCEDURE — 6360000002 HC RX W HCPCS: Performed by: FAMILY MEDICINE

## 2025-08-16 PROCEDURE — 80202 ASSAY OF VANCOMYCIN: CPT

## 2025-08-16 PROCEDURE — 36415 COLL VENOUS BLD VENIPUNCTURE: CPT

## 2025-08-16 PROCEDURE — 85025 COMPLETE CBC W/AUTO DIFF WBC: CPT

## 2025-08-16 PROCEDURE — 86140 C-REACTIVE PROTEIN: CPT

## 2025-08-16 PROCEDURE — 99232 SBSQ HOSP IP/OBS MODERATE 35: CPT | Performed by: INTERNAL MEDICINE

## 2025-08-16 PROCEDURE — 05H933Z INSERTION OF INFUSION DEVICE INTO RIGHT BRACHIAL VEIN, PERCUTANEOUS APPROACH: ICD-10-PCS | Performed by: STUDENT IN AN ORGANIZED HEALTH CARE EDUCATION/TRAINING PROGRAM

## 2025-08-16 PROCEDURE — 2580000003 HC RX 258: Performed by: FAMILY MEDICINE

## 2025-08-16 PROCEDURE — 2500000003 HC RX 250 WO HCPCS: Performed by: FAMILY MEDICINE

## 2025-08-16 PROCEDURE — 84145 PROCALCITONIN (PCT): CPT

## 2025-08-16 PROCEDURE — 6370000000 HC RX 637 (ALT 250 FOR IP): Performed by: FAMILY MEDICINE

## 2025-08-16 PROCEDURE — 80053 COMPREHEN METABOLIC PANEL: CPT

## 2025-08-16 PROCEDURE — 6370000000 HC RX 637 (ALT 250 FOR IP): Performed by: INTERNAL MEDICINE

## 2025-08-16 PROCEDURE — 36410 VNPNXR 3YR/> PHY/QHP DX/THER: CPT

## 2025-08-16 PROCEDURE — 1100000000 HC RM PRIVATE

## 2025-08-16 RX ADMIN — OXYBUTYNIN CHLORIDE 5 MG: 5 TABLET ORAL at 13:43

## 2025-08-16 RX ADMIN — NYSTATIN 500000 UNITS: 100000 SUSPENSION ORAL at 13:43

## 2025-08-16 RX ADMIN — MEROPENEM 1000 MG: 1 INJECTION INTRAVENOUS at 12:05

## 2025-08-16 RX ADMIN — ATORVASTATIN CALCIUM 80 MG: 40 TABLET, FILM COATED ORAL at 09:52

## 2025-08-16 RX ADMIN — SODIUM CHLORIDE, PRESERVATIVE FREE 10 ML: 5 INJECTION INTRAVENOUS at 09:52

## 2025-08-16 RX ADMIN — NYSTATIN 500000 UNITS: 100000 SUSPENSION ORAL at 21:01

## 2025-08-16 RX ADMIN — SODIUM CHLORIDE, PRESERVATIVE FREE 10 ML: 5 INJECTION INTRAVENOUS at 21:02

## 2025-08-16 RX ADMIN — MEROPENEM 1000 MG: 1 INJECTION INTRAVENOUS at 01:06

## 2025-08-16 RX ADMIN — VANCOMYCIN HYDROCHLORIDE 1000 MG: 1 INJECTION, POWDER, LYOPHILIZED, FOR SOLUTION INTRAVENOUS at 18:26

## 2025-08-16 RX ADMIN — OXYBUTYNIN CHLORIDE 5 MG: 5 TABLET ORAL at 21:01

## 2025-08-16 RX ADMIN — NYSTATIN 500000 UNITS: 100000 SUSPENSION ORAL at 18:23

## 2025-08-16 RX ADMIN — FERROUS SULFATE TAB 325 MG (65 MG ELEMENTAL FE) 325 MG: 325 (65 FE) TAB at 09:52

## 2025-08-16 RX ADMIN — MEGESTROL ACETATE 40 MG: 400 SUSPENSION ORAL at 09:51

## 2025-08-16 RX ADMIN — NYSTATIN 500000 UNITS: 100000 SUSPENSION ORAL at 09:52

## 2025-08-16 RX ADMIN — OXYBUTYNIN CHLORIDE 5 MG: 5 TABLET ORAL at 09:51

## 2025-08-16 RX ADMIN — SODIUM CHLORIDE: 0.9 INJECTION, SOLUTION INTRAVENOUS at 05:10

## 2025-08-16 ASSESSMENT — PAIN SCALES - GENERAL
PAINLEVEL_OUTOF10: 0
PAINLEVEL_OUTOF10: 0

## 2025-08-17 LAB
ALBUMIN SERPL-MCNC: 1.2 G/DL (ref 3.5–5)
ALBUMIN/GLOB SERPL: 0.2 (ref 1.1–2.2)
ALP SERPL-CCNC: 91 U/L (ref 45–117)
ALT SERPL-CCNC: 7 U/L (ref 12–78)
ANION GAP SERPL CALC-SCNC: 5 MMOL/L (ref 2–12)
AST SERPL W P-5'-P-CCNC: 13 U/L (ref 15–37)
BACTERIA SPEC CULT: ABNORMAL
BACTERIA SPEC CULT: ABNORMAL
BILIRUB SERPL-MCNC: 0.2 MG/DL (ref 0.2–1)
BUN SERPL-MCNC: 5 MG/DL (ref 6–20)
BUN/CREAT SERPL: 9 (ref 12–20)
CA-I BLD-MCNC: 8.4 MG/DL (ref 8.5–10.1)
CHLORIDE SERPL-SCNC: 114 MMOL/L (ref 97–108)
CO2 SERPL-SCNC: 23 MMOL/L (ref 21–32)
CREAT SERPL-MCNC: 0.57 MG/DL (ref 0.55–1.02)
GLOBULIN SER CALC-MCNC: 4.9 G/DL (ref 2–4)
GLUCOSE BLD STRIP.AUTO-MCNC: 107 MG/DL (ref 65–100)
GLUCOSE BLD STRIP.AUTO-MCNC: 92 MG/DL (ref 65–100)
GLUCOSE BLD STRIP.AUTO-MCNC: 94 MG/DL (ref 65–100)
GLUCOSE BLD STRIP.AUTO-MCNC: 98 MG/DL (ref 65–100)
GLUCOSE SERPL-MCNC: 94 MG/DL (ref 65–100)
GRAM STN SPEC: ABNORMAL
GRAM STN SPEC: ABNORMAL
Lab: ABNORMAL
PERFORMED BY:: ABNORMAL
PERFORMED BY:: NORMAL
POTASSIUM SERPL-SCNC: 3.4 MMOL/L (ref 3.5–5.1)
PROT SERPL-MCNC: 6.1 G/DL (ref 6.4–8.2)
SODIUM SERPL-SCNC: 142 MMOL/L (ref 136–145)

## 2025-08-17 PROCEDURE — 80053 COMPREHEN METABOLIC PANEL: CPT

## 2025-08-17 PROCEDURE — 2580000003 HC RX 258: Performed by: INTERNAL MEDICINE

## 2025-08-17 PROCEDURE — 1100000000 HC RM PRIVATE

## 2025-08-17 PROCEDURE — 99232 SBSQ HOSP IP/OBS MODERATE 35: CPT | Performed by: INTERNAL MEDICINE

## 2025-08-17 PROCEDURE — 2580000003 HC RX 258: Performed by: FAMILY MEDICINE

## 2025-08-17 PROCEDURE — 2500000003 HC RX 250 WO HCPCS: Performed by: FAMILY MEDICINE

## 2025-08-17 PROCEDURE — 82962 GLUCOSE BLOOD TEST: CPT

## 2025-08-17 PROCEDURE — 6370000000 HC RX 637 (ALT 250 FOR IP): Performed by: INTERNAL MEDICINE

## 2025-08-17 PROCEDURE — 6370000000 HC RX 637 (ALT 250 FOR IP): Performed by: FAMILY MEDICINE

## 2025-08-17 PROCEDURE — 6360000002 HC RX W HCPCS: Performed by: FAMILY MEDICINE

## 2025-08-17 PROCEDURE — 36415 COLL VENOUS BLD VENIPUNCTURE: CPT

## 2025-08-17 PROCEDURE — 6360000002 HC RX W HCPCS: Performed by: INTERNAL MEDICINE

## 2025-08-17 RX ORDER — ENOXAPARIN SODIUM 100 MG/ML
40 INJECTION SUBCUTANEOUS DAILY
Status: DISCONTINUED | OUTPATIENT
Start: 2025-08-17 | End: 2025-08-18 | Stop reason: HOSPADM

## 2025-08-17 RX ADMIN — SODIUM CHLORIDE, PRESERVATIVE FREE 10 ML: 5 INJECTION INTRAVENOUS at 21:57

## 2025-08-17 RX ADMIN — ENOXAPARIN SODIUM 40 MG: 100 INJECTION SUBCUTANEOUS at 15:35

## 2025-08-17 RX ADMIN — MEGESTROL ACETATE 40 MG: 400 SUSPENSION ORAL at 09:34

## 2025-08-17 RX ADMIN — OXYBUTYNIN CHLORIDE 5 MG: 5 TABLET ORAL at 15:35

## 2025-08-17 RX ADMIN — ATORVASTATIN CALCIUM 80 MG: 40 TABLET, FILM COATED ORAL at 09:36

## 2025-08-17 RX ADMIN — MEROPENEM 1000 MG: 1 INJECTION INTRAVENOUS at 22:02

## 2025-08-17 RX ADMIN — MEROPENEM 1000 MG: 1 INJECTION INTRAVENOUS at 12:59

## 2025-08-17 RX ADMIN — MEROPENEM 1000 MG: 1 INJECTION INTRAVENOUS at 00:24

## 2025-08-17 RX ADMIN — SODIUM CHLORIDE: 0.9 INJECTION, SOLUTION INTRAVENOUS at 18:49

## 2025-08-17 RX ADMIN — NYSTATIN 500000 UNITS: 100000 SUSPENSION ORAL at 21:57

## 2025-08-17 RX ADMIN — VANCOMYCIN HYDROCHLORIDE 1000 MG: 1 INJECTION, POWDER, LYOPHILIZED, FOR SOLUTION INTRAVENOUS at 18:48

## 2025-08-17 RX ADMIN — NYSTATIN 500000 UNITS: 100000 SUSPENSION ORAL at 09:37

## 2025-08-17 RX ADMIN — FERROUS SULFATE TAB 325 MG (65 MG ELEMENTAL FE) 325 MG: 325 (65 FE) TAB at 09:36

## 2025-08-17 RX ADMIN — NYSTATIN 500000 UNITS: 100000 SUSPENSION ORAL at 12:57

## 2025-08-17 RX ADMIN — NYSTATIN 500000 UNITS: 100000 SUSPENSION ORAL at 18:47

## 2025-08-17 RX ADMIN — OXYBUTYNIN CHLORIDE 5 MG: 5 TABLET ORAL at 09:36

## 2025-08-17 RX ADMIN — POTASSIUM BICARBONATE 40 MEQ: 782 TABLET, EFFERVESCENT ORAL at 09:35

## 2025-08-17 RX ADMIN — OXYBUTYNIN CHLORIDE 5 MG: 5 TABLET ORAL at 21:57

## 2025-08-17 ASSESSMENT — PAIN SCALES - GENERAL
PAINLEVEL_OUTOF10: 0

## 2025-08-18 VITALS
HEART RATE: 78 BPM | TEMPERATURE: 98.1 F | SYSTOLIC BLOOD PRESSURE: 119 MMHG | WEIGHT: 115.08 LBS | RESPIRATION RATE: 16 BRPM | OXYGEN SATURATION: 98 % | HEIGHT: 67 IN | BODY MASS INDEX: 18.06 KG/M2 | DIASTOLIC BLOOD PRESSURE: 58 MMHG

## 2025-08-18 LAB
ALBUMIN SERPL-MCNC: 1.3 G/DL (ref 3.5–5)
ALBUMIN/GLOB SERPL: 0.2 (ref 1.1–2.2)
ALP SERPL-CCNC: 104 U/L (ref 45–117)
ALT SERPL-CCNC: 8 U/L (ref 12–78)
ANION GAP SERPL CALC-SCNC: 5 MMOL/L (ref 2–12)
AST SERPL W P-5'-P-CCNC: 17 U/L (ref 15–37)
BACTERIA SPEC CULT: ABNORMAL
BACTERIA SPEC CULT: ABNORMAL
BILIRUB SERPL-MCNC: 0.2 MG/DL (ref 0.2–1)
BUN SERPL-MCNC: 6 MG/DL (ref 6–20)
BUN/CREAT SERPL: 10 (ref 12–20)
CA-I BLD-MCNC: 8.9 MG/DL (ref 8.5–10.1)
CHLORIDE SERPL-SCNC: 113 MMOL/L (ref 97–108)
CO2 SERPL-SCNC: 25 MMOL/L (ref 21–32)
CREAT SERPL-MCNC: 0.6 MG/DL (ref 0.55–1.02)
CRP SERPL-MCNC: 3.39 MG/DL (ref 0–0.3)
ERYTHROCYTE [DISTWIDTH] IN BLOOD BY AUTOMATED COUNT: 20.7 % (ref 11.5–14.5)
GLOBULIN SER CALC-MCNC: 5.6 G/DL (ref 2–4)
GLUCOSE BLD STRIP.AUTO-MCNC: 104 MG/DL (ref 65–100)
GLUCOSE BLD STRIP.AUTO-MCNC: 117 MG/DL (ref 65–100)
GLUCOSE BLD STRIP.AUTO-MCNC: 92 MG/DL (ref 65–100)
GLUCOSE SERPL-MCNC: 97 MG/DL (ref 65–100)
GRAM STN SPEC: ABNORMAL
GRAM STN SPEC: ABNORMAL
HCT VFR BLD AUTO: 24.8 % (ref 35–47)
HGB BLD-MCNC: 7.4 G/DL (ref 11.5–16)
Lab: ABNORMAL
MCH RBC QN AUTO: 22.5 PG (ref 26–34)
MCHC RBC AUTO-ENTMCNC: 29.8 G/DL (ref 30–36.5)
MCV RBC AUTO: 75.4 FL (ref 80–99)
NRBC # BLD: 0 K/UL (ref 0–0.01)
NRBC BLD-RTO: 0 PER 100 WBC
PERFORMED BY:: ABNORMAL
PERFORMED BY:: ABNORMAL
PERFORMED BY:: NORMAL
PLATELET # BLD AUTO: 458 K/UL (ref 150–400)
PMV BLD AUTO: 9.8 FL (ref 8.9–12.9)
POTASSIUM SERPL-SCNC: 3.6 MMOL/L (ref 3.5–5.1)
PROCALCITONIN SERPL-MCNC: <0.05 NG/ML
PROT SERPL-MCNC: 6.9 G/DL (ref 6.4–8.2)
RBC # BLD AUTO: 3.29 M/UL (ref 3.8–5.2)
SODIUM SERPL-SCNC: 143 MMOL/L (ref 136–145)
WBC # BLD AUTO: 10.4 K/UL (ref 3.6–11)

## 2025-08-18 PROCEDURE — 82962 GLUCOSE BLOOD TEST: CPT

## 2025-08-18 PROCEDURE — 2580000003 HC RX 258: Performed by: INTERNAL MEDICINE

## 2025-08-18 PROCEDURE — 2W2RX4Z DRESSING OF LEFT LOWER LEG USING BANDAGE: ICD-10-PCS | Performed by: PHYSICIAN ASSISTANT

## 2025-08-18 PROCEDURE — 6370000000 HC RX 637 (ALT 250 FOR IP): Performed by: INTERNAL MEDICINE

## 2025-08-18 PROCEDURE — 2500000003 HC RX 250 WO HCPCS: Performed by: FAMILY MEDICINE

## 2025-08-18 PROCEDURE — 99232 SBSQ HOSP IP/OBS MODERATE 35: CPT | Performed by: INTERNAL MEDICINE

## 2025-08-18 PROCEDURE — 84145 PROCALCITONIN (PCT): CPT

## 2025-08-18 PROCEDURE — 97165 OT EVAL LOW COMPLEX 30 MIN: CPT

## 2025-08-18 PROCEDURE — 86140 C-REACTIVE PROTEIN: CPT

## 2025-08-18 PROCEDURE — 94761 N-INVAS EAR/PLS OXIMETRY MLT: CPT

## 2025-08-18 PROCEDURE — 97530 THERAPEUTIC ACTIVITIES: CPT

## 2025-08-18 PROCEDURE — 6370000000 HC RX 637 (ALT 250 FOR IP): Performed by: FAMILY MEDICINE

## 2025-08-18 PROCEDURE — 36415 COLL VENOUS BLD VENIPUNCTURE: CPT

## 2025-08-18 PROCEDURE — 80053 COMPREHEN METABOLIC PANEL: CPT

## 2025-08-18 PROCEDURE — 6360000002 HC RX W HCPCS: Performed by: INTERNAL MEDICINE

## 2025-08-18 PROCEDURE — 97161 PT EVAL LOW COMPLEX 20 MIN: CPT

## 2025-08-18 PROCEDURE — 85027 COMPLETE CBC AUTOMATED: CPT

## 2025-08-18 RX ORDER — LINEZOLID 600 MG/1
600 TABLET, FILM COATED ORAL 2 TIMES DAILY
Qty: 28 TABLET | Refills: 0 | Status: SHIPPED | OUTPATIENT
Start: 2025-08-18 | End: 2025-09-01

## 2025-08-18 RX ADMIN — ATORVASTATIN CALCIUM 80 MG: 40 TABLET, FILM COATED ORAL at 10:58

## 2025-08-18 RX ADMIN — MEGESTROL ACETATE 40 MG: 400 SUSPENSION ORAL at 10:58

## 2025-08-18 RX ADMIN — ENOXAPARIN SODIUM 40 MG: 100 INJECTION SUBCUTANEOUS at 10:57

## 2025-08-18 RX ADMIN — MEROPENEM 1000 MG: 1 INJECTION INTRAVENOUS at 15:03

## 2025-08-18 RX ADMIN — NYSTATIN 500000 UNITS: 100000 SUSPENSION ORAL at 10:57

## 2025-08-18 RX ADMIN — OXYBUTYNIN CHLORIDE 5 MG: 5 TABLET ORAL at 10:58

## 2025-08-18 RX ADMIN — MEROPENEM 1000 MG: 1 INJECTION INTRAVENOUS at 04:37

## 2025-08-18 RX ADMIN — SODIUM CHLORIDE, PRESERVATIVE FREE 10 ML: 5 INJECTION INTRAVENOUS at 10:58

## 2025-08-18 RX ADMIN — NYSTATIN 500000 UNITS: 100000 SUSPENSION ORAL at 15:02

## 2025-08-18 RX ADMIN — NYSTATIN 500000 UNITS: 100000 SUSPENSION ORAL at 17:14

## 2025-08-18 RX ADMIN — OXYBUTYNIN CHLORIDE 5 MG: 5 TABLET ORAL at 15:02

## 2025-08-18 RX ADMIN — FERROUS SULFATE TAB 325 MG (65 MG ELEMENTAL FE) 325 MG: 325 (65 FE) TAB at 10:58

## 2025-08-18 ASSESSMENT — PAIN SCALES - GENERAL: PAINLEVEL_OUTOF10: 0

## 2025-08-19 ENCOUNTER — TELEPHONE (OUTPATIENT)
Facility: CLINIC | Age: 80
End: 2025-08-19

## 2025-08-19 LAB
BACTERIA SPEC CULT: ABNORMAL
BACTERIA SPEC CULT: ABNORMAL
GRAM STN SPEC: ABNORMAL
GRAM STN SPEC: ABNORMAL
Lab: ABNORMAL

## 2025-08-20 LAB
BACTERIA SPEC CULT: NORMAL
BACTERIA SPEC CULT: NORMAL
Lab: NORMAL
Lab: NORMAL

## 2025-08-29 ENCOUNTER — HOSPITAL ENCOUNTER (OUTPATIENT)
Facility: HOSPITAL | Age: 80
Discharge: HOME | End: 2025-08-29
Attending: PHYSICIAN ASSISTANT
Payer: MEDICARE

## 2025-08-29 VITALS
HEART RATE: 62 BPM | TEMPERATURE: 97.3 F | SYSTOLIC BLOOD PRESSURE: 125 MMHG | RESPIRATION RATE: 18 BRPM | DIASTOLIC BLOOD PRESSURE: 67 MMHG

## 2025-08-29 DIAGNOSIS — T14.8XXA WOUND INFECTION: ICD-10-CM

## 2025-08-29 DIAGNOSIS — S41.101D OPEN WOUND OF RIGHT AXILLARY REGION, SUBSEQUENT ENCOUNTER: ICD-10-CM

## 2025-08-29 DIAGNOSIS — L73.2 HIDRADENITIS SUPPURATIVA: ICD-10-CM

## 2025-08-29 DIAGNOSIS — E11.40 NEUROPATHY DUE TO TYPE 2 DIABETES MELLITUS (HCC): ICD-10-CM

## 2025-08-29 DIAGNOSIS — S41.102D OPEN WOUND OF LEFT AXILLARY REGION, SUBSEQUENT ENCOUNTER: ICD-10-CM

## 2025-08-29 DIAGNOSIS — L89.323 PRESSURE ULCER OF ISCHIUM, LEFT, STAGE III (HCC): ICD-10-CM

## 2025-08-29 DIAGNOSIS — L89.153 PRESSURE INJURY OF SACRAL REGION, STAGE 3 (HCC): Primary | ICD-10-CM

## 2025-08-29 DIAGNOSIS — L08.9 WOUND INFECTION: ICD-10-CM

## 2025-08-29 DIAGNOSIS — L73.2 HIDRADENITIS SUPPURATIVA OF ANUS: ICD-10-CM

## 2025-08-29 PROCEDURE — 99214 OFFICE O/P EST MOD 30 MIN: CPT

## 2025-08-29 RX ORDER — GINSENG 100 MG
CAPSULE ORAL PRN
OUTPATIENT
Start: 2025-08-29

## 2025-08-29 RX ORDER — LIDOCAINE 50 MG/G
OINTMENT TOPICAL PRN
OUTPATIENT
Start: 2025-08-29

## 2025-08-29 RX ORDER — CLOBETASOL PROPIONATE 0.5 MG/G
OINTMENT TOPICAL PRN
OUTPATIENT
Start: 2025-08-29

## 2025-08-29 RX ORDER — GENTAMICIN SULFATE 1 MG/G
OINTMENT TOPICAL PRN
OUTPATIENT
Start: 2025-08-29

## 2025-08-29 RX ORDER — LIDOCAINE HYDROCHLORIDE 40 MG/ML
SOLUTION TOPICAL PRN
OUTPATIENT
Start: 2025-08-29

## 2025-08-29 RX ORDER — LIDOCAINE 40 MG/G
CREAM TOPICAL PRN
OUTPATIENT
Start: 2025-08-29

## 2025-08-29 RX ORDER — NEOMYCIN/BACITRACIN/POLYMYXINB 3.5-400-5K
OINTMENT (GRAM) TOPICAL PRN
OUTPATIENT
Start: 2025-08-29

## 2025-08-29 RX ORDER — BACITRACIN ZINC AND POLYMYXIN B SULFATE 500; 1000 [USP'U]/G; [USP'U]/G
OINTMENT TOPICAL PRN
OUTPATIENT
Start: 2025-08-29

## 2025-08-29 RX ORDER — LIDOCAINE HYDROCHLORIDE 20 MG/ML
JELLY TOPICAL PRN
OUTPATIENT
Start: 2025-08-29

## 2025-08-29 RX ORDER — BETAMETHASONE DIPROPIONATE 0.5 MG/G
CREAM TOPICAL PRN
OUTPATIENT
Start: 2025-08-29

## 2025-08-29 RX ORDER — SILVER SULFADIAZINE 10 MG/G
CREAM TOPICAL PRN
OUTPATIENT
Start: 2025-08-29

## 2025-08-29 RX ORDER — SODIUM CHLOR/HYPOCHLOROUS ACID 0.033 %
SOLUTION, IRRIGATION IRRIGATION PRN
OUTPATIENT
Start: 2025-08-29

## 2025-08-29 RX ORDER — MUPIROCIN 2 %
OINTMENT (GRAM) TOPICAL PRN
OUTPATIENT
Start: 2025-08-29

## 2025-08-29 RX ORDER — TRIAMCINOLONE ACETONIDE 1 MG/G
OINTMENT TOPICAL PRN
OUTPATIENT
Start: 2025-08-29

## 2025-08-29 ASSESSMENT — PAIN SCALES - GENERAL: PAINLEVEL_OUTOF10: 0

## 2025-09-05 ENCOUNTER — TELEPHONE (OUTPATIENT)
Facility: CLINIC | Age: 80
End: 2025-09-05

## (undated) DEVICE — PROCEDURE KIT BX00717111

## (undated) DEVICE — MASK ANES INF SZ 2 PREM TAIL VLV INFL PRT UNSCENTED SGL PT

## (undated) DEVICE — BITE BLOCK ENDOSCP AD 60 FR W/ ADJ STRP PLAS GRN BLOX

## (undated) DEVICE — MOUTHPIECE ENDOSCP L CTRL OPN AND SIDE PORTS DISP

## (undated) DEVICE — KIT COLON WITH 1.1 OZ ORCA HYDRA SEAL 2 GOWN ADAPT SECONDARY

## (undated) DEVICE — FORCEPS BX L240CM JAW DIA2.4MM ORNG L CAP W/ NDL DISP RAD

## (undated) DEVICE — CANNULA NSL O2 AD 7 FT END-TIDAL CARBON DIOX VENTFLO

## (undated) DEVICE — CANNULA NASAL ADULT 10FT ETCO2/CO2 VENTFLO

## (undated) DEVICE — ENDOSCOPIC KIT 1.1+ DE BOWL

## (undated) DEVICE — LINE SAMPLING ADVANCE ORAL NASAL MICROSTREAM O2 TUBING 6.5'

## (undated) DEVICE — FORCEPS BX L240CM JAW DIA2.8MM L CAP W/ NDL MIC MESH TOOTH

## (undated) DEVICE — Device

## (undated) DEVICE — FORCEPS BX 240CM 2.4MM L NDL RAD JAW 4 M00513334

## (undated) DEVICE — THE ENDO CARRY-ON PROCEDURE KIT CONTAINS ALL OF THE SUPPLIES AND INFECTION PREVENTION PRODUCTS NEEDED FOR ENDOSCOPIC PROCEDURES: Brand: ENDO CARRY-ON PROCEDURE KIT